# Patient Record
Sex: MALE | Race: WHITE | NOT HISPANIC OR LATINO | Employment: STUDENT | URBAN - METROPOLITAN AREA
[De-identification: names, ages, dates, MRNs, and addresses within clinical notes are randomized per-mention and may not be internally consistent; named-entity substitution may affect disease eponyms.]

---
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
## 2024-01-12 ENCOUNTER — HOSPITAL ENCOUNTER (EMERGENCY)
Facility: HOSPITAL | Age: 19
Discharge: HOME | End: 2024-01-12
Attending: EMERGENCY MEDICINE
Payer: COMMERCIAL

## 2024-01-12 VITALS
HEART RATE: 103 BPM | RESPIRATION RATE: 22 BRPM | SYSTOLIC BLOOD PRESSURE: 144 MMHG | TEMPERATURE: 98 F | DIASTOLIC BLOOD PRESSURE: 101 MMHG | BODY MASS INDEX: 52.5 KG/M2 | WEIGHT: 315 LBS | OXYGEN SATURATION: 96 %

## 2024-01-12 DIAGNOSIS — R11.2 NAUSEA VOMITING AND DIARRHEA: Primary | ICD-10-CM

## 2024-01-12 DIAGNOSIS — R10.13 EPIGASTRIC ABDOMINAL PAIN: ICD-10-CM

## 2024-01-12 DIAGNOSIS — R19.7 NAUSEA VOMITING AND DIARRHEA: Primary | ICD-10-CM

## 2024-01-12 PROCEDURE — 99284 EMERGENCY DEPT VISIT MOD MDM: CPT | Performed by: EMERGENCY MEDICINE

## 2024-01-12 PROCEDURE — 99284 EMERGENCY DEPT VISIT MOD MDM: CPT

## 2024-01-12 RX ORDER — ONDANSETRON 4 MG/1
4 TABLET, ORALLY DISINTEGRATING ORAL ONCE
Status: COMPLETED | OUTPATIENT
Start: 2024-01-12 | End: 2024-01-12

## 2024-01-12 RX ORDER — DICYCLOMINE HCL 20 MG
20 TABLET ORAL 2 TIMES DAILY
Qty: 20 TABLET | Refills: 0 | Status: SHIPPED | OUTPATIENT
Start: 2024-01-12

## 2024-01-12 RX ORDER — DICYCLOMINE HYDROCHLORIDE 10 MG/1
20 CAPSULE ORAL ONCE
Status: COMPLETED | OUTPATIENT
Start: 2024-01-12 | End: 2024-01-12

## 2024-01-12 RX ORDER — ONDANSETRON 4 MG/1
4 TABLET, FILM COATED ORAL EVERY 6 HOURS
Qty: 12 TABLET | Refills: 0 | Status: SHIPPED | OUTPATIENT
Start: 2024-01-12

## 2024-01-12 RX ADMIN — DICYCLOMINE HYDROCHLORIDE 20 MG: 10 CAPSULE ORAL at 14:29

## 2024-01-12 RX ADMIN — ONDANSETRON 4 MG: 4 TABLET, ORALLY DISINTEGRATING ORAL at 14:29

## 2024-01-12 NOTE — DISCHARGE INSTRUCTIONS
We have prescribed Bentyl for crampy abdominal pain, Zofran for nausea.  If you have any severe worsening of abdominal pain, persistent nausea and vomiting and are not able to tolerate fluids, have significant blood in your stool, return to the emergency department.  If you are still having a lot of diarrhea in the next 2 to 3 days, we have provided a prescription for stool studies which you can collect and take to the lab facility to check for common infectious causes.  Follow-up with your primary care provider in the next 1 to 2 weeks for reassessment.

## 2024-01-12 NOTE — ED PROVIDER NOTES
History  Chief Complaint   Patient presents with    Abdominal Pain     Mid abd pain for three days. Assoc with vomiting and diarrhea, gone now     HPI  Patient is a 18-year-old male presenting for evaluation of approximately 3 days of nausea, vomiting, diarrhea.  Patient stating that he has been having several episodes of nonbloody nonbilious emesis per day as well as numerous episodes of loose nonbloody nonmelanotic stool.  Patient states several episodes of stool incontinence.  Patient feels like he has a bowel movement every time he tries to eat something.  Patient denies headache, rhinorrhea, cough, sore throat, myalgias, fevers, chills, recent travel or sick contacts.  Patient states that his abdominal pain is totally resolved at time of evaluation in the emergency department.  Patient denies recent antibiotic use.  Prior to Admission Medications   Prescriptions Last Dose Informant Patient Reported? Taking?   Abilify Maintena 400 MG injection   Yes No   Sig: INJECT 2 ML INTRAMUSCUALRLY EVERY 4 WEEKS AS DIRECTED   Blood Glucose Monitoring Suppl (OneTouch Verio) w/Device KIT   No No   Sig: Use 2 (two) times a day Ok to substitute with insurance covered brand   Lancets (onetouch ultrasoft) lancets   No No   Sig: Use as instructed   buPROPion (WELLBUTRIN) 100 mg tablet  Family Member Yes No   Sig: Take 100 mg by mouth 2 (two) times a day   busPIRone (BUSPAR) 5 mg tablet   Yes No   Sig: Take 5 mg by mouth 2 (two) times a day   fenofibrate (TRICOR) 54 MG tablet   No No   Sig: Take 1 tablet (54 mg total) by mouth daily   glucose blood (OneTouch Verio) test strip   No No   Sig: TEST BLOOD SUGAR TWICE A DAY   metFORMIN (GLUCOPHAGE-XR) 500 mg 24 hr tablet   No No   Sig: Take 1 tablet (500 mg total) by mouth 2 (two) times a day with meals Start with 1 pill 500mg with breakfast x 1 week, then 1 pill 500mg with breakfast and 1 pill with dinner for 1 week , then 2 pills with breakfast (1000mg) and 1 pill with dinner (500mg)  for 1 week, then 1000mg twice a day going forward   omega-3-acid ethyl esters (LOVAZA) 1 g capsule   No No   Sig: Take 2 capsules (2 g total) by mouth 2 (two) times a day   Patient not taking: Reported on 10/17/2023   sitaGLIPtin (JANUVIA) 50 mg tablet   No No   Sig: Take 1 tablet (50 mg total) by mouth daily   topiramate (TOPAMAX) 50 MG tablet   Yes No   Sig: Take 50 mg by mouth 2 (two) times a day      Facility-Administered Medications: None       Past Medical History:   Diagnosis Date    ADHD (attention deficit hyperactivity disorder)     Diabetes mellitus (HCC)     5/2023    Lung collapse     Sleep apnea     Viral meningitis        Past Surgical History:   Procedure Laterality Date    TONSILECTOMY AND ADNOIDECTOMY      2020       Family History   Problem Relation Age of Onset    Hyperlipidemia Mother     Diabetes type II Mother     Obesity Mother     Obesity Father     Drug abuse Father     Diabetes type II Maternal Aunt     Lung disease Maternal Aunt     Rheum arthritis Maternal Aunt     Fibromyalgia Maternal Aunt     Atrial fibrillation Maternal Grandmother     Hyperlipidemia Maternal Grandfather     Diabetes type II Maternal Grandfather     Stroke Maternal Grandfather     Heart disease Maternal Grandfather     Stroke Paternal Grandfather      I have reviewed and agree with the history as documented.    E-Cigarette/Vaping    E-Cigarette Use Current Some Day User     Comments smoke cigg. when he has money      E-Cigarette/Vaping Substances    Nicotine No     THC No     CBD No     Flavoring Yes     Other No     Unknown No      Social History     Tobacco Use    Smoking status: Some Days     Types: Cigarettes     Passive exposure: Yes    Smokeless tobacco: Current   Vaping Use    Vaping status: Some Days    Substances: Flavoring   Substance Use Topics    Alcohol use: Not Currently     Alcohol/week: 2.0 standard drinks of alcohol     Types: 2 Shots of liquor per week    Drug use: Yes     Types: Marijuana        Review of Systems   Constitutional:  Negative for chills, fatigue and fever.   Respiratory:  Negative for cough and shortness of breath.    Gastrointestinal:  Positive for abdominal pain, diarrhea, nausea and vomiting.   Genitourinary:  Negative for decreased urine volume.   Musculoskeletal:  Negative for arthralgias and myalgias.   Neurological:  Negative for headaches.   Psychiatric/Behavioral:  Negative for confusion.    All other systems reviewed and are negative.      Physical Exam  Physical Exam  Vitals and nursing note reviewed.   Constitutional:       General: He is not in acute distress.     Appearance: Normal appearance. He is not ill-appearing, toxic-appearing or diaphoretic.      Comments: Well-appearing, nontoxic, nondistressed   HENT:      Head: Normocephalic and atraumatic.      Comments: Moist mucous membranes     Right Ear: External ear normal.      Left Ear: External ear normal.   Eyes:      General:         Right eye: No discharge.         Left eye: No discharge.   Cardiovascular:      Comments: Regular rate and rhythm, no murmurs rubs or gallops.  Extremities warm and well-perfused without mottling.  Pulmonary:      Effort: No respiratory distress.      Comments: No increased work of breathing.  Speaking in complete sentences.  Lungs clear to auscultation bilaterally without wheezes, rales, rhonchi.  Satting 98% on room air indicating adequate oxygenation.  Abdominal:      General: There is no distension.      Comments: Abdomen obese, protuberant, but nontender without rigidity, rebound, guarding   Musculoskeletal:         General: No deformity.      Cervical back: Normal range of motion.   Skin:     Findings: No lesion or rash.   Neurological:      Mental Status: He is alert and oriented to person, place, and time. Mental status is at baseline.      Comments: Awake, alert, pleasant, interactive   Psychiatric:         Mood and Affect: Mood and affect normal.         Vital Signs  ED Triage  Vitals [01/12/24 1320]   Temperature Pulse Respirations Blood Pressure SpO2   98 °F (36.7 °C) 103 22 (!) 144/101 96 %      Temp Source Heart Rate Source Patient Position - Orthostatic VS BP Location FiO2 (%)   Tympanic Monitor Sitting Right arm --      Pain Score       No Pain           Vitals:    01/12/24 1320   BP: (!) 144/101   Pulse: 103   Patient Position - Orthostatic VS: Sitting         Visual Acuity      ED Medications  Medications   dicyclomine (BENTYL) capsule 20 mg (has no administration in time range)   ondansetron (ZOFRAN-ODT) dispersible tablet 4 mg (has no administration in time range)       Diagnostic Studies  Results Reviewed       None                   No orders to display              Procedures  Procedures         ED Course                                             Medical Decision Making  I obtained history from the patient.  Patient well-appearing with normal vital signs, no external signs of dehydration, benign abdominal examination.  Patient's nausea, vomiting, diarrhea all consistent with a viral gastroenteritis.  Provided with prescription for Bentyl for crampy abdominal pain, Zofran for nausea.  Provided with prescriptions for stool studies if he continues to have diarrhea for more than the next few days.  Instructed to continue oral hydration, discharged with return precautions.    Amount and/or Complexity of Data Reviewed  Labs: ordered.    Risk  Prescription drug management.             Disposition  Final diagnoses:   Nausea vomiting and diarrhea   Epigastric abdominal pain     Time reflects when diagnosis was documented in both MDM as applicable and the Disposition within this note       Time User Action Codes Description Comment    1/12/2024  2:22 PM Dylan Peres Add [R11.2,  R19.7] Nausea vomiting and diarrhea     1/12/2024  2:22 PM Dylan Peres Add [R10.13] Epigastric abdominal pain           ED Disposition       ED Disposition   Discharge    Condition   Stable     Date/Time   Fri Jan 12, 2024  2:22 PM    Comment   Rik Marin discharge to home/self care.                   Follow-up Information       Follow up With Specialties Details Why Contact Info Additional Information    [facility] Emergency Department Emergency Medicine  If symptoms worsen [address] [facility] [address]  [phone] [facility] Emergency Department, [address] [place], [place], [address]            Patient's Medications   Discharge Prescriptions    DICYCLOMINE (BENTYL) 20 MG TABLET    Take 1 tablet (20 mg total) by mouth 2 (two) times a day       Start Date: 1/12/2024 End Date: --       Order Dose: 20 mg       Quantity: 20 tablet    Refills: 0    ONDANSETRON (ZOFRAN) 4 MG TABLET    Take 1 tablet (4 mg total) by mouth every 6 (six) hours       Start Date: 1/12/2024 End Date: --       Order Dose: 4 mg       Quantity: 12 tablet    Refills: 0       Outpatient Discharge Orders   Stool Enteric Bacterial Panel by PCR   Standing Status: Future Standing Exp. Date: 01/12/25     Ova and parasite examination   Standing Status: Future Standing Exp. Date: 01/12/25     Clostridium difficile toxin by PCR with EIA   Standing Status: Future Standing Exp. Date: 01/12/25       PDMP Review       None            ED Provider  Electronically Signed by             Dylan Peres MD  01/12/24 1497

## 2024-02-27 ENCOUNTER — HOSPITAL ENCOUNTER (EMERGENCY)
Facility: HOSPITAL | Age: 19
Discharge: HOME | End: 2024-02-27
Attending: EMERGENCY MEDICINE
Payer: COMMERCIAL

## 2024-02-27 VITALS
OXYGEN SATURATION: 94 % | RESPIRATION RATE: 18 BRPM | SYSTOLIC BLOOD PRESSURE: 159 MMHG | BODY MASS INDEX: 52.49 KG/M2 | DIASTOLIC BLOOD PRESSURE: 96 MMHG | TEMPERATURE: 97.1 F | HEART RATE: 108 BPM | WEIGHT: 315 LBS

## 2024-02-27 DIAGNOSIS — L02.91 ABSCESS: Primary | ICD-10-CM

## 2024-02-27 PROCEDURE — 99284 EMERGENCY DEPT VISIT MOD MDM: CPT | Performed by: EMERGENCY MEDICINE

## 2024-02-27 PROCEDURE — 99282 EMERGENCY DEPT VISIT SF MDM: CPT

## 2024-02-27 RX ORDER — SULFAMETHOXAZOLE AND TRIMETHOPRIM 800; 160 MG/1; MG/1
1 TABLET ORAL ONCE
Status: COMPLETED | OUTPATIENT
Start: 2024-02-27 | End: 2024-02-27

## 2024-02-27 RX ORDER — SULFAMETHOXAZOLE AND TRIMETHOPRIM 800; 160 MG/1; MG/1
1 TABLET ORAL 2 TIMES DAILY
Qty: 14 TABLET | Refills: 0 | Status: SHIPPED | OUTPATIENT
Start: 2024-02-27 | End: 2024-03-05

## 2024-02-27 RX ADMIN — SULFAMETHOXAZOLE AND TRIMETHOPRIM 1 TABLET: 800; 160 TABLET ORAL at 17:15

## 2024-02-28 NOTE — ED PROVIDER NOTES
History  Chief Complaint   Patient presents with    Groin Pain     States he's bleeding from groin     Patient presents via EMS from his day program for evaluation of bleeding from his groin.  Patient is unsure where the blood is coming from states he reached down and felt it when he felt something wet.      History provided by:  Patient and EMS personnel   used: No    Groin Pain  Associated symptoms: groin pain        Prior to Admission Medications   Prescriptions Last Dose Informant Patient Reported? Taking?   Abilify Maintena 400 MG injection   Yes No   Sig: INJECT 2 ML INTRAMUSCUALRLY EVERY 4 WEEKS AS DIRECTED   Blood Glucose Monitoring Suppl (OneTouch Verio) w/Device KIT   No No   Sig: Use 2 (two) times a day Ok to substitute with insurance covered brand   Lancets (onetouch ultrasoft) lancets   No No   Sig: Use as instructed   buPROPion (WELLBUTRIN) 100 mg tablet  Family Member Yes No   Sig: Take 100 mg by mouth 2 (two) times a day   busPIRone (BUSPAR) 5 mg tablet   Yes No   Sig: Take 5 mg by mouth 2 (two) times a day   dicyclomine (BENTYL) 20 mg tablet   No No   Sig: Take 1 tablet (20 mg total) by mouth 2 (two) times a day   fenofibrate (TRICOR) 54 MG tablet   No No   Sig: Take 1 tablet (54 mg total) by mouth daily   glucose blood (OneTouch Verio) test strip   No No   Sig: TEST BLOOD SUGAR TWICE A DAY   metFORMIN (GLUCOPHAGE-XR) 500 mg 24 hr tablet   No No   Sig: Take 1 tablet (500 mg total) by mouth 2 (two) times a day with meals Start with 1 pill 500mg with breakfast x 1 week, then 1 pill 500mg with breakfast and 1 pill with dinner for 1 week , then 2 pills with breakfast (1000mg) and 1 pill with dinner (500mg) for 1 week, then 1000mg twice a day going forward   omega-3-acid ethyl esters (LOVAZA) 1 g capsule   No No   Sig: Take 2 capsules (2 g total) by mouth 2 (two) times a day   Patient not taking: Reported on 10/17/2023   ondansetron (ZOFRAN) 4 mg tablet   No No   Sig: Take 1 tablet (4  mg total) by mouth every 6 (six) hours   sitaGLIPtin (JANUVIA) 50 mg tablet   No No   Sig: Take 1 tablet (50 mg total) by mouth daily   topiramate (TOPAMAX) 50 MG tablet   Yes No   Sig: Take 50 mg by mouth 2 (two) times a day      Facility-Administered Medications: None       Past Medical History:   Diagnosis Date    ADHD (attention deficit hyperactivity disorder)     Diabetes mellitus (HCC)     5/2023    Lung collapse     Sleep apnea     Viral meningitis        Past Surgical History:   Procedure Laterality Date    TONSILECTOMY AND ADNOIDECTOMY      2020       Family History   Problem Relation Age of Onset    Hyperlipidemia Mother     Diabetes type II Mother     Obesity Mother     Obesity Father     Drug abuse Father     Diabetes type II Maternal Aunt     Lung disease Maternal Aunt     Rheum arthritis Maternal Aunt     Fibromyalgia Maternal Aunt     Atrial fibrillation Maternal Grandmother     Hyperlipidemia Maternal Grandfather     Diabetes type II Maternal Grandfather     Stroke Maternal Grandfather     Heart disease Maternal Grandfather     Stroke Paternal Grandfather      I have reviewed and agree with the history as documented.    E-Cigarette/Vaping    E-Cigarette Use Current Some Day User     Comments smoke cigg. when he has money      E-Cigarette/Vaping Substances    Nicotine No     THC No     CBD No     Flavoring Yes     Other No     Unknown No      Social History     Tobacco Use    Smoking status: Some Days     Types: Cigarettes     Passive exposure: Yes    Smokeless tobacco: Current   Vaping Use    Vaping status: Some Days    Substances: Flavoring   Substance Use Topics    Alcohol use: Not Currently     Alcohol/week: 2.0 standard drinks of alcohol     Types: 2 Shots of liquor per week    Drug use: Yes     Types: Marijuana       Review of Systems   All other systems reviewed and are negative.      Physical Exam  Physical Exam  Vitals and nursing note reviewed.   Constitutional:       General: He is not in  "acute distress.     Appearance: He is obese.   Skin:         Neurological:      General: No focal deficit present.      Mental Status: He is alert and oriented to person, place, and time.         Vital Signs  ED Triage Vitals [02/27/24 1640]   Temperature Pulse Respirations Blood Pressure SpO2   (!) 97.1 °F (36.2 °C) (!) 108 18 159/96 94 %      Temp src Heart Rate Source Patient Position - Orthostatic VS BP Location FiO2 (%)   -- -- -- -- --      Pain Score       3           Vitals:    02/27/24 1640   BP: 159/96   Pulse: (!) 108         Visual Acuity      ED Medications  Medications   sulfamethoxazole-trimethoprim (BACTRIM DS) 800-160 mg per tablet 1 tablet (1 tablet Oral Given 2/27/24 1715)       Diagnostic Studies  Results Reviewed       None                   No orders to display              Procedures  Procedures         ED Course         CRAFFT      Flowsheet Row Most Recent Value   CRAFFT Initial Screen: During the past 12 months, did you:    1. Drink any alcohol (more than a few sips)?  No Filed at: 02/27/2024 [facility]   2. Smoke any marijuana or hashish No Filed at: 02/27/2024 [facility]   3. Use anything else to get high? (\"anything else\" includes illegal drugs, over the counter and prescription drugs, and things that you sniff or 'patel')? No Filed at: 02/27/2024 1640                                            Medical Decision Making  Pulse ox 94% on room air indicating adequate oxygenation.      Spontaneously ruptured abscess will start on antibiotics keep area clean warm soaks as needed return if symptoms worsen.    Risk  Prescription drug management.             Disposition  Final diagnoses:   Abscess     Time reflects when diagnosis was documented in both MDM as applicable and the Disposition within this note       Time User Action Codes Description Comment    2/27/2024  5:14 PM Amado Mercado Add [L02.91] Abscess           ED Disposition       ED Disposition   Discharge    Condition   Stable    Date/Time   Tue " Feb 27, 2024 1714    Comment   Rik Tomas discharge to home/self care.                   Follow-up Information       Follow up With Specialties Details Why Contact Info Additional Information    [facility] Emergency Department Emergency Medicine  If symptoms worsen [address] [facility] [id number] [phone] [facility] Emergency Department, [address] [place], [place], [address]    Gustavo Fajardo MD  In 4 days For wound re-check [address] [address] [phone]               Discharge Medication List as of 2/27/2024  5:15 PM        START taking these medications    Details   sulfamethoxazole-trimethoprim (BACTRIM DS) 800-160 mg per tablet Take 1 tablet by mouth 2 (two) times a day for 7 days smx-tmp DS (BACTRIM) 800-160 mg tabs (1tab q12 D10), Starting Tue 2/27/2024, Until Tue 3/5/2024, Normal           CONTINUE these medications which have NOT CHANGED    Details   Abilify Maintena 400 MG injection INJECT 2 ML INTRAMUSCUALRLY EVERY 4 WEEKS AS DIRECTED, Historical Med      Blood Glucose Monitoring Suppl (OneTouch Verio) w/Device KIT Use 2 (two) times a day Ok to substitute with insurance covered brand, Starting Fri 10/27/2023, Normal      buPROPion (WELLBUTRIN) 100 mg tablet Take 100 mg by mouth 2 (two) times a day, Historical Med      busPIRone (BUSPAR) 5 mg tablet Take 5 mg by mouth 2 (two) times a day, Starting Fri 6/30/2023, Historical Med      dicyclomine (BENTYL) 20 mg tablet Take 1 tablet (20 mg total) by mouth 2 (two) times a day, Starting Fri 1/12/2024, Normal      fenofibrate (TRICOR) 54 MG tablet Take 1 tablet (54 mg total) by mouth daily, Starting Thu 9/28/2023, Normal      glucose blood (OneTouch Verio) test strip TEST BLOOD SUGAR TWICE A DAY, Normal      Lancets (onetouch ultrasoft) lancets Use as instructed, Normal      metFORMIN (GLUCOPHAGE-XR) 500 mg 24 hr tablet Take 1 tablet (500 mg total) by mouth 2 (two)  times a day with meals Start with 1 pill 500mg with breakfast x 1 week, then 1 pill 500mg with breakfast and 1 pill with dinner for 1 week , then 2 pills with breakfast (1000mg) and 1 pill with dinner (500mg)  for 1 week, then 1000mg twice a day going forward, Starting Tue 10/17/2023, Normal      omega-3-acid ethyl esters (LOVAZA) 1 g capsule Take 2 capsules (2 g total) by mouth 2 (two) times a day, Starting Thu 9/28/2023, Normal      ondansetron (ZOFRAN) 4 mg tablet Take 1 tablet (4 mg total) by mouth every 6 (six) hours, Starting Fri 1/12/2024, Normal      sitaGLIPtin (JANUVIA) 50 mg tablet Take 1 tablet (50 mg total) by mouth daily, Starting Tue 10/17/2023, Normal      topiramate (TOPAMAX) 50 MG tablet Take 50 mg by mouth 2 (two) times a day, Starting Thu 3/10/2022, Historical Med             No discharge procedures on file.    PDMP Review       None            ED Provider  Electronically Signed by             Amado Mercado DO  02/28/24 2162

## 2024-03-04 ENCOUNTER — HOSPITAL ENCOUNTER (EMERGENCY)
Facility: HOSPITAL | Age: 19
Discharge: HOME | End: 2024-03-04
Attending: EMERGENCY MEDICINE
Payer: COMMERCIAL

## 2024-03-04 VITALS
HEART RATE: 106 BPM | DIASTOLIC BLOOD PRESSURE: 90 MMHG | TEMPERATURE: 96.9 F | WEIGHT: 315 LBS | RESPIRATION RATE: 18 BRPM | SYSTOLIC BLOOD PRESSURE: 148 MMHG | BODY MASS INDEX: 52.49 KG/M2 | OXYGEN SATURATION: 95 %

## 2024-03-04 DIAGNOSIS — Z00.8 ENCOUNTER FOR PSYCHOLOGICAL EVALUATION: ICD-10-CM

## 2024-03-04 DIAGNOSIS — F84.0 AUTISM: Primary | ICD-10-CM

## 2024-03-04 PROCEDURE — 99283 EMERGENCY DEPT VISIT LOW MDM: CPT

## 2024-03-04 PROCEDURE — 99284 EMERGENCY DEPT VISIT MOD MDM: CPT | Performed by: EMERGENCY MEDICINE

## 2024-03-04 NOTE — ED PROVIDER NOTES
"History  Chief Complaint   Patient presents with    Psychiatric Evaluation     Pt states he just needs to talk to crisis      Pt is a 19yo M who presents for psych eval.  Patient is well-known to this emergency department.  Patient was recently hospitalized and since that time has been going to a partial program Monday through Friday.  Patient missed his ride to his partial program today and therefore became upset.  Patient called crisis and they recommended he come in for further evaluation.  Patient denies any SI, HI, AH, VH.  Patient stating that he gets frustrated when he \"cannot control everything in his life\".        Prior to Admission Medications   Prescriptions Last Dose Informant Patient Reported? Taking?   Abilify Maintena 400 MG injection   Yes No   Sig: INJECT 2 ML INTRAMUSCUALRLY EVERY 4 WEEKS AS DIRECTED   Blood Glucose Monitoring Suppl (OneTouch Verio) w/Device KIT   No No   Sig: Use 2 (two) times a day Ok to substitute with insurance covered brand   Lancets (onetouch ultrasoft) lancets   No No   Sig: Use as instructed   buPROPion (WELLBUTRIN) 100 mg tablet  Family Member Yes No   Sig: Take 100 mg by mouth 2 (two) times a day   busPIRone (BUSPAR) 5 mg tablet   Yes No   Sig: Take 5 mg by mouth 2 (two) times a day   dicyclomine (BENTYL) 20 mg tablet   No No   Sig: Take 1 tablet (20 mg total) by mouth 2 (two) times a day   fenofibrate (TRICOR) 54 MG tablet   No No   Sig: Take 1 tablet (54 mg total) by mouth daily   glucose blood (OneTouch Verio) test strip   No No   Sig: TEST BLOOD SUGAR TWICE A DAY   metFORMIN (GLUCOPHAGE-XR) 500 mg 24 hr tablet   No No   Sig: Take 1 tablet (500 mg total) by mouth 2 (two) times a day with meals Start with 1 pill 500mg with breakfast x 1 week, then 1 pill 500mg with breakfast and 1 pill with dinner for 1 week , then 2 pills with breakfast (1000mg) and 1 pill with dinner (500mg) for 1 week, then 1000mg twice a day going forward   omega-3-acid ethyl esters (LOVAZA) 1 g " capsule   No No   Sig: Take 2 capsules (2 g total) by mouth 2 (two) times a day   Patient not taking: Reported on 10/17/2023   ondansetron (ZOFRAN) 4 mg tablet   No No   Sig: Take 1 tablet (4 mg total) by mouth every 6 (six) hours   sitaGLIPtin (JANUVIA) 50 mg tablet   No No   Sig: Take 1 tablet (50 mg total) by mouth daily   sulfamethoxazole-trimethoprim (BACTRIM DS) 800-160 mg per tablet   No No   Sig: Take 1 tablet by mouth 2 (two) times a day for 7 days smx-tmp DS (BACTRIM) 800-160 mg tabs (1tab q12 D10)   topiramate (TOPAMAX) 50 MG tablet   Yes No   Sig: Take 50 mg by mouth 2 (two) times a day      Facility-Administered Medications: None       Past Medical History:   Diagnosis Date    ADHD (attention deficit hyperactivity disorder)     Diabetes mellitus (HCC)     5/2023    Lung collapse     Sleep apnea     Viral meningitis        Past Surgical History:   Procedure Laterality Date    TONSILECTOMY AND ADNOIDECTOMY      2020       Family History   Problem Relation Age of Onset    Hyperlipidemia Mother     Diabetes type II Mother     Obesity Mother     Obesity Father     Drug abuse Father     Diabetes type II Maternal Aunt     Lung disease Maternal Aunt     Rheum arthritis Maternal Aunt     Fibromyalgia Maternal Aunt     Atrial fibrillation Maternal Grandmother     Hyperlipidemia Maternal Grandfather     Diabetes type II Maternal Grandfather     Stroke Maternal Grandfather     Heart disease Maternal Grandfather     Stroke Paternal Grandfather      I have reviewed and agree with the history as documented.    E-Cigarette/Vaping    E-Cigarette Use Current Some Day User     Comments smoke cigg. when he has money      E-Cigarette/Vaping Substances    Nicotine No     THC No     CBD No     Flavoring Yes     Other No     Unknown No      Social History     Tobacco Use    Smoking status: Some Days     Types: Cigarettes     Passive exposure: Yes    Smokeless tobacco: Current   Vaping Use    Vaping status: Some Days     Substances: Flavoring   Substance Use Topics    Alcohol use: Not Currently     Alcohol/week: 2.0 standard drinks of alcohol     Types: 2 Shots of liquor per week    Drug use: Yes     Types: Marijuana       Review of Systems   Psychiatric/Behavioral:  Positive for agitation.    All other systems reviewed and are negative.      Physical Exam  Physical Exam  Vitals reviewed.   Constitutional:       General: He is not in acute distress.     Appearance: He is well-developed. He is obese. He is not toxic-appearing.   HENT:      Head: Atraumatic.      Right Ear: External ear normal.      Left Ear: External ear normal.      Nose: Nose normal.   Cardiovascular:      Rate and Rhythm: Normal rate and regular rhythm.      Heart sounds: Normal heart sounds.   Pulmonary:      Effort: Pulmonary effort is normal. No respiratory distress.   Abdominal:      General: There is no distension.      Palpations: Abdomen is soft.   Musculoskeletal:         General: Normal range of motion.      Cervical back: Neck supple.   Skin:     General: Skin is warm and dry.      Capillary Refill: Capillary refill takes less than 2 seconds.   Neurological:      Mental Status: He is alert and oriented to person, place, and time.   Psychiatric:         Attention and Perception: He does not perceive auditory or visual hallucinations.         Speech: Speech normal.         Behavior: Behavior is cooperative.         Thought Content: Thought content does not include homicidal or suicidal ideation.         Vital Signs  ED Triage Vitals   Temperature Pulse Respirations Blood Pressure SpO2   03/04/24 1438 03/04/24 1439 03/04/24 1438 03/04/24 1439 03/04/24 1439   (!) 96.9 °F (36.1 °C) (!) 106 18 148/90 95 %      Temp src Heart Rate Source Patient Position - Orthostatic VS BP Location FiO2 (%)   -- -- -- -- --             Pain Score       03/04/24 1437       No Pain           Vitals:    03/04/24 1439   BP: 148/90   Pulse: (!) 106         Visual Acuity      ED  "Medications  Medications - No data to display    Diagnostic Studies  Results Reviewed       None                   No orders to display              Procedures  Procedures         ED Course  ED Course as of 03/04/24 1845   Mon Mar 04, 2024   1445 Crisis at bedside.    1503 Pt not meeting criteria for inpt. FG sending peer support to talk with pt. Will await their eval.          CRAFFT      Flowsheet Row Most Recent Value   CRAFFT Initial Screen: During the past 12 months, did you:    1. Drink any alcohol (more than a few sips)?  No Filed at: 03/04/2024 7308   2. Smoke any marijuana or hashish No Filed at: 03/04/2024 1437   3. Use anything else to get high? (\"anything else\" includes illegal drugs, over the counter and prescription drugs, and things that you sniff or 'patel')? No Filed at: 03/04/2024 3427                                            Medical Decision Making  Pt is a 17yo M who presents for psych eval.    Will plan for crisis evaluation.  See ED course for results and details.    Plan to discharge pt with f/u to PCP and partial program. Discussed returning the ED with new or worsening of symptoms. Discussed continuing home medications as prescribed. Pt expressed understanding of discharge instructions, return precautions, and medication instructions and is stable for discharge at this time. All questions were answered and pt was discharged without incident.                Disposition  Final diagnoses:   Autism   Encounter for psychological evaluation     Time reflects when diagnosis was documented in both MDM as applicable and the Disposition within this note       Time User Action Codes Description Comment    3/4/2024  3:53 PM Echo Snyder Add [F84.0] Autism     3/4/2024  3:53 PM Echo Snyder Add [Z00.8] Encounter for psychological evaluation           ED Disposition       ED Disposition   Discharge    Condition   Stable    Date/Time   Mon Mar 4, 2024 2583    Comment   Rik Marin discharge " to home/self care.                   Follow-up Information       Follow up With Specialties Details Why Contact Info    Gustavo Fajardo MD  Call  As needed [address] [id number] [phone]              Discharge Medication List as of 3/4/2024  3:54 PM        CONTINUE these medications which have NOT CHANGED    Details   Abilify Maintena 400 MG injection INJECT 2 ML INTRAMUSCUALRLY EVERY 4 WEEKS AS DIRECTED, Historical Med      Blood Glucose Monitoring Suppl (OneTouch Verio) w/Device KIT Use 2 (two) times a day Ok to substitute with insurance covered brand, Starting Fri 10/27/2023, Normal      buPROPion (WELLBUTRIN) 100 mg tablet Take 100 mg by mouth 2 (two) times a day, Historical Med      busPIRone (BUSPAR) 5 mg tablet Take 5 mg by mouth 2 (two) times a day, Starting Fri 6/30/2023, Historical Med      dicyclomine (BENTYL) 20 mg tablet Take 1 tablet (20 mg total) by mouth 2 (two) times a day, Starting Fri 1/12/2024, Normal      fenofibrate (TRICOR) 54 MG tablet Take 1 tablet (54 mg total) by mouth daily, Starting Thu 9/28/2023, Normal      glucose blood (OneTouch Verio) test strip TEST BLOOD SUGAR TWICE A DAY, Normal      Lancets (onetouch ultrasoft) lancets Use as instructed, Normal      metFORMIN (GLUCOPHAGE-XR) 500 mg 24 hr tablet Take 1 tablet (500 mg total) by mouth 2 (two) times a day with meals Start with 1 pill 500mg with breakfast x 1 week, then 1 pill 500mg with breakfast and 1 pill with dinner for 1 week , then 2 pills with breakfast (1000mg) and 1 pill with dinner (500mg)  for 1 week, then 1000mg twice a day going forward, Starting Tue 10/17/2023, Normal      omega-3-acid ethyl esters (LOVAZA) 1 g capsule Take 2 capsules (2 g total) by mouth 2 (two) times a day, Starting Thu 9/28/2023, Normal      ondansetron (ZOFRAN) 4 mg tablet Take 1 tablet (4 mg total) by mouth every 6 (six) hours, Starting Fri 1/12/2024, Normal      sitaGLIPtin (JANUVIA) 50 mg tablet Take 1 tablet (50 mg  total) by mouth daily, Starting Tue 10/17/2023, Normal      sulfamethoxazole-trimethoprim (BACTRIM DS) 800-160 mg per tablet Take 1 tablet by mouth 2 (two) times a day for 7 days smx-tmp DS (BACTRIM) 800-160 mg tabs (1tab q12 D10), Starting Tue 2/27/2024, Until Tue 3/5/2024, Normal      topiramate (TOPAMAX) 50 MG tablet Take 50 mg by mouth 2 (two) times a day, Starting u 3/10/2022, Historical Med             No discharge procedures on file.    PDMP Review       None            ED Provider  Electronically Signed by             Echo Snyder MD  03/04/24 3969

## 2024-03-04 NOTE — DISCHARGE INSTRUCTIONS
Keep any upcoming appointments.  Continue attending your partial program.   Return to the ED with new or worsening symptoms.

## 2024-03-04 NOTE — ED NOTES
SOTO/Robert was sending a [facility] peer support person (Delfino) to the ER to meet with the patient.    15:45 - SOTO/Delfino in to speak with patient - OK with d/c - ER Attending advised - patient to go visit his grandfather in ICU post d/c.
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
## 2024-03-12 ENCOUNTER — HOSPITAL ENCOUNTER (EMERGENCY)
Facility: HOSPITAL | Age: 19
Discharge: HOME | End: 2024-03-12
Attending: EMERGENCY MEDICINE
Payer: COMMERCIAL

## 2024-03-12 VITALS
WEIGHT: 315 LBS | SYSTOLIC BLOOD PRESSURE: 165 MMHG | RESPIRATION RATE: 20 BRPM | OXYGEN SATURATION: 98 % | HEIGHT: 74 IN | HEART RATE: 106 BPM | TEMPERATURE: 97.7 F | DIASTOLIC BLOOD PRESSURE: 82 MMHG | BODY MASS INDEX: 40.43 KG/M2

## 2024-03-12 DIAGNOSIS — F12.188 CANNABIS HYPEREMESIS SYNDROME CONCURRENT WITH AND DUE TO CANNABIS ABUSE (HCC): Primary | ICD-10-CM

## 2024-03-12 PROCEDURE — 96361 HYDRATE IV INFUSION ADD-ON: CPT

## 2024-03-12 PROCEDURE — 99284 EMERGENCY DEPT VISIT MOD MDM: CPT | Performed by: EMERGENCY MEDICINE

## 2024-03-12 PROCEDURE — 96374 THER/PROPH/DIAG INJ IV PUSH: CPT

## 2024-03-12 PROCEDURE — 99283 EMERGENCY DEPT VISIT LOW MDM: CPT

## 2024-03-12 RX ORDER — ONDANSETRON 4 MG/1
4 TABLET, ORALLY DISINTEGRATING ORAL EVERY 6 HOURS PRN
Qty: 12 TABLET | Refills: 0 | Status: SHIPPED | OUTPATIENT
Start: 2024-03-12

## 2024-03-12 RX ORDER — ONDANSETRON 2 MG/ML
4 INJECTION INTRAMUSCULAR; INTRAVENOUS ONCE
Status: COMPLETED | OUTPATIENT
Start: 2024-03-12 | End: 2024-03-12

## 2024-03-12 RX ADMIN — ONDANSETRON 4 MG: 2 INJECTION INTRAMUSCULAR; INTRAVENOUS at 15:28

## 2024-03-12 RX ADMIN — SODIUM CHLORIDE 1000 ML: 0.9 INJECTION, SOLUTION INTRAVENOUS at 15:29

## 2024-03-12 NOTE — ED PROVIDER NOTES
"History  Chief Complaint   Patient presents with    Vomiting     Patient inhaled marijuana and subsequently vomited. Nausea since subsided, but the patient states \"I need help, I'm stoned and I feel like crap\"     18-year-old male with past history of morbid obesity, ADHD, viral meningitis, presents to the ED for nausea and vomiting.  Earlier today patient smokes cigarette and then smoked a joint of marijuana and became nauseous.  Patient states that he did not eat breakfast this morning.  Subsequently ambulance was called and patient brought to the ED for further evaluation.  Patient has no other complaints.      History provided by:  Patient  Vomiting  Associated symptoms: no abdominal pain, no arthralgias, no chills, no cough, no fever and no sore throat        Prior to Admission Medications   Prescriptions Last Dose Informant Patient Reported? Taking?   Abilify Maintena 400 MG injection   Yes No   Sig: INJECT 2 ML INTRAMUSCUALRLY EVERY 4 WEEKS AS DIRECTED   Blood Glucose Monitoring Suppl (OneTouch Verio) w/Device KIT   No No   Sig: Use 2 (two) times a day Ok to substitute with insurance covered brand   Lancets (onetouch ultrasoft) lancets   No No   Sig: Use as instructed   buPROPion (WELLBUTRIN) 100 mg tablet  Family Member Yes No   Sig: Take 100 mg by mouth 2 (two) times a day   busPIRone (BUSPAR) 5 mg tablet   Yes No   Sig: Take 5 mg by mouth 2 (two) times a day   dicyclomine (BENTYL) 20 mg tablet   No No   Sig: Take 1 tablet (20 mg total) by mouth 2 (two) times a day   fenofibrate (TRICOR) 54 MG tablet   No No   Sig: Take 1 tablet (54 mg total) by mouth daily   glucose blood (OneTouch Verio) test strip   No No   Sig: TEST BLOOD SUGAR TWICE A DAY   metFORMIN (GLUCOPHAGE-XR) 500 mg 24 hr tablet   No No   Sig: Take 1 tablet (500 mg total) by mouth 2 (two) times a day with meals Start with 1 pill 500mg with breakfast x 1 week, then 1 pill 500mg with breakfast and 1 pill with dinner for 1 week , then 2 pills with " breakfast (1000mg) and 1 pill with dinner (500mg) for 1 week, then 1000mg twice a day going forward   omega-3-acid ethyl esters (LOVAZA) 1 g capsule   No No   Sig: Take 2 capsules (2 g total) by mouth 2 (two) times a day   Patient not taking: Reported on 10/17/2023   ondansetron (ZOFRAN) 4 mg tablet   No No   Sig: Take 1 tablet (4 mg total) by mouth every 6 (six) hours   sitaGLIPtin (JANUVIA) 50 mg tablet   No No   Sig: Take 1 tablet (50 mg total) by mouth daily   topiramate (TOPAMAX) 50 MG tablet   Yes No   Sig: Take 50 mg by mouth 2 (two) times a day      Facility-Administered Medications: None       Past Medical History:   Diagnosis Date    ADHD (attention deficit hyperactivity disorder)     Diabetes mellitus (HCC)     5/2023    Lung collapse     Sleep apnea     Viral meningitis        Past Surgical History:   Procedure Laterality Date    TONSILECTOMY AND ADNOIDECTOMY      2020       Family History   Problem Relation Age of Onset    Hyperlipidemia Mother     Diabetes type II Mother     Obesity Mother     Obesity Father     Drug abuse Father     Diabetes type II Maternal Aunt     Lung disease Maternal Aunt     Rheum arthritis Maternal Aunt     Fibromyalgia Maternal Aunt     Atrial fibrillation Maternal Grandmother     Hyperlipidemia Maternal Grandfather     Diabetes type II Maternal Grandfather     Stroke Maternal Grandfather     Heart disease Maternal Grandfather     Stroke Paternal Grandfather      I have reviewed and agree with the history as documented.    E-Cigarette/Vaping    E-Cigarette Use Current Some Day User     Comments smoke cigg. when he has money      E-Cigarette/Vaping Substances    Nicotine No     THC No     CBD No     Flavoring Yes     Other No     Unknown No      Social History     Tobacco Use    Smoking status: Some Days     Types: Cigarettes     Passive exposure: Yes    Smokeless tobacco: Current   Vaping Use    Vaping status: Some Days    Substances: Flavoring   Substance Use Topics     Alcohol use: Not Currently     Alcohol/week: 2.0 standard drinks of alcohol     Types: 2 Shots of liquor per week    Drug use: Yes     Types: Marijuana       Review of Systems   Constitutional:  Negative for chills and fever.   HENT:  Negative for ear pain and sore throat.    Eyes:  Negative for pain and visual disturbance.   Respiratory:  Negative for cough and shortness of breath.    Cardiovascular:  Negative for chest pain and palpitations.   Gastrointestinal:  Positive for nausea and vomiting. Negative for abdominal pain.   Genitourinary:  Negative for dysuria and hematuria.   Musculoskeletal:  Negative for arthralgias and back pain.   Skin:  Negative for color change and rash.   Neurological:  Negative for seizures and syncope.   All other systems reviewed and are negative.      Physical Exam  Physical Exam  Vitals and nursing note reviewed.   Constitutional:       General: He is not in acute distress.     Appearance: He is well-developed.   HENT:      Head: Normocephalic and atraumatic.   Eyes:      Conjunctiva/sclera: Conjunctivae normal.   Cardiovascular:      Rate and Rhythm: Regular rhythm. Tachycardia present.      Heart sounds: No murmur heard.  Pulmonary:      Effort: Pulmonary effort is normal. No respiratory distress.      Breath sounds: Normal breath sounds.   Abdominal:      General: Abdomen is flat. Bowel sounds are normal. There is no distension.      Palpations: Abdomen is soft.      Tenderness: There is no abdominal tenderness.      Comments: Abdomen is soft, nondistended, with bowel sound present all 4 quadrants.  No tenderness noted to palpation of abdomen.   Musculoskeletal:         General: No swelling.      Cervical back: Neck supple.   Skin:     General: Skin is warm and dry.      Capillary Refill: Capillary refill takes less than 2 seconds.   Neurological:      Mental Status: He is alert.   Psychiatric:         Mood and Affect: Mood normal.         Vital Signs  ED Triage Vitals [03/12/24  "1514]   Temperature Pulse Respirations Blood Pressure SpO2   97.7 °F (36.5 °C) (!) 108 20 165/82 96 %      Temp Source Heart Rate Source Patient Position - Orthostatic VS BP Location FiO2 (%)   Oral Monitor Sitting Left arm --      Pain Score       --           Vitals:    03/12/24 1514   BP: 165/82   Pulse: (!) 108   Patient Position - Orthostatic VS: Sitting         Visual Acuity      ED Medications  Medications   sodium chloride 0.9 % bolus 1,000 mL (1,000 mL Intravenous New Bag 3/12/24 1529)   ondansetron (ZOFRAN) injection 4 mg (4 mg Intravenous Given 3/12/24 1528)   ondansetron (ZOFRAN) injection 4 mg (4 mg Intravenous Given 3/12/24 1528)       Diagnostic Studies  Results Reviewed       None                   No orders to display              Procedures  Procedures         ED Course  ED Course as of 03/12/24 1608   Tue Mar 12, 2024   1535 Patient states that he is now hungry and he would like to eat something.   1548 Patient reexamined at bedside.  Patient is eating a turkey sandwich with no signs of emesis.  Patient states that he feels better.  I discussed cessation from illicit drug use with patient.  At this time patient will be discharged with follow-up to PCP.         DIAZ      Flowsheet Row Most Recent Value   DIAZ Initial Screen: During the past 12 months, did you:    1. Drink any alcohol (more than a few sips)?  No Filed at: 03/12/2024 1518   2. Smoke any marijuana or hashish Yes Filed at: 03/12/2024 1518   3. Use anything else to get high? (\"anything else\" includes illegal drugs, over the counter and prescription drugs, and things that you sniff or 'patel')? No Filed at: 03/12/2024 1518   DIAZ Full Screen: During the past 12 months:    1. Have you ever ridden in a car driven by someone (including yourself) who was \"high\" or had been using alcohol or drugs? 1 Filed at: 03/12/2024 1518   2. Do you ever use alcohol or drugs to relax, feel better about yourself, or fit in? 1 Filed at: 03/12/2024 1518 "   3. Do you ever use alcohol/drugs while you are by yourself, alone? 1 Filed at: 03/12/2024 1518   4. Do you ever forget things you did while using alcohol or drugs? 1 Filed at: 03/12/2024 8738   5. Do your family or friends ever tell you that you should cut down on your drinking or drug use? 0 Filed at: 03/12/2024 7099   6. Have you gotten into trouble while you were using alcohol or drugs? 1 Filed at: 03/12/2024 1518   CRAFFT Score 5 Filed at: 03/12/2024 1516                                            Medical Decision Making  Give IV fluids, antiemetics and continue to monitor patient for any worsening symptoms.    Patient felt better with IV fluids and IV Zofran.  Patient then tolerated p.o. without any further emesis.  At this time I had a long discussion about refraining from illicit drug use as well as marijuana causing cannabis hyperemesis syndrome.  Patient discharged home with follow-up to PCP.  Close return instructions given to return to the ER for any worsening symptoms.  Patient agrees with discharge plan.  Patient well appearing at time of discharge.    Please Note: Fluency Direct voice recognition software may have been used in the creation of this document. Wrong words or sound a like substitutions may have occurred due to the inherent limitations of the voice software.         Risk  Prescription drug management.             Disposition  Final diagnoses:   Cannabis hyperemesis syndrome concurrent with and due to cannabis abuse (HCC)     Time reflects when diagnosis was documented in both MDM as applicable and the Disposition within this note       Time User Action Codes Description Comment    3/12/2024  4:07 PM Sharon Law Add [F12.188] Cannabis hyperemesis syndrome concurrent with and due to cannabis abuse (HCC)           ED Disposition       ED Disposition   Discharge    Condition   Stable    Date/Time   Tue Mar 12, 2024 1607    Comment   Rik Marin discharge to home/self care.                    Follow-up Information       Follow up With Specialties Details Why Contact Info    Gustavo Fajardo MD  Schedule an appointment as soon as possible for a visit   [address] [address]  [phone]              Patient's Medications   Discharge Prescriptions    ONDANSETRON (ZOFRAN-ODT) 4 MG DISINTEGRATING TABLET    Take 1 tablet (4 mg total) by mouth every 6 (six) hours as needed for nausea or vomiting       Start Date: 3/12/2024 End Date: --       Order Dose: 4 mg       Quantity: 12 tablet    Refills: 0       No discharge procedures on file.    PDMP Review       None            ED Provider  Electronically Signed by             Sharon Law DO  03/12/24 0624

## 2024-03-17 ENCOUNTER — HOSPITAL ENCOUNTER (EMERGENCY)
Facility: HOSPITAL | Age: 19
Discharge: HOME | End: 2024-03-17
Attending: EMERGENCY MEDICINE
Payer: COMMERCIAL

## 2024-03-17 VITALS
HEART RATE: 106 BPM | OXYGEN SATURATION: 95 % | DIASTOLIC BLOOD PRESSURE: 83 MMHG | SYSTOLIC BLOOD PRESSURE: 138 MMHG | TEMPERATURE: 98.3 F | RESPIRATION RATE: 20 BRPM

## 2024-03-17 DIAGNOSIS — L02.91 ABSCESS: Primary | ICD-10-CM

## 2024-03-17 PROCEDURE — 99282 EMERGENCY DEPT VISIT SF MDM: CPT

## 2024-03-17 PROCEDURE — 99284 EMERGENCY DEPT VISIT MOD MDM: CPT | Performed by: EMERGENCY MEDICINE

## 2024-03-17 NOTE — ED PROVIDER NOTES
History  Chief Complaint   Patient presents with    Abscess     Two abscesses , one under each arm for about a week, here 5 days ago for unrelated prob     Patient presents for evaluation abscesses under both arms states has been there for a week.  History of hidradenitis suppurativa.       History provided by:  Patient   used: No    Abscess      Prior to Admission Medications   Prescriptions Last Dose Informant Patient Reported? Taking?   Abilify Maintena 400 MG injection   Yes No   Sig: INJECT 2 ML INTRAMUSCUALRLY EVERY 4 WEEKS AS DIRECTED   Blood Glucose Monitoring Suppl (OneTouch Verio) w/Device KIT   No No   Sig: Use 2 (two) times a day Ok to substitute with insurance covered brand   Lancets (onetouch ultrasoft) lancets   No No   Sig: Use as instructed   buPROPion (WELLBUTRIN) 100 mg tablet  Family Member Yes No   Sig: Take 100 mg by mouth 2 (two) times a day   busPIRone (BUSPAR) 5 mg tablet   Yes No   Sig: Take 5 mg by mouth 2 (two) times a day   dicyclomine (BENTYL) 20 mg tablet   No No   Sig: Take 1 tablet (20 mg total) by mouth 2 (two) times a day   fenofibrate (TRICOR) 54 MG tablet   No No   Sig: Take 1 tablet (54 mg total) by mouth daily   glucose blood (OneTouch Verio) test strip   No No   Sig: TEST BLOOD SUGAR TWICE A DAY   metFORMIN (GLUCOPHAGE-XR) 500 mg 24 hr tablet   No No   Sig: Take 1 tablet (500 mg total) by mouth 2 (two) times a day with meals Start with 1 pill 500mg with breakfast x 1 week, then 1 pill 500mg with breakfast and 1 pill with dinner for 1 week , then 2 pills with breakfast (1000mg) and 1 pill with dinner (500mg) for 1 week, then 1000mg twice a day going forward   omega-3-acid ethyl esters (LOVAZA) 1 g capsule   No No   Sig: Take 2 capsules (2 g total) by mouth 2 (two) times a day   Patient not taking: Reported on 10/17/2023   ondansetron (ZOFRAN) 4 mg tablet   No No   Sig: Take 1 tablet (4 mg total) by mouth every 6 (six) hours   ondansetron (ZOFRAN-ODT) 4 mg  disintegrating tablet   No No   Sig: Take 1 tablet (4 mg total) by mouth every 6 (six) hours as needed for nausea or vomiting   sitaGLIPtin (JANUVIA) 50 mg tablet   No No   Sig: Take 1 tablet (50 mg total) by mouth daily   topiramate (TOPAMAX) 50 MG tablet   Yes No   Sig: Take 50 mg by mouth 2 (two) times a day      Facility-Administered Medications: None       Past Medical History:   Diagnosis Date    ADHD (attention deficit hyperactivity disorder)     Diabetes mellitus (HCC)     5/2023    Lung collapse     Sleep apnea     Viral meningitis        Past Surgical History:   Procedure Laterality Date    TONSILECTOMY AND ADNOIDECTOMY      2020       Family History   Problem Relation Age of Onset    Hyperlipidemia Mother     Diabetes type II Mother     Obesity Mother     Obesity Father     Drug abuse Father     Diabetes type II Maternal Aunt     Lung disease Maternal Aunt     Rheum arthritis Maternal Aunt     Fibromyalgia Maternal Aunt     Atrial fibrillation Maternal Grandmother     Hyperlipidemia Maternal Grandfather     Diabetes type II Maternal Grandfather     Stroke Maternal Grandfather     Heart disease Maternal Grandfather     Stroke Paternal Grandfather      I have reviewed and agree with the history as documented.    E-Cigarette/Vaping    E-Cigarette Use Current Some Day User     Comments smoke cigg. when he has money      E-Cigarette/Vaping Substances    Nicotine No     THC No     CBD No     Flavoring Yes     Other No     Unknown No      Social History     Tobacco Use    Smoking status: Some Days     Types: Cigarettes     Passive exposure: Yes    Smokeless tobacco: Current   Vaping Use    Vaping status: Some Days    Substances: Flavoring   Substance Use Topics    Alcohol use: Not Currently     Alcohol/week: 2.0 standard drinks of alcohol     Types: 2 Shots of liquor per week    Drug use: Yes     Types: Marijuana       Review of Systems   All other systems reviewed and are negative.      Physical Exam  Physical  Exam  Vitals and nursing note reviewed.   Constitutional:       General: He is not in acute distress.     Appearance: He is obese.   Skin:         Neurological:      General: No focal deficit present.      Mental Status: He is alert and oriented to person, place, and time.         Vital Signs  ED Triage Vitals [03/17/24 1451]   Temperature Pulse Respirations Blood Pressure SpO2   98.3 °F (36.8 °C) (!) 106 20 138/83 95 %      Temp Source Heart Rate Source Patient Position - Orthostatic VS BP Location FiO2 (%)   Tympanic -- Sitting Right arm --      Pain Score       No Pain           Vitals:    03/17/24 1451   BP: 138/83   Pulse: (!) 106   Patient Position - Orthostatic VS: Sitting         Visual Acuity      ED Medications  Medications - No data to display    Diagnostic Studies  Results Reviewed       None                   No orders to display              Procedures  Procedures         ED Course                                             Medical Decision Making  Pulse ox 95% on room air indicating adequate oxygenation.    Patient use antibiotic ointment for the open area on the right side continue warm compresses on the left.    Risk  Prescription drug management.             Disposition  Final diagnoses:   Abscess     Time reflects when diagnosis was documented in both MDM as applicable and the Disposition within this note       Time User Action Codes Description Comment    3/17/2024  3:19 PM Amado Mercado Add [L02.91] Abscess           ED Disposition       ED Disposition   Discharge    Condition   Stable    Date/Time   Sun Mar 17, 2024  3:19 PM    Comment   Rik Marin discharge to home/self care.                   Follow-up Information       Follow up With Specialties Details Why Contact Info    Gustavo Fajardo MD  In 3 days For wound re-check [address] [address]  [phone]              Patient's Medications   Discharge Prescriptions    MUPIROCIN (BACTROBAN) 2 % OINTMENT     Apply topically 3 (three) times a day       Start Date: 3/17/2024 End Date: --       Order Dose: --       Quantity: 15 g    Refills: 0       No discharge procedures on file.    PDMP Review       None            ED Provider  Electronically Signed by             Amado Mercado DO  03/17/24 152

## 2024-04-03 ENCOUNTER — APPOINTMENT (EMERGENCY)
Dept: RADIOLOGY | Facility: HOSPITAL | Age: 19
End: 2024-04-03
Payer: COMMERCIAL

## 2024-04-03 ENCOUNTER — HOSPITAL ENCOUNTER (EMERGENCY)
Facility: HOSPITAL | Age: 19
Discharge: HOME | End: 2024-04-03
Attending: EMERGENCY MEDICINE
Payer: COMMERCIAL

## 2024-04-03 ENCOUNTER — HOSPITAL ENCOUNTER (EMERGENCY)
Facility: HOSPITAL | Age: 19
Discharge: HOME | End: 2024-04-04
Attending: EMERGENCY MEDICINE
Payer: COMMERCIAL

## 2024-04-03 VITALS
RESPIRATION RATE: 22 BRPM | WEIGHT: 315 LBS | DIASTOLIC BLOOD PRESSURE: 78 MMHG | SYSTOLIC BLOOD PRESSURE: 171 MMHG | OXYGEN SATURATION: 95 % | BODY MASS INDEX: 50.16 KG/M2 | TEMPERATURE: 98.3 F | HEART RATE: 105 BPM

## 2024-04-03 VITALS
OXYGEN SATURATION: 96 % | HEART RATE: 103 BPM | DIASTOLIC BLOOD PRESSURE: 62 MMHG | TEMPERATURE: 97.9 F | RESPIRATION RATE: 20 BRPM | SYSTOLIC BLOOD PRESSURE: 143 MMHG

## 2024-04-03 DIAGNOSIS — S09.90XA CLOSED HEAD INJURY, INITIAL ENCOUNTER: Primary | ICD-10-CM

## 2024-04-03 DIAGNOSIS — S09.90XA CLOSED HEAD INJURY, INITIAL ENCOUNTER: ICD-10-CM

## 2024-04-03 DIAGNOSIS — R51.9 HEADACHE: Primary | ICD-10-CM

## 2024-04-03 PROCEDURE — 99284 EMERGENCY DEPT VISIT MOD MDM: CPT

## 2024-04-03 PROCEDURE — 70450 CT HEAD/BRAIN W/O DYE: CPT

## 2024-04-03 PROCEDURE — 99284 EMERGENCY DEPT VISIT MOD MDM: CPT | Performed by: EMERGENCY MEDICINE

## 2024-04-03 RX ORDER — KETOROLAC TROMETHAMINE 30 MG/ML
15 INJECTION, SOLUTION INTRAMUSCULAR; INTRAVENOUS ONCE
Status: COMPLETED | OUTPATIENT
Start: 2024-04-03 | End: 2024-04-03

## 2024-04-03 RX ORDER — ACETAMINOPHEN 325 MG/1
975 TABLET ORAL ONCE
Status: COMPLETED | OUTPATIENT
Start: 2024-04-03 | End: 2024-04-03

## 2024-04-03 RX ADMIN — KETOROLAC TROMETHAMINE 15 MG: 30 INJECTION, SOLUTION INTRAMUSCULAR; INTRAVENOUS at 22:02

## 2024-04-03 RX ADMIN — ACETAMINOPHEN 975 MG: 325 TABLET ORAL at 21:47

## 2024-04-04 PROCEDURE — 99284 EMERGENCY DEPT VISIT MOD MDM: CPT | Performed by: EMERGENCY MEDICINE

## 2024-04-04 NOTE — ED PROVIDER NOTES
History  Chief Complaint   Patient presents with    Head Injury     States he was lifting a refridgerator over his head and it came down on his head. No loc no neck prob. C/o severe headache     HPI  Patient is a 18-year-old male history of diabetes, ADHD, autism presenting for evaluation of closed head injury.  Patient states that he was moving a refrigerator when it fell and struck him on the head.  Patient denies loss of consciousness patient denies anticoagulation or antiplatelet use.  Patient still complains of a moderate to severe headache, states some minor sensitivity to light, denies dizziness, nausea, vomiting, focal weakness or numbness.  Patient denies neck pain, denies trauma to any other part of his body.  Prior to Admission Medications   Prescriptions Last Dose Informant Patient Reported? Taking?   Abilify Maintena 400 MG injection   Yes No   Sig: INJECT 2 ML INTRAMUSCUALRLY EVERY 4 WEEKS AS DIRECTED   Blood Glucose Monitoring Suppl (OneTouch Verio) w/Device KIT   No No   Sig: Use 2 (two) times a day Ok to substitute with insurance covered brand   Lancets (onetouch ultrasoft) lancets   No No   Sig: Use as instructed   buPROPion (WELLBUTRIN) 100 mg tablet  Family Member Yes No   Sig: Take 100 mg by mouth 2 (two) times a day   busPIRone (BUSPAR) 5 mg tablet   Yes No   Sig: Take 5 mg by mouth 2 (two) times a day   dicyclomine (BENTYL) 20 mg tablet   No No   Sig: Take 1 tablet (20 mg total) by mouth 2 (two) times a day   fenofibrate (TRICOR) 54 MG tablet   No No   Sig: Take 1 tablet (54 mg total) by mouth daily   glucose blood (OneTouch Verio) test strip   No No   Sig: TEST BLOOD SUGAR TWICE A DAY   metFORMIN (GLUCOPHAGE-XR) 500 mg 24 hr tablet   No No   Sig: Take 1 tablet (500 mg total) by mouth 2 (two) times a day with meals Start with 1 pill 500mg with breakfast x 1 week, then 1 pill 500mg with breakfast and 1 pill with dinner for 1 week , then 2 pills with breakfast (1000mg) and 1 pill with dinner  (500mg) for 1 week, then 1000mg twice a day going forward   mupirocin (BACTROBAN) 2 % ointment   No No   Sig: Apply topically 3 (three) times a day   omega-3-acid ethyl esters (LOVAZA) 1 g capsule   No No   Sig: Take 2 capsules (2 g total) by mouth 2 (two) times a day   Patient not taking: Reported on 10/17/2023   ondansetron (ZOFRAN) 4 mg tablet   No No   Sig: Take 1 tablet (4 mg total) by mouth every 6 (six) hours   ondansetron (ZOFRAN-ODT) 4 mg disintegrating tablet   No No   Sig: Take 1 tablet (4 mg total) by mouth every 6 (six) hours as needed for nausea or vomiting   sitaGLIPtin (JANUVIA) 50 mg tablet   No No   Sig: Take 1 tablet (50 mg total) by mouth daily   topiramate (TOPAMAX) 50 MG tablet   Yes No   Sig: Take 50 mg by mouth 2 (two) times a day      Facility-Administered Medications: None       Past Medical History:   Diagnosis Date    ADHD (attention deficit hyperactivity disorder)     Diabetes mellitus (HCC)     5/2023    Lung collapse     Sleep apnea     Viral meningitis        Past Surgical History:   Procedure Laterality Date    TONSILECTOMY AND ADNOIDECTOMY      2020       Family History   Problem Relation Age of Onset    Hyperlipidemia Mother     Diabetes type II Mother     Obesity Mother     Obesity Father     Drug abuse Father     Diabetes type II Maternal Aunt     Lung disease Maternal Aunt     Rheum arthritis Maternal Aunt     Fibromyalgia Maternal Aunt     Atrial fibrillation Maternal Grandmother     Hyperlipidemia Maternal Grandfather     Diabetes type II Maternal Grandfather     Stroke Maternal Grandfather     Heart disease Maternal Grandfather     Stroke Paternal Grandfather      I have reviewed and agree with the history as documented.    E-Cigarette/Vaping    E-Cigarette Use Current Some Day User     Comments smoke cigg. when he has money      E-Cigarette/Vaping Substances    Nicotine No     THC No     CBD No     Flavoring Yes     Other No     Unknown No      Social History     Tobacco Use     Smoking status: Some Days     Types: Cigarettes     Passive exposure: Yes    Smokeless tobacco: Current   Vaping Use    Vaping status: Some Days    Substances: Flavoring   Substance Use Topics    Alcohol use: Not Currently     Alcohol/week: 2.0 standard drinks of alcohol     Types: 2 Shots of liquor per week    Drug use: Yes     Types: Marijuana       Review of Systems   Constitutional:  Negative for chills, fatigue and fever.   Gastrointestinal:  Negative for nausea and vomiting.   Musculoskeletal:  Negative for arthralgias, back pain, myalgias, neck pain and neck stiffness.   Neurological:  Negative for syncope, weakness and numbness.   All other systems reviewed and are negative.      Physical Exam  Physical Exam  Vitals and nursing note reviewed.   Constitutional:       General: He is not in acute distress.     Appearance: Normal appearance. He is not ill-appearing, toxic-appearing or diaphoretic.      Comments: Well-appearing, nontoxic, nondistressed   HENT:      Head: Normocephalic and atraumatic.      Comments: Moist mucous membranes.  No external signs of trauma.  Pupils 3 mm bilaterally, reactive to light     Right Ear: External ear normal.      Left Ear: External ear normal.   Eyes:      General:         Right eye: No discharge.         Left eye: No discharge.   Pulmonary:      Effort: No respiratory distress.   Abdominal:      General: There is no distension.   Musculoskeletal:         General: No deformity.      Cervical back: Normal range of motion.      Comments: No C/T/L-spine tenderness, step-off, deformity   Skin:     Findings: No lesion or rash.   Neurological:      Mental Status: He is alert and oriented to person, place, and time. Mental status is at baseline.      Comments: Awake, alert, pleasant, interactive.  Cranial nerves II through XII intact.  Full strength and sensation bilaterally in upper and lower extremities.  Able to walk across the room without ataxia   Psychiatric:         Mood  "and Affect: Mood and affect normal.         Vital Signs  ED Triage Vitals [04/03/24 2132]   Temperature Pulse Respirations Blood Pressure SpO2   98.3 °F (36.8 °C) 105 22 (!) 171/78 95 %      Temp Source Heart Rate Source Patient Position - Orthostatic VS BP Location FiO2 (%)   Tympanic Monitor Sitting Right arm --      Pain Score       10 - Worst Possible Pain           Vitals:    04/03/24 2132   BP: (!) 171/78   Pulse: 105   Patient Position - Orthostatic VS: Sitting         Visual Acuity  Visual Acuity      Flowsheet Row Most Recent Value   L Pupil Size (mm) 3   R Pupil Size (mm) 3            ED Medications  Medications   acetaminophen (TYLENOL) tablet 975 mg (975 mg Oral Given 4/3/24 2147)   ketorolac (TORADOL) injection 15 mg (15 mg Intramuscular Given 4/3/24 2202)       Diagnostic Studies  Results Reviewed       None                   No orders to display              Procedures  Procedures         ED Course         CRAFFT      Flowsheet Row Most Recent Value   CRAFFT Initial Screen: During the past 12 months, did you:    1. Drink any alcohol (more than a few sips)?  No Filed at: 04/03/2024 2150   2. Smoke any marijuana or hashish No Filed at: 04/03/2024 2150   3. Use anything else to get high? (\"anything else\" includes illegal drugs, over the counter and prescription drugs, and things that you sniff or 'patel')? No Filed at: 04/03/2024 2150                                            Medical Decision Making  I obtained history from the patient.  Patient with closed head injury from a refrigerator.  Patient well-appearing, smiling, eating [employer].  Patient with a nonfocal neurologic exam.  After discussion with patient and patient's mother, decision to defer head imaging at this time.  Cervical spine cleared clinically Via Nexus criteria.  Treated symptomatically with Tylenol, Toradol, provided with reassurance, discharged with return precautions.    Amount and/or Complexity of Data Reviewed  Radiology: " ordered.    Risk  OTC drugs.  Prescription drug management.             Disposition  Final diagnoses:   Closed head injury, initial encounter     Time reflects when diagnosis was documented in both MDM as applicable and the Disposition within this note       Time User Action Codes Description Comment    4/3/2024  9:54 PM Dylan Peres Add [S09.90XA] Closed head injury, initial encounter           ED Disposition       ED Disposition   Discharge    Condition   Stable    Date/Time   Wed Apr 3, 2024 2106    Comment   Rik Marin discharge to home/self care.                   Follow-up Information       Follow up With Specialties Details Why Contact Info Additional Information    [facility] Emergency Medicine  If symptoms worsen [address] [facility] [address]  [phone] [facility], [address] [place], [place], [address]            Discharge Medication List as of 4/3/2024  9:56 PM        CONTINUE these medications which have NOT CHANGED    Details   Abilify Maintena 400 MG injection INJECT 2 ML INTRAMUSCUALRLY EVERY 4 WEEKS AS DIRECTED, Historical Med      Blood Glucose Monitoring Suppl (OneTouch Verio) w/Device KIT Use 2 (two) times a day Ok to substitute with insurance covered brand, Starting Fri 10/27/2023, Normal      buPROPion (WELLBUTRIN) 100 mg tablet Take 100 mg by mouth 2 (two) times a day, Historical Med      busPIRone (BUSPAR) 5 mg tablet Take 5 mg by mouth 2 (two) times a day, Starting Fri 6/30/2023, Historical Med      dicyclomine (BENTYL) 20 mg tablet Take 1 tablet (20 mg total) by mouth 2 (two) times a day, Starting Fri 1/12/2024, Normal      fenofibrate (TRICOR) 54 MG tablet Take 1 tablet (54 mg total) by mouth daily, Starting Thu 9/28/2023, Normal      glucose blood (OneTouch Verio) test strip TEST BLOOD SUGAR TWICE A DAY, Normal      Lancets (onetouch ultrasoft) lancets Use as instructed,  Normal      metFORMIN (GLUCOPHAGE-XR) 500 mg 24 hr tablet Take 1 tablet (500 mg total) by mouth 2 (two) times a day with meals Start with 1 pill 500mg with breakfast x 1 week, then 1 pill 500mg with breakfast and 1 pill with dinner for 1 week , then 2 pills with breakfast (1000mg) and 1 pill with dinner (500mg)  for 1 week, then 1000mg twice a day going forward, Starting Tue 10/17/2023, Normal      mupirocin (BACTROBAN) 2 % ointment Apply topically 3 (three) times a day, Starting Sun 3/17/2024, Normal      omega-3-acid ethyl esters (LOVAZA) 1 g capsule Take 2 capsules (2 g total) by mouth 2 (two) times a day, Starting Thu 9/28/2023, Normal      ondansetron (ZOFRAN) 4 mg tablet Take 1 tablet (4 mg total) by mouth every 6 (six) hours, Starting Fri 1/12/2024, Normal      ondansetron (ZOFRAN-ODT) 4 mg disintegrating tablet Take 1 tablet (4 mg total) by mouth every 6 (six) hours as needed for nausea or vomiting, Starting Tue 3/12/2024, Normal      sitaGLIPtin (JANUVIA) 50 mg tablet Take 1 tablet (50 mg total) by mouth daily, Starting Tue 10/17/2023, Normal      topiramate (TOPAMAX) 50 MG tablet Take 50 mg by mouth 2 (two) times a day, Starting Thu 3/10/2022, Historical Med             No discharge procedures on file.    PDMP Review       None            ED Provider  Electronically Signed by             Dylan Peres MD  04/03/24 8892

## 2024-04-04 NOTE — ED NOTES
Arrives laughing and very happy c/o of severe headache with large drink from [employer] and large bag of food from [employer]. Advised not to touch any of that until the dr approves it.     Marlen Rivers RN  04/03/24 2668

## 2024-04-04 NOTE — DISCHARGE INSTRUCTIONS
Use Tylenol and Motrin for headache.  If you have any severe worsening of headache, confusion, persistent nausea and vomiting, new weakness or numbness, return to the emergency department.  You likely sustained a minor concussion.  Avoid any additional head trauma.  Avoid any activities that worsen your headache or worsen dizziness or nausea.

## 2024-04-04 NOTE — ED PROVIDER NOTES
History  Chief Complaint   Patient presents with    Headache     Patient c/o worsening headache after hit in head with refrigerator      HPI  Patient is a 18-year-old male history of diabetes, ADHD presenting for evaluation of headache.  Patient was evaluated approximately 30 minutes ago for same complaint, had been discharged without head CT following shared decision making with patient and patient's mother but continues to have headache and return to the emergency department.  Prior to Admission Medications   Prescriptions Last Dose Informant Patient Reported? Taking?   Abilify Maintena 400 MG injection   Yes No   Sig: INJECT 2 ML INTRAMUSCUALRLY EVERY 4 WEEKS AS DIRECTED   Blood Glucose Monitoring Suppl (OneTouch Verio) w/Device KIT   No No   Sig: Use 2 (two) times a day Ok to substitute with insurance covered brand   Lancets (onetouch ultrasoft) lancets   No No   Sig: Use as instructed   buPROPion (WELLBUTRIN) 100 mg tablet  Family Member Yes No   Sig: Take 100 mg by mouth 2 (two) times a day   busPIRone (BUSPAR) 5 mg tablet   Yes No   Sig: Take 5 mg by mouth 2 (two) times a day   dicyclomine (BENTYL) 20 mg tablet   No No   Sig: Take 1 tablet (20 mg total) by mouth 2 (two) times a day   fenofibrate (TRICOR) 54 MG tablet   No No   Sig: Take 1 tablet (54 mg total) by mouth daily   glucose blood (OneTouch Verio) test strip   No No   Sig: TEST BLOOD SUGAR TWICE A DAY   metFORMIN (GLUCOPHAGE-XR) 500 mg 24 hr tablet   No No   Sig: Take 1 tablet (500 mg total) by mouth 2 (two) times a day with meals Start with 1 pill 500mg with breakfast x 1 week, then 1 pill 500mg with breakfast and 1 pill with dinner for 1 week , then 2 pills with breakfast (1000mg) and 1 pill with dinner (500mg) for 1 week, then 1000mg twice a day going forward   mupirocin (BACTROBAN) 2 % ointment   No No   Sig: Apply topically 3 (three) times a day   omega-3-acid ethyl esters (LOVAZA) 1 g capsule   No No   Sig: Take 2 capsules (2 g total) by mouth  2 (two) times a day   Patient not taking: Reported on 10/17/2023   ondansetron (ZOFRAN) 4 mg tablet   No No   Sig: Take 1 tablet (4 mg total) by mouth every 6 (six) hours   ondansetron (ZOFRAN-ODT) 4 mg disintegrating tablet   No No   Sig: Take 1 tablet (4 mg total) by mouth every 6 (six) hours as needed for nausea or vomiting   sitaGLIPtin (JANUVIA) 50 mg tablet   No No   Sig: Take 1 tablet (50 mg total) by mouth daily   topiramate (TOPAMAX) 50 MG tablet   Yes No   Sig: Take 50 mg by mouth 2 (two) times a day      Facility-Administered Medications: None       Past Medical History:   Diagnosis Date    ADHD (attention deficit hyperactivity disorder)     Diabetes mellitus (HCC)     5/2023    Lung collapse     Sleep apnea     Viral meningitis        Past Surgical History:   Procedure Laterality Date    TONSILECTOMY AND ADNOIDECTOMY      2020       Family History   Problem Relation Age of Onset    Hyperlipidemia Mother     Diabetes type II Mother     Obesity Mother     Obesity Father     Drug abuse Father     Diabetes type II Maternal Aunt     Lung disease Maternal Aunt     Rheum arthritis Maternal Aunt     Fibromyalgia Maternal Aunt     Atrial fibrillation Maternal Grandmother     Hyperlipidemia Maternal Grandfather     Diabetes type II Maternal Grandfather     Stroke Maternal Grandfather     Heart disease Maternal Grandfather     Stroke Paternal Grandfather      I have reviewed and agree with the history as documented.    E-Cigarette/Vaping    E-Cigarette Use Current Some Day User     Comments smoke cigg. when he has money      E-Cigarette/Vaping Substances    Nicotine No     THC No     CBD No     Flavoring Yes     Other No     Unknown No      Social History     Tobacco Use    Smoking status: Some Days     Types: Cigarettes     Passive exposure: Yes    Smokeless tobacco: Current   Vaping Use    Vaping status: Some Days    Substances: Flavoring   Substance Use Topics    Alcohol use: Not Currently     Alcohol/week: 2.0  standard drinks of alcohol     Types: 2 Shots of liquor per week    Drug use: Yes     Types: Marijuana       Review of Systems   Musculoskeletal:  Negative for arthralgias, back pain, myalgias, neck pain and neck stiffness.   Neurological:  Positive for headaches. Negative for weakness and numbness.   All other systems reviewed and are negative.      Physical Exam  Physical Exam  Vitals and nursing note reviewed.   Constitutional:       General: He is not in acute distress.     Appearance: Normal appearance. He is not ill-appearing, toxic-appearing or diaphoretic.   HENT:      Head: Normocephalic and atraumatic.      Right Ear: External ear normal.      Left Ear: External ear normal.   Eyes:      General:         Right eye: No discharge.         Left eye: No discharge.   Pulmonary:      Effort: No respiratory distress.   Abdominal:      General: There is no distension.   Musculoskeletal:         General: No deformity.      Cervical back: Normal range of motion.   Skin:     Findings: No lesion or rash.   Neurological:      Mental Status: He is alert and oriented to person, place, and time. Mental status is at baseline.      Comments: Cranial nerves II through XII intact.  Full strength and sensation bilaterally in the upper and lower extremities   Psychiatric:         Mood and Affect: Mood and affect normal.         Vital Signs  ED Triage Vitals   Temperature Pulse Respirations Blood Pressure SpO2   04/03/24 2310 04/03/24 2310 04/03/24 2310 04/03/24 2310 04/03/24 2310   97.9 °F (36.6 °C) 103 20 143/62 96 %      Temp Source Heart Rate Source Patient Position - Orthostatic VS BP Location FiO2 (%)   04/03/24 2310 04/03/24 2310 -- -- --   Oral Monitor         Pain Score       04/03/24 2314       10 - Worst Possible Pain           Vitals:    04/03/24 2310   BP: 143/62   Pulse: 103         Visual Acuity  Visual Acuity      Flowsheet Row Most Recent Value   L Pupil Size (mm) 3   R Pupil Size (mm) 3            ED  "Medications  Medications - No data to display    Diagnostic Studies  Results Reviewed       None                   CT head without contrast   Final Result by Russell Ugarte MD (04/04 0040)      No acute intracranial abnormality.                  Workstation performed: [id number]                    Procedures  Procedures         ED Course         CRAFFT      Flowsheet Row Most Recent Value   THIAGOFFBAY Initial Screen: During the past 12 months, did you:    1. Drink any alcohol (more than a few sips)?  No Filed at: 04/03/2024 2312   2. Smoke any marijuana or hashish No Filed at: 04/03/2024 2312   3. Use anything else to get high? (\"anything else\" includes illegal drugs, over the counter and prescription drugs, and things that you sniff or 'patel')? No Filed at: 04/03/2024 2312                                            Medical Decision Making  I obtained history from the patient.  I ordered and reviewed a CT head to evaluate for intracranial trauma.  CT head atraumatic.  Provided with reassurance, discharged with return precautions.    Amount and/or Complexity of Data Reviewed  Radiology: ordered.             Disposition  Final diagnoses:   Headache   Closed head injury, initial encounter     Time reflects when diagnosis was documented in both MDM as applicable and the Disposition within this note       Time User Action Codes Description Comment    4/4/2024 12:42 AM Dylan Peres Add [R51.9] Headache     4/4/2024 12:43 AM Dylan Peres Add [S09.90XA] Closed head injury, initial encounter           ED Disposition       ED Disposition   Discharge    Condition   Stable    Date/Time   Thu Apr 4, 2024 0042    Comment   Rik Marin discharge to home/self care.                   Follow-up Information       Follow up With Specialties Details Why Contact Info Additional Information    [facility] Emergency Department Emergency Medicine  If symptoms worsen [address] [place] [address]" [place] [address]  [phone] [facility] Emergency Department, [address], [place], [address]            Patient's Medications   Discharge Prescriptions    No medications on file       No discharge procedures on file.    PDMP Review       None            ED Provider  Electronically Signed by             Dylan Peres MD  04/04/24 0047

## 2024-04-04 NOTE — DISCHARGE INSTRUCTIONS
If you have any severe worsening of headache, persistent nausea and vomiting and are not able to drink fluids, have confusion, new weakness or numbness anywhere in your body, return to the emergency department.  Your CAT scan was normal.  Continue with Tylenol and Motrin as needed for headache.

## 2024-04-11 ENCOUNTER — HOSPITAL ENCOUNTER (EMERGENCY)
Facility: HOSPITAL | Age: 19
Discharge: HOME | End: 2024-04-11
Attending: STUDENT IN AN ORGANIZED HEALTH CARE EDUCATION/TRAINING PROGRAM | Admitting: STUDENT IN AN ORGANIZED HEALTH CARE EDUCATION/TRAINING PROGRAM
Payer: COMMERCIAL

## 2024-04-11 VITALS
TEMPERATURE: 98.8 F | OXYGEN SATURATION: 96 % | BODY MASS INDEX: 40.43 KG/M2 | DIASTOLIC BLOOD PRESSURE: 77 MMHG | WEIGHT: 315 LBS | HEART RATE: 123 BPM | RESPIRATION RATE: 20 BRPM | SYSTOLIC BLOOD PRESSURE: 138 MMHG | HEIGHT: 74 IN

## 2024-04-11 DIAGNOSIS — F12.90 MARIJUANA USE: ICD-10-CM

## 2024-04-11 DIAGNOSIS — F41.9 ANXIETY: Primary | ICD-10-CM

## 2024-04-11 LAB
ATRIAL RATE: 108 BPM
P AXIS: 48 DEGREES
PR INTERVAL: 140 MS
QRS AXIS: 67 DEGREES
QRSD INTERVAL: 90 MS
QT INTERVAL: 342 MS
QTC INTERVAL: 458 MS
T WAVE AXIS: 48 DEGREES
VENTRICULAR RATE: 108 BPM

## 2024-04-11 PROCEDURE — 93005 ELECTROCARDIOGRAM TRACING: CPT

## 2024-04-11 NOTE — ED PROVIDER NOTES
History  Chief Complaint   Patient presents with    Medication Problem     Patient took his abilify after smoking marajauna, now has a headache, nausea, cannot walk straight feels bad     Patient is a 18-year-old male, past medical history including diabetes, and ADHD, who presents to the emergency room for anxiety.  Patient states that he takes Abilify and recently took his injection.  He also smokes marijuana.  He states earlier today he smoked marijuana.  He then started to google and became very anxious.  He now presents for further evaluation.  Currently patient continues to endorse some mild anxiety.  States he feels hungry.  No other complaints or concerns.        Prior to Admission Medications   Prescriptions Last Dose Informant Patient Reported? Taking?   Abilify Maintena 400 MG injection   Yes No   Sig: INJECT 2 ML INTRAMUSCUALRLY EVERY 4 WEEKS AS DIRECTED   Blood Glucose Monitoring Suppl (OneTouch Verio) w/Device KIT   No No   Sig: Use 2 (two) times a day Ok to substitute with insurance covered brand   Lancets (onetouch ultrasoft) lancets   No No   Sig: Use as instructed   buPROPion (WELLBUTRIN) 100 mg tablet  Family Member Yes No   Sig: Take 100 mg by mouth 2 (two) times a day   busPIRone (BUSPAR) 5 mg tablet   Yes No   Sig: Take 5 mg by mouth 2 (two) times a day   dicyclomine (BENTYL) 20 mg tablet   No No   Sig: Take 1 tablet (20 mg total) by mouth 2 (two) times a day   fenofibrate (TRICOR) 54 MG tablet   No No   Sig: Take 1 tablet (54 mg total) by mouth daily   glucose blood (OneTouch Verio) test strip   No No   Sig: TEST BLOOD SUGAR TWICE A DAY   metFORMIN (GLUCOPHAGE-XR) 500 mg 24 hr tablet   No No   Sig: Take 1 tablet (500 mg total) by mouth 2 (two) times a day with meals Start with 1 pill 500mg with breakfast x 1 week, then 1 pill 500mg with breakfast and 1 pill with dinner for 1 week , then 2 pills with breakfast (1000mg) and 1 pill with dinner (500mg) for 1 week, then 1000mg twice a day going  forward   mupirocin (BACTROBAN) 2 % ointment   No No   Sig: Apply topically 3 (three) times a day   omega-3-acid ethyl esters (LOVAZA) 1 g capsule   No No   Sig: Take 2 capsules (2 g total) by mouth 2 (two) times a day   Patient not taking: Reported on 10/17/2023   ondansetron (ZOFRAN) 4 mg tablet   No No   Sig: Take 1 tablet (4 mg total) by mouth every 6 (six) hours   ondansetron (ZOFRAN-ODT) 4 mg disintegrating tablet   No No   Sig: Take 1 tablet (4 mg total) by mouth every 6 (six) hours as needed for nausea or vomiting   sitaGLIPtin (JANUVIA) 50 mg tablet   No No   Sig: Take 1 tablet (50 mg total) by mouth daily   topiramate (TOPAMAX) 50 MG tablet   Yes No   Sig: Take 50 mg by mouth 2 (two) times a day      Facility-Administered Medications: None       Past Medical History:   Diagnosis Date    ADHD (attention deficit hyperactivity disorder)     Diabetes mellitus (HCC)     5/2023    Lung collapse     Sleep apnea     Viral meningitis        Past Surgical History:   Procedure Laterality Date    TONSILECTOMY AND ADNOIDECTOMY      2020       Family History   Problem Relation Age of Onset    Hyperlipidemia Mother     Diabetes type II Mother     Obesity Mother     Obesity Father     Drug abuse Father     Diabetes type II Maternal Aunt     Lung disease Maternal Aunt     Rheum arthritis Maternal Aunt     Fibromyalgia Maternal Aunt     Atrial fibrillation Maternal Grandmother     Hyperlipidemia Maternal Grandfather     Diabetes type II Maternal Grandfather     Stroke Maternal Grandfather     Heart disease Maternal Grandfather     Stroke Paternal Grandfather      I have reviewed and agree with the history as documented.    E-Cigarette/Vaping    E-Cigarette Use Current Some Day User     Comments smoke cigg. when he has money      E-Cigarette/Vaping Substances    Nicotine No     THC No     CBD No     Flavoring Yes     Other No     Unknown No      Social History     Tobacco Use    Smoking status: Some Days     Types:  Cigarettes     Passive exposure: Yes    Smokeless tobacco: Current   Vaping Use    Vaping status: Some Days    Substances: Flavoring   Substance Use Topics    Alcohol use: Not Currently     Alcohol/week: 2.0 standard drinks of alcohol     Types: 2 Shots of liquor per week    Drug use: Yes     Types: Marijuana       Review of Systems   Psychiatric/Behavioral:  The patient is nervous/anxious.    All other systems reviewed and are negative.      Physical Exam  Physical Exam  Vitals and nursing note reviewed.   Constitutional:       General: He is not in acute distress.     Appearance: He is well-developed. He is not ill-appearing, toxic-appearing or diaphoretic.   HENT:      Head: Normocephalic and atraumatic.      Right Ear: External ear normal.      Left Ear: External ear normal.      Nose: Nose normal.   Eyes:      General: Lids are normal. No scleral icterus.  Cardiovascular:      Rate and Rhythm: Normal rate and regular rhythm.   Pulmonary:      Effort: Pulmonary effort is normal. No respiratory distress.   Musculoskeletal:         General: No deformity. Normal range of motion.      Cervical back: Normal range of motion and neck supple.   Skin:     General: Skin is warm and dry.   Neurological:      General: No focal deficit present.      Mental Status: He is alert.   Psychiatric:         Mood and Affect: Mood normal.         Behavior: Behavior normal.         Vital Signs  ED Triage Vitals [04/11/24 1426]   Temperature Pulse Respirations Blood Pressure SpO2   98.8 °F (37.1 °C) (!) 123 20 138/77 96 %      Temp Source Heart Rate Source Patient Position - Orthostatic VS BP Location FiO2 (%)   Tympanic Monitor Standing Right arm --      Pain Score       --           Vitals:    04/11/24 1426   BP: 138/77   Pulse: (!) 123   Patient Position - Orthostatic VS: Standing         Visual Acuity      ED Medications  Medications - No data to display    Diagnostic Studies  Results Reviewed       None                   No orders  "to display              Procedures  ECG 12 Lead Documentation Only    Date/Time: 4/11/2024 3:52 PM    Performed by: Kee Mendez DO  Authorized by: Kee Mendez DO    ECG reviewed by me, the ED Provider: yes    Patient location:  ED  Interpretation:     Interpretation: normal    Rate:     ECG rate:  108    ECG rate assessment: tachycardic    Rhythm:     Rhythm: sinus tachycardia    Ectopy:     Ectopy: none    QRS:     QRS axis:  Normal  Conduction:     Conduction: normal    ST segments:     ST segments:  Normal  T waves:     T waves: normal             ED Course  ED Course as of 04/11/24 1618   Thu Apr 11, 2024   1545 Pt re-evaluated. Feels better. Not anxious anymore. Does not want further workup or observation. Would like to go. Will d/c.          DIAZ      Flowsheet Row Most Recent Value   DIAZ Initial Screen: During the past 12 months, did you:    1. Drink any alcohol (more than a few sips)?  No Filed at: 04/11/2024 1427   2. Smoke any marijuana or hashish Yes Filed at: 04/11/2024 1427   3. Use anything else to get high? (\"anything else\" includes illegal drugs, over the counter and prescription drugs, and things that you sniff or 'patel')? No Filed at: 04/11/2024 1427   DIAZ Full Screen: During the past 12 months:    1. Have you ever ridden in a car driven by someone (including yourself) who was \"high\" or had been using alcohol or drugs? 0 Filed at: 04/11/2024 1427   2. Do you ever use alcohol or drugs to relax, feel better about yourself, or fit in? 0 Filed at: 04/11/2024 1427   3. Do you ever use alcohol/drugs while you are by yourself, alone? 0 Filed at: 04/11/2024 1427   4. Do you ever forget things you did while using alcohol or drugs? 1 Filed at: 04/11/2024 1427   5. Do your family or friends ever tell you that you should cut down on your drinking or drug use? 0 Filed at: 04/11/2024 1427   6. Have you gotten into trouble while you were using alcohol or drugs? 0 Filed at: 04/11/2024 1427 " "  CRAFFT Score 1 Filed at: 04/11/2024 1427                                            Medical Decision Making  Patient is a 18 y.o. male who presents to the ED for anxiety in the setting of marijuana use. Pt is non-toxic, well appearing. Tachy but otherwise vitals are stable.  Physical exam is unremarkable.    Differential includes but is not limited to: Marijuana use.  Presentation not consistent with other toxidromes.    Plan: EKG, observation                 Portions of the record may have been created with voice recognition software. Occasional wrong word or \"sound a like\" substitutions may have occurred due to the inherent limitations of voice recognition software. Read the chart carefully and recognize, using context, where substitutions have occurred.    Problems Addressed:  Anxiety: acute illness or injury  Marijuana use: acute illness or injury             Disposition  Final diagnoses:   Anxiety   Marijuana use     Time reflects when diagnosis was documented in both MDM as applicable and the Disposition within this note       Time User Action Codes Description Comment    4/11/2024  3:44 PM Kee Mendez Add [F41.9] Anxiety     4/11/2024  3:44 PM Kee Mendez Add [F12.90] Marijuana use           ED Disposition       ED Disposition   Discharge    Condition   Stable    Date/Time   Thu Apr 11, 2024 0384    Comment   Rik Marin discharge to home/self care.                   Follow-up Information       Follow up With Specialties Details Why Contact Info Additional Information    [employer]  Call in 1 day  [phone]       [facility] Emergency Department Emergency Medicine   [address] [place] [id number] [phone] [facility] Emergency Department, [address] [place] [address], [address]    Gustavo Fajardo MD    [address] [id number] [phone]               Discharge Medication List as of 4/11/2024  3:45 PM    "     CONTINUE these medications which have NOT CHANGED    Details   Abilify Maintena 400 MG injection INJECT 2 ML INTRAMUSCUALRLY EVERY 4 WEEKS AS DIRECTED, Historical Med      Blood Glucose Monitoring Suppl (OneTouch Verio) w/Device KIT Use 2 (two) times a day Ok to substitute with insurance covered brand, Starting Fri 10/27/2023, Normal      buPROPion (WELLBUTRIN) 100 mg tablet Take 100 mg by mouth 2 (two) times a day, Historical Med      busPIRone (BUSPAR) 5 mg tablet Take 5 mg by mouth 2 (two) times a day, Starting Fri 6/30/2023, Historical Med      dicyclomine (BENTYL) 20 mg tablet Take 1 tablet (20 mg total) by mouth 2 (two) times a day, Starting Fri 1/12/2024, Normal      fenofibrate (TRICOR) 54 MG tablet Take 1 tablet (54 mg total) by mouth daily, Starting Thu 9/28/2023, Normal      glucose blood (OneTouch Verio) test strip TEST BLOOD SUGAR TWICE A DAY, Normal      Lancets (onetouch ultrasoft) lancets Use as instructed, Normal      metFORMIN (GLUCOPHAGE-XR) 500 mg 24 hr tablet Take 1 tablet (500 mg total) by mouth 2 (two) times a day with meals Start with 1 pill 500mg with breakfast x 1 week, then 1 pill 500mg with breakfast and 1 pill with dinner for 1 week , then 2 pills with breakfast (1000mg) and 1 pill with dinner (500mg)  for 1 week, then 1000mg twice a day going forward, Starting Tue 10/17/2023, Normal      mupirocin (BACTROBAN) 2 % ointment Apply topically 3 (three) times a day, Starting Sun 3/17/2024, Normal      omega-3-acid ethyl esters (LOVAZA) 1 g capsule Take 2 capsules (2 g total) by mouth 2 (two) times a day, Starting Thu 9/28/2023, Normal      ondansetron (ZOFRAN) 4 mg tablet Take 1 tablet (4 mg total) by mouth every 6 (six) hours, Starting Fri 1/12/2024, Normal      ondansetron (ZOFRAN-ODT) 4 mg disintegrating tablet Take 1 tablet (4 mg total) by mouth every 6 (six) hours as needed for nausea or vomiting, Starting Tue 3/12/2024, Normal      sitaGLIPtin (JANUVIA) 50 mg tablet Take 1 tablet  (50 mg total) by mouth daily, Starting Tue 10/17/2023, Normal      topiramate (TOPAMAX) 50 MG tablet Take 50 mg by mouth 2 (two) times a day, Starting Thu 3/10/2022, Historical Med             No discharge procedures on file.    PDMP Review       None            ED Provider  Electronically Signed by             Kee Mendez DO  04/11/24 1706

## 2024-04-13 ENCOUNTER — HOSPITAL ENCOUNTER (EMERGENCY)
Facility: HOSPITAL | Age: 19
Discharge: HOME | End: 2024-04-13
Admitting: STUDENT IN AN ORGANIZED HEALTH CARE EDUCATION/TRAINING PROGRAM
Payer: COMMERCIAL

## 2024-04-13 VITALS
RESPIRATION RATE: 22 BRPM | BODY MASS INDEX: 50.07 KG/M2 | TEMPERATURE: 97.8 F | HEART RATE: 100 BPM | SYSTOLIC BLOOD PRESSURE: 145 MMHG | HEIGHT: 74 IN | OXYGEN SATURATION: 96 % | DIASTOLIC BLOOD PRESSURE: 71 MMHG

## 2024-04-13 DIAGNOSIS — B37.9 CANDIDA INFECTION: Primary | ICD-10-CM

## 2024-04-13 PROCEDURE — 99284 EMERGENCY DEPT VISIT MOD MDM: CPT | Performed by: PHYSICIAN ASSISTANT

## 2024-04-13 PROCEDURE — 99283 EMERGENCY DEPT VISIT LOW MDM: CPT

## 2024-04-13 RX ORDER — NYSTATIN 100000 U/G
CREAM TOPICAL 2 TIMES DAILY
Qty: 30 G | Refills: 0 | Status: SHIPPED | OUTPATIENT
Start: 2024-04-13 | End: 2024-05-13

## 2024-04-13 NOTE — ED NOTES
Patient refusing to find . Lyft ride to be set up, and the patient was informed that this would be a last courtesy as the patient frequently requires .            Lila Trinh RN  04/13/24 0858

## 2024-04-13 NOTE — ED PROVIDER NOTES
History  Chief Complaint   Patient presents with    Arm Pain     Patient reports showering and feeling bump and pain to right underarm     Patient is a 19-year-old male with past medical history significant for NIDDM 2, ADHD, depression, anxiety who presents for evaluation of lesion under his right arm.  He states the lesion has been ongoing for several months.  He states it is mildly painful with palpation.  He has a similar lesion starting in the left axilla. He denies fever, chills nausea, vomiting.      Arm Pain  Associated symptoms: rash    Associated symptoms: no abdominal pain, no chest pain, no cough, no ear pain, no fever, no shortness of breath, no sore throat and no vomiting        Prior to Admission Medications   Prescriptions Last Dose Informant Patient Reported? Taking?   Abilify Maintena 400 MG injection   Yes No   Sig: INJECT 2 ML INTRAMUSCUALRLY EVERY 4 WEEKS AS DIRECTED   Blood Glucose Monitoring Suppl (OneTouch Verio) w/Device KIT   No No   Sig: Use 2 (two) times a day Ok to substitute with insurance covered brand   Lancets (onetouch ultrasoft) lancets   No No   Sig: Use as instructed   buPROPion (WELLBUTRIN) 100 mg tablet  Family Member Yes No   Sig: Take 100 mg by mouth 2 (two) times a day   busPIRone (BUSPAR) 5 mg tablet   Yes No   Sig: Take 5 mg by mouth 2 (two) times a day   dicyclomine (BENTYL) 20 mg tablet   No No   Sig: Take 1 tablet (20 mg total) by mouth 2 (two) times a day   fenofibrate (TRICOR) 54 MG tablet   No No   Sig: Take 1 tablet (54 mg total) by mouth daily   glucose blood (OneTouch Verio) test strip   No No   Sig: TEST BLOOD SUGAR TWICE A DAY   metFORMIN (GLUCOPHAGE-XR) 500 mg 24 hr tablet   No No   Sig: Take 1 tablet (500 mg total) by mouth 2 (two) times a day with meals Start with 1 pill 500mg with breakfast x 1 week, then 1 pill 500mg with breakfast and 1 pill with dinner for 1 week , then 2 pills with breakfast (1000mg) and 1 pill with dinner (500mg) for 1 week, then 1000mg  twice a day going forward   mupirocin (BACTROBAN) 2 % ointment   No No   Sig: Apply topically 3 (three) times a day   omega-3-acid ethyl esters (LOVAZA) 1 g capsule   No No   Sig: Take 2 capsules (2 g total) by mouth 2 (two) times a day   Patient not taking: Reported on 10/17/2023   ondansetron (ZOFRAN) 4 mg tablet   No No   Sig: Take 1 tablet (4 mg total) by mouth every 6 (six) hours   ondansetron (ZOFRAN-ODT) 4 mg disintegrating tablet   No No   Sig: Take 1 tablet (4 mg total) by mouth every 6 (six) hours as needed for nausea or vomiting   sitaGLIPtin (JANUVIA) 50 mg tablet   No No   Sig: Take 1 tablet (50 mg total) by mouth daily   topiramate (TOPAMAX) 50 MG tablet   Yes No   Sig: Take 50 mg by mouth 2 (two) times a day      Facility-Administered Medications: None       Past Medical History:   Diagnosis Date    ADHD (attention deficit hyperactivity disorder)     Diabetes mellitus (HCC)     5/2023    Lung collapse     Sleep apnea     Viral meningitis        Past Surgical History:   Procedure Laterality Date    TONSILECTOMY AND ADNOIDECTOMY      2020       Family History   Problem Relation Age of Onset    Hyperlipidemia Mother     Diabetes type II Mother     Obesity Mother     Obesity Father     Drug abuse Father     Diabetes type II Maternal Aunt     Lung disease Maternal Aunt     Rheum arthritis Maternal Aunt     Fibromyalgia Maternal Aunt     Atrial fibrillation Maternal Grandmother     Hyperlipidemia Maternal Grandfather     Diabetes type II Maternal Grandfather     Stroke Maternal Grandfather     Heart disease Maternal Grandfather     Stroke Paternal Grandfather      I have reviewed and agree with the history as documented.    E-Cigarette/Vaping    E-Cigarette Use Current Some Day User     Comments smoke cigg. when he has money      E-Cigarette/Vaping Substances    Nicotine No     THC No     CBD No     Flavoring Yes     Other No     Unknown No      Social History     Tobacco Use    Smoking status: Some Days      Types: Cigarettes     Passive exposure: Yes    Smokeless tobacco: Current   Vaping Use    Vaping status: Some Days    Substances: Flavoring   Substance Use Topics    Alcohol use: Yes     Alcohol/week: 2.0 standard drinks of alcohol     Types: 2 Shots of liquor per week    Drug use: Yes     Types: Marijuana       Review of Systems   Constitutional: Negative.  Negative for chills and fever.   HENT:  Negative for ear pain and sore throat.    Eyes: Negative.  Negative for pain and visual disturbance.   Respiratory:  Negative for cough and shortness of breath.    Cardiovascular:  Negative for chest pain and palpitations.   Gastrointestinal:  Negative for abdominal pain and vomiting.   Endocrine: Negative.    Genitourinary: Negative.  Negative for dysuria and hematuria.   Musculoskeletal:  Negative for arthralgias and back pain.   Skin:  Positive for color change and rash.   Allergic/Immunologic: Negative.    Neurological: Negative.  Negative for seizures and syncope.   All other systems reviewed and are negative.      Physical Exam  Physical Exam  Vitals and nursing note reviewed.   Constitutional:       General: He is not in acute distress.     Appearance: Normal appearance. He is well-developed. He is obese. He is not ill-appearing, toxic-appearing or diaphoretic.   HENT:      Head: Normocephalic and atraumatic.      Right Ear: External ear normal.      Left Ear: External ear normal.      Nose: Nose normal.      Mouth/Throat:      Mouth: Mucous membranes are moist.   Eyes:      General: No scleral icterus.     Conjunctiva/sclera: Conjunctivae normal.   Cardiovascular:      Rate and Rhythm: Normal rate and regular rhythm.      Pulses: Normal pulses.      Heart sounds: Normal heart sounds. No murmur heard.  Pulmonary:      Effort: Pulmonary effort is normal. No respiratory distress.      Breath sounds: Normal breath sounds.   Musculoskeletal:         General: No swelling, tenderness or signs of injury.      Cervical  "back: Normal range of motion and neck supple.      Right lower leg: No edema.      Left lower leg: No edema.   Skin:     General: Skin is warm and dry.      Capillary Refill: Capillary refill takes less than 2 seconds.      Findings: Erythema, lesion and rash present.   Neurological:      Mental Status: He is alert.   Psychiatric:         Mood and Affect: Mood normal.        Media Information      Document Information    Clinical Image - Mobile Device      04/13/2024 08:40   Attached To:   Hospital Encounter on 4/13/24   Source Information    [facility]       Vital Signs  ED Triage Vitals [04/13/24 0803]   Temperature Pulse Respirations Blood Pressure SpO2   97.8 °F (36.6 °C) 100 22 145/71 96 %      Temp Source Heart Rate Source Patient Position - Orthostatic VS BP Location FiO2 (%)   Oral Monitor Sitting Left arm --      Pain Score       --           Vitals:    04/13/24 0803   BP: 145/71   Pulse: 100   Patient Position - Orthostatic VS: Sitting         Visual Acuity      ED Medications  Medications - No data to display    Diagnostic Studies  Results Reviewed       None                   No orders to display              Procedures  Procedures         ED Course         CRAFFT      Flowsheet Row Most Recent Value   CRAFFT Initial Screen: During the past 12 months, did you:    1. Drink any alcohol (more than a few sips)?  No Filed at: 04/13/2024 0800   2. Smoke any marijuana or hashish No Filed at: 04/13/2024 0800   3. Use anything else to get high? (\"anything else\" includes illegal drugs, over the counter and prescription drugs, and things that you sniff or 'patel')? No Filed at: 04/13/2024 0800                                            Medical Decision Making  Problems Addressed:  Candida infection: acute illness or injury    Risk  Prescription drug management.             Disposition  Final diagnoses:   Candida infection     Time reflects when diagnosis was documented in both MDM as " applicable and the Disposition within this note       Time User Action Codes Description Comment    4/13/2024  8:37 AM Shaila Hernandez Add [B37.9] Candida infection           ED Disposition       ED Disposition   Discharge    Condition   Stable    Date/Time   Sat Apr 13, 2024 0838    Comment   Rik Coreasonnell discharge to home/self care.                   Follow-up Information       Follow up With Specialties Details Why Contact Info Additional Information    Gustavo Fajardo MD  Go to  As needed [address] [facility] [address]  [facility] [address]  [phone]       [facility] Emergency Medicine Go to  If symptoms worsen [address] [facility] [address]  [phone] [facility], [address] [place], [place], [address]            Patient's Medications   Discharge Prescriptions    NYSTATIN (MYCOSTATIN) CREAM    Apply topically 2 (two) times a day       Start Date: 4/13/2024 End Date: 5/13/2024       Order Dose: --       Quantity: 30 g    Refills: 0       No discharge procedures on file.    PDMP Review       None            ED Provider  Electronically Signed by             Shaila Hernandez PA-C  04/13/24 0852

## 2024-04-20 ENCOUNTER — HOSPITAL ENCOUNTER (EMERGENCY)
Facility: HOSPITAL | Age: 19
Discharge: HOME | End: 2024-04-20
Attending: EMERGENCY MEDICINE
Payer: COMMERCIAL

## 2024-04-20 ENCOUNTER — APPOINTMENT (EMERGENCY)
Dept: RADIOLOGY | Facility: HOSPITAL | Age: 19
End: 2024-04-20
Payer: COMMERCIAL

## 2024-04-20 VITALS
HEART RATE: 85 BPM | OXYGEN SATURATION: 99 % | DIASTOLIC BLOOD PRESSURE: 74 MMHG | SYSTOLIC BLOOD PRESSURE: 128 MMHG | TEMPERATURE: 98 F | RESPIRATION RATE: 20 BRPM
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
## 2024-04-20 DIAGNOSIS — S89.91XA INJURY OF RIGHT KNEE, INITIAL ENCOUNTER: ICD-10-CM

## 2024-04-20 PROCEDURE — 99284 EMERGENCY DEPT VISIT MOD MDM: CPT | Performed by: EMERGENCY MEDICINE

## 2024-04-20 PROCEDURE — 73564 X-RAY EXAM KNEE 4 OR MORE: CPT

## 2024-04-20 PROCEDURE — 99284 EMERGENCY DEPT VISIT MOD MDM: CPT

## 2024-04-20 RX ORDER — IBUPROFEN 600 MG/1
600 TABLET ORAL ONCE
Status: COMPLETED | OUTPATIENT
Start: 2024-04-20 | End: 2024-04-20

## 2024-04-20 RX ADMIN — IBUPROFEN 600 MG: 600 TABLET, FILM COATED ORAL at 22:35

## 2024-04-21 NOTE — ED PROVIDER NOTES
History  Chief Complaint   Patient presents with    Fall     Mechanical fall 9tripped) at home. No head strike. No syncope. No LOC. Pt A+ox4 at triage. C/o right knee pain     Patient presents for evaluation after a fall at home.  Patient states he had a mechanical fall after he tripped and fell over a toy going to his brother's room.  Denies any head injury or loss of consciousness.  States he landed on his right knee and is having pain in his anterior right knee.       History provided by:  Patient   used: No    Fall      Prior to Admission Medications   Prescriptions Last Dose Informant Patient Reported? Taking?   Abilify Maintena 400 MG injection   Yes No   Sig: INJECT 2 ML INTRAMUSCUALRLY EVERY 4 WEEKS AS DIRECTED   Blood Glucose Monitoring Suppl (OneTouch Verio) w/Device KIT   No No   Sig: Use 2 (two) times a day Ok to substitute with insurance covered brand   Lancets (onetouch ultrasoft) lancets   No No   Sig: Use as instructed   buPROPion (WELLBUTRIN) 100 mg tablet  Family Member Yes No   Sig: Take 100 mg by mouth 2 (two) times a day   busPIRone (BUSPAR) 5 mg tablet   Yes No   Sig: Take 5 mg by mouth 2 (two) times a day   dicyclomine (BENTYL) 20 mg tablet   No No   Sig: Take 1 tablet (20 mg total) by mouth 2 (two) times a day   fenofibrate (TRICOR) 54 MG tablet   No No   Sig: Take 1 tablet (54 mg total) by mouth daily   glucose blood (OneTouch Verio) test strip   No No   Sig: TEST BLOOD SUGAR TWICE A DAY   metFORMIN (GLUCOPHAGE-XR) 500 mg 24 hr tablet   No No   Sig: Take 1 tablet (500 mg total) by mouth 2 (two) times a day with meals Start with 1 pill 500mg with breakfast x 1 week, then 1 pill 500mg with breakfast and 1 pill with dinner for 1 week , then 2 pills with breakfast (1000mg) and 1 pill with dinner (500mg) for 1 week, then 1000mg twice a day going forward   mupirocin (BACTROBAN) 2 % ointment   No No   Sig: Apply topically 3 (three) times a day   nystatin (MYCOSTATIN) cream   No  No   Sig: Apply topically 2 (two) times a day   omega-3-acid ethyl esters (LOVAZA) 1 g capsule   No No   Sig: Take 2 capsules (2 g total) by mouth 2 (two) times a day   Patient not taking: Reported on 10/17/2023   ondansetron (ZOFRAN) 4 mg tablet   No No   Sig: Take 1 tablet (4 mg total) by mouth every 6 (six) hours   ondansetron (ZOFRAN-ODT) 4 mg disintegrating tablet   No No   Sig: Take 1 tablet (4 mg total) by mouth every 6 (six) hours as needed for nausea or vomiting   sitaGLIPtin (JANUVIA) 50 mg tablet   No No   Sig: Take 1 tablet (50 mg total) by mouth daily   topiramate (TOPAMAX) 50 MG tablet   Yes No   Sig: Take 50 mg by mouth 2 (two) times a day      Facility-Administered Medications: None       Past Medical History:   Diagnosis Date    ADHD (attention deficit hyperactivity disorder)     Diabetes mellitus (HCC)     5/2023    Lung collapse     Sleep apnea     Viral meningitis        Past Surgical History:   Procedure Laterality Date    TONSILECTOMY AND ADNOIDECTOMY      2020       Family History   Problem Relation Age of Onset    Hyperlipidemia Mother     Diabetes type II Mother     Obesity Mother     Obesity Father     Drug abuse Father     Diabetes type II Maternal Aunt     Lung disease Maternal Aunt     Rheum arthritis Maternal Aunt     Fibromyalgia Maternal Aunt     Atrial fibrillation Maternal Grandmother     Hyperlipidemia Maternal Grandfather     Diabetes type II Maternal Grandfather     Stroke Maternal Grandfather     Heart disease Maternal Grandfather     Stroke Paternal Grandfather      I have reviewed and agree with the history as documented.    E-Cigarette/Vaping    E-Cigarette Use Current Some Day User     Comments smoke cigg. when he has money      E-Cigarette/Vaping Substances    Nicotine No     THC No     CBD No     Flavoring Yes     Other No     Unknown No      Social History     Tobacco Use    Smoking status: Some Days     Types: Cigarettes     Passive exposure: Yes    Smokeless tobacco:  Current   Vaping Use    Vaping status: Some Days    Substances: Flavoring   Substance Use Topics    Alcohol use: Yes     Alcohol/week: 2.0 standard drinks of alcohol     Types: 2 Shots of liquor per week    Drug use: Yes     Types: Marijuana       Review of Systems   All other systems reviewed and are negative.      Physical Exam  Physical Exam  Vitals and nursing note reviewed.   Constitutional:       General: He is not in acute distress.  HENT:      Head: Atraumatic.   Musculoskeletal:         General: Tenderness present. No deformity. Normal range of motion.        Legs:    Skin:     Capillary Refill: Capillary refill takes less than 2 seconds.      Findings: No rash.   Neurological:      General: No focal deficit present.      Mental Status: He is alert and oriented to person, place, and time.         Vital Signs  ED Triage Vitals [04/20/24 2143]   Temperature Pulse Respirations Blood Pressure SpO2   98 °F (36.7 °C) 85 20 128/74 99 %      Temp Source Heart Rate Source Patient Position - Orthostatic VS BP Location FiO2 (%)   Tympanic Monitor Sitting Right arm --      Pain Score       1           Vitals:    04/20/24 2143   BP: 128/74   Pulse: 85   Patient Position - Orthostatic VS: Sitting         Visual Acuity  Visual Acuity      Flowsheet Row Most Recent Value   L Pupil Size (mm) 3   R Pupil Size (mm) 3            ED Medications  Medications   ibuprofen (MOTRIN) tablet 600 mg (600 mg Oral Given 4/20/24 2235)       Diagnostic Studies  Results Reviewed       None                   XR knee 4+ views Right injury   Final Result by Joshua Roman MD (04/21 4911)      No acute osseous abnormality.            Workstation performed: [id number]                    Procedures  Procedures         ED Course                                             Medical Decision Making  Pulse ox 99% on room air indicating adequate oxygenation.  Xray R knee: No fracture or dislocation as read by me    Amount and/or Complexity of Data  Reviewed  Radiology: ordered and independent interpretation performed.    Risk  Prescription drug management.             Disposition  Final diagnoses:   Fall, initial encounter   Injury of right knee, initial encounter     Time reflects when diagnosis was documented in both MDM as applicable and the Disposition within this note       Time User Action Codes Description Comment    4/20/2024 10:37 PM Amado Mercado [W19.XXXA] Fall, initial encounter     4/20/2024 10:37 PM Amado Mercado Add [S89.91XA] Injury of right knee, initial encounter           ED Disposition       ED Disposition   Discharge    Condition   Stable    Date/Time   Sat Apr 20, 2024 10:37 PM    Comment   Rik Coreasonnell discharge to home/self care.                   Follow-up Information       Follow up With Specialties Details Why Contact Info    Surinder Londono, DO Orthopedic Surgery In 1 week As needed [address] [facility] [address], [address]  [facility] [address] [phone]              Discharge Medication List as of 4/20/2024 10:42 PM        CONTINUE these medications which have NOT CHANGED    Details   Abilify Maintena 400 MG injection INJECT 2 ML INTRAMUSCUALRLY EVERY 4 WEEKS AS DIRECTED, Historical Med      Blood Glucose Monitoring Suppl (OneTouch Verio) w/Device KIT Use 2 (two) times a day Ok to substitute with insurance covered brand, Starting Fri 10/27/2023, Normal      buPROPion (WELLBUTRIN) 100 mg tablet Take 100 mg by mouth 2 (two) times a day, Historical Med      busPIRone (BUSPAR) 5 mg tablet Take 5 mg by mouth 2 (two) times a day, Starting Fri 6/30/2023, Historical Med      dicyclomine (BENTYL) 20 mg tablet Take 1 tablet (20 mg total) by mouth 2 (two) times a day, Starting Fri 1/12/2024, Normal      fenofibrate (TRICOR) 54 MG tablet Take 1 tablet (54 mg total) by mouth daily, Starting u 9/28/2023, Normal      glucose blood (OneTouch Verio) test strip TEST BLOOD SUGAR TWICE A DAY, Normal      Lancets (onetouch  ultrasoft) lancets Use as instructed, Normal      metFORMIN (GLUCOPHAGE-XR) 500 mg 24 hr tablet Take 1 tablet (500 mg total) by mouth 2 (two) times a day with meals Start with 1 pill 500mg with breakfast x 1 week, then 1 pill 500mg with breakfast and 1 pill with dinner for 1 week , then 2 pills with breakfast (1000mg) and 1 pill with dinner (500mg)  for 1 week, then 1000mg twice a day going forward, Starting Tue 10/17/2023, Normal      mupirocin (BACTROBAN) 2 % ointment Apply topically 3 (three) times a day, Starting Sun 3/17/2024, Normal      nystatin (MYCOSTATIN) cream Apply topically 2 (two) times a day, Starting Sat 4/13/2024, Until Mon 5/13/2024, Normal      omega-3-acid ethyl esters (LOVAZA) 1 g capsule Take 2 capsules (2 g total) by mouth 2 (two) times a day, Starting Thu 9/28/2023, Normal      ondansetron (ZOFRAN) 4 mg tablet Take 1 tablet (4 mg total) by mouth every 6 (six) hours, Starting Fri 1/12/2024, Normal      ondansetron (ZOFRAN-ODT) 4 mg disintegrating tablet Take 1 tablet (4 mg total) by mouth every 6 (six) hours as needed for nausea or vomiting, Starting Tue 3/12/2024, Normal      sitaGLIPtin (JANUVIA) 50 mg tablet Take 1 tablet (50 mg total) by mouth daily, Starting Tue 10/17/2023, Normal      topiramate (TOPAMAX) 50 MG tablet Take 50 mg by mouth 2 (two) times a day, Starting Thu 3/10/2022, Historical Med             No discharge procedures on file.    PDMP Review       None            ED Provider  Electronically Signed by             Amado Mercado DO  04/21/24 1942

## 2024-05-01 ENCOUNTER — HOSPITAL ENCOUNTER (EMERGENCY)
Facility: HOSPITAL | Age: 19
Discharge: HOME | End: 2024-05-01
Attending: EMERGENCY MEDICINE
Payer: COMMERCIAL

## 2024-05-01 VITALS
DIASTOLIC BLOOD PRESSURE: 61 MMHG | OXYGEN SATURATION: 93 % | SYSTOLIC BLOOD PRESSURE: 147 MMHG | TEMPERATURE: 100.5 F | HEART RATE: 116 BPM | RESPIRATION RATE: 29 BRPM

## 2024-05-01 DIAGNOSIS — B34.9 VIRAL SYNDROME: Primary | ICD-10-CM

## 2024-05-01 PROCEDURE — 0241U HB NFCT DS VIR RESP RNA 4 TRGT: CPT | Performed by: EMERGENCY MEDICINE

## 2024-05-01 PROCEDURE — 99284 EMERGENCY DEPT VISIT MOD MDM: CPT | Performed by: EMERGENCY MEDICINE

## 2024-05-01 PROCEDURE — 99284 EMERGENCY DEPT VISIT MOD MDM: CPT

## 2024-05-01 RX ORDER — ACETAMINOPHEN 325 MG/1
650 TABLET ORAL ONCE
Status: COMPLETED | OUTPATIENT
Start: 2024-05-01 | End: 2024-05-01

## 2024-05-01 RX ADMIN — ACETAMINOPHEN 650 MG: 325 TABLET ORAL at 18:28

## 2024-05-02 NOTE — ED PROVIDER NOTES
History  Chief Complaint   Patient presents with    Flu Symptoms     Pt whole family sick with flu, has felt sick since taking a nap today, slight fever, feels body aches and some sob.      Patient presents for evaluation of general illness.  Patient reports fever and generalized bodyaches with some shortness of breath and congestion.  Patient's family is sick with the flu as well.      History provided by:  Patient   used: No    Flu Symptoms  Presenting symptoms: fatigue, fever and shortness of breath    Associated symptoms: nasal congestion        Prior to Admission Medications   Prescriptions Last Dose Informant Patient Reported? Taking?   Abilify Maintena 400 MG injection   Yes No   Sig: INJECT 2 ML INTRAMUSCUALRLY EVERY 4 WEEKS AS DIRECTED   Blood Glucose Monitoring Suppl (OneTouch Verio) w/Device KIT   No No   Sig: Use 2 (two) times a day Ok to substitute with insurance covered brand   Lancets (onetouch ultrasoft) lancets   No No   Sig: Use as instructed   buPROPion (WELLBUTRIN) 100 mg tablet  Family Member Yes No   Sig: Take 100 mg by mouth 2 (two) times a day   busPIRone (BUSPAR) 5 mg tablet   Yes No   Sig: Take 5 mg by mouth 2 (two) times a day   dicyclomine (BENTYL) 20 mg tablet   No No   Sig: Take 1 tablet (20 mg total) by mouth 2 (two) times a day   fenofibrate (TRICOR) 54 MG tablet   No No   Sig: Take 1 tablet (54 mg total) by mouth daily   glucose blood (OneTouch Verio) test strip   No No   Sig: TEST BLOOD SUGAR TWICE A DAY   metFORMIN (GLUCOPHAGE-XR) 500 mg 24 hr tablet   No No   Sig: Take 1 tablet (500 mg total) by mouth 2 (two) times a day with meals Start with 1 pill 500mg with breakfast x 1 week, then 1 pill 500mg with breakfast and 1 pill with dinner for 1 week , then 2 pills with breakfast (1000mg) and 1 pill with dinner (500mg) for 1 week, then 1000mg twice a day going forward   mupirocin (BACTROBAN) 2 % ointment   No No   Sig: Apply topically 3 (three) times a day    nystatin (MYCOSTATIN) cream   No No   Sig: Apply topically 2 (two) times a day   omega-3-acid ethyl esters (LOVAZA) 1 g capsule   No No   Sig: Take 2 capsules (2 g total) by mouth 2 (two) times a day   Patient not taking: Reported on 10/17/2023   ondansetron (ZOFRAN) 4 mg tablet   No No   Sig: Take 1 tablet (4 mg total) by mouth every 6 (six) hours   ondansetron (ZOFRAN-ODT) 4 mg disintegrating tablet   No No   Sig: Take 1 tablet (4 mg total) by mouth every 6 (six) hours as needed for nausea or vomiting   sitaGLIPtin (JANUVIA) 50 mg tablet   No No   Sig: Take 1 tablet (50 mg total) by mouth daily   topiramate (TOPAMAX) 50 MG tablet   Yes No   Sig: Take 50 mg by mouth 2 (two) times a day      Facility-Administered Medications: None       Past Medical History:   Diagnosis Date    ADHD (attention deficit hyperactivity disorder)     Diabetes mellitus (HCC)     5/2023    Lung collapse     Sleep apnea     Viral meningitis        Past Surgical History:   Procedure Laterality Date    TONSILECTOMY AND ADNOIDECTOMY      2020       Family History   Problem Relation Age of Onset    Hyperlipidemia Mother     Diabetes type II Mother     Obesity Mother     Obesity Father     Drug abuse Father     Diabetes type II Maternal Aunt     Lung disease Maternal Aunt     Rheum arthritis Maternal Aunt     Fibromyalgia Maternal Aunt     Atrial fibrillation Maternal Grandmother     Hyperlipidemia Maternal Grandfather     Diabetes type II Maternal Grandfather     Stroke Maternal Grandfather     Heart disease Maternal Grandfather     Stroke Paternal Grandfather      I have reviewed and agree with the history as documented.    E-Cigarette/Vaping    E-Cigarette Use Current Some Day User     Comments smoke cigg. when he has money      E-Cigarette/Vaping Substances    Nicotine No     THC No     CBD No     Flavoring Yes     Other No     Unknown No      Social History     Tobacco Use    Smoking status: Former     Types: Cigarettes     Passive  exposure: Yes    Smokeless tobacco: Current   Vaping Use    Vaping status: Some Days    Substances: Flavoring   Substance Use Topics    Alcohol use: Yes     Alcohol/week: 2.0 standard drinks of alcohol     Types: 2 Shots of liquor per week    Drug use: Yes     Types: Marijuana       Review of Systems   Constitutional:  Positive for fatigue and fever.   HENT:  Positive for congestion.    Respiratory:  Positive for shortness of breath.    All other systems reviewed and are negative.      Physical Exam  Physical Exam  Vitals and nursing note reviewed.   Constitutional:       General: He is not in acute distress.     Appearance: He is obese.   HENT:      Nose: Congestion present.   Cardiovascular:      Rate and Rhythm: Regular rhythm. Tachycardia present.   Pulmonary:      Effort: Pulmonary effort is normal. No respiratory distress.      Breath sounds: Normal breath sounds.   Neurological:      General: No focal deficit present.      Mental Status: He is alert and oriented to person, place, and time.         Vital Signs  ED Triage Vitals   Temperature Pulse Respirations Blood Pressure SpO2   05/01/24 1815 05/01/24 1815 05/01/24 1815 05/01/24 1815 05/01/24 1815   100.5 °F (38.1 °C) (!) 121 (!) 25 147/61 95 %      Temp Source Heart Rate Source Patient Position - Orthostatic VS BP Location FiO2 (%)   05/01/24 1815 05/01/24 1815 05/01/24 1815 05/01/24 1815 --   Oral Monitor Sitting Right arm       Pain Score       05/01/24 1828       Med Not Given for Pain - for MAR use only           Vitals:    05/01/24 1815 05/01/24 1830 05/01/24 1845 05/01/24 1915   BP: 147/61      Pulse: (!) 121 (!) 130 (!) 120 (!) 116   Patient Position - Orthostatic VS: Sitting            Visual Acuity      ED Medications  Medications   acetaminophen (TYLENOL) tablet 650 mg (650 mg Oral Given 5/1/24 1828)       Diagnostic Studies  Results Reviewed       Procedure Component Value Units Date/Time    FLU/RSV/COVID - if FLU/RSV clinically relevant  [[id number]]  (Normal) Collected: 05/01/24 1828    Lab Status: Final result Specimen: Nares from Nose Updated: 05/01/24 1911     SARS-CoV-2 Negative     INFLUENZA A PCR Negative     INFLUENZA B PCR Negative     RSV PCR Negative    Narrative:      FOR PEDIATRIC PATIENTS - copy/paste COVID Guidelines URL to browser: [email or web address]    SARS-CoV-2 assay is a Nucleic Acid Amplification assay intended for the  qualitative detection of nucleic acid from SARS-CoV-2 in nasopharyngeal  swabs. Results are for the presumptive identification of SARS-CoV-2 RNA.    Positive results are indicative of infection with SARS-CoV-2, the virus  causing COVID-19, but do not rule out bacterial infection or co-infection  with other viruses. Laboratories within the [place] and its  territories are required to report all positive results to the appropriate  public health authorities. Negative results do not preclude SARS-CoV-2  infection and should not be used as the sole basis for treatment or other  patient management decisions. Negative results must be combined with  clinical observations, patient history, and epidemiological information.  This test has not been FDA cleared or approved.    This test has been authorized by FDA under an Emergency Use Authorization  (EUA). This test is only authorized for the duration of time the  declaration that circumstances exist justifying the authorization of the  emergency use of an in vitro diagnostic tests for detection of SARS-CoV-2  virus and/or diagnosis of COVID-19 infection under section 564(b)(1) of  the Act, 21 U.S.C. 360bbb-3(b)(1), unless the authorization is terminated  or revoked sooner. The test has been validated but independent review by FDA  and CLIA is pending.    Test performed using [employer] GeneXpert: This RT-PCR assay targets N2,  a region unique to SARS-CoV-2. A conserved region in the E-gene was chosen  for  "pan-Sarbecovirus detection which includes SARS-CoV-2.    According to CMS-2020-01-R, this platform meets the definition of high-throughput technology.                   No orders to display              Procedures  Procedures         ED Course         CRAFFT      Flowsheet Row Most Recent Value   CRAFFT Initial Screen: During the past 12 months, did you:    1. Drink any alcohol (more than a few sips)?  No Filed at: 05/01/2024 1819   2. Smoke any marijuana or hashish No Filed at: 05/01/2024 1819   3. Use anything else to get high? (\"anything else\" includes illegal drugs, over the counter and prescription drugs, and things that you sniff or 'patel')? No Filed at: 05/01/2024 1819                                            Medical Decision Making  Pulse ox 93% on room air indicating adequate oxygenation.    Risk  OTC drugs.             Disposition  Final diagnoses:   Viral syndrome     Time reflects when diagnosis was documented in both MDM as applicable and the Disposition within this note       Time User Action Codes Description Comment    5/1/2024  7:24 PM Amado Mercado [B34.9] Viral syndrome           ED Disposition       ED Disposition   Discharge    Condition   Stable    Date/Time   Wed May 1, 2024 1859    Comment   Rik Marin discharge to home/self care.                   Follow-up Information       Follow up With Specialties Details Why Contact Info Additional Information    Gustavo Fajardo MD  In 3 days  [address] [facility] [address]  [facility] [address] [phone]       [facility] Emergency Medicine  If symptoms worsen [address] [facility] [id number] [phone] [facility], [address], [place], [address]            Discharge Medication List as of 5/1/2024  7:24 PM        CONTINUE these medications which have NOT CHANGED    Details   Abilify Maintena 400 MG injection INJECT 2 ML INTRAMUSCUALRLY " EVERY 4 WEEKS AS DIRECTED, Historical Med      Blood Glucose Monitoring Suppl (OneTouch Verio) w/Device KIT Use 2 (two) times a day Ok to substitute with insurance covered brand, Starting Fri 10/27/2023, Normal      buPROPion (WELLBUTRIN) 100 mg tablet Take 100 mg by mouth 2 (two) times a day, Historical Med      busPIRone (BUSPAR) 5 mg tablet Take 5 mg by mouth 2 (two) times a day, Starting Fri 6/30/2023, Historical Med      dicyclomine (BENTYL) 20 mg tablet Take 1 tablet (20 mg total) by mouth 2 (two) times a day, Starting Fri 1/12/2024, Normal      fenofibrate (TRICOR) 54 MG tablet Take 1 tablet (54 mg total) by mouth daily, Starting Thu 9/28/2023, Normal      glucose blood (OneTouch Verio) test strip TEST BLOOD SUGAR TWICE A DAY, Normal      Lancets (onetouch ultrasoft) lancets Use as instructed, Normal      metFORMIN (GLUCOPHAGE-XR) 500 mg 24 hr tablet Take 1 tablet (500 mg total) by mouth 2 (two) times a day with meals Start with 1 pill 500mg with breakfast x 1 week, then 1 pill 500mg with breakfast and 1 pill with dinner for 1 week , then 2 pills with breakfast (1000mg) and 1 pill with dinner (500mg)  for 1 week, then 1000mg twice a day going forward, Starting Tue 10/17/2023, Normal      mupirocin (BACTROBAN) 2 % ointment Apply topically 3 (three) times a day, Starting Sun 3/17/2024, Normal      nystatin (MYCOSTATIN) cream Apply topically 2 (two) times a day, Starting Sat 4/13/2024, Until Mon 5/13/2024, Normal      omega-3-acid ethyl esters (LOVAZA) 1 g capsule Take 2 capsules (2 g total) by mouth 2 (two) times a day, Starting Thu 9/28/2023, Normal      ondansetron (ZOFRAN) 4 mg tablet Take 1 tablet (4 mg total) by mouth every 6 (six) hours, Starting Fri 1/12/2024, Normal      ondansetron (ZOFRAN-ODT) 4 mg disintegrating tablet Take 1 tablet (4 mg total) by mouth every 6 (six) hours as needed for nausea or vomiting, Starting Tue 3/12/2024, Normal      sitaGLIPtin (JANUVIA) 50 mg tablet Take 1 tablet (50 mg  total) by mouth daily, Starting Tue 10/17/2023, Normal      topiramate (TOPAMAX) 50 MG tablet Take 50 mg by mouth 2 (two) times a day, Starting Thu 3/10/2022, Historical Med             No discharge procedures on file.    PDMP Review       None            ED Provider  Electronically Signed by             Amado Mercado DO  05/02/24 0886

## 2024-05-05 ENCOUNTER — APPOINTMENT (EMERGENCY)
Dept: RADIOLOGY | Facility: HOSPITAL | Age: 19
DRG: 720 | End: 2024-05-05
Payer: COMMERCIAL

## 2024-05-05 ENCOUNTER — HOSPITAL ENCOUNTER (EMERGENCY)
Facility: HOSPITAL | Age: 19
Discharge: HOME | DRG: 720 | End: 2024-05-05
Attending: EMERGENCY MEDICINE | Admitting: EMERGENCY MEDICINE
Payer: COMMERCIAL

## 2024-05-05 ENCOUNTER — HOSPITAL ENCOUNTER (INPATIENT)
Facility: HOSPITAL | Age: 19
LOS: 2 days | Discharge: HOME | DRG: 720 | End: 2024-05-07
Attending: EMERGENCY MEDICINE | Admitting: INTERNAL MEDICINE
Payer: COMMERCIAL

## 2024-05-05 VITALS
TEMPERATURE: 98.3 F | HEART RATE: 111 BPM | RESPIRATION RATE: 26 BRPM | OXYGEN SATURATION: 93 % | SYSTOLIC BLOOD PRESSURE: 145 MMHG | DIASTOLIC BLOOD PRESSURE: 64 MMHG

## 2024-05-05 DIAGNOSIS — R09.02 HYPOXIA: ICD-10-CM

## 2024-05-05 DIAGNOSIS — J18.9 PNEUMONIA: ICD-10-CM

## 2024-05-05 DIAGNOSIS — J18.9 PNEUMONIA: Primary | ICD-10-CM

## 2024-05-05 DIAGNOSIS — J18.9 MULTIFOCAL PNEUMONIA: Primary | ICD-10-CM

## 2024-05-05 DIAGNOSIS — F32.9 MDD (MAJOR DEPRESSIVE DISORDER): ICD-10-CM

## 2024-05-05 DIAGNOSIS — R45.851 SUICIDE IDEATION: ICD-10-CM

## 2024-05-05 DIAGNOSIS — R06.02 SHORTNESS OF BREATH: ICD-10-CM

## 2024-05-05 DIAGNOSIS — E11.65 UNCONTROLLED TYPE 2 DIABETES MELLITUS WITH HYPERGLYCEMIA (HCC): ICD-10-CM

## 2024-05-05 LAB
ANION GAP SERPL CALCULATED.3IONS-SCNC: 13 MMOL/L (ref 4–13)
ANION GAP SERPL CALCULATED.3IONS-SCNC: 8 MMOL/L (ref 4–13)
BACTERIA UR QL AUTO: ABNORMAL /HPF
BASOPHILS # BLD AUTO: 0.01 THOUSANDS/ÂΜL (ref 0–0.1)
BASOPHILS # BLD AUTO: 0.02 THOUSANDS/ÂΜL (ref 0–0.1)
BASOPHILS NFR BLD AUTO: 0 % (ref 0–1)
BASOPHILS NFR BLD AUTO: 0 % (ref 0–1)
BILIRUB UR QL STRIP: NEGATIVE
BUN SERPL-MCNC: 10 MG/DL (ref 5–25)
BUN SERPL-MCNC: 7 MG/DL (ref 5–25)
CALCIUM SERPL-MCNC: 8.5 MG/DL (ref 8.4–10.2)
CALCIUM SERPL-MCNC: 9.1 MG/DL (ref 8.4–10.2)
CHLORIDE SERPL-SCNC: 98 MMOL/L (ref 96–108)
CHLORIDE SERPL-SCNC: 98 MMOL/L (ref 96–108)
CLARITY UR: CLEAR
CO2 SERPL-SCNC: 21 MMOL/L (ref 21–32)
CO2 SERPL-SCNC: 28 MMOL/L (ref 21–32)
COLOR UR: YELLOW
CREAT SERPL-MCNC: 0.61 MG/DL (ref 0.6–1.3)
CREAT SERPL-MCNC: 0.63 MG/DL (ref 0.6–1.3)
EOSINOPHIL # BLD AUTO: 0.11 THOUSAND/ÂΜL (ref 0–0.61)
EOSINOPHIL # BLD AUTO: 0.13 THOUSAND/ÂΜL (ref 0–0.61)
EOSINOPHIL NFR BLD AUTO: 2 % (ref 0–6)
EOSINOPHIL NFR BLD AUTO: 2 % (ref 0–6)
ERYTHROCYTE [DISTWIDTH] IN BLOOD BY AUTOMATED COUNT: 13.6 % (ref 11.6–15.1)
ERYTHROCYTE [DISTWIDTH] IN BLOOD BY AUTOMATED COUNT: 13.6 % (ref 11.6–15.1)
GFR SERPL CREATININE-BSD FRML MDRD: 141 ML/MIN/1.73SQ M
GFR SERPL CREATININE-BSD FRML MDRD: 143 ML/MIN/1.73SQ M
GLUCOSE SERPL-MCNC: 173 MG/DL (ref 65–140)
GLUCOSE SERPL-MCNC: 298 MG/DL (ref 65–140)
GLUCOSE SERPL-MCNC: 368 MG/DL (ref 65–140)
GLUCOSE UR STRIP-MCNC: ABNORMAL MG/DL
HCT VFR BLD AUTO: 40.8 % (ref 36.5–49.3)
HCT VFR BLD AUTO: 41.7 % (ref 36.5–49.3)
HGB BLD-MCNC: 13 G/DL (ref 12–17)
HGB BLD-MCNC: 13.3 G/DL (ref 12–17)
HGB UR QL STRIP.AUTO: NEGATIVE
IMM GRANULOCYTES # BLD AUTO: 0.02 THOUSAND/UL (ref 0–0.2)
IMM GRANULOCYTES # BLD AUTO: 0.04 THOUSAND/UL (ref 0–0.2)
IMM GRANULOCYTES NFR BLD AUTO: 0 % (ref 0–2)
IMM GRANULOCYTES NFR BLD AUTO: 1 % (ref 0–2)
KETONES UR STRIP-MCNC: NEGATIVE MG/DL
LEUKOCYTE ESTERASE UR QL STRIP: NEGATIVE
LYMPHOCYTES # BLD AUTO: 1.76 THOUSANDS/ÂΜL (ref 0.6–4.47)
LYMPHOCYTES # BLD AUTO: 1.88 THOUSANDS/ÂΜL (ref 0.6–4.47)
LYMPHOCYTES NFR BLD AUTO: 27 % (ref 14–44)
LYMPHOCYTES NFR BLD AUTO: 29 % (ref 14–44)
MCH RBC QN AUTO: 27.3 PG (ref 26.8–34.3)
MCH RBC QN AUTO: 27.3 PG (ref 26.8–34.3)
MCHC RBC AUTO-ENTMCNC: 31.9 G/DL (ref 31.4–37.4)
MCHC RBC AUTO-ENTMCNC: 31.9 G/DL (ref 31.4–37.4)
MCV RBC AUTO: 86 FL (ref 82–98)
MCV RBC AUTO: 86 FL (ref 82–98)
MONOCYTES # BLD AUTO: 0.49 THOUSAND/ÂΜL (ref 0.17–1.22)
MONOCYTES # BLD AUTO: 0.63 THOUSAND/ÂΜL (ref 0.17–1.22)
MONOCYTES NFR BLD AUTO: 10 % (ref 4–12)
MONOCYTES NFR BLD AUTO: 7 % (ref 4–12)
MUCOUS THREADS UR QL AUTO: ABNORMAL
NEUTROPHILS # BLD AUTO: 4.05 THOUSANDS/ÂΜL (ref 1.85–7.62)
NEUTROPHILS # BLD AUTO: 4.06 THOUSANDS/ÂΜL (ref 1.85–7.62)
NEUTS SEG NFR BLD AUTO: 61 % (ref 43–75)
NEUTS SEG NFR BLD AUTO: 61 % (ref 43–75)
NITRITE UR QL STRIP: NEGATIVE
NON-SQ EPI CELLS URNS QL MICRO: ABNORMAL /HPF
NRBC BLD AUTO-RTO: 0 /100 WBCS
NRBC BLD AUTO-RTO: 0 /100 WBCS
PH UR STRIP.AUTO: 6.5 [PH]
PLATELET # BLD AUTO: 241 THOUSANDS/UL (ref 149–390)
PLATELET # BLD AUTO: 246 THOUSANDS/UL (ref 149–390)
PMV BLD AUTO: 10.5 FL (ref 8.9–12.7)
PMV BLD AUTO: 10.5 FL (ref 8.9–12.7)
POTASSIUM SERPL-SCNC: 4 MMOL/L (ref 3.5–5.3)
POTASSIUM SERPL-SCNC: 4.1 MMOL/L (ref 3.5–5.3)
PROCALCITONIN SERPL-MCNC: 0.09 NG/ML
PROT UR STRIP-MCNC: ABNORMAL MG/DL
RBC # BLD AUTO: 4.76 MILLION/UL (ref 3.88–5.62)
RBC # BLD AUTO: 4.87 MILLION/UL (ref 3.88–5.62)
RBC #/AREA URNS AUTO: ABNORMAL /HPF
SODIUM SERPL-SCNC: 132 MMOL/L (ref 135–147)
SODIUM SERPL-SCNC: 134 MMOL/L (ref 135–147)
SP GR UR STRIP.AUTO: 1.02 (ref 1–1.03)
UROBILINOGEN UR STRIP-ACNC: 2 MG/DL
WBC # BLD AUTO: 6.6 THOUSAND/UL (ref 4.31–10.16)
WBC # BLD AUTO: 6.6 THOUSAND/UL (ref 4.31–10.16)
WBC #/AREA URNS AUTO: ABNORMAL /HPF

## 2024-05-05 PROCEDURE — 85025 COMPLETE CBC W/AUTO DIFF WBC: CPT | Performed by: EMERGENCY MEDICINE

## 2024-05-05 PROCEDURE — 36415 COLL VENOUS BLD VENIPUNCTURE: CPT | Performed by: EMERGENCY MEDICINE

## 2024-05-05 PROCEDURE — 82948 REAGENT STRIP/BLOOD GLUCOSE: CPT

## 2024-05-05 PROCEDURE — 96360 HYDRATION IV INFUSION INIT: CPT

## 2024-05-05 PROCEDURE — 87449 NOS EACH ORGANISM AG IA: CPT | Performed by: INTERNAL MEDICINE

## 2024-05-05 PROCEDURE — 84145 PROCALCITONIN (PCT): CPT | Performed by: EMERGENCY MEDICINE

## 2024-05-05 PROCEDURE — 99285 EMERGENCY DEPT VISIT HI MDM: CPT | Performed by: EMERGENCY MEDICINE

## 2024-05-05 PROCEDURE — 71046 X-RAY EXAM CHEST 2 VIEWS: CPT

## 2024-05-05 PROCEDURE — 99285 EMERGENCY DEPT VISIT HI MDM: CPT

## 2024-05-05 PROCEDURE — 99284 EMERGENCY DEPT VISIT MOD MDM: CPT | Performed by: EMERGENCY MEDICINE

## 2024-05-05 PROCEDURE — 81001 URINALYSIS AUTO W/SCOPE: CPT | Performed by: EMERGENCY MEDICINE

## 2024-05-05 PROCEDURE — 71045 X-RAY EXAM CHEST 1 VIEW: CPT

## 2024-05-05 PROCEDURE — 80048 BASIC METABOLIC PNL TOTAL CA: CPT | Performed by: EMERGENCY MEDICINE

## 2024-05-05 PROCEDURE — 99284 EMERGENCY DEPT VISIT MOD MDM: CPT

## 2024-05-05 PROCEDURE — 99222 1ST HOSP IP/OBS MODERATE 55: CPT | Performed by: INTERNAL MEDICINE

## 2024-05-05 RX ORDER — CEFTRIAXONE 1 G/50ML
1000 INJECTION, SOLUTION INTRAVENOUS ONCE
Qty: 50 ML | Refills: 0 | Status: COMPLETED | OUTPATIENT
Start: 2024-05-06 | End: 2024-05-06

## 2024-05-05 RX ORDER — ENOXAPARIN SODIUM 100 MG/ML
40 INJECTION SUBCUTANEOUS DAILY
Status: DISCONTINUED | OUTPATIENT
Start: 2024-05-06 | End: 2024-05-05 | Stop reason: DRUGHIGH

## 2024-05-05 RX ORDER — ACETAMINOPHEN 325 MG/1
650 TABLET ORAL EVERY 6 HOURS PRN
Status: DISCONTINUED | OUTPATIENT
Start: 2024-05-05 | End: 2024-05-07 | Stop reason: HOSPADM

## 2024-05-05 RX ORDER — AMOXICILLIN AND CLAVULANATE POTASSIUM 875; 125 MG/1; MG/1
1 TABLET, FILM COATED ORAL ONCE
Status: COMPLETED | OUTPATIENT
Start: 2024-05-05 | End: 2024-05-05

## 2024-05-05 RX ORDER — IPRATROPIUM BROMIDE AND ALBUTEROL SULFATE 2.5; .5 MG/3ML; MG/3ML
3 SOLUTION RESPIRATORY (INHALATION)
Status: DISCONTINUED | OUTPATIENT
Start: 2024-05-05 | End: 2024-05-07 | Stop reason: HOSPADM

## 2024-05-05 RX ORDER — ALBUTEROL SULFATE 90 UG/1
2 AEROSOL, METERED RESPIRATORY (INHALATION) EVERY 4 HOURS PRN
Status: DISCONTINUED | OUTPATIENT
Start: 2024-05-05 | End: 2024-05-07 | Stop reason: HOSPADM

## 2024-05-05 RX ORDER — GUAIFENESIN/DEXTROMETHORPHAN 100-10MG/5
10 SYRUP ORAL ONCE
Status: COMPLETED | OUTPATIENT
Start: 2024-05-05 | End: 2024-05-05

## 2024-05-05 RX ORDER — ENOXAPARIN SODIUM 100 MG/ML
40 INJECTION SUBCUTANEOUS EVERY 12 HOURS SCHEDULED
Status: DISCONTINUED | OUTPATIENT
Start: 2024-05-06 | End: 2024-05-07 | Stop reason: HOSPADM

## 2024-05-05 RX ORDER — AMOXICILLIN AND CLAVULANATE POTASSIUM 875; 125 MG/1; MG/1
1 TABLET, FILM COATED ORAL EVERY 12 HOURS SCHEDULED
Qty: 14 TABLET | Refills: 0 | Status: SHIPPED | OUTPATIENT
Start: 2024-05-05 | End: 2024-05-07

## 2024-05-05 RX ORDER — AZITHROMYCIN 250 MG/1
500 TABLET, FILM COATED ORAL ONCE
Status: COMPLETED | OUTPATIENT
Start: 2024-05-05 | End: 2024-05-05

## 2024-05-05 RX ORDER — BENZONATATE 100 MG/1
100 CAPSULE ORAL EVERY 8 HOURS SCHEDULED
Status: DISCONTINUED | OUTPATIENT
Start: 2024-05-05 | End: 2024-05-07 | Stop reason: HOSPADM

## 2024-05-05 RX ORDER — METHYLPREDNISOLONE SODIUM SUCCINATE 125 MG/2ML
60 INJECTION, POWDER, LYOPHILIZED, FOR SOLUTION INTRAMUSCULAR; INTRAVENOUS ONCE
Status: COMPLETED | OUTPATIENT
Start: 2024-05-05 | End: 2024-05-05

## 2024-05-05 RX ORDER — DEXTROMETHORPHAN POLISTIREX 30 MG/5ML
10 SUSPENSION ORAL 2 TIMES DAILY
Qty: 148 ML | Refills: 0 | Status: SHIPPED | OUTPATIENT
Start: 2024-05-05

## 2024-05-05 RX ORDER — INSULIN LISPRO 100 [IU]/ML
2-12 INJECTION, SOLUTION INTRAVENOUS; SUBCUTANEOUS
Status: DISCONTINUED | OUTPATIENT
Start: 2024-05-05 | End: 2024-05-07 | Stop reason: HOSPADM

## 2024-05-05 RX ORDER — MAGNESIUM SULFATE HEPTAHYDRATE 40 MG/ML
2 INJECTION, SOLUTION INTRAVENOUS ONCE
Status: COMPLETED | OUTPATIENT
Start: 2024-05-05 | End: 2024-05-05

## 2024-05-05 RX ORDER — BUPROPION HYDROCHLORIDE 100 MG/1
100 TABLET ORAL 2 TIMES DAILY
Status: DISCONTINUED | OUTPATIENT
Start: 2024-05-06 | End: 2024-05-06

## 2024-05-05 RX ORDER — INSULIN LISPRO 100 [IU]/ML
2-12 INJECTION, SOLUTION INTRAVENOUS; SUBCUTANEOUS
Status: DISCONTINUED | OUTPATIENT
Start: 2024-05-06 | End: 2024-05-07 | Stop reason: HOSPADM

## 2024-05-05 RX ORDER — BENZONATATE 100 MG/1
100 CAPSULE ORAL ONCE
Status: COMPLETED | OUTPATIENT
Start: 2024-05-05 | End: 2024-05-05

## 2024-05-05 RX ORDER — BUSPIRONE HYDROCHLORIDE 5 MG/1
5 TABLET ORAL 2 TIMES DAILY
Status: DISCONTINUED | OUTPATIENT
Start: 2024-05-06 | End: 2024-05-06

## 2024-05-05 RX ORDER — FENOFIBRATE 48 MG/1
48 TABLET, COATED ORAL DAILY
Status: DISCONTINUED | OUTPATIENT
Start: 2024-05-06 | End: 2024-05-07 | Stop reason: HOSPADM

## 2024-05-05 RX ORDER — AZITHROMYCIN 250 MG/1
250 TABLET, FILM COATED ORAL EVERY 24 HOURS
Qty: 4 TABLET | Refills: 0 | Status: ON HOLD | OUTPATIENT
Start: 2024-05-05 | End: 2024-05-07

## 2024-05-05 RX ORDER — NICOTINE 21 MG/24HR
1 PATCH, TRANSDERMAL 24 HOURS TRANSDERMAL DAILY
Status: CANCELLED | OUTPATIENT
Start: 2024-05-06

## 2024-05-05 RX ORDER — BENZONATATE 100 MG/1
100 CAPSULE ORAL EVERY 8 HOURS
Qty: 21 CAPSULE | Refills: 0 | Status: SHIPPED | OUTPATIENT
Start: 2024-05-05 | End: 2024-05-18

## 2024-05-05 RX ORDER — CEFTRIAXONE 1 G/50ML
1000 INJECTION, SOLUTION INTRAVENOUS ONCE
Status: COMPLETED | OUTPATIENT
Start: 2024-05-05 | End: 2024-05-05

## 2024-05-05 RX ORDER — TOPIRAMATE 25 MG/1
50 TABLET ORAL 2 TIMES DAILY
Status: DISCONTINUED | OUTPATIENT
Start: 2024-05-06 | End: 2024-05-07 | Stop reason: HOSPADM

## 2024-05-05 RX ADMIN — ALBUTEROL SULFATE 5 MG: 2.5 SOLUTION RESPIRATORY (INHALATION) at 19:31

## 2024-05-05 RX ADMIN — AMOXICILLIN AND CLAVULANATE POTASSIUM 1 TABLET: 875; 125 TABLET, FILM COATED ORAL at 04:19

## 2024-05-05 RX ADMIN — METHYLPREDNISOLONE SODIUM SUCCINATE 60 MG: 125 INJECTION, POWDER, FOR SOLUTION INTRAMUSCULAR; INTRAVENOUS at 19:41

## 2024-05-05 RX ADMIN — GUAIFENESIN AND DEXTROMETHORPHAN 10 ML: 100; 10 SYRUP ORAL at 19:50

## 2024-05-05 RX ADMIN — BENZONATATE 100 MG: 100 CAPSULE ORAL at 03:05

## 2024-05-05 RX ADMIN — SODIUM CHLORIDE 1000 ML: 0.9 INJECTION, SOLUTION INTRAVENOUS at 03:07

## 2024-05-05 RX ADMIN — CEFTRIAXONE 1000 MG: 1 INJECTION, SOLUTION INTRAVENOUS at 19:52

## 2024-05-05 RX ADMIN — INSULIN LISPRO 10 UNITS: 100 INJECTION, SOLUTION INTRAVENOUS; SUBCUTANEOUS at 23:12

## 2024-05-05 RX ADMIN — BENZONATATE 100 MG: 100 CAPSULE ORAL at 23:30

## 2024-05-05 RX ADMIN — GUAIFENESIN AND DEXTROMETHORPHAN 10 ML: 100; 10 SYRUP ORAL at 03:05

## 2024-05-05 RX ADMIN — IPRATROPIUM BROMIDE 0.5 MG: 0.5 SOLUTION RESPIRATORY (INHALATION) at 19:31

## 2024-05-05 RX ADMIN — AZITHROMYCIN MONOHYDRATE 500 MG: 500 INJECTION, POWDER, LYOPHILIZED, FOR SOLUTION INTRAVENOUS at 23:10

## 2024-05-05 RX ADMIN — AZITHROMYCIN DIHYDRATE 500 MG: 250 TABLET ORAL at 04:19

## 2024-05-05 RX ADMIN — MAGNESIUM SULFATE HEPTAHYDRATE 2 G: 40 INJECTION, SOLUTION INTRAVENOUS at 20:10

## 2024-05-05 NOTE — ED PROVIDER NOTES
History  Chief Complaint   Patient presents with    Psychiatric Evaluation     States he is feeling down. States maybe he could use a adjustment of his meds. States he lives with his family. He gets angry fast, they fight. Arrives with several tote bags of belongings. He does not think he is suicidal . Patient noted to have medical chart from this am.     Shortness of Breath     Patient is tachypneic , sat is 92 %     18-year-old male with past history of morbid obesity, ADHD, viral meningitis, presents to the ED for suicidal ideation as well as cough and shortness of breath.  Patient was seen at our emergency department last night and was diagnosed with multifocal pneumonia.  Patient was discharged home on antibiotics and cough medicine.  Patient states that he never picked this up.  Patient got into an argument earlier with his family and then became suicidal.  Patient called and left and arrived to the ED for further evaluation.  In the emergency department patient is tachycardic, mildly febrile as well as tachypneic.  His oxygen saturation is 92%.  Patient states that he did not know that he needed to  his prescriptions at home.  Patient is having difficulty taking deep breath.      History provided by:  Patient  Psychiatric Evaluation  Associated symptoms: no abdominal pain and no chest pain    Shortness of Breath  Associated symptoms: cough    Associated symptoms: no abdominal pain, no chest pain, no ear pain, no fever, no rash, no sore throat and no vomiting        Prior to Admission Medications   Prescriptions Last Dose Informant Patient Reported? Taking?   Abilify Maintena 400 MG injection   Yes No   Sig: INJECT 2 ML INTRAMUSCUALRLY EVERY 4 WEEKS AS DIRECTED   Blood Glucose Monitoring Suppl (OneTouch Verio) w/Device KIT   No No   Sig: Use 2 (two) times a day Ok to substitute with insurance covered brand   Dextromethorphan Polistirex ER 30 MG/5ML SUER   No No   Sig: Take 10 mL (60 mg total) by mouth 2  (two) times a day   Lancets (onetouch ultrasoft) lancets   No No   Sig: Use as instructed   amoxicillin-clavulanate (AUGMENTIN) 875-125 mg per tablet   No No   Sig: Take 1 tablet by mouth every 12 (twelve) hours for 7 days   azithromycin (ZITHROMAX) 250 mg tablet   No No   Sig: Take 1 tablet (250 mg total) by mouth every 24 hours for 4 days Take 2 tablets today then 1 tablet daily x 4 days   benzonatate (TESSALON PERLES) 100 mg capsule   No No   Sig: Take 1 capsule (100 mg total) by mouth every 8 (eight) hours   buPROPion (WELLBUTRIN) 100 mg tablet  Family Member Yes No   Sig: Take 100 mg by mouth 2 (two) times a day   busPIRone (BUSPAR) 5 mg tablet   Yes No   Sig: Take 5 mg by mouth 2 (two) times a day   dicyclomine (BENTYL) 20 mg tablet   No No   Sig: Take 1 tablet (20 mg total) by mouth 2 (two) times a day   fenofibrate (TRICOR) 54 MG tablet   No No   Sig: Take 1 tablet (54 mg total) by mouth daily   glucose blood (OneTouch Verio) test strip   No No   Sig: TEST BLOOD SUGAR TWICE A DAY   metFORMIN (GLUCOPHAGE-XR) 500 mg 24 hr tablet   No No   Sig: Take 1 tablet (500 mg total) by mouth 2 (two) times a day with meals Start with 1 pill 500mg with breakfast x 1 week, then 1 pill 500mg with breakfast and 1 pill with dinner for 1 week , then 2 pills with breakfast (1000mg) and 1 pill with dinner (500mg) for 1 week, then 1000mg twice a day going forward   mupirocin (BACTROBAN) 2 % ointment   No No   Sig: Apply topically 3 (three) times a day   nystatin (MYCOSTATIN) cream   No No   Sig: Apply topically 2 (two) times a day   omega-3-acid ethyl esters (LOVAZA) 1 g capsule   No No   Sig: Take 2 capsules (2 g total) by mouth 2 (two) times a day   Patient not taking: Reported on 10/17/2023   ondansetron (ZOFRAN) 4 mg tablet   No No   Sig: Take 1 tablet (4 mg total) by mouth every 6 (six) hours   ondansetron (ZOFRAN-ODT) 4 mg disintegrating tablet   No No   Sig: Take 1 tablet (4 mg total) by mouth every 6 (six) hours as needed  for nausea or vomiting   sitaGLIPtin (JANUVIA) 50 mg tablet   No No   Sig: Take 1 tablet (50 mg total) by mouth daily   topiramate (TOPAMAX) 50 MG tablet   Yes No   Sig: Take 50 mg by mouth 2 (two) times a day      Facility-Administered Medications: None       Past Medical History:   Diagnosis Date    ADHD (attention deficit hyperactivity disorder)     Diabetes mellitus (HCC)     5/2023    Lung collapse     Sleep apnea     Viral meningitis        Past Surgical History:   Procedure Laterality Date    TONSILECTOMY AND ADNOIDECTOMY      2020       Family History   Problem Relation Age of Onset    Hyperlipidemia Mother     Diabetes type II Mother     Obesity Mother     Obesity Father     Drug abuse Father     Diabetes type II Maternal Aunt     Lung disease Maternal Aunt     Rheum arthritis Maternal Aunt     Fibromyalgia Maternal Aunt     Atrial fibrillation Maternal Grandmother     Hyperlipidemia Maternal Grandfather     Diabetes type II Maternal Grandfather     Stroke Maternal Grandfather     Heart disease Maternal Grandfather     Stroke Paternal Grandfather      I have reviewed and agree with the history as documented.    E-Cigarette/Vaping    E-Cigarette Use Current Some Day User     Comments smoke cigg. when he has money      E-Cigarette/Vaping Substances    Nicotine No     THC No     CBD No     Flavoring Yes     Other No     Unknown No      Social History     Tobacco Use    Smoking status: Former     Types: Cigarettes     Passive exposure: Yes    Smokeless tobacco: Current   Vaping Use    Vaping status: Some Days    Substances: Flavoring   Substance Use Topics    Alcohol use: Yes     Alcohol/week: 2.0 standard drinks of alcohol     Types: 2 Shots of liquor per week    Drug use: Yes     Types: Marijuana       Review of Systems   Constitutional:  Negative for chills and fever.   HENT:  Negative for ear pain and sore throat.    Eyes:  Negative for pain and visual disturbance.   Respiratory:  Positive for cough and  shortness of breath.    Cardiovascular:  Negative for chest pain and palpitations.   Gastrointestinal:  Negative for abdominal pain and vomiting.   Genitourinary:  Negative for dysuria and hematuria.   Musculoskeletal:  Negative for arthralgias and back pain.   Skin:  Negative for color change and rash.   Neurological:  Negative for seizures and syncope.   All other systems reviewed and are negative.      Physical Exam  Physical Exam  Vitals and nursing note reviewed.   Constitutional:       General: He is not in acute distress.     Appearance: He is well-developed.   HENT:      Head: Normocephalic and atraumatic.   Eyes:      Conjunctiva/sclera: Conjunctivae normal.   Cardiovascular:      Rate and Rhythm: Normal rate and regular rhythm.      Heart sounds: No murmur heard.  Pulmonary:      Effort: Pulmonary effort is normal. No respiratory distress.      Breath sounds: Wheezing present.      Comments: Diffuse inspiratory and expiratory rhonchi/wheezing noted to bilateral lungs.  Patient is tachypneic and tachycardic.  Oxygen saturation is 92% on room air.  Abdominal:      Palpations: Abdomen is soft.      Tenderness: There is no abdominal tenderness.   Musculoskeletal:         General: No swelling.      Cervical back: Neck supple.   Skin:     General: Skin is warm and dry.      Capillary Refill: Capillary refill takes less than 2 seconds.   Neurological:      Mental Status: He is alert.   Psychiatric:         Mood and Affect: Mood normal.         Vital Signs  ED Triage Vitals [05/05/24 1749]   Temperature Pulse Respirations Blood Pressure SpO2   100.3 °F (37.9 °C) 105 (!) 36 134/74 92 %      Temp Source Heart Rate Source Patient Position - Orthostatic VS BP Location FiO2 (%)   Tympanic Monitor Sitting Left arm --      Pain Score       --           Vitals:    05/05/24 1749   BP: 134/74   Pulse: 105   Patient Position - Orthostatic VS: Sitting         Visual Acuity      ED Medications  Medications   ipratropium  (ATROVENT) 0.02 % inhalation solution 0.5 mg (has no administration in time range)   albuterol inhalation solution 5 mg (has no administration in time range)   methylPREDNISolone sodium succinate (Solu-MEDROL) injection 60 mg (has no administration in time range)   magnesium sulfate 2 g/50 mL IVPB (premix) 2 g (has no administration in time range)   cefTRIAXone (ROCEPHIN) IVPB (premix in dextrose) 1,000 mg 50 mL (has no administration in time range)   azithromycin (ZITHROMAX) 500 mg in sodium chloride 0.9% 250mL IVPB 500 mg (has no administration in time range)   dextromethorphan-guaiFENesin (ROBITUSSIN DM) oral syrup 10 mL (has no administration in time range)       Diagnostic Studies  Results Reviewed       Procedure Component Value Units Date/Time    CBC and differential [[id number]]     Lab Status: No result Specimen: Blood     Basic metabolic panel [[id number]]     Lab Status: No result Specimen: Blood     UA w Reflex to Microscopic w Reflex to Culture [[id number]]     Lab Status: No result Specimen: Urine                    XR chest 2 views    (Results Pending)              Procedures  Procedures         ED Course         CRAFFT      Flowsheet Row Most Recent Value   CRAFFT Initial Screen: During the past 12 months, did you:    1. Drink any alcohol (more than a few sips)?  Yes Filed at: 05/05/2024 1752   2. Smoke any marijuana or hashish Yes Filed at: 05/05/2024 1752                                            Medical Decision Making  I reviewed x-ray from last night that shows multifocal pneumonia.  Patient is currently having suicidal ideation.  At this time patient is not able to take care of himself at home.  Patient did not  any of his medications that was prescribed to him yesterday.  At this time steroids, neb treatment, cough medication, antibiotics ordered.  Patient will be admitted for further management of his multifocal pneumonia.  After patient is medically cleared, patient will undergo crisis  evaluation for his suicide ideation.  Patient agrees with admission plans.    Amount and/or Complexity of Data Reviewed  Labs: ordered. Decision-making details documented in ED Course.  Radiology: ordered.    Risk  Prescription drug management.             Disposition  Final diagnoses:   Multifocal pneumonia   Shortness of breath   Suicide ideation   Hypoxia     Time reflects when diagnosis was documented in both MDM as applicable and the Disposition within this note       Time User Action Codes Description Comment    5/5/2024  7:19 PM Sharon Law [J18.9] Multifocal pneumonia     5/5/2024  7:19 PM Sharon Law [R06.02] Shortness of breath     5/5/2024  7:19 PM Sharon Law [R45.851] Suicide ideation     5/5/2024  7:24 PM Sharon Law [R09.02] Hypoxia           ED Disposition       ED Disposition   Admit    Condition   Stable    Date/Time   Sun May 5, 2024 1919    Comment   Case was discussed with Dr. Colvin and the patient's admission status was agreed to be Admission Status: inpatient status to the service of Dr. Colvin.               Follow-up Information    None         Patient's Medications   Discharge Prescriptions    No medications on file       No discharge procedures on file.    PDMP Review       None            ED Provider  Electronically Signed by             Sharon Law DO  05/05/24 8713

## 2024-05-05 NOTE — ED NOTES
Patient noted to have harsh, barky cough.No sob,O2 saturation is 91-93% on room air, has pneumonia,was here this am, was prescribed antibiotics and did not pick them up yet and family did not pick them up for him either.     Shima Pruitt RN  05/05/24 3949

## 2024-05-06 ENCOUNTER — APPOINTMENT (INPATIENT)
Dept: RADIOLOGY | Facility: HOSPITAL | Age: 19
DRG: 720 | End: 2024-05-06
Payer: COMMERCIAL

## 2024-05-06 PROBLEM — A41.9 SEPSIS (HCC): Status: ACTIVE | Noted: 2024-05-06

## 2024-05-06 PROBLEM — F91.9 CONDUCT DISORDER: Status: ACTIVE | Noted: 2021-11-23

## 2024-05-06 LAB
ALBUMIN SERPL BCP-MCNC: 4 G/DL (ref 3.5–5)
ALP SERPL-CCNC: 77 U/L (ref 34–104)
ALT SERPL W P-5'-P-CCNC: 35 U/L (ref 7–52)
ANION GAP SERPL CALCULATED.3IONS-SCNC: 10 MMOL/L (ref 4–13)
AST SERPL W P-5'-P-CCNC: 36 U/L (ref 13–39)
BASOPHILS # BLD AUTO: 0.01 THOUSANDS/ÂΜL (ref 0–0.1)
BASOPHILS NFR BLD AUTO: 0 % (ref 0–1)
BILIRUB SERPL-MCNC: 0.42 MG/DL (ref 0.2–1)
BUN SERPL-MCNC: 8 MG/DL (ref 5–25)
CALCIUM SERPL-MCNC: 9.1 MG/DL (ref 8.4–10.2)
CHLORIDE SERPL-SCNC: 99 MMOL/L (ref 96–108)
CO2 SERPL-SCNC: 25 MMOL/L (ref 21–32)
CREAT SERPL-MCNC: 0.56 MG/DL (ref 0.6–1.3)
EOSINOPHIL # BLD AUTO: 0 THOUSAND/ÂΜL (ref 0–0.61)
EOSINOPHIL NFR BLD AUTO: 0 % (ref 0–6)
ERYTHROCYTE [DISTWIDTH] IN BLOOD BY AUTOMATED COUNT: 13.7 % (ref 11.6–15.1)
GFR SERPL CREATININE-BSD FRML MDRD: 148 ML/MIN/1.73SQ M
GLUCOSE SERPL-MCNC: 240 MG/DL (ref 65–140)
GLUCOSE SERPL-MCNC: 270 MG/DL (ref 65–140)
GLUCOSE SERPL-MCNC: 298 MG/DL (ref 65–140)
GLUCOSE SERPL-MCNC: 311 MG/DL (ref 65–140)
GLUCOSE SERPL-MCNC: 312 MG/DL (ref 65–140)
GLUCOSE SERPL-MCNC: 395 MG/DL (ref 65–140)
HCT VFR BLD AUTO: 41.5 % (ref 36.5–49.3)
HGB BLD-MCNC: 13.5 G/DL (ref 12–17)
IMM GRANULOCYTES # BLD AUTO: 0.04 THOUSAND/UL (ref 0–0.2)
IMM GRANULOCYTES NFR BLD AUTO: 1 % (ref 0–2)
L PNEUMO1 AG UR QL IA.RAPID: NEGATIVE
LYMPHOCYTES # BLD AUTO: 1.16 THOUSANDS/ÂΜL (ref 0.6–4.47)
LYMPHOCYTES NFR BLD AUTO: 22 % (ref 14–44)
MAGNESIUM SERPL-MCNC: 2.3 MG/DL (ref 1.9–2.7)
MCH RBC QN AUTO: 28.1 PG (ref 26.8–34.3)
MCHC RBC AUTO-ENTMCNC: 32.5 G/DL (ref 31.4–37.4)
MCV RBC AUTO: 87 FL (ref 82–98)
MONOCYTES # BLD AUTO: 0.29 THOUSAND/ÂΜL (ref 0.17–1.22)
MONOCYTES NFR BLD AUTO: 6 % (ref 4–12)
NEUTROPHILS # BLD AUTO: 3.68 THOUSANDS/ÂΜL (ref 1.85–7.62)
NEUTS SEG NFR BLD AUTO: 71 % (ref 43–75)
NRBC BLD AUTO-RTO: 0 /100 WBCS
PHOSPHATE SERPL-MCNC: 4.5 MG/DL (ref 2.7–4.5)
PLATELET # BLD AUTO: 259 THOUSANDS/UL (ref 149–390)
PMV BLD AUTO: 11.1 FL (ref 8.9–12.7)
POTASSIUM SERPL-SCNC: 4.3 MMOL/L (ref 3.5–5.3)
PROT SERPL-MCNC: 8.1 G/DL (ref 6.4–8.4)
RBC # BLD AUTO: 4.8 MILLION/UL (ref 3.88–5.62)
S PNEUM AG UR QL: NEGATIVE
SODIUM SERPL-SCNC: 134 MMOL/L (ref 135–147)
WBC # BLD AUTO: 5.18 THOUSAND/UL (ref 4.31–10.16)

## 2024-05-06 PROCEDURE — 93005 ELECTROCARDIOGRAM TRACING: CPT

## 2024-05-06 PROCEDURE — 82948 REAGENT STRIP/BLOOD GLUCOSE: CPT

## 2024-05-06 PROCEDURE — 94760 N-INVAS EAR/PLS OXIMETRY 1: CPT

## 2024-05-06 PROCEDURE — 83735 ASSAY OF MAGNESIUM: CPT | Performed by: INTERNAL MEDICINE

## 2024-05-06 PROCEDURE — 80053 COMPREHEN METABOLIC PANEL: CPT | Performed by: INTERNAL MEDICINE

## 2024-05-06 PROCEDURE — 85025 COMPLETE CBC W/AUTO DIFF WBC: CPT | Performed by: INTERNAL MEDICINE

## 2024-05-06 PROCEDURE — 84100 ASSAY OF PHOSPHORUS: CPT | Performed by: INTERNAL MEDICINE

## 2024-05-06 PROCEDURE — 94640 AIRWAY INHALATION TREATMENT: CPT

## 2024-05-06 PROCEDURE — 71275 CT ANGIOGRAPHY CHEST: CPT

## 2024-05-06 PROCEDURE — 94660 CPAP INITIATION&MGMT: CPT

## 2024-05-06 PROCEDURE — 99232 SBSQ HOSP IP/OBS MODERATE 35: CPT | Performed by: INTERNAL MEDICINE

## 2024-05-06 RX ORDER — FLUOXETINE 10 MG/1
10 CAPSULE ORAL DAILY
Status: DISCONTINUED | OUTPATIENT
Start: 2024-05-06 | End: 2024-05-07 | Stop reason: HOSPADM

## 2024-05-06 RX ORDER — GUAIFENESIN/DEXTROMETHORPHAN 100-10MG/5
10 SYRUP ORAL EVERY 6 HOURS PRN
Status: DISCONTINUED | OUTPATIENT
Start: 2024-05-06 | End: 2024-05-07 | Stop reason: HOSPADM

## 2024-05-06 RX ORDER — ONDANSETRON 2 MG/ML
4 INJECTION INTRAMUSCULAR; INTRAVENOUS EVERY 6 HOURS PRN
Status: DISCONTINUED | OUTPATIENT
Start: 2024-05-06 | End: 2024-05-07 | Stop reason: HOSPADM

## 2024-05-06 RX ORDER — INSULIN GLARGINE 100 [IU]/ML
40 INJECTION, SOLUTION SUBCUTANEOUS
Status: DISCONTINUED | OUTPATIENT
Start: 2024-05-06 | End: 2024-05-07 | Stop reason: HOSPADM

## 2024-05-06 RX ORDER — INSULIN LISPRO 100 [IU]/ML
10 INJECTION, SOLUTION INTRAVENOUS; SUBCUTANEOUS
Status: DISCONTINUED | OUTPATIENT
Start: 2024-05-07 | End: 2024-05-07 | Stop reason: HOSPADM

## 2024-05-06 RX ADMIN — FENOFIBRATE 48 MG: 48 TABLET, FILM COATED ORAL at 08:19

## 2024-05-06 RX ADMIN — INSULIN LISPRO 4 UNITS: 100 INJECTION, SOLUTION INTRAVENOUS; SUBCUTANEOUS at 17:11

## 2024-05-06 RX ADMIN — BENZONATATE 100 MG: 100 CAPSULE ORAL at 21:05

## 2024-05-06 RX ADMIN — IPRATROPIUM BROMIDE AND ALBUTEROL SULFATE 3 ML: 2.5; .5 SOLUTION RESPIRATORY (INHALATION) at 20:06

## 2024-05-06 RX ADMIN — INSULIN LISPRO 8 UNITS: 100 INJECTION, SOLUTION INTRAVENOUS; SUBCUTANEOUS at 21:06

## 2024-05-06 RX ADMIN — ONDANSETRON 4 MG: 2 INJECTION INTRAMUSCULAR; INTRAVENOUS at 22:40

## 2024-05-06 RX ADMIN — IPRATROPIUM BROMIDE AND ALBUTEROL SULFATE 3 ML: 2.5; .5 SOLUTION RESPIRATORY (INHALATION) at 07:06

## 2024-05-06 RX ADMIN — BENZONATATE 100 MG: 100 CAPSULE ORAL at 05:17

## 2024-05-06 RX ADMIN — SITAGLIPTIN 100 MG: 50 TABLET, FILM COATED ORAL at 11:04

## 2024-05-06 RX ADMIN — CEFTRIAXONE 1000 MG: 1 INJECTION, SOLUTION INTRAVENOUS at 00:38

## 2024-05-06 RX ADMIN — INSULIN LISPRO 6 UNITS: 100 INJECTION, SOLUTION INTRAVENOUS; SUBCUTANEOUS at 08:18

## 2024-05-06 RX ADMIN — INSULIN LISPRO 6 UNITS: 100 INJECTION, SOLUTION INTRAVENOUS; SUBCUTANEOUS at 12:48

## 2024-05-06 RX ADMIN — ENOXAPARIN SODIUM 40 MG: 40 INJECTION SUBCUTANEOUS at 08:17

## 2024-05-06 RX ADMIN — TOPIRAMATE 50 MG: 25 TABLET, FILM COATED ORAL at 08:19

## 2024-05-06 RX ADMIN — AZITHROMYCIN MONOHYDRATE 500 MG: 500 INJECTION, POWDER, LYOPHILIZED, FOR SOLUTION INTRAVENOUS at 22:04

## 2024-05-06 RX ADMIN — IPRATROPIUM BROMIDE AND ALBUTEROL SULFATE 3 ML: 2.5; .5 SOLUTION RESPIRATORY (INHALATION) at 01:39

## 2024-05-06 RX ADMIN — BUSPIRONE HYDROCHLORIDE 5 MG: 5 TABLET ORAL at 08:19

## 2024-05-06 RX ADMIN — TOPIRAMATE 50 MG: 25 TABLET, FILM COATED ORAL at 17:10

## 2024-05-06 RX ADMIN — INSULIN GLARGINE 40 UNITS: 100 INJECTION, SOLUTION SUBCUTANEOUS at 22:03

## 2024-05-06 RX ADMIN — CEFTRIAXONE 2000 MG: 2 INJECTION, SOLUTION INTRAVENOUS at 21:05

## 2024-05-06 RX ADMIN — IPRATROPIUM BROMIDE AND ALBUTEROL SULFATE 3 ML: 2.5; .5 SOLUTION RESPIRATORY (INHALATION) at 13:15

## 2024-05-06 RX ADMIN — BUPROPION HYDROCHLORIDE TABLETS 100 MG: 100 TABLET, FILM COATED ORAL at 08:19

## 2024-05-06 RX ADMIN — IOHEXOL 100 ML: 350 INJECTION, SOLUTION INTRAVENOUS at 00:35

## 2024-05-06 RX ADMIN — BENZONATATE 100 MG: 100 CAPSULE ORAL at 13:12

## 2024-05-06 NOTE — ASSESSMENT & PLAN NOTE
-Patient reportedly with suicidal ideation on admission to ED  -Patient reports no intent to harm self or others on my personal evaluation  -Continue home meds: bupropion, buspirone  -1 to 1   -Psych consulted

## 2024-05-06 NOTE — ASSESSMENT & PLAN NOTE
As evidenced by tachypnea, tachycardia, fever  Afebrile at present, hemodynamically stable  Secondary to above  Continue antibiotics as noted  Follow-up cultures

## 2024-05-06 NOTE — H&P
Pending sale to [facility]  H&P  Name: Rik Marin 19 y.o. male I MRN: [id number]  Unit/Bed#: [address] [place] [address] Date of Admission: 5/5/2024   Date of Service: 5/5/2024 I Hospital Day: 0      Assessment/Plan   * Pneumonia  Assessment & Plan  -Afebrile, tachypneic, tachycardic, stable BP  -No leukocytosis  -CXR: multifocal bilateral pneumonia, greater on the left  -Continue ceftriaxone with azithromycin  -Albuterol PRN, Duoneb breathing treatment  -Pneumonia panel ordered  -Monitor O2 sat closely    MDD (major depressive disorder)  Assessment & Plan  -Patient reportedly with suicidal ideation on admission to ED  -Patient reports no intent to harm self or others on my personal evaluation  -Continue home meds: bupropion, buspirone  -1 to 1   -Psych consulted    Uncontrolled type 2 diabetes mellitus with hyperglycemia (HCC)  Assessment & Plan  -A1c 10.9  -Continue ISS with glucose checks      Morbid obesity with BMI of 50.0-59.9, adult (HCC)  Assessment & Plan  -Lifestyle modifications     Hypertriglyceridemia  Assessment & Plan  -Continue fenofibrate    VTE Pharmacologic Prophylaxis: VTE Score: 3 Moderate Risk (Score 3-4) - Pharmacological DVT Prophylaxis Ordered: enoxaparin (Lovenox).  Code Status: Full Code  Discussion with family: Patient declined call to .     Anticipated Length of Stay: Patient will be admitted on an inpatient basis with an anticipated length of stay of greater than 2 midnights secondary to CAP.    Total Time Spent on Date of Encounter in care of patient: 30 mins. This time was spent on one or more of the following: performing physical exam; counseling and coordination of care; obtaining or reviewing history; documenting in the medical record; reviewing/ordering tests, medications or procedures; communicating with other healthcare professionals and discussing with patient's family/caregivers.    Chief Complaint: Dyspnea    History of Present Illness:  Rik Marin is  a 19 y.o. male with a PMH of MDD, T2DM, Hypertriglyceridemia, Morbid obesity who presented to the ED due to cough and shortness of breath. Patient was recently evaluated in ED previous night and ultimately discharged home on cough suppressants and oral antibiotics. Patient failed to improve on above therapy and therefore came into the ED for evaluation. Patient reported to have suicideal ideation during ED provider's evaluation. Denies chest pain, N/V/D, weakness, numbness.    Review of Systems:  Review of Systems   Constitutional:  Positive for fever.   HENT: Negative.     Eyes: Negative.    Respiratory:  Positive for shortness of breath.    Cardiovascular: Negative.    Gastrointestinal: Negative.    Endocrine: Negative.    Genitourinary: Negative.    Musculoskeletal: Negative.    Skin: Negative.    Allergic/Immunologic: Negative.    Neurological: Negative.    Hematological: Negative.    Psychiatric/Behavioral: Negative.         Past Medical and Surgical History:   Past Medical History:   Diagnosis Date    ADHD (attention deficit hyperactivity disorder)     Diabetes mellitus (HCC)     5/2023    Lung collapse     Sleep apnea     Viral meningitis        Past Surgical History:   Procedure Laterality Date    TONSILECTOMY AND ADNOIDECTOMY      2020       Meds/Allergies:  Prior to Admission medications    Medication Sig Start Date End Date Taking? Authorizing Provider   Abilify Maintena 400 MG injection INJECT 2 ML INTRAMUSCUALRLY EVERY 4 WEEKS AS DIRECTED 9/23/23   Historical Provider, MD   amoxicillin-clavulanate (AUGMENTIN) 875-125 mg per tablet Take 1 tablet by mouth every 12 (twelve) hours for 7 days 5/5/24 5/12/24  Kanu Carter MD   azithromycin (ZITHROMAX) 250 mg tablet Take 1 tablet (250 mg total) by mouth every 24 hours for 4 days Take 2 tablets today then 1 tablet daily x 4 days 5/5/24 5/9/24  Kanu Carter MD   benzonatate (TESSALON PERLES) 100 mg capsule Take 1 capsule (100 mg total) by mouth every  8 (eight) hours 5/5/24   Kanu Carter MD   Blood Glucose Monitoring Suppl (OneTouch Verio) w/Device KIT Use 2 (two) times a day Ok to substitute with insurance covered brand 10/27/23   Kailyn Velázquez MD   buPROPion (WELLBUTRIN) 100 mg tablet Take 100 mg by mouth 2 (two) times a day  Patient not taking: Reported on 5/5/2024    Historical Provider, MD   busPIRone (BUSPAR) 5 mg tablet Take 5 mg by mouth 2 (two) times a day  Patient not taking: Reported on 5/5/2024 6/30/23   Historical Provider, MD   Dextromethorphan Polistirex ER 30 MG/5ML SUER Take 10 mL (60 mg total) by mouth 2 (two) times a day 5/5/24   Kanu Carter MD   dicyclomine (BENTYL) 20 mg tablet Take 1 tablet (20 mg total) by mouth 2 (two) times a day 1/12/24   Dylan Peres MD   fenofibrate (TRICOR) 54 MG tablet Take 1 tablet (54 mg total) by mouth daily  Patient not taking: Reported on 5/5/2024 9/28/23   Narinder Whatley MD   glucose blood (OneTouch Verio) test strip TEST BLOOD SUGAR TWICE A DAY 10/30/23   Kailyn Velázquez MD   Lancets (onetouch ultrasoft) lancets Use as instructed 10/27/23   Kailyn Velázquez MD   metFORMIN (GLUCOPHAGE-XR) 500 mg 24 hr tablet Take 1 tablet (500 mg total) by mouth 2 (two) times a day with meals Start with 1 pill 500mg with breakfast x 1 week, then 1 pill 500mg with breakfast and 1 pill with dinner for 1 week , then 2 pills with breakfast (1000mg) and 1 pill with dinner (500mg) for 1 week, then 1000mg twice a day going forward  Patient not taking: Reported on 5/5/2024 10/17/23   Rebecca Centeno, DO   mupirocin (BACTROBAN) 2 % ointment Apply topically 3 (three) times a day 3/17/24   Amado Mercado, DO   nystatin (MYCOSTATIN) cream Apply topically 2 (two) times a day 4/13/24 5/13/24  Shaila Hernandez PA-C   omega-3-acid ethyl esters (LOVAZA) 1 g capsule Take 2 capsules (2 g total) by mouth 2 (two) times a day  Patient not taking: Reported on 10/17/2023 9/28/23   Narinder Whatley MD   ondansetron (ZOFRAN) 4 mg  tablet Take 1 tablet (4 mg total) by mouth every 6 (six) hours 1/12/24   Dylan Peres MD   ondansetron (ZOFRAN-ODT) 4 mg disintegrating tablet Take 1 tablet (4 mg total) by mouth every 6 (six) hours as needed for nausea or vomiting 3/12/24   Sharon Law DO   sitaGLIPtin (JANUVIA) 50 mg tablet Take 1 tablet (50 mg total) by mouth daily  Patient not taking: Reported on 5/5/2024 10/17/23   Rebecca Centneo DO   topiramate (TOPAMAX) 50 MG tablet Take 50 mg by mouth 2 (two) times a day  Patient not taking: Reported on 5/5/2024 3/10/22   Historical Provider, MD     I have reviewed home medications using recent Epic encounter.    Allergies: No Known Allergies    Social History:  Marital Status: Single   Patient Pre-hospital Living Situation: Home  Patient Pre-hospital Level of Mobility: walks  Substance Use History:   Social History     Substance and Sexual Activity   Alcohol Use Yes    Alcohol/week: 2.0 standard drinks of alcohol    Types: 2 Shots of liquor per week     Social History     Tobacco Use   Smoking Status Former    Types: Cigarettes    Passive exposure: Yes   Smokeless Tobacco Current     Social History     Substance and Sexual Activity   Drug Use Yes    Types: Marijuana       Family History:  Family History   Problem Relation Age of Onset    Hyperlipidemia Mother     Diabetes type II Mother     Obesity Mother     Obesity Father     Drug abuse Father     Diabetes type II Maternal Aunt     Lung disease Maternal Aunt     Rheum arthritis Maternal Aunt     Fibromyalgia Maternal Aunt     Atrial fibrillation Maternal Grandmother     Hyperlipidemia Maternal Grandfather     Diabetes type II Maternal Grandfather     Stroke Maternal Grandfather     Heart disease Maternal Grandfather     Stroke Paternal Grandfather        Physical Exam:     Vitals:   Blood Pressure: 135/69 (05/05/24 2045)  Pulse: (!) 112 (05/05/24 2045)  Temperature: 98.7 °F (37.1 °C) (05/05/24 2051)  Temp Source: Oral (05/05/24  2051)  Respirations: (!) 28 (05/05/24 2045)  Weight - Scale: (!) 173 kg (382 lb 3.2 oz) (05/05/24 1749)  SpO2: 94 % (05/05/24 2045)    Physical Exam  Constitutional:       Appearance: He is obese.   HENT:      Right Ear: Tympanic membrane normal.      Left Ear: Tympanic membrane normal.      Nose: Nose normal.      Mouth/Throat:      Mouth: Mucous membranes are moist.   Eyes:      Extraocular Movements: Extraocular movements intact.      Pupils: Pupils are equal, round, and reactive to light.   Cardiovascular:      Rate and Rhythm: Regular rhythm. Tachycardia present.      Pulses: Normal pulses.      Heart sounds: Normal heart sounds.   Pulmonary:      Effort: Pulmonary effort is normal. No respiratory distress.      Breath sounds: Rhonchi present.   Abdominal:      General: Abdomen is flat. Bowel sounds are normal.      Palpations: Abdomen is soft.   Musculoskeletal:         General: Normal range of motion.      Cervical back: Normal range of motion.   Skin:     General: Skin is warm and dry.      Capillary Refill: Capillary refill takes less than 2 seconds.   Neurological:      General: No focal deficit present.      Mental Status: He is alert and oriented to person, place, and time. Mental status is at baseline.          Additional Data:     Lab Results:  Results from last 7 days   Lab Units 05/05/24  1939   WBC Thousand/uL 6.60   HEMOGLOBIN g/dL 13.3   HEMATOCRIT % 41.7   PLATELETS Thousands/uL 246   SEGS PCT % 61   LYMPHO PCT % 29   MONO PCT % 7   EOS PCT % 2     Results from last 7 days   Lab Units 05/05/24  1939   SODIUM mmol/L 134*   POTASSIUM mmol/L 4.0   CHLORIDE mmol/L 98   CO2 mmol/L 28   BUN mg/dL 7   CREATININE mg/dL 0.61   ANION GAP mmol/L 8   CALCIUM mg/dL 9.1   GLUCOSE RANDOM mg/dL 173*                 Results from last 7 days   Lab Units 05/05/24  0304   PROCALCITONIN ng/ml 0.09       Lines/Drains:  Invasive Devices       Peripheral Intravenous Line  Duration             Peripheral IV 05/05/24  Left;Ventral (anterior);Right Forearm <1 day                        Imaging: Reviewed radiology reports from this admission including: chest xray  XR chest 2 views   Final Result by Yaz Weathers MD (05/05 2145)      Redemonstration of multifocal bilateral pneumonia, greater on the left.            Workstation performed: [id number]             EKG and Other Studies Reviewed on Admission:   EKG: Sinus Tachycardia. .    ** Please Note: This note has been constructed using a voice recognition system. **

## 2024-05-06 NOTE — PLAN OF CARE
Problem: NEUROSENSORY - ADULT  Goal: Achieves stable or improved neurological status  Description: INTERVENTIONS  - Monitor and report changes in neurological status  - Monitor vital signs such as temperature, blood pressure, glucose, and any other labs ordered   - Initiate measures to prevent increased intracranial pressure  - Monitor for seizure activity and implement precautions if appropriate      Outcome: Progressing  Goal: Remains free of injury related to seizures activity  Description: INTERVENTIONS  - Maintain airway, patient safety  and administer oxygen as ordered  - Monitor patient for seizure activity, document and report duration and description of seizure to physician/advanced practitioner  - If seizure occurs,  ensure patient safety during seizure  - Reorient patient post seizure  - Seizure pads on all 4 side rails  - Instruct patient/family to notify RN of any seizure activity including if an aura is experienced  - Instruct patient/family to call for assistance with activity based on nursing assessment  - Administer anti-seizure medications if ordered    Outcome: Progressing  Goal: Achieves maximal functionality and self care  Description: INTERVENTIONS  - Monitor swallowing and airway patency with patient fatigue and changes in neurological status  - Encourage and assist patient to increase activity and self care.   - Encourage visually impaired, hearing impaired and aphasic patients to use assistive/communication devices  Outcome: Progressing     Problem: CARDIOVASCULAR - ADULT  Goal: Maintains optimal cardiac output and hemodynamic stability  Description: INTERVENTIONS:  - Monitor I/O, vital signs and rhythm  - Monitor for S/S and trends of decreased cardiac output  - Administer and titrate ordered vasoactive medications to optimize hemodynamic stability  - Assess quality of pulses, skin color and temperature  - Assess for signs of decreased coronary artery perfusion  - Instruct patient to  report change in severity of symptoms  Outcome: Progressing  Goal: Absence of cardiac dysrhythmias or at baseline rhythm  Description: INTERVENTIONS:  - Continuous cardiac monitoring, vital signs, obtain 12 lead EKG if ordered  - Administer antiarrhythmic and heart rate control medications as ordered  - Monitor electrolytes and administer replacement therapy as ordered  Outcome: Progressing     Problem: RESPIRATORY - ADULT  Goal: Achieves optimal ventilation and oxygenation  Description: INTERVENTIONS:  - Assess for changes in respiratory status  - Assess for changes in mentation and behavior  - Position to facilitate oxygenation and minimize respiratory effort  - Oxygen administered by appropriate delivery if ordered  - Initiate smoking cessation education as indicated  - Encourage broncho-pulmonary hygiene including cough, deep breathe, Incentive Spirometry  - Assess the need for suctioning and aspirate as needed  - Assess and instruct to report SOB or any respiratory difficulty  - Respiratory Therapy support as indicated  Outcome: Progressing

## 2024-05-06 NOTE — UTILIZATION REVIEW
Initial Clinical Review    Admission: Date/Time/Statement:   Admission Orders (From admission, onward)       Ordered        05/05/24 1926  INPATIENT ADMISSION  Once                          Orders Placed This Encounter   Procedures    INPATIENT ADMISSION     Standing Status:   Standing     Number of Occurrences:   1     Order Specific Question:   Level of Care     Answer:   Med Surg [16]     Order Specific Question:   Estimated length of stay     Answer:   More than 2 Midnights     Order Specific Question:   Certification     Answer:   I certify that inpatient services are medically necessary for this patient for a duration of greater than two midnights. See H&P and MD Progress Notes for additional information about the patient's course of treatment.     ED Arrival Information       Expected   -    Arrival   5/5/2024 17:33    Acuity   Emergent              Means of arrival   Walk-In    Escorted by   Self    Service   Hospitalist    Admission type   Emergency              Arrival complaint   Crisis             Chief Complaint   Patient presents with    Psychiatric Evaluation     States he is feeling down. States maybe he could use a adjustment of his meds. States he lives with his family. He gets angry fast, they fight. Arrives with several tote bags of belongings. He does not think he is suicidal . Patient noted to have medical chart from this am.     Shortness of Breath     Patient is tachypneic , sat is 92 %       Initial Presentation:  19 yom to ER from home  with suicidal ideations, cough, SOB. Patient was seen at our emergency department last night and was diagnosed with multifocal pneumonia. Patient was discharged home on antibiotics and cough medicine. Patient states that he never picked this up. Patient got into an argument earlier with his family and then became suicidal. Patient called and left and arrived to the ED for further evaluation. In the emergency department patient is tachycardic, mildly febrile  as well as tachypneic. His oxygen saturation is 92%. Patient states that he did not know that he needed to  his prescriptions at home. Patient is having difficulty taking deep breath. Hx DM, morbid obesity, ADHD, viral meningitis, major depression. Presents febrile, tachycardic, tachypneic with diffuse inspiratory and expiratory rhonchi/wheezing noted to bilateral lungs. Admission CTA showing multifocal bilateral pneumonia. Labs: Na 132.  Admitted to inpatient status for pneumonia. Started on IVABT, cultures pending. Placed on frequent safety checks for suidial ideations.     Anticipated Length of Stay/Certification Statement:   Patient will be admitted on an inpatient basis with an anticipated length of stay of greater than 2 midnights secondary to CAP.     Date: 5/6/24   Day 2:   Double IVABT in progress for pneumonia with cultures pending. Pt currently tachypneic with coarse breath sounds, expiratory wheezing noted.  requiring O2 @ 4ltr nc.     Per psyche: depression  Plan:   Continue medical management  1:1 observation not necessary from psychiatric perspective at this time  Patient is not interested in inpatient psychiatric hospitalization  Patient does not meet criteria for involuntary inpatient psychiatric treatment at this time  Will adjust patient's psychiatric medication regimen to his home regimen based on records  Discontinue bupropion  Discontinue BuSpar   Continue Topamax 50 mg BID  Restart home fluoxetine 10 mg daily  home guanfacine ER 2 mg not on formulary therefore cannot order at this time, will hold off on ordering substitute at this time    ED Triage Vitals   Temperature Pulse Respirations Blood Pressure SpO2   05/05/24 1749 05/05/24 1749 05/05/24 1749 05/05/24 1749 05/05/24 1749   100.3 °F (37.9 °C) 105 (!) 36 134/74 92 %      Temp Source Heart Rate Source Patient Position - Orthostatic VS BP Location FiO2 (%)   05/05/24 1749 05/05/24 1749 05/05/24 1749 05/05/24 1749 --   Tympanic  Monitor Sitting Left arm       Pain Score       05/06/24 0049       No Pain          Wt Readings from Last 1 Encounters:   05/05/24 (!) 172 kg (379 lb 15.9 oz)     Additional Vital Signs:   Date/Time Temp Pulse Resp BP MAP (mmHg) SpO2 Calculated FIO2 (%) - Nasal Cannula Nasal Cannula O2 Flow Rate (L/min) O2 Device O2 Interface Device Patient Position - Orthostatic VS   05/06/24 1143 -- -- -- -- -- 94 % 28 2 L/min Nasal cannula -- --   05/06/24 0804 98.4 °F (36.9 °C) 92 24 Abnormal  141/86 -- 94 % 36 4 L/min Nasal cannula -- Lying   05/06/24 0706 -- -- -- -- -- 92 % 36 4 L/min Nasal cannula -- --   05/06/24 0300 98.2 °F (36.8 °C) 110 Abnormal  30 Abnormal  143/72 98 94 % -- -- CPAP -- Lying   05/06/24 0200 -- -- -- -- -- 96 % -- -- -- Full face mask --   05/06/24 0141 -- -- -- -- -- 96 % 36 4 L/min Nasal cannula -- --   05/05/24 2302 -- -- -- -- -- 92 % -- -- Nasal cannula -- --   05/05/24 2250 -- -- -- -- -- 95 % 36 4 L/min Nasal cannula -- --   05/05/24 2154 97.8 °F (36.6 °C) 105 32 Abnormal  129/63 -- 86 % Abnormal  -- -- None (Room air) -- --   05/05/24 2051 98.7 °F (37.1 °C) -- -- -- -- -- -- -- -- -- --   05/05/24 2045 -- 112 Abnormal  28 Abnormal  135/69 93 94 % -- -- None (Room air) -- Lying   05/05/24 2015 -- 110 Abnormal  30 Abnormal  147/69 99 94 % -- -- None (Room air) -- Lying   05/05/24 1950 -- -- -- -- -- -- -- -- None (Room air) -- --   05/05/24 1945 -- 112 Abnormal  32 Abnormal  146/69 99 98 % -- -- None (Room air) -- Lying   05/05/24 1749 100.3 °F (37.9 °C) 105 36 Abnormal  134/74 -- 92 % -- -- None (Room air) -- Sitting     Pertinent Labs/Diagnostic Test Results:   CTA chest pe study   Final Result  (05/06 0155)      No evidence of acute intrathoracic pathology      Multifocal pneumonia      XR chest 2 views   Final Result  (05/05 2145)      Redemonstration of multifocal bilateral pneumonia, greater on the left.       Results from last 7 days   Lab Units 05/01/24  1828   SARS-COV-2  Negative  "    Results from last 7 days   Lab Units 05/06/24  0513 05/05/24 1939 05/05/24  0304   WBC Thousand/uL 5.18 6.60 6.60   HEMOGLOBIN g/dL 13.5 13.3 13.0   HEMATOCRIT % 41.5 41.7 40.8   PLATELETS Thousands/uL 259 246 241   TOTAL NEUT ABS Thousands/µL 3.68 4.05 4.06         Results from last 7 days   Lab Units 05/06/24  0513 05/05/24 1939 05/05/24  0304   SODIUM mmol/L 134* 134* 132*   POTASSIUM mmol/L 4.3 4.0 4.1   CHLORIDE mmol/L 99 98 98   CO2 mmol/L 25 28 21   ANION GAP mmol/L 10 8 13   BUN mg/dL 8 7 10   CREATININE mg/dL 0.56* 0.61 0.63   EGFR ml/min/1.73sq m 148 143 141   CALCIUM mg/dL 9.1 9.1 8.5   MAGNESIUM mg/dL 2.3  --   --    PHOSPHORUS mg/dL 4.5  --   --      Results from last 7 days   Lab Units 05/06/24  0513   AST U/L 36   ALT U/L 35   ALK PHOS U/L 77   TOTAL PROTEIN g/dL 8.1   ALBUMIN g/dL 4.0   TOTAL BILIRUBIN mg/dL 0.42     Results from last 7 days   Lab Units 05/06/24  0808 05/06/24  0051 05/05/24  2256   POC GLUCOSE mg/dl 298* 395* 368*     Results from last 7 days   Lab Units 05/06/24 0513 05/05/24 1939 05/05/24  0304   GLUCOSE RANDOM mg/dL 312* 173* 298*             No results found for: \"BETA-HYDROXYBUTYRATE\"                                   Results from last 7 days   Lab Units 05/05/24  0304   PROCALCITONIN ng/ml 0.09                                                     Results from last 7 days   Lab Units 05/05/24  2042   CLARITY UA  Clear   COLOR UA  Yellow   SPEC GRAV UA  1.025   PH UA  6.5   GLUCOSE UA mg/dl 300 (3/10%)*   KETONES UA mg/dl Negative   BLOOD UA  Negative   PROTEIN UA mg/dl Trace*   NITRITE UA  Negative   BILIRUBIN UA  Negative   UROBILINOGEN UA (BE) mg/dl 2.0*   LEUKOCYTES UA  Negative   WBC UA /hpf 0-1   RBC UA /hpf 0-1   BACTERIA UA /hpf Occasional   EPITHELIAL CELLS WET PREP /hpf Occasional   MUCUS THREADS  Moderate*     Results from last 7 days   Lab Units 05/05/24  2333 05/01/24  1828   STREP PNEUMONIAE ANTIGEN, URINE  Negative  --    LEGIONELLA URINARY ANTIGEN  " Negative  --    INFLUENZA A PCR   --  Negative   INFLUENZA B PCR   --  Negative   RSV PCR   --  Negative                                               ED Treatment:   Medication Administration from 05/05/2024 1733 to 05/05/2024 2104         Date/Time Order Dose Route Action     05/05/2024 1931 EDT ipratropium (ATROVENT) 0.02 % inhalation solution 0.5 mg 0.5 mg Nebulization Given     05/05/2024 1931 EDT albuterol inhalation solution 5 mg 5 mg Nebulization Given     05/05/2024 1941 EDT methylPREDNISolone sodium succinate (Solu-MEDROL) injection 60 mg 60 mg Intravenous Given     05/05/2024 2010 EDT magnesium sulfate 2 g/50 mL IVPB (premix) 2 g 2 g Intravenous New Bag     05/05/2024 1952 EDT cefTRIAXone (ROCEPHIN) IVPB (premix in dextrose) 1,000 mg 50 mL 1,000 mg Intravenous New Bag     05/05/2024 1950 EDT dextromethorphan-guaiFENesin (ROBITUSSIN DM) oral syrup 10 mL 10 mL Oral Given          Past Medical History:   Diagnosis Date    ADHD (attention deficit hyperactivity disorder)     Diabetes mellitus ([facility])     5/2023    Lung collapse     Sleep apnea     Viral meningitis      Present on Admission:   Uncontrolled type 2 diabetes mellitus with hyperglycemia ([facility])   MDD (major depressive disorder)   Pneumonia   Hypertriglyceridemia   Sepsis ([facility])      Admitting Diagnosis: Shortness of breath [R06.02]  SOB (shortness of breath) [R06.02]  Hypoxia [R09.02]  Suicide ideation [R45.851]  Encounter for psychological evaluation [Z00.8]  Multifocal pneumonia [J18.9]  Age/Sex: 19 y.o. male  Admission Orders:  Telemetry  Q15min safety checks  Accuchecks  Consult psyche  Cont pulse ox  Scd/foot pumps    Scheduled Medications:  azithromycin, 500 mg, Intravenous, Q24H  benzonatate, 100 mg, Oral, Q8H TANESHA  ceftriaxone, 2,000 mg, Intravenous, Q24H  enoxaparin, 40 mg, Subcutaneous, Q12H TANESHA  fenofibrate, 48 mg, Oral, Daily  FLUoxetine, 10 mg, Oral, Daily  insulin lispro, 2-12 Units, Subcutaneous, TID AC  insulin lispro, 2-12 Units,  Subcutaneous, HS  ipratropium-albuterol, 3 mL, Nebulization, Q6H  sitaGLIPtin, 100 mg, Oral, Daily  topiramate, 50 mg, Oral, BID    PRN Meds:  acetaminophen, 650 mg, Oral, Q6H PRN  albuterol, 2 puff, Inhalation, Q4H PRN  dextromethorphan-guaiFENesin, 10 mL, Oral, Q6H PRN    Network Utilization Review Department  ATTENTION: Please call with any questions or concerns to [phone] and carefully listen to the prompts so that you are directed to the right person. All voicemails are confidential.   For Discharge needs, contact Care Management DC Support Team at [phone] opt. [phone]  Send all requests for admission clinical reviews, approved or denied determinations and any other requests to dedicated fax number below belonging to the [facility] where the patient is receiving treatment. List of dedicated fax numbers for the Facilities:  FACILITY NAME UR FAX NUMBER   ADMISSION DENIALS (Administrative/Medical Necessity) [phone]   DISCHARGE SUPPORT TEAM (NETWORK) [phone]   PARENT CHILD HEALTH (Maternity/NICU/Pediatrics) [phone]   [facility] [phone]   [facility] [phone]   [facility] [phone]   [facility] [phone]   [facility] [phone]   General acute hospital [phone]   [facility] [phone]   [facility] [phone]   [facility] [phone]   [facility] [phone]   [facility] [phone]   [facility] [phone]

## 2024-05-06 NOTE — ASSESSMENT & PLAN NOTE
Presented with fever 100.3 °F with associated tachypnea, tachycardia, blood pressure stable  White count normal, initially procalcitonin negative.  SARS-CoV-2 influenza RSV PCR negative  -CXR: multifocal bilateral pneumonia, greater on the left.  CTA chest multifocal pneumonia, no evidence of pulmonary embolism  -Continue ceftriaxone with azithromycin  -Albuterol PRN, Duoneb   -Pneumonia panel ordered  -Monitor O2 sat closely  Check EKG for QTc

## 2024-05-06 NOTE — PROGRESS NOTES
[facility]  Progress Note  Name: Rik Marin I  MRN: [id number]  Unit/Bed#: [address] [address] I Date of Admission: 5/5/2024   Date of Service: 5/6/2024 I Hospital Day: 1    Assessment/Plan   Sepsis (HCC)  Assessment & Plan  As evidenced by tachypnea, tachycardia, fever  Afebrile at present, hemodynamically stable  Secondary to above  Continue antibiotics as noted  Follow-up cultures    * Pneumonia  Assessment & Plan  Presented with fever 100.3 °F with associated tachypnea, tachycardia, blood pressure stable  White count normal, initially procalcitonin negative.  SARS-CoV-2 influenza RSV PCR negative  -CXR: multifocal bilateral pneumonia, greater on the left.  CTA chest multifocal pneumonia, no evidence of pulmonary embolism  -Continue ceftriaxone with azithromycin  -Albuterol PRN, Duoneb   -Pneumonia panel ordered  -Monitor O2 sat closely  Check EKG for QTc    Uncontrolled type 2 diabetes mellitus with hyperglycemia ([facility])  Assessment & Plan  -A1c 10.9 in 2023, recheck hemoglobin A1c  Hyperglycemic, patient did receive steroids in ED  Resume Januvia, holding metformin at present as patient had contrast exposure  -Continue sliding scale  Discussed with patient regarding starting basal bolus regimen, will start Lantus and Premeal insulin  Diabetic insulin education  Diabetic diet      Hypertriglyceridemia  Assessment & Plan  -Continue fenofibrate    Morbid obesity with BMI of 50.0-59.9, adult ([facility])  Assessment & Plan  -Lifestyle modifications     MDD (major depressive disorder)  Assessment & Plan  -Patient reportedly with suicidal ideation on admission to ED  -Patient reported no intent to harm self or others at the time of admission  -Continue home meds: bupropion, buspirone  -Psych consulted-input appreciated.  No one-to-one observation or inpatient psychiatric evaluation and treatment was recommended.  Continue medical management as per psychiatry               VTE Pharmacologic  Prophylaxis: VTE Score: 3 Moderate Risk (Score 3-4) - Pharmacological DVT Prophylaxis Ordered: enoxaparin (Lovenox).    Mobility:   Basic Mobility Inpatient Raw Score: 18  JH-HLM Goal: 6: Walk 10 steps or more  JH-HLM Achieved: 5: Stand (1 or more minutes)  JH-HLM Goal achieved. Continue to encourage appropriate mobility.    Patient Centered Rounds: I performed bedside rounds with nursing staff today.   Discussions with Specialists or Other Care Team Provider: yes, psychiatry    Education and Discussions with Family / Patient:  yes.     Total Time Spent on Date of Encounter in care of patient: 35 mins. This time was spent on one or more of the following: performing physical exam; counseling and coordination of care; obtaining or reviewing history; documenting in the medical record; reviewing/ordering tests, medications or procedures; communicating with other healthcare professionals and discussing with patient's family/caregivers.    Current Length of Stay: 1 day(s)  Current Patient Status: Inpatient   Certification Statement: The patient will continue to require additional inpatient hospital stay due to sepsis, pneumonia  Discharge Plan: Anticipate discharge in 24-48 hrs to pending clinical progress and psychiatry input    Code Status: Level 1 - Full Code    Subjective:   Reports improvement in cough and shortness of breath still with coughing fits at times  Denies any nasal congestion or sore throat  Denies any GI symptoms    Discussed with psychiatry, Dr. Franco patient does not require suicide precautions    Reports that he was using metformin at home for diabetes but was not checking sugars    Remains on supplemental oxygen        Objective:     Vitals:   Temp (24hrs), Av.7 °F (37.1 °C), Min:97.8 °F (36.6 °C), Max:100.3 °F (37.9 °C)    Temp:  [97.8 °F (36.6 °C)-100.3 °F (37.9 °C)] 98.4 °F (36.9 °C)  HR:  [] 92  Resp:  [24-36] 24  BP: (129-147)/(63-86) 141/86  SpO2:  [86 %-98 %] 94 % on 4 L  supplemental oxygen  Body mass index is 48.14 kg/m².     Input and Output Summary (last 24 hours):   No intake or output data in the 24 hours ending 05/06/24 0856    Physical Exam:   Physical Exam  Vitals and nursing note reviewed.   Constitutional:       General: He is not in acute distress.     Appearance: He is well-developed. He is obese.   HENT:      Head: Normocephalic and atraumatic.   Eyes:      Conjunctiva/sclera: Conjunctivae normal.   Cardiovascular:      Rate and Rhythm: Normal rate and regular rhythm.      Heart sounds: No murmur heard.  Pulmonary:      Effort: Pulmonary effort is normal. No respiratory distress.      Breath sounds: Rhonchi present.   Abdominal:      Palpations: Abdomen is soft.      Tenderness: There is no abdominal tenderness.   Musculoskeletal:         General: No swelling.      Right lower leg: No edema.      Left lower leg: No edema.   Skin:     General: Skin is warm and dry.      Capillary Refill: Capillary refill takes less than 2 seconds.   Neurological:      Mental Status: He is alert.   Psychiatric:         Mood and Affect: Mood normal.         Additional Data:     Labs:  Results from last 7 days   Lab Units 05/06/24  0513   WBC Thousand/uL 5.18   HEMOGLOBIN g/dL 13.5   HEMATOCRIT % 41.5   PLATELETS Thousands/uL 259   SEGS PCT % 71   LYMPHO PCT % 22   MONO PCT % 6   EOS PCT % 0     Results from last 7 days   Lab Units 05/06/24  0513   SODIUM mmol/L 134*   POTASSIUM mmol/L 4.3   CHLORIDE mmol/L 99   CO2 mmol/L 25   BUN mg/dL 8   CREATININE mg/dL 0.56*   ANION GAP mmol/L 10   CALCIUM mg/dL 9.1   ALBUMIN g/dL 4.0   TOTAL BILIRUBIN mg/dL 0.42   ALK PHOS U/L 77   ALT U/L 35   AST U/L 36   GLUCOSE RANDOM mg/dL 312*         Results from last 7 days   Lab Units 05/06/24  0808 05/06/24  0051 05/05/24  2256   POC GLUCOSE mg/dl 298* 395* 368*         Results from last 7 days   Lab Units 05/05/24  0304   PROCALCITONIN ng/ml 0.09       Lines/Drains:  Invasive Devices       Peripheral  Intravenous Line  Duration             Peripheral IV 05/05/24 Left;Ventral (anterior);Right Forearm <1 day                      Telemetry:  Telemetry Orders (From admission, onward)               24 Hour Telemetry Monitoring  Continuous x 24 Hours (Telem)        Question:  Reason for 24 Hour Telemetry  Answer:  Arrhythmias requiring acute medical intervention / PPM or ICD malfunction                     Telemetry Reviewed: Normal Sinus Rhythm  Indication for Continued Telemetry Use: No indication for continued use. Will discontinue.     EKG normal sinus rhythm at 87 bpm, QTc 450 unchanged from prior         Imaging: Reviewed radiology reports from this admission including: chest CT scan    Recent Cultures (last 7 days):         Last 24 Hours Medication List:   Current Facility-Administered Medications   Medication Dose Route Frequency Provider Last Rate    acetaminophen  650 mg Oral Q6H PRN Doc Colvin MD      albuterol  2 puff Inhalation Q4H PRN Doc Colvin MD      azithromycin           azithromycin  500 mg Intravenous Q24H Doc Colvin MD      benzonatate  100 mg Oral Q8H TANESHA Doc Colvin MD      buPROPion  100 mg Oral BID Doc Colvin MD      busPIRone  5 mg Oral BID Doc Colvin MD      ceftriaxone  2,000 mg Intravenous Q24H Doc Colvin MD      dextromethorphan-guaiFENesin  10 mL Oral Q6H PRN Kelis Cody MD      enoxaparin  40 mg Subcutaneous Q12H TANESHA Doc Colvin MD      fenofibrate  48 mg Oral Daily Doc Colvin MD      insulin lispro  2-12 Units Subcutaneous TID AC Doc Colvin MD      insulin lispro  2-12 Units Subcutaneous HS Doc Colvin MD      ipratropium-albuterol  3 mL Nebulization Q6H Doc Colvin MD      sitaGLIPtin  100 mg Oral Daily Kelsi Cody MD      topiramate  50 mg Oral BID Doc Colvin MD          Today, Patient Was Seen By: Kelsi Cody MD    **Please Note: This note may have been constructed using a voice recognition  system.**
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
## 2024-05-06 NOTE — ASSESSMENT & PLAN NOTE
-A1c 10.9 in 2023, recheck hemoglobin A1c  Hyperglycemic, patient did receive steroids in ED  Resume Januvia, holding metformin at present as patient had contrast exposure  -Continue sliding scale  Discussed with patient regarding starting basal bolus regimen, will start Lantus and Premeal insulin  Diabetic insulin education  Diabetic diet

## 2024-05-06 NOTE — ED PROVIDER NOTES
Final Diagnosis:  1. Pneumonia        Chief Complaint   Patient presents with    Cough     Pt arrives with cough x a few days and generally feeling unwell. States when he has coughing fits he vomits. Also had nose bleed which triggered an episode of throwing up blood tinged from his nose.        HPI  Patient presents w/ cough for a few days. Was seen w/ neg swabs. Not taking OTC. Coughing to post tussive emesis. Had 1 x episode of epistaxis. Not active. Continued rhinorrhea. Has some tachypnea exacerbated by body habitus, but WOB ok and no resp distress. Sleeping soundly between labs and evals.  Has a hx of DM     EMS additionally reports:     - Previous charting underwent limited review with attention to last ED visits, labs, ekgs, and prior imaging.  Chart review reveals :     Admission on 05/01/2024, Discharged on 05/01/2024   Component Date Value Ref Range Status    SARS-CoV-2 05/01/2024 Negative  Negative Final    INFLUENZA A PCR 05/01/2024 Negative  Negative Final    INFLUENZA B PCR 05/01/2024 Negative  Negative Final    RSV PCR 05/01/2024 Negative  Negative Final       - No language barrier.   - History obtained from patient    - Discuss patient's care, with patient permission or by chart review, with      PMH:   has a past medical history of ADHD (attention deficit hyperactivity disorder), Diabetes mellitus (HCC), Lung collapse, Sleep apnea, and Viral meningitis.    PSH:   has a past surgical history that includes TONSILECTOMY AND ADNOIDECTOMY.     Social History:  Tobacco Use: High Risk (5/5/2024)    Patient History     Smoking Tobacco Use: Former     Smokeless Tobacco Use: Current     Passive Exposure: Yes     Alcohol Use: Unknown (1/18/2023)    Received from [facility]    AUDIT-C     Frequency of Alcohol Consumption: Patient declined     Average Number of Drinks: Patient declined     Frequency of Binge Drinking: Patient declined     No illicit use       ROS:  Pertinent positives/negatives: .      Some ROS may be present in the HPI and would take precedent over these standard questions asked below.   Review of Systems   Constitutional:  Positive for chills and fatigue. Negative for diaphoresis and fever.   Respiratory:  Positive for cough and shortness of breath. Negative for wheezing and stridor.    Cardiovascular:  Negative for chest pain and palpitations.   Gastrointestinal:  Positive for vomiting. Negative for abdominal pain, constipation and diarrhea.        CONSTITUTIONAL:  No lethargy. No unexpected weight loss. No change in behavior.  EYES:  No pain, redness, or discharge. No loss of vision. No orbital trauma or pain.   ENT:  No tinnitus or decreased hearing. No epistaxis/purulent rhinorrhea. No voice change, airway closing, trismus.   CARDIOVASCULAR:  No chest pain. No skin mottling or pallor. No change in exertional capacity  RESPIRATORY:  No hemoptysis. No paroxysmal nocturnal dyspnea. No stridor. No apnea or bluing.   GASTROINTESTINAL:  No vomiting, diarrhea. No distension. No melena. No hematochezia.   GENITOURINARY:  No nocturia. No hematuria or foul smelling or cloudy urine. No discharge. No sores/adenopathy.   MUSCULOSKELETAL:  No contracture.  No new deformity.   INTEGUMENTARY:  No swelling. No unexpected contusions. No abrasions. No lymphangitis.  NEUROLOGIC:  No meningismus. No new numbness of the extremities. No new focal weakness. No postural instability  PSYCHIATRIC:  No SI HI AVH  HEMATOLOGICAL:  No bleeding. No petechiae. No bruising.  ALLERGIES:  No urticaria. No sudden abd cramping. No stridor.    PE:     Physical exam highlights:   Physical Exam       Vitals:    05/05/24 0242 05/05/24 0300 05/05/24 0330 05/05/24 0415   BP:  142/66 136/79 145/64   Pulse: (!) 111 (!) 111 (!) 109 (!) 111   Resp: (!) 26  (!) 28 (!) 26   Temp:  98.3 °F (36.8 °C)     TempSrc:  Oral     SpO2: 91% 93% 97% 93%     Vitals reviewed by me.   Nursing note reviewed  Chaperone present for all sensitive  exam.  Const: No acute distress. Alert. Nontoxic. Not diaphoretic.  Able to stand and walk around dept   HEENT: External ears normal. No protrusion drainage swelling. Nose normal. No drainage/traumatic deformity. MM. Mouth with baseline/symmetric movement. No trismus.   Eyes: No squinting. No icterus. No tearing/swelling/drainage. Tracks through the room with normal EOM.   Neck: ROM normal. No rigidity. No meningismus.  Cards: Rate as per vitals Compared to monitor sinus unless documented. Regular Well perfused.  Pulm: tachypnea. No inc in WOB. Satting on RA around 93 94. Full sentences.   Abd: No distension beyond baseline. No fluctuant wave. Patient without peritoneal pain with shifting/bumping the bed.   MSK: ROM normal baseline. No deformity. No contractures from baseline.   Skin: No new rashes visible. Well perfused. No wounds visualized on exposed skin  Neuro: Nonfocal. Baseline. CN grossly intact. Moving all four with coordination. Gait w/o issue.   Psych: Normal behavior and affect.        A:  - Nursing note reviewed.    Ddx and MDM  Considered diagnoses    Re swab viral illness    Given 2x cough supp for bronchitis    R/o pnuemonia  On prelim w/ multifocal  Augmentin and azithro since he has DM and prior lung issues          Dispo decision able to ambulate w/o change in o2. Walking around comf.   Cough controlled.   DM so given expanded home regimen for pneumonia      My conversation with consultant reveals:        Decision rules:                           My read of the XR/CT scan reveals:   XR chest 1 view portable   ED Interpretation   Patchy consolidations like multifocal pneumonia      Final Result      Multifocal consolidation, greater on the left, compatible with pneumonia.            Workstation performed: [id number]             Orders Placed This Encounter   Procedures    XR chest 1 view portable    CBC and differential    Basic metabolic panel    Procalcitonin     Labs Reviewed   BASIC METABOLIC  PANEL - Abnormal       Result Value Ref Range Status    Sodium 132 (*) 135 - 147 mmol/L Final    Potassium 4.1  3.5 - 5.3 mmol/L Final    Comment: Slightly Hemolyzed:Results may be affected.    Chloride 98  96 - 108 mmol/L Final    CO2 21  21 - 32 mmol/L Final    ANION GAP 13  4 - 13 mmol/L Final    BUN 10  5 - 25 mg/dL Final    Creatinine 0.63  0.60 - 1.30 mg/dL Final    Comment: Standardized to IDMS reference method    Glucose 298 (*) 65 - 140 mg/dL Final    Comment: If the patient is fasting, the ADA then defines impaired fasting glucose as > 100 mg/dL and diabetes as > or equal to 123 mg/dL.    Calcium 8.5  8.4 - 10.2 mg/dL Final    eGFR 141  ml/min/1.73sq m Final    Narrative:     National Kidney Disease Foundation guidelines for Chronic Kidney Disease (CKD):     Stage 1 with normal or high GFR (GFR > 90 mL/min/1.73 square meters)    Stage 2 Mild CKD (GFR = 60-89 mL/min/1.73 square meters)    Stage 3A Moderate CKD (GFR = 45-59 mL/min/1.73 square meters)    Stage 3B Moderate CKD (GFR = 30-44 mL/min/1.73 square meters)    Stage 4 Severe CKD (GFR = 15-29 mL/min/1.73 square meters)    Stage 5 End Stage CKD (GFR <15 mL/min/1.73 square meters)  Note: GFR calculation is accurate only with a steady state creatinine   PROCALCITONIN TEST - Normal    Procalcitonin 0.09  <=0.25 ng/ml Final    Comment: Suspected Lower Respiratory Tract Infection (LRTI):  - LESS than or EQUAL to 0.25 ng/mL:   low likelihood for bacterial LRTI; antibiotics DISCOURAGED.  - GREATER than 0.25 ng/mL:   increased likelihood for bacterial LRTI; antibiotics ENCOURAGED.    Suspected Sepsis:  - Strongly consider initiating antibiotics in ALL UNSTABLE patients.  - LESS than or EQUAL to 0.5 ng/mL:   low likelihood for bacterial sepsis; antibiotics DISCOURAGED.  - GREATER than 0.5 ng/mL:   increased likelihood for bacterial sepsis; antibiotics ENCOURAGED.  - GREATER than 2 ng/mL:   high risk for severe sepsis / septic shock; antibiotics strongly  ENCOURAGED.    Decisions on antibiotic use should not be based solely on Procalcitonin (PCT) levels. If PCT is low but uncertainty exists with stopping antibiotics, repeat PCT in 6-24 hours to confirm the low level. If antibiotics are administered (regardless if initial PCT was high or low), repeat PCT every 1-2 days to consider early antibiotic cessation (when GREATER than 80% decrease from the peak OR when PCT drops below designated cutoffs, whichever comes first), so long as the infection is NOT one that typically requires prolonged treatment durations (e.g., bone/joint infections, endocarditis, Staph. aureus bacteremia).    Situations of FALSE-POSITIVE Procalcitonin values:  1) Newborns < 72 hours old  2) Massive stress from severe trauma / burns, major surgery, acute pancreatitis, cardiogenic / hemorrhagic shock, sickle cell crisis, or other organ perfusion abnormalities  3) Malaria and some Candidal infections  4) Treatment with agents that stimulate cytokines (e.g., OKT3, anti-lymphocyte globulins, alemtuzumab, IL-2, granulocyte transfusion [NOT GCSFs])  5) Chronic renal disease causes elevated baseline levels (consider GREATER than 0.75 ng/mL as an abnormal cut-off); initiating HD/CRRT may cause transient decreases  6) Paraneoplastic syndromes from medullary thyroid or SCLC, some forms of vasculitis, and acute [id number] disease    Situations of FALSE-NEGATIVE Procalcitonin values:  1) Too early in clinical course for PCT to have reached its peak (may repeat in 6-24 hours to confirm low level)  2) Localized infection WITHOUT systemic (SIRS / sepsis) response (e.g., an abscess, osteomyelitis, cystitis)  3) Mycobacteria (e.g., Tuberculosis, MAC)  4) Cystic fibrosis exacerbations     CBC AND DIFFERENTIAL    WBC 6.60  4.31 - 10.16 Thousand/uL Final    RBC 4.76  3.88 - 5.62 Million/uL Final    Hemoglobin 13.0  12.0 - 17.0 g/dL Final    Hematocrit 40.8  36.5 - 49.3 % Final    MCV 86  82 - 98 fL Final    MCH  27.3  26.8 - 34.3 pg Final    MCHC 31.9  31.4 - 37.4 g/dL Final    RDW 13.6  11.6 - 15.1 % Final    MPV 10.5  8.9 - 12.7 fL Final    Platelets 241  149 - 390 Thousands/uL Final    nRBC 0  /100 WBCs Final    Segmented % 61  43 - 75 % Final    Immature Grans % 0  0 - 2 % Final    Lymphocytes % 27  14 - 44 % Final    Monocytes % 10  4 - 12 % Final    Eosinophils Relative 2  0 - 6 % Final    Basophils Relative 0  0 - 1 % Final    Absolute Neutrophils 4.06  1.85 - 7.62 Thousands/µL Final    Absolute Immature Grans 0.02  0.00 - 0.20 Thousand/uL Final    Absolute Lymphocytes 1.76  0.60 - 4.47 Thousands/µL Final    Absolute Monocytes 0.63  0.17 - 1.22 Thousand/µL Final    Eosinophils Absolute 0.11  0.00 - 0.61 Thousand/µL Final    Basophils Absolute 0.02  0.00 - 0.10 Thousands/µL Final       *Each of these labs was reviewed. Particular standout labs will be noted in the ED Course above     Final Diagnosis:  1. Pneumonia          P:  - hospital tx includes   Medications   sodium chloride 0.9 % bolus 1,000 mL (0 mL Intravenous Stopped 5/5/24 0416)   dextromethorphan-guaiFENesin (ROBITUSSIN DM) oral syrup 10 mL (10 mL Oral Given 5/5/24 0305)   benzonatate (TESSALON PERLES) capsule 100 mg (100 mg Oral Given 5/5/24 0305)   amoxicillin-clavulanate (AUGMENTIN) 875-125 mg per tablet 1 tablet (1 tablet Oral Given 5/5/24 0419)   azithromycin (ZITHROMAX) tablet 500 mg (500 mg Oral Given 5/5/24 0419)         - disposition  Time reflects when diagnosis was documented in both MDM as applicable and the Disposition within this note       Time User Action Codes Description Comment    5/5/2024  3:38 AM Kanu Carter [J18.9] Pneumonia           ED Disposition       ED Disposition   Discharge    Condition   Stable    Date/Time   Sun May 5, 2024  3:38 AM    Comment   Rik Marin discharge to home/self care.                   Follow-up Information       Follow up With Specialties Details Why Contact Info    Gustavo Fajardo MD    [address] [facility] [address]  [facility] [address]  [phone]              - patient will call their PCP to let them know they were in the emergency department. We discuss return precautions and patient is agreeable with plan and aformentioned disposition.       - additional treatment intended, if consistent with primary provider:  - patient to follow with :      Discharge Medication List as of 5/5/2024  4:23 AM        START taking these medications    Details   amoxicillin-clavulanate (AUGMENTIN) 875-125 mg per tablet Take 1 tablet by mouth every 12 (twelve) hours for 7 days, Starting Sun 5/5/2024, Until Sun 5/12/2024, Normal      azithromycin (ZITHROMAX) 250 mg tablet Take 1 tablet (250 mg total) by mouth every 24 hours for 4 days Take 2 tablets today then 1 tablet daily x 4 days, Starting Sun 5/5/2024, Until Thu 5/9/2024, Normal      benzonatate (TESSALON PERLES) 100 mg capsule Take 1 capsule (100 mg total) by mouth every 8 (eight) hours, Starting Sun 5/5/2024, Normal      Dextromethorphan Polistirex ER 30 MG/5ML SUER Take 10 mL (60 mg total) by mouth 2 (two) times a day, Starting Sun 5/5/2024, Normal           CONTINUE these medications which have NOT CHANGED    Details   Abilify Maintena 400 MG injection INJECT 2 ML INTRAMUSCUALRLY EVERY 4 WEEKS AS DIRECTED, Historical Med      Blood Glucose Monitoring Suppl (OneTouch Verio) w/Device KIT Use 2 (two) times a day Ok to substitute with insurance covered brand, Starting Fri 10/27/2023, Normal      buPROPion (WELLBUTRIN) 100 mg tablet Take 100 mg by mouth 2 (two) times a day, Historical Med      busPIRone (BUSPAR) 5 mg tablet Take 5 mg by mouth 2 (two) times a day, Starting Fri 6/30/2023, Historical Med      dicyclomine (BENTYL) 20 mg tablet Take 1 tablet (20 mg total) by mouth 2 (two) times a day, Starting Fri 1/12/2024, Normal      fenofibrate (TRICOR) 54 MG tablet Take 1 tablet (54 mg total) by mouth daily, Starting Thu 9/28/2023, Normal      glucose blood (OneTouch  Verio) test strip TEST BLOOD SUGAR TWICE A DAY, Normal      Lancets (onetouch ultrasoft) lancets Use as instructed, Normal      metFORMIN (GLUCOPHAGE-XR) 500 mg 24 hr tablet Take 1 tablet (500 mg total) by mouth 2 (two) times a day with meals Start with 1 pill 500mg with breakfast x 1 week, then 1 pill 500mg with breakfast and 1 pill with dinner for 1 week , then 2 pills with breakfast (1000mg) and 1 pill with dinner (500mg)  for 1 week, then 1000mg twice a day going forward, Starting Tue 10/17/2023, Normal      mupirocin (BACTROBAN) 2 % ointment Apply topically 3 (three) times a day, Starting Sun 3/17/2024, Normal      nystatin (MYCOSTATIN) cream Apply topically 2 (two) times a day, Starting Sat 4/13/2024, Until Mon 5/13/2024, Normal      omega-3-acid ethyl esters (LOVAZA) 1 g capsule Take 2 capsules (2 g total) by mouth 2 (two) times a day, Starting Thu 9/28/2023, Normal      ondansetron (ZOFRAN) 4 mg tablet Take 1 tablet (4 mg total) by mouth every 6 (six) hours, Starting Fri 1/12/2024, Normal      ondansetron (ZOFRAN-ODT) 4 mg disintegrating tablet Take 1 tablet (4 mg total) by mouth every 6 (six) hours as needed for nausea or vomiting, Starting Tue 3/12/2024, Normal      sitaGLIPtin (JANUVIA) 50 mg tablet Take 1 tablet (50 mg total) by mouth daily, Starting Tue 10/17/2023, Normal      topiramate (TOPAMAX) 50 MG tablet Take 50 mg by mouth 2 (two) times a day, Starting Thu 3/10/2022, Historical Med           No discharge procedures on file.  Prior to Admission Medications   Prescriptions Last Dose Informant Patient Reported? Taking?   Abilify Maintena 400 MG injection   Yes No   Sig: INJECT 2 ML INTRAMUSCUALRLY EVERY 4 WEEKS AS DIRECTED   Blood Glucose Monitoring Suppl (OneTouch Verio) w/Device KIT   No No   Sig: Use 2 (two) times a day Ok to substitute with insurance covered brand   Lancets (onetouch ultrasoft) lancets   No No   Sig: Use as instructed   buPROPion (WELLBUTRIN) 100 mg tablet  Family Member Yes No    Sig: Take 100 mg by mouth 2 (two) times a day   Patient not taking: Reported on 5/5/2024   busPIRone (BUSPAR) 5 mg tablet   Yes No   Sig: Take 5 mg by mouth 2 (two) times a day   Patient not taking: Reported on 5/5/2024   dicyclomine (BENTYL) 20 mg tablet   No No   Sig: Take 1 tablet (20 mg total) by mouth 2 (two) times a day   fenofibrate (TRICOR) 54 MG tablet   No No   Sig: Take 1 tablet (54 mg total) by mouth daily   Patient not taking: Reported on 5/5/2024   glucose blood (OneTouch Verio) test strip   No No   Sig: TEST BLOOD SUGAR TWICE A DAY   metFORMIN (GLUCOPHAGE-XR) 500 mg 24 hr tablet   No No   Sig: Take 1 tablet (500 mg total) by mouth 2 (two) times a day with meals Start with 1 pill 500mg with breakfast x 1 week, then 1 pill 500mg with breakfast and 1 pill with dinner for 1 week , then 2 pills with breakfast (1000mg) and 1 pill with dinner (500mg) for 1 week, then 1000mg twice a day going forward   Patient not taking: Reported on 5/5/2024   mupirocin (BACTROBAN) 2 % ointment   No No   Sig: Apply topically 3 (three) times a day   nystatin (MYCOSTATIN) cream   No No   Sig: Apply topically 2 (two) times a day   omega-3-acid ethyl esters (LOVAZA) 1 g capsule   No No   Sig: Take 2 capsules (2 g total) by mouth 2 (two) times a day   Patient not taking: Reported on 10/17/2023   ondansetron (ZOFRAN) 4 mg tablet   No No   Sig: Take 1 tablet (4 mg total) by mouth every 6 (six) hours   ondansetron (ZOFRAN-ODT) 4 mg disintegrating tablet   No No   Sig: Take 1 tablet (4 mg total) by mouth every 6 (six) hours as needed for nausea or vomiting   sitaGLIPtin (JANUVIA) 50 mg tablet   No No   Sig: Take 1 tablet (50 mg total) by mouth daily   Patient not taking: Reported on 5/5/2024   topiramate (TOPAMAX) 50 MG tablet   Yes No   Sig: Take 50 mg by mouth 2 (two) times a day   Patient not taking: Reported on 5/5/2024      Facility-Administered Medications: None       Portions of the record may have been created with voice  "recognition software. Occasional wrong word or \"sound a like\" substitutions may have occurred due to the inherent limitations of voice recognition software. Read the chart carefully and recognize, using context, where substitutions have occurred.    Electronically signed by:  MD Kanu Villafuerte MD  05/06/24 0320    "

## 2024-05-06 NOTE — PLAN OF CARE
Problem: NEUROSENSORY - ADULT  Goal: Achieves stable or improved neurological status  Description: INTERVENTIONS  - Monitor and report changes in neurological status  - Monitor vital signs such as temperature, blood pressure, glucose, and any other labs ordered   - Initiate measures to prevent increased intracranial pressure  - Monitor for seizure activity and implement precautions if appropriate      Outcome: Progressing  Goal: Remains free of injury related to seizures activity  Description: INTERVENTIONS  - Maintain airway, patient safety  and administer oxygen as ordered  - Monitor patient for seizure activity, document and report duration and description of seizure to physician/advanced practitioner  - If seizure occurs,  ensure patient safety during seizure  - Reorient patient post seizure  - Seizure pads on all 4 side rails  - Instruct patient/family to notify RN of any seizure activity including if an aura is experienced  - Instruct patient/family to call for assistance with activity based on nursing assessment  - Administer anti-seizure medications if ordered    Outcome: Progressing  Goal: Achieves maximal functionality and self care  Description: INTERVENTIONS  - Monitor swallowing and airway patency with patient fatigue and changes in neurological status  - Encourage and assist patient to increase activity and self care.   - Encourage visually impaired, hearing impaired and aphasic patients to use assistive/communication devices  Outcome: Progressing     Problem: CARDIOVASCULAR - ADULT  Goal: Maintains optimal cardiac output and hemodynamic stability  Description: INTERVENTIONS:  - Monitor I/O, vital signs and rhythm  - Monitor for S/S and trends of decreased cardiac output  - Administer and titrate ordered vasoactive medications to optimize hemodynamic stability  - Assess quality of pulses, skin color and temperature  - Assess for signs of decreased coronary artery perfusion  - Instruct patient to  report change in severity of symptoms  Outcome: Progressing  Goal: Absence of cardiac dysrhythmias or at baseline rhythm  Description: INTERVENTIONS:  - Continuous cardiac monitoring, vital signs, obtain 12 lead EKG if ordered  - Administer antiarrhythmic and heart rate control medications as ordered  - Monitor electrolytes and administer replacement therapy as ordered  Outcome: Progressing     Problem: RESPIRATORY - ADULT  Goal: Achieves optimal ventilation and oxygenation  Description: INTERVENTIONS:  - Assess for changes in respiratory status  - Assess for changes in mentation and behavior  - Position to facilitate oxygenation and minimize respiratory effort  - Oxygen administered by appropriate delivery if ordered  - Initiate smoking cessation education as indicated  - Encourage broncho-pulmonary hygiene including cough, deep breathe, Incentive Spirometry  - Assess the need for suctioning and aspirate as needed  - Assess and instruct to report SOB or any respiratory difficulty  - Respiratory Therapy support as indicated  Outcome: Progressing     Problem: GENITOURINARY - ADULT  Goal: Maintains or returns to baseline urinary function  Description: INTERVENTIONS:  - Assess urinary function  - Encourage oral fluids to ensure adequate hydration if ordered  - Administer IV fluids as ordered to ensure adequate hydration  - Administer ordered medications as needed  - Offer frequent toileting  - Follow urinary retention protocol if ordered  Outcome: Progressing  Goal: Absence of urinary retention  Description: INTERVENTIONS:  - Assess patient’s ability to void and empty bladder  - Monitor I/O  - Bladder scan as needed  - Discuss with physician/AP medications to alleviate retention as needed  - Discuss catheterization for long term situations as appropriate  Outcome: Progressing  Goal: Urinary catheter remains patent  Description: INTERVENTIONS:  - Assess patency of urinary catheter  - If patient has a chronic terrell,  consider changing catheter if non-functioning  - Follow guidelines for intermittent irrigation of non-functioning urinary catheter  Outcome: Progressing     Problem: METABOLIC, FLUID AND ELECTROLYTES - ADULT  Goal: Electrolytes maintained within normal limits  Description: INTERVENTIONS:  - Monitor labs and assess patient for signs and symptoms of electrolyte imbalances  - Administer electrolyte replacement as ordered  - Monitor response to electrolyte replacements, including repeat lab results as appropriate  - Instruct patient on fluid and nutrition as appropriate  Outcome: Progressing  Goal: Fluid balance maintained  Description: INTERVENTIONS:  - Monitor labs   - Monitor I/O and WT  - Instruct patient on fluid and nutrition as appropriate  - Assess for signs & symptoms of volume excess or deficit  Outcome: Progressing  Goal: Glucose maintained within target range  Description: INTERVENTIONS:  - Monitor Blood Glucose as ordered  - Assess for signs and symptoms of hyperglycemia and hypoglycemia  - Administer ordered medications to maintain glucose within target range  - Assess nutritional intake and initiate nutrition service referral as needed  Outcome: Progressing     Problem: HEMATOLOGIC - ADULT  Goal: Maintains hematologic stability  Description: INTERVENTIONS  - Assess for signs and symptoms of bleeding or hemorrhage  - Monitor labs  - Administer supportive blood products/factors as ordered and appropriate  Outcome: Progressing

## 2024-05-06 NOTE — ASSESSMENT & PLAN NOTE
-Patient reportedly with suicidal ideation on admission to ED  -Patient reported no intent to harm self or others at the time of admission  -Continue home meds: bupropion, buspirone  -Psych consulted-input appreciated.  No one-to-one observation or inpatient psychiatric evaluation and treatment was recommended.  Continue medical management as per psychiatry

## 2024-05-06 NOTE — ASSESSMENT & PLAN NOTE
-Afebrile, tachypneic, tachycardic, stable BP  -No leukocytosis  -CXR: multifocal bilateral pneumonia, greater on the left  -Continue ceftriaxone with azithromycin  -Albuterol PRN, Duoneb breathing treatment  -Pneumonia panel ordered  -Monitor O2 sat closely

## 2024-05-07 VITALS
DIASTOLIC BLOOD PRESSURE: 71 MMHG | HEIGHT: 75 IN | WEIGHT: 315 LBS | RESPIRATION RATE: 20 BRPM | BODY MASS INDEX: 39.17 KG/M2 | SYSTOLIC BLOOD PRESSURE: 125 MMHG | HEART RATE: 96 BPM | TEMPERATURE: 97.5 F | OXYGEN SATURATION: 92 %

## 2024-05-07 LAB
ANION GAP SERPL CALCULATED.3IONS-SCNC: 9 MMOL/L (ref 4–13)
ATRIAL RATE: 87 BPM
BUN SERPL-MCNC: 15 MG/DL (ref 5–25)
CALCIUM SERPL-MCNC: 9 MG/DL (ref 8.4–10.2)
CHLORIDE SERPL-SCNC: 101 MMOL/L (ref 96–108)
CO2 SERPL-SCNC: 26 MMOL/L (ref 21–32)
CREAT SERPL-MCNC: 0.68 MG/DL (ref 0.6–1.3)
ERYTHROCYTE [DISTWIDTH] IN BLOOD BY AUTOMATED COUNT: 13.8 % (ref 11.6–15.1)
EST. AVERAGE GLUCOSE BLD GHB EST-MCNC: 286 MG/DL
GFR SERPL CREATININE-BSD FRML MDRD: 137 ML/MIN/1.73SQ M
GLUCOSE SERPL-MCNC: 170 MG/DL (ref 65–140)
GLUCOSE SERPL-MCNC: 197 MG/DL (ref 65–140)
GLUCOSE SERPL-MCNC: 261 MG/DL (ref 65–140)
GLUCOSE SERPL-MCNC: 283 MG/DL (ref 65–140)
HBA1C MFR BLD: 11.6 %
HCT VFR BLD AUTO: 42.3 % (ref 36.5–49.3)
HGB BLD-MCNC: 13.4 G/DL (ref 12–17)
MCH RBC QN AUTO: 27.4 PG (ref 26.8–34.3)
MCHC RBC AUTO-ENTMCNC: 31.7 G/DL (ref 31.4–37.4)
MCV RBC AUTO: 87 FL (ref 82–98)
P AXIS: 56 DEGREES
PLATELET # BLD AUTO: 256 THOUSANDS/UL (ref 149–390)
PMV BLD AUTO: 10.4 FL (ref 8.9–12.7)
POTASSIUM SERPL-SCNC: 4 MMOL/L (ref 3.5–5.3)
PR INTERVAL: 144 MS
PROCALCITONIN SERPL-MCNC: 0.07 NG/ML
QRS AXIS: 71 DEGREES
QRSD INTERVAL: 94 MS
QT INTERVAL: 374 MS
QTC INTERVAL: 450 MS
RBC # BLD AUTO: 4.89 MILLION/UL (ref 3.88–5.62)
SODIUM SERPL-SCNC: 136 MMOL/L (ref 135–147)
T WAVE AXIS: 43 DEGREES
VENTRICULAR RATE: 87 BPM
WBC # BLD AUTO: 8.04 THOUSAND/UL (ref 4.31–10.16)

## 2024-05-07 PROCEDURE — 94760 N-INVAS EAR/PLS OXIMETRY 1: CPT

## 2024-05-07 PROCEDURE — 83036 HEMOGLOBIN GLYCOSYLATED A1C: CPT | Performed by: INTERNAL MEDICINE

## 2024-05-07 PROCEDURE — 80048 BASIC METABOLIC PNL TOTAL CA: CPT | Performed by: INTERNAL MEDICINE

## 2024-05-07 PROCEDURE — 99239 HOSP IP/OBS DSCHRG MGMT >30: CPT | Performed by: STUDENT IN AN ORGANIZED HEALTH CARE EDUCATION/TRAINING PROGRAM

## 2024-05-07 PROCEDURE — 94640 AIRWAY INHALATION TREATMENT: CPT

## 2024-05-07 PROCEDURE — 84145 PROCALCITONIN (PCT): CPT | Performed by: INTERNAL MEDICINE

## 2024-05-07 PROCEDURE — 94660 CPAP INITIATION&MGMT: CPT

## 2024-05-07 PROCEDURE — 82948 REAGENT STRIP/BLOOD GLUCOSE: CPT

## 2024-05-07 PROCEDURE — 93010 ELECTROCARDIOGRAM REPORT: CPT | Performed by: INTERNAL MEDICINE

## 2024-05-07 PROCEDURE — 85027 COMPLETE CBC AUTOMATED: CPT | Performed by: INTERNAL MEDICINE

## 2024-05-07 RX ORDER — METFORMIN HYDROCHLORIDE 500 MG/1
1000 TABLET, EXTENDED RELEASE ORAL 2 TIMES DAILY WITH MEALS
Qty: 60 TABLET | Refills: 0 | Status: SHIPPED | OUTPATIENT
Start: 2024-05-07

## 2024-05-07 RX ORDER — FLUOXETINE 10 MG/1
10 CAPSULE ORAL DAILY
Qty: 30 CAPSULE | Refills: 0 | Status: SHIPPED | OUTPATIENT
Start: 2024-05-08

## 2024-05-07 RX ORDER — BLOOD SUGAR DIAGNOSTIC
STRIP MISCELLANEOUS
Qty: 100 EACH | Refills: 1 | Status: SHIPPED | OUTPATIENT
Start: 2024-05-07

## 2024-05-07 RX ORDER — INSULIN LISPRO 100 [IU]/ML
9 INJECTION, SOLUTION INTRAVENOUS; SUBCUTANEOUS
Qty: 10 ML | Refills: 0 | Status: SHIPPED | OUTPATIENT
Start: 2024-05-07 | End: 2024-05-08

## 2024-05-07 RX ORDER — PEN NEEDLE, DIABETIC 32GX 5/32"
NEEDLE, DISPOSABLE MISCELLANEOUS
Qty: 100 EACH | Refills: 0 | Status: SHIPPED | OUTPATIENT
Start: 2024-05-07

## 2024-05-07 RX ORDER — AZITHROMYCIN 250 MG/1
250 TABLET, FILM COATED ORAL EVERY 24 HOURS
Qty: 3 TABLET | Refills: 0 | Status: SHIPPED | OUTPATIENT
Start: 2024-05-08 | End: 2024-05-18

## 2024-05-07 RX ORDER — CEFUROXIME AXETIL 500 MG/1
500 TABLET ORAL EVERY 12 HOURS SCHEDULED
Qty: 8 TABLET | Refills: 0 | Status: SHIPPED | OUTPATIENT
Start: 2024-05-07 | End: 2024-05-18

## 2024-05-07 RX ORDER — BLOOD-GLUCOSE METER
KIT MISCELLANEOUS
Qty: 1 KIT | Refills: 1 | Status: SHIPPED | OUTPATIENT
Start: 2024-05-07

## 2024-05-07 RX ORDER — INSULIN GLARGINE 100 [IU]/ML
30 INJECTION, SOLUTION SUBCUTANEOUS
Qty: 10 ML | Refills: 0 | Status: SHIPPED | OUTPATIENT
Start: 2024-05-07

## 2024-05-07 RX ORDER — GLUCOSAMINE HCL/CHONDROITIN SU 500-400 MG
CAPSULE ORAL
Qty: 100 EACH | Refills: 0 | Status: SHIPPED | OUTPATIENT
Start: 2024-05-07

## 2024-05-07 RX ORDER — LANCETS 33 GAUGE
EACH MISCELLANEOUS
Qty: 100 EACH | Refills: 0 | Status: SHIPPED | OUTPATIENT
Start: 2024-05-07

## 2024-05-07 RX ADMIN — TOPIRAMATE 50 MG: 25 TABLET, FILM COATED ORAL at 08:36

## 2024-05-07 RX ADMIN — ENOXAPARIN SODIUM 40 MG: 40 INJECTION SUBCUTANEOUS at 08:37

## 2024-05-07 RX ADMIN — SITAGLIPTIN 100 MG: 50 TABLET, FILM COATED ORAL at 08:36

## 2024-05-07 RX ADMIN — INSULIN LISPRO 6 UNITS: 100 INJECTION, SOLUTION INTRAVENOUS; SUBCUTANEOUS at 12:10

## 2024-05-07 RX ADMIN — BENZONATATE 100 MG: 100 CAPSULE ORAL at 14:17

## 2024-05-07 RX ADMIN — IPRATROPIUM BROMIDE AND ALBUTEROL SULFATE 3 ML: 2.5; .5 SOLUTION RESPIRATORY (INHALATION) at 01:53

## 2024-05-07 RX ADMIN — TOPIRAMATE 50 MG: 25 TABLET, FILM COATED ORAL at 17:05

## 2024-05-07 RX ADMIN — FLUOXETINE 10 MG: 10 CAPSULE ORAL at 08:47

## 2024-05-07 RX ADMIN — INSULIN LISPRO 10 UNITS: 100 INJECTION, SOLUTION INTRAVENOUS; SUBCUTANEOUS at 17:05

## 2024-05-07 RX ADMIN — IPRATROPIUM BROMIDE AND ALBUTEROL SULFATE 3 ML: 2.5; .5 SOLUTION RESPIRATORY (INHALATION) at 07:06

## 2024-05-07 RX ADMIN — BENZONATATE 100 MG: 100 CAPSULE ORAL at 05:24

## 2024-05-07 RX ADMIN — INSULIN LISPRO 2 UNITS: 100 INJECTION, SOLUTION INTRAVENOUS; SUBCUTANEOUS at 17:05

## 2024-05-07 RX ADMIN — GUAIFENESIN AND DEXTROMETHORPHAN 10 ML: 100; 10 SYRUP ORAL at 09:45

## 2024-05-07 RX ADMIN — IPRATROPIUM BROMIDE AND ALBUTEROL SULFATE 3 ML: 2.5; .5 SOLUTION RESPIRATORY (INHALATION) at 13:06

## 2024-05-07 RX ADMIN — INSULIN LISPRO 2 UNITS: 100 INJECTION, SOLUTION INTRAVENOUS; SUBCUTANEOUS at 08:39

## 2024-05-07 RX ADMIN — INSULIN LISPRO 10 UNITS: 100 INJECTION, SOLUTION INTRAVENOUS; SUBCUTANEOUS at 08:39

## 2024-05-07 RX ADMIN — INSULIN LISPRO 10 UNITS: 100 INJECTION, SOLUTION INTRAVENOUS; SUBCUTANEOUS at 12:10

## 2024-05-07 RX ADMIN — ALBUTEROL SULFATE 2 PUFF: 90 AEROSOL, METERED RESPIRATORY (INHALATION) at 14:34

## 2024-05-07 RX ADMIN — FENOFIBRATE 48 MG: 48 TABLET, FILM COATED ORAL at 08:36

## 2024-05-07 NOTE — ASSESSMENT & PLAN NOTE
-A1c 10.9 in 2023, recheck hemoglobin A1c  Hyperglycemic, patient did receive steroids in ED  Resume Januvia, holding metformin at present as patient had contrast exposure    Discussed with patient regarding starting basal bolus regimen, will start Lantus and Premeal insulin on DC with PCP  Diabetic insulin education  Diabetic diet

## 2024-05-07 NOTE — ED NOTES
16:50 - PES asked to speak with patient who would not disclose the reason to 3N staff - patient already cleared for d/c by psychiatrist - patient reports he feels fine for d/c but his mother wants him inpt - PES explained we can't exclude the psychiatrist's recommendation for d/c - PES will notify patient's mother.    17:00 - mother's voice mail full and no message could be left - dialed the other phone # and it was the patient's - there is no other phone # to reach his mother.  Patient will try to have her call PES back.    22:15 - call was never returned by mother.

## 2024-05-07 NOTE — ASSESSMENT & PLAN NOTE
As evidenced by tachypnea, tachycardia, fever  Afebrile at present, hemodynamically stable  Secondary to above  Continue antibiotics as noted  Follow-up cultures  Urine Legionella/strep pneumo-negative

## 2024-05-07 NOTE — ASSESSMENT & PLAN NOTE
Presented with fever 100.3 °F with associated tachypnea, tachycardia, blood pressure stable  White count normal, initially procalcitonin negative.  SARS-CoV-2 influenza RSV PCR negative  -CXR: multifocal bilateral pneumonia, greater on the left.  CTA chest multifocal pneumonia, no evidence of pulmonary embolism  Continue ceftriaxone with azithromycin- dc with Ceftin/Azithro  Albuterol PRN, Duoneb   Pneumonia panel-negative

## 2024-05-07 NOTE — DISCHARGE SUMMARY
[facility]  Discharge- Rik Marin 2005, 19 y.o. male MRN: [id number]  Unit/Bed#: [address] [place] [address] Encounter: [id number]  Primary Care Provider: Gustavo Fajardo MD   Date and time admitted to hospital: 5/5/2024  5:55 PM    * Pneumonia  Assessment & Plan  Presented with fever 100.3 °F with associated tachypnea, tachycardia, blood pressure stable  White count normal, initially procalcitonin negative.  SARS-CoV-2 influenza RSV PCR negative  -CXR: multifocal bilateral pneumonia, greater on the left.  CTA chest multifocal pneumonia, no evidence of pulmonary embolism  Continue ceftriaxone with azithromycin- dc with Ceftin/Azithro  Albuterol PRN, Duoneb   Pneumonia panel-negative      Uncontrolled type 2 diabetes mellitus with hyperglycemia (HCC)  Assessment & Plan  -A1c 10.9 in 2023, recheck hemoglobin A1c  Hyperglycemic, patient did receive steroids in ED  Resume Januvia, holding metformin at present as patient had contrast exposure    Discussed with patient regarding starting basal bolus regimen, will start Lantus and Premeal insulin on DC with PCP  Diabetic insulin education  Diabetic diet      MDD (major depressive disorder)  Assessment & Plan  -Patient reportedly with suicidal ideation on admission to ED  -Patient reported no intent to harm self or others at the time of admission  -Continue home meds: bupropion, buspirone  -Psych consulted-   not meet criteria for involuntary inpatient psychiatric treatment at this time  Discontinue bupropion  Discontinue BuSpar   Continue Topamax 50 mg BID  Restart home fluoxetine 10 mg daily  home guanfacine ER 2 mg not on formulary therefore cannot order at this time, will hold off on ordering substitute at this time    Sepsis (HCC)  Assessment & Plan  As evidenced by tachypnea, tachycardia, fever  Afebrile at present, hemodynamically stable  Secondary to above  Continue antibiotics as noted  Follow-up cultures  Urine Legionella/strep  pneumo-negative    Hypertriglyceridemia  Assessment & Plan  -Continue fenofibrate    Morbid obesity with BMI of 50.0-59.9, adult ([facility])  Assessment & Plan  -Lifestyle modifications       Medical Problems       Resolved Problems  Date Reviewed: 5/7/2024   None       Discharging Physician / Practitioner: Betzaida Hernandez MD  PCP: Gustavo Fajardo MD  Admission Date:   Admission Orders (From admission, onward)       Ordered        05/05/24 1926  INPATIENT ADMISSION  Once                          Discharge Date: 05/07/24      Consultations During Hospital Stay:  N/A    Procedures Performed:   N/A    Significant Findings / Test Results:   N/A    Incidental Findings:   N/A    Test Results Pending at Discharge (will require follow up):   N/A     Outpatient Tests Requested:  N/A    Complications:  N/A        Hospital Course:   Rik Marin is a 19 y.o. male patient who originally presented to the hospital on 5/5/2024 due to coughing versus shortness of breath.  Upon arrival patient noted to have pneumonia multifocal initiated on dual antibiotics will be discharged with oral equivalent.  Patient noted to have significant history of MDD, seen by psych with recommendations for discharge DC bupropion/BuSpar, continue Topamax, restart fluoxetine, continue home guanfacine.  Patient met sepsis criteria upon arrival, cultures remain negative, continue current course of care as above follow-up with PCP.  Patient morbidly obese, counseled on diet and exercise follow-up with PCP.  Patient has history of elevated triglycerides, continue fibrate and follow-up with PCP for monitoring.      The patient, initially admitted to the hospital as inpatient, was discharged earlier than expected given the following: improved.    Please see above list of diagnoses and related plan for additional information.     Condition at Discharge: fair    Discharge Day Visit / Exam:   Subjective: Patient seen and examined at bedside while walking in the  "[facility].  Patient denied any fever, chills chest pain, shortness of breath, chest pain or palpitations.  Patient reported that parameter does not work, demonstrated by MD and patient obtaining 1500 I-S.    Vitals: Blood Pressure: 125/65 (05/07/24 0706)  Pulse: 93 (05/07/24 0706)  Temperature: 97.7 °F (36.5 °C) (05/07/24 0706)  Temp Source: Oral (05/07/24 0706)  Respirations: 22 (05/07/24 0706)  Height: 6' 2.5\" (189.2 cm) (05/05/24 2300)  Weight - Scale: (!) 172 kg (379 lb 15.9 oz) (05/05/24 2300)  SpO2: 95 % (05/07/24 1306)    Exam:   Physical Exam  Vitals and nursing note reviewed.   Constitutional:       General: He is not in acute distress.     Appearance: He is well-developed. He is obese.   HENT:      Head: Normocephalic and atraumatic.   Eyes:      Conjunctiva/sclera: Conjunctivae normal.   Cardiovascular:      Rate and Rhythm: Normal rate and regular rhythm.      Heart sounds: No murmur heard.  Pulmonary:      Effort: Pulmonary effort is normal. No respiratory distress.      Breath sounds: Normal breath sounds. No stridor. No wheezing or rhonchi.   Abdominal:      Palpations: Abdomen is soft.      Tenderness: There is no abdominal tenderness.      Comments: Enlarged panniculus   Musculoskeletal:         General: No swelling.      Cervical back: Neck supple.      Right lower leg: No edema.      Left lower leg: No edema.   Skin:     General: Skin is warm and dry.      Capillary Refill: Capillary refill takes less than 2 seconds.   Neurological:      Mental Status: He is alert and oriented to person, place, and time.   Psychiatric:         Mood and Affect: Mood normal.          Discussion with Family: Patient declined call to .     Discharge instructions/Information to patient and family:   See after visit summary for information provided to patient and family.      Provisions for Follow-Up Care:  See after visit summary for information related to follow-up care and any pertinent home " health orders.      Mobility at time of Discharge:   Basic Mobility Inpatient Raw Score: 24  JH-HLM Goal: 8: Walk 250 feet or more  JH-HLM Achieved: 8: Walk 250 feet ot more  HLM Goal achieved. Continue to encourage appropriate mobility.     Disposition:   Home    Planned Readmission: No     Discharge Statement:  I spent 45 minutes discharging the patient. This time was spent on the day of discharge. I had direct contact with the patient on the day of discharge. Greater than 50% of the total time was spent examining patient, answering all patient questions, arranging and discussing plan of care with patient as well as directly providing post-discharge instructions.  Additional time then spent on discharge activities.    Discharge Medications:  See after visit summary for reconciled discharge medications provided to patient and/or family.      **Please Note: This note may have been constructed using a voice recognition system**

## 2024-05-07 NOTE — ASSESSMENT & PLAN NOTE
-Patient reportedly with suicidal ideation on admission to ED  -Patient reported no intent to harm self or others at the time of admission  -Continue home meds: bupropion, buspirone  -Psych consulted-   not meet criteria for involuntary inpatient psychiatric treatment at this time  Discontinue bupropion  Discontinue BuSpar   Continue Topamax 50 mg BID  Restart home fluoxetine 10 mg daily  home guanfacine ER 2 mg not on formulary therefore cannot order at this time, will hold off on ordering substitute at this time

## 2024-05-07 NOTE — PROGRESS NOTES
Patient called RN into room laughing about ordering over $70 worth of [employer] food, pt stated he got hot dogs and chicken wings and multiple bags of snacks and a milkshake. RN educated patient about diabetes and a controlled diet. Patient laughed and said he wanted it all and did not care about it. Patient blood glucose 311 at the moment.

## 2024-05-07 NOTE — NURSING NOTE
Patient discharged home. Written and verbal discharge instruction provided to patient. All questions answered. IV removed per hospital protocol, occlusive dressing applied. Marianela called for patient. Patient left unit with all personal belongings, accompanied by LPN.

## 2024-05-08 ENCOUNTER — APPOINTMENT (EMERGENCY)
Dept: RADIOLOGY | Facility: HOSPITAL | Age: 19
End: 2024-05-08
Payer: COMMERCIAL

## 2024-05-08 ENCOUNTER — HOSPITAL ENCOUNTER (EMERGENCY)
Facility: HOSPITAL | Age: 19
Discharge: HOME | End: 2024-05-08
Attending: EMERGENCY MEDICINE
Payer: COMMERCIAL

## 2024-05-08 VITALS
OXYGEN SATURATION: 95 % | SYSTOLIC BLOOD PRESSURE: 141 MMHG | BODY MASS INDEX: 40.43 KG/M2 | DIASTOLIC BLOOD PRESSURE: 86 MMHG | TEMPERATURE: 97.7 F | RESPIRATION RATE: 20 BRPM | WEIGHT: 315 LBS | HEIGHT: 74 IN | HEART RATE: 88 BPM

## 2024-05-08 DIAGNOSIS — E11.9 TYPE 2 DIABETES MELLITUS WITHOUT COMPLICATION, WITHOUT LONG-TERM CURRENT USE OF INSULIN (HCC): Primary | ICD-10-CM

## 2024-05-08 DIAGNOSIS — J18.9 PNEUMONIA: Primary | ICD-10-CM

## 2024-05-08 PROCEDURE — 87070 CULTURE OTHR SPECIMN AEROBIC: CPT | Performed by: PHYSICIAN ASSISTANT

## 2024-05-08 PROCEDURE — 99284 EMERGENCY DEPT VISIT MOD MDM: CPT

## 2024-05-08 PROCEDURE — 71046 X-RAY EXAM CHEST 2 VIEWS: CPT

## 2024-05-08 PROCEDURE — 99284 EMERGENCY DEPT VISIT MOD MDM: CPT | Performed by: PHYSICIAN ASSISTANT

## 2024-05-08 PROCEDURE — 87205 SMEAR GRAM STAIN: CPT | Performed by: PHYSICIAN ASSISTANT

## 2024-05-08 RX ORDER — ALBUTEROL SULFATE 2.5 MG/3ML
2.5 SOLUTION RESPIRATORY (INHALATION) EVERY 6 HOURS PRN
Qty: 75 ML | Refills: 0 | Status: SHIPPED | OUTPATIENT
Start: 2024-05-08

## 2024-05-08 RX ORDER — INSULIN ASPART 100 [IU]/ML
9 INJECTION, SOLUTION INTRAVENOUS; SUBCUTANEOUS
Qty: 15 ML | Refills: 0 | Status: SHIPPED | OUTPATIENT
Start: 2024-05-08

## 2024-05-08 RX ORDER — BENZONATATE 100 MG/1
100 CAPSULE ORAL 3 TIMES DAILY PRN
Qty: 20 CAPSULE | Refills: 0 | Status: SHIPPED | OUTPATIENT
Start: 2024-05-08 | End: 2024-05-24

## 2024-05-08 NOTE — UTILIZATION REVIEW
NOTIFICATION OF ADMISSION DISCHARGE   This is a Notification of Discharge from [facility]. Please be advised that this patient has been discharge from our facility. Below you will find the admission and discharge date and time including the patient’s disposition.   UTILIZATION REVIEW CONTACT:  Opal Sow  Utilization   Network Utilization Review Department  Phone: [phone] x carefully listen to the prompts. All voicemails are confidential.  Email: [email or web address]     ADMISSION INFORMATION  PRESENTATION DATE: 5/5/2024  5:55 PM  OBERVATION ADMISSION DATE:   INPATIENT ADMISSION DATE: 5/5/24  7:26 PM   DISCHARGE DATE: 5/7/2024  7:54 PM   DISPOSITION:Home/Self Care    Network Utilization Review Department  ATTENTION: Please call with any questions or concerns to [phone] and carefully listen to the prompts so that you are directed to the right person. All voicemails are confidential.   For Discharge needs, contact Care Management DC Support Team at [phone] opt. 2  Send all requests for admission clinical reviews, approved or denied determinations and any other requests to dedicated fax number below belonging to the campus where the patient is receiving treatment. List of dedicated fax numbers for the Facilities:  FACILITY NAME UR FAX NUMBER   ADMISSION DENIALS (Administrative/Medical Necessity) [phone]   DISCHARGE SUPPORT TEAM ([facility]) [phone]   PARENT CHILD HEALTH (Maternity/NICU/Pediatrics) [phone]   [facility] [phone]   [facility] [phone]   [facility] [phone]   General acute hospital [phone]   [facility] [phone]   [facility] [phone]   [facility] [phone]   [facility] [phone]   [facility]  [facility] [phone]   [facility] [phone]   [facility] [phone]   [facility] [phone]

## 2024-05-08 NOTE — ED NOTES
RN gave patient a nebulizer equipment and a digital thermometer for home as verbally instructed by Lara BRITO who is the attending for the patient.  Patient told regarding the instructions in the  box of the nebulizer.     Amada Barrow RN  05/08/24 3937

## 2024-05-08 NOTE — ED PROVIDER NOTES
History  Chief Complaint   Patient presents with    Cough    Shortness of Breath     3 days ago patient was here for same reason, came back because symptoms got worst.  Bloody tinge moodyglm     19-year-old male to the ED with continued cough that is productive, was recently admitted for pneumonia and recently discharged, he states that he is taking all of his medications.  States that he continues with cough shortness of breath but now cough is overly productive.  Small moderate blood in the cough.  There is a harsh cough noted on exam.  Patient does not have a nebulizer at home.  He was not discharged on steroids, does have a history of diabetes etc.  Denies fevers, chest or abdominal pain, flank pain etc.        Prior to Admission Medications   Prescriptions Last Dose Informant Patient Reported? Taking?   Abilify Maintena 400 MG injection   Yes No   Sig: INJECT 2 ML INTRAMUSCUALRLY EVERY 4 WEEKS AS DIRECTED   Alcohol Swabs 70 % PADS   No No   Sig: May substitute brand based on insurance coverage. Check glucose TID.   Blood Glucose Monitoring Suppl (OneTouch Verio Reflect) w/Device KIT   No No   Sig: May substitute brand based on insurance coverage. Check glucose TID.   Blood Glucose Monitoring Suppl (OneTouch Verio) w/Device KIT   No No   Sig: Use 2 (two) times a day Ok to substitute with insurance covered brand   Dextromethorphan Polistirex ER 30 MG/5ML SUER   No No   Sig: Take 10 mL (60 mg total) by mouth 2 (two) times a day   FLUoxetine (PROzac) 10 mg capsule   No No   Sig: Take 1 capsule (10 mg total) by mouth daily   Insulin Glargine Solostar (Lantus SoloStar) 100 UNIT/ML SOPN   No No   Sig: Inject 0.3 mL (30 Units total) under the skin daily at bedtime   Insulin Pen Needle (BD Pen Needle Shani 2nd Gen) 32G X 4 MM MISC   No No   Sig: For use with insulin pen. Pharmacy may dispense brand covered by insurance.   Lancets (onetouch ultrasoft) lancets   No No   Sig: Use as instructed   OneTouch Delica Lancets 33G  MISC   No No   Sig: May substitute brand based on insurance coverage. Check glucose TID.   azithromycin (ZITHROMAX) 250 mg tablet   No No   Sig: Take 1 tablet (250 mg total) by mouth every 24 hours for 3 days Take 2 tablets today then 1 tablet daily x 4 days Do not start before May 8, 2024.   benzonatate (TESSALON PERLES) 100 mg capsule   No No   Sig: Take 1 capsule (100 mg total) by mouth every 8 (eight) hours   cefuroxime (CEFTIN) 500 mg tablet   No No   Sig: Take 1 tablet (500 mg total) by mouth every 12 (twelve) hours for 4 days   dicyclomine (BENTYL) 20 mg tablet   No No   Sig: Take 1 tablet (20 mg total) by mouth 2 (two) times a day   fenofibrate (TRICOR) 54 MG tablet   No No   Sig: Take 1 tablet (54 mg total) by mouth daily   glucose blood (OneTouch Verio) test strip   No No   Sig: TEST BLOOD SUGAR TWICE A DAY   glucose blood (OneTouch Verio) test strip   No No   Sig: May substitute brand based on insurance coverage. Check glucose TID.   insulin lispro (HumaLOG KwikPen) 100 units/mL injection pen   No No   Sig: Inject 9 Units under the skin 3 (three) times a day with meals   metFORMIN (GLUCOPHAGE-XR) 500 mg 24 hr tablet   No No   Sig: Take 2 tablets (1,000 mg total) by mouth 2 (two) times a day with meals Start with 1 pill 500mg with breakfast x 1 week, then 1 pill 500mg with breakfast and 1 pill with dinner for 1 week , then 2 pills with breakfast (1000mg) and 1 pill with dinner (500mg) for 1 week, then 1000mg twice a day going forward   nystatin (MYCOSTATIN) cream   No No   Sig: Apply topically 2 (two) times a day   ondansetron (ZOFRAN-ODT) 4 mg disintegrating tablet   No No   Sig: Take 1 tablet (4 mg total) by mouth every 6 (six) hours as needed for nausea or vomiting   sitaGLIPtin (JANUVIA) 50 mg tablet   No No   Sig: Take 1 tablet (50 mg total) by mouth daily   topiramate (TOPAMAX) 50 MG tablet   Yes No   Sig: Take 50 mg by mouth 2 (two) times a day      Facility-Administered Medications: None       Past  Medical History:   Diagnosis Date    ADHD (attention deficit hyperactivity disorder)     Diabetes mellitus (HCC)     5/2023    Lung collapse     Sleep apnea     Viral meningitis        Past Surgical History:   Procedure Laterality Date    TONSILECTOMY AND ADNOIDECTOMY      2020       Family History   Problem Relation Age of Onset    Hyperlipidemia Mother     Diabetes type II Mother     Obesity Mother     Obesity Father     Drug abuse Father     Diabetes type II Maternal Aunt     Lung disease Maternal Aunt     Rheum arthritis Maternal Aunt     Fibromyalgia Maternal Aunt     Atrial fibrillation Maternal Grandmother     Hyperlipidemia Maternal Grandfather     Diabetes type II Maternal Grandfather     Stroke Maternal Grandfather     Heart disease Maternal Grandfather     Stroke Paternal Grandfather      I have reviewed and agree with the history as documented.    E-Cigarette/Vaping    E-Cigarette Use Current Some Day User     Comments smoke cigg. when he has money      E-Cigarette/Vaping Substances    Nicotine No     THC No     CBD No     Flavoring Yes     Other No     Unknown No      Social History     Tobacco Use    Smoking status: Former     Types: Cigarettes     Passive exposure: Yes    Smokeless tobacco: Current   Vaping Use    Vaping status: Some Days    Substances: Flavoring   Substance Use Topics    Alcohol use: Yes     Alcohol/week: 2.0 standard drinks of alcohol     Types: 2 Shots of liquor per week    Drug use: Yes     Types: Marijuana       Review of Systems   Constitutional: Negative.  Negative for chills, fatigue and fever.   HENT: Negative.  Negative for congestion, postnasal drip, rhinorrhea and sore throat.    Eyes: Negative.    Respiratory:  Positive for cough and shortness of breath. Negative for apnea, choking, chest tightness, wheezing and stridor.    Cardiovascular: Negative.    Gastrointestinal: Negative.  Negative for abdominal pain, diarrhea, nausea and vomiting.   Endocrine: Negative.     Genitourinary: Negative.    Musculoskeletal: Negative.    Skin: Negative.    Neurological: Negative.    Hematological: Negative.    Psychiatric/Behavioral: Negative.     All other systems reviewed and are negative.      Physical Exam  Physical Exam  Vitals and nursing note reviewed.   Constitutional:       General: He is not in acute distress.     Appearance: Normal appearance. He is well-developed. He is obese. He is not ill-appearing, toxic-appearing or diaphoretic.   HENT:      Head: Normocephalic and atraumatic.      Right Ear: External ear normal.      Left Ear: External ear normal.      Nose: Nose normal.      Mouth/Throat:      Pharynx: No oropharyngeal exudate.   Eyes:      General: No scleral icterus.        Right eye: No discharge.         Left eye: No discharge.      Conjunctiva/sclera: Conjunctivae normal.      Pupils: Pupils are equal, round, and reactive to light.   Cardiovascular:      Rate and Rhythm: Normal rate and regular rhythm.      Pulses: Normal pulses.      Heart sounds: Normal heart sounds. No murmur heard.     No friction rub. No gallop.   Pulmonary:      Effort: Pulmonary effort is normal. No respiratory distress.      Breath sounds: Normal breath sounds. No stridor. No wheezing, rhonchi or rales.      Comments: Patient speaking full sentences without difficulty, clear breath sounds in all lung fields however there is a harsh productive cough on exam, sputum cultures was taken  Chest:      Chest wall: No tenderness.   Abdominal:      General: Bowel sounds are normal. There is no distension.      Palpations: Abdomen is soft. There is no mass.      Tenderness: There is no abdominal tenderness. There is no guarding or rebound.      Hernia: No hernia is present.   Musculoskeletal:      Cervical back: Normal range of motion and neck supple.   Lymphadenopathy:      Cervical: No cervical adenopathy.   Skin:     General: Skin is warm and dry.      Capillary Refill: Capillary refill takes less  "than 2 seconds.      Coloration: Skin is not pale.      Findings: No erythema or rash.   Neurological:      General: No focal deficit present.      Mental Status: He is alert and oriented to person, place, and time. Mental status is at baseline.   Psychiatric:         Thought Content: Thought content normal.         Judgment: Judgment normal.         Vital Signs  ED Triage Vitals [05/08/24 0930]   Temperature Pulse Respirations Blood Pressure SpO2   97.7 °F (36.5 °C) 88 20 141/86 95 %      Temp Source Heart Rate Source Patient Position - Orthostatic VS BP Location FiO2 (%)   Tympanic Monitor Sitting Right arm --      Pain Score       --           Vitals:    05/08/24 0930   BP: 141/86   Pulse: 88   Patient Position - Orthostatic VS: Sitting         Visual Acuity      ED Medications  Medications - No data to display    Diagnostic Studies  Results Reviewed       Procedure Component Value Units Date/Time    Sputum culture and Gram stain [[id number]] Collected: 05/08/24 1015    Lab Status: In process Specimen: Expectorated Sputum Updated: 05/08/24 1017                   XR chest 2 views    (Results Pending)              Procedures  Procedures         ED Course         DIAZ      Flowsheet Row Most Recent Value   DIAZ Initial Screen: During the past 12 months, did you:    1. Drink any alcohol (more than a few sips)?  No Filed at: 05/08/2024 0946   2. Smoke any marijuana or hashish Yes Filed at: 05/08/2024 0946   3. Use anything else to get high? (\"anything else\" includes illegal drugs, over the counter and prescription drugs, and things that you sniff or 'patel')? No Filed at: 05/08/2024 0946   DIAZ Full Screen: During the past 12 months:    1. Have you ever ridden in a car driven by someone (including yourself) who was \"high\" or had been using alcohol or drugs? 0 Filed at: 05/08/2024 0946   2. Do you ever use alcohol or drugs to relax, feel better about yourself, or fit in? 0 Filed at: 05/08/2024 0946   3. Do you ever " use alcohol/drugs while you are by yourself, alone? 0 Filed at: 05/08/2024 0946   4. Do you ever forget things you did while using alcohol or drugs? 0 Filed at: 05/08/2024 0946   5. Do your family or friends ever tell you that you should cut down on your drinking or drug use? 0 Filed at: 05/08/2024 0946   6. Have you gotten into trouble while you were using alcohol or drugs? 0 Filed at: 05/08/2024 0946   CRAFFT Score 0 Filed at: 05/08/2024 0946                                            Medical Decision Making  Patient ambulatory in the ED without any shortness of breath and good oxygenation and clear breath sounds on room air.  We were able to obtain a sputum culture.  Will avoid any steroids at this point in time given history of diabetes.  We gave patient a thermometer as he does not have 1 at home as well as a nebulizer which she was advised to utilize every 4-6 hours.  Will have him continue his current antibiotic regimen as chest x-ray shows improvement of pneumonia as read by myself.     Patient is informed to return to the emergency department for worsening of symptoms and was given proper education regarding their diagnosis and symptoms. Otherwise the patient is informed to follow up with their primary care doctor for re-evaluation. The patient verbalizes understanding and agrees with above assessment and plan. All questions were answered.        Amount and/or Complexity of Data Reviewed  Labs: ordered.  Radiology: ordered.    Risk  OTC drugs.  Prescription drug management.             Disposition  Final diagnoses:   Pneumonia     Time reflects when diagnosis was documented in both MDM as applicable and the Disposition within this note       Time User Action Codes Description Comment    5/8/2024 10:26 AM Melissa Murillo Add [J18.9] Pneumonia           ED Disposition       ED Disposition   Discharge    Condition   Stable    Date/Time   Wed May 8, 2024 1026    Comment   Rik Marin discharge to  home/self care.                   Follow-up Information       Follow up With Specialties Details Why Contact Info Additional Information    [facility] Emergency Department Emergency Medicine Go to  If symptoms worsen such as high persistent fevers, otherwise please follow up with your family doctor [address] [facility] [id number] [phone] [facility] Emergency Department, [address] [place], [place], [address]    Gustavo Fajardo MD  Schedule an appointment as soon as possible for a visit in 1 day  [address] [id number] [phone]               Discharge Medication List as of 5/8/2024 10:28 AM        START taking these medications    Details   albuterol (2.5 mg/3 mL) 0.083 % nebulizer solution Take 3 mL (2.5 mg total) by nebulization every 6 (six) hours as needed for wheezing or shortness of breath, Starting Wed 5/8/2024, Normal      !! benzonatate (TESSALON PERLES) 100 mg capsule Take 1 capsule (100 mg total) by mouth 3 (three) times a day as needed for cough, Starting Wed 5/8/2024, Normal       !! - Potential duplicate medications found. Please discuss with provider.        CONTINUE these medications which have NOT CHANGED    Details   Abilify Maintena 400 MG injection INJECT 2 ML INTRAMUSCUALRLY EVERY 4 WEEKS AS DIRECTED, Historical Med      Alcohol Swabs 70 % PADS May substitute brand based on insurance coverage. Check glucose TID., Normal      azithromycin (ZITHROMAX) 250 mg tablet Take 1 tablet (250 mg total) by mouth every 24 hours for 3 days Take 2 tablets today then 1 tablet daily x 4 days Do not start before May 8, 2024., Starting Wed 5/8/2024, Until Sat 5/11/2024, Normal      !! benzonatate (TESSALON PERLES) 100 mg capsule Take 1 capsule (100 mg total) by mouth every 8 (eight) hours, Starting Sun 5/5/2024, Normal      !! Blood Glucose Monitoring Suppl (OneTouch Verio Reflect) w/Device KIT May substitute brand based on  insurance coverage. Check glucose TID., Normal      !! Blood Glucose Monitoring Suppl (OneTouch Verio) w/Device KIT Use 2 (two) times a day Ok to substitute with insurance covered brand, Starting Fri 10/27/2023, Normal      cefuroxime (CEFTIN) 500 mg tablet Take 1 tablet (500 mg total) by mouth every 12 (twelve) hours for 4 days, Starting Tue 5/7/2024, Until Sat 5/11/2024, Normal      Dextromethorphan Polistirex ER 30 MG/5ML SUER Take 10 mL (60 mg total) by mouth 2 (two) times a day, Starting Sun 5/5/2024, Normal      dicyclomine (BENTYL) 20 mg tablet Take 1 tablet (20 mg total) by mouth 2 (two) times a day, Starting Fri 1/12/2024, Normal      fenofibrate (TRICOR) 54 MG tablet Take 1 tablet (54 mg total) by mouth daily, Starting Thu 9/28/2023, Normal      FLUoxetine (PROzac) 10 mg capsule Take 1 capsule (10 mg total) by mouth daily, Starting Wed 5/8/2024, Normal      !! glucose blood (OneTouch Verio) test strip TEST BLOOD SUGAR TWICE A DAY, Normal      !! glucose blood (OneTouch Verio) test strip May substitute brand based on insurance coverage. Check glucose TID., Normal      Insulin Glargine Solostar (Lantus SoloStar) 100 UNIT/ML SOPN Inject 0.3 mL (30 Units total) under the skin daily at bedtime, Starting Tue 5/7/2024, Normal      insulin lispro (HumaLOG KwikPen) 100 units/mL injection pen Inject 9 Units under the skin 3 (three) times a day with meals, Starting Tue 5/7/2024, Normal      Insulin Pen Needle (BD Pen Needle Shani 2nd Gen) 32G X 4 MM MISC For use with insulin pen. Pharmacy may dispense brand covered by insurance., Normal      !! Lancets (onetouch ultrasoft) lancets Use as instructed, Normal      metFORMIN (GLUCOPHAGE-XR) 500 mg 24 hr tablet Take 2 tablets (1,000 mg total) by mouth 2 (two) times a day with meals Start with 1 pill 500mg with breakfast x 1 week, then 1 pill 500mg with breakfast and 1 pill with dinner for 1 week , then 2 pills with breakfast (1000mg) and 1 pill with dinner (500 mg) for 1  week, then 1000mg twice a day going forward, Starting Tue 5/7/2024, Normal      nystatin (MYCOSTATIN) cream Apply topically 2 (two) times a day, Starting Sat 4/13/2024, Until Mon 5/13/2024, Normal      ondansetron (ZOFRAN-ODT) 4 mg disintegrating tablet Take 1 tablet (4 mg total) by mouth every 6 (six) hours as needed for nausea or vomiting, Starting Tue 3/12/2024, Normal      !! OneTouch Delica Lancets 33G MISC May substitute brand based on insurance coverage. Check glucose TID., Normal      sitaGLIPtin (JANUVIA) 50 mg tablet Take 1 tablet (50 mg total) by mouth daily, Starting Tue 5/7/2024, Normal      topiramate (TOPAMAX) 50 MG tablet Take 50 mg by mouth 2 (two) times a day, Starting Thu 3/10/2022, Historical Med       !! - Potential duplicate medications found. Please discuss with provider.          Outpatient Discharge Orders   Nebulizer       PDMP Review       None            ED Provider  Electronically Signed by             Melissa Murillo PA-C  05/08/24 8731

## 2024-05-08 NOTE — DISCHARGE INSTRUCTIONS
Your chest x-ray has showed improved signs of pneumonia, your lungs are clear and your oxygenation looks good.  Please use nebulizer every 4-6 hours over the next few days.  Please understand that your cough may persist over the next few weeks.

## 2024-05-10 LAB
BACTERIA SPT RESP CULT: ABNORMAL
GRAM STN SPEC: ABNORMAL

## 2024-05-11 ENCOUNTER — APPOINTMENT (EMERGENCY)
Dept: RADIOLOGY | Facility: HOSPITAL | Age: 19
DRG: 710 | End: 2024-05-11
Payer: COMMERCIAL

## 2024-05-11 ENCOUNTER — ANESTHESIA EVENT (INPATIENT)
Dept: PERIOP | Facility: HOSPITAL | Age: 19
End: 2024-05-11
Payer: COMMERCIAL

## 2024-05-11 ENCOUNTER — ANESTHESIA (INPATIENT)
Dept: PERIOP | Facility: HOSPITAL | Age: 19
End: 2024-05-11
Payer: COMMERCIAL

## 2024-05-11 ENCOUNTER — HOSPITAL ENCOUNTER (INPATIENT)
Facility: HOSPITAL | Age: 19
LOS: 7 days | Discharge: HOME | DRG: 710 | End: 2024-05-18
Attending: EMERGENCY MEDICINE | Admitting: STUDENT IN AN ORGANIZED HEALTH CARE EDUCATION/TRAINING PROGRAM
Payer: COMMERCIAL

## 2024-05-11 DIAGNOSIS — N49.3 FOURNIER'S GANGRENE IN MALE: ICD-10-CM

## 2024-05-11 DIAGNOSIS — E11.9 DIABETES MELLITUS (HCC): ICD-10-CM

## 2024-05-11 DIAGNOSIS — A41.9 SEPSIS (HCC): ICD-10-CM

## 2024-05-11 DIAGNOSIS — N49.3 FOURNIER'S GANGRENE: Primary | ICD-10-CM

## 2024-05-11 DIAGNOSIS — E66.01 MORBID OBESITY (HCC): ICD-10-CM

## 2024-05-11 DIAGNOSIS — R78.81 GRAM-NEGATIVE BACTEREMIA: ICD-10-CM

## 2024-05-11 PROBLEM — Z79.4 TYPE 2 DIABETES MELLITUS WITH HYPERGLYCEMIA, WITH LONG-TERM CURRENT USE OF INSULIN (HCC): Status: ACTIVE | Noted: 2023-10-11

## 2024-05-11 PROBLEM — F90.9 ADHD (ATTENTION DEFICIT HYPERACTIVITY DISORDER): Status: ACTIVE | Noted: 2024-05-11

## 2024-05-11 PROBLEM — E11.65 TYPE 2 DIABETES MELLITUS WITH HYPERGLYCEMIA, WITH LONG-TERM CURRENT USE OF INSULIN (HCC): Status: ACTIVE | Noted: 2023-10-11

## 2024-05-11 PROBLEM — N49.2 SCROTAL INFECTION: Status: ACTIVE | Noted: 2024-05-11

## 2024-05-11 PROBLEM — E87.1 HYPONATREMIA: Status: ACTIVE | Noted: 2024-05-11

## 2024-05-11 PROBLEM — G47.30 SLEEP APNEA: Status: ACTIVE | Noted: 2024-05-11

## 2024-05-11 PROBLEM — R82.71 BACTERIURIA: Status: ACTIVE | Noted: 2024-05-11

## 2024-05-11 LAB
ALBUMIN SERPL BCP-MCNC: 3.8 G/DL (ref 3.5–5)
ALP SERPL-CCNC: 70 U/L (ref 34–104)
ALT SERPL W P-5'-P-CCNC: 20 U/L (ref 7–52)
ANION GAP SERPL CALCULATED.3IONS-SCNC: 8 MMOL/L (ref 4–13)
APTT PPP: 33 SECONDS (ref 23–37)
AST SERPL W P-5'-P-CCNC: 21 U/L (ref 13–39)
ATRIAL RATE: 118 BPM
BACTERIA UR QL AUTO: ABNORMAL /HPF
BASOPHILS # BLD AUTO: 0.05 THOUSANDS/ÂΜL (ref 0–0.1)
BASOPHILS NFR BLD AUTO: 0 % (ref 0–1)
BILIRUB SERPL-MCNC: 0.92 MG/DL (ref 0.2–1)
BILIRUB UR QL STRIP: NEGATIVE
BUN SERPL-MCNC: 7 MG/DL (ref 5–25)
CALCIUM SERPL-MCNC: 8.8 MG/DL (ref 8.4–10.2)
CHLORIDE SERPL-SCNC: 99 MMOL/L (ref 96–108)
CLARITY UR: CLEAR
CO2 SERPL-SCNC: 21 MMOL/L (ref 21–32)
COLOR UR: ABNORMAL
CREAT SERPL-MCNC: 0.76 MG/DL (ref 0.6–1.3)
EOSINOPHIL # BLD AUTO: 0.06 THOUSAND/ÂΜL (ref 0–0.61)
EOSINOPHIL NFR BLD AUTO: 0 % (ref 0–6)
ERYTHROCYTE [DISTWIDTH] IN BLOOD BY AUTOMATED COUNT: 13.7 % (ref 11.6–15.1)
FLUAV RNA RESP QL NAA+PROBE: NEGATIVE
FLUBV RNA RESP QL NAA+PROBE: NEGATIVE
GFR SERPL CREATININE-BSD FRML MDRD: 131 ML/MIN/1.73SQ M
GLUCOSE SERPL-MCNC: 182 MG/DL (ref 65–140)
GLUCOSE SERPL-MCNC: 234 MG/DL (ref 65–140)
GLUCOSE UR STRIP-MCNC: ABNORMAL MG/DL
HCT VFR BLD AUTO: 39.2 % (ref 36.5–49.3)
HGB BLD-MCNC: 12.5 G/DL (ref 12–17)
HGB UR QL STRIP.AUTO: NEGATIVE
IMM GRANULOCYTES # BLD AUTO: 0.18 THOUSAND/UL (ref 0–0.2)
IMM GRANULOCYTES NFR BLD AUTO: 1 % (ref 0–2)
INR PPP: 1.14 (ref 0.84–1.19)
KETONES UR STRIP-MCNC: ABNORMAL MG/DL
LACTATE SERPL-SCNC: 1.4 MMOL/L (ref 0.5–2)
LEUKOCYTE ESTERASE UR QL STRIP: NEGATIVE
LYMPHOCYTES # BLD AUTO: 1.7 THOUSANDS/ÂΜL (ref 0.6–4.47)
LYMPHOCYTES NFR BLD AUTO: 10 % (ref 14–44)
MCH RBC QN AUTO: 27.4 PG (ref 26.8–34.3)
MCHC RBC AUTO-ENTMCNC: 31.9 G/DL (ref 31.4–37.4)
MCV RBC AUTO: 86 FL (ref 82–98)
MONOCYTES # BLD AUTO: 0.94 THOUSAND/ÂΜL (ref 0.17–1.22)
MONOCYTES NFR BLD AUTO: 5 % (ref 4–12)
MUCOUS THREADS UR QL AUTO: ABNORMAL
NEUTROPHILS # BLD AUTO: 14.34 THOUSANDS/ÂΜL (ref 1.85–7.62)
NEUTS SEG NFR BLD AUTO: 84 % (ref 43–75)
NITRITE UR QL STRIP: NEGATIVE
NON-SQ EPI CELLS URNS QL MICRO: ABNORMAL /HPF
NRBC BLD AUTO-RTO: 0 /100 WBCS
P AXIS: 45 DEGREES
PH UR STRIP.AUTO: 6 [PH]
PLATELET # BLD AUTO: 281 THOUSANDS/UL (ref 149–390)
PMV BLD AUTO: 10.7 FL (ref 8.9–12.7)
POTASSIUM SERPL-SCNC: 3.7 MMOL/L (ref 3.5–5.3)
PR INTERVAL: 164 MS
PROCALCITONIN SERPL-MCNC: 0.28 NG/ML
PROT SERPL-MCNC: 8 G/DL (ref 6.4–8.4)
PROT UR STRIP-MCNC: ABNORMAL MG/DL
PROTHROMBIN TIME: 14.8 SECONDS (ref 11.6–14.5)
QRS AXIS: 80 DEGREES
QRSD INTERVAL: 92 MS
QT INTERVAL: 328 MS
QTC INTERVAL: 459 MS
RBC # BLD AUTO: 4.57 MILLION/UL (ref 3.88–5.62)
RBC #/AREA URNS AUTO: ABNORMAL /HPF
RSV RNA RESP QL NAA+PROBE: NEGATIVE
SARS-COV-2 RNA RESP QL NAA+PROBE: NEGATIVE
SODIUM SERPL-SCNC: 128 MMOL/L (ref 135–147)
SP GR UR STRIP.AUTO: 1.02 (ref 1–1.03)
T WAVE AXIS: 53 DEGREES
UROBILINOGEN UR STRIP-ACNC: 2 MG/DL
VENTRICULAR RATE: 118 BPM
WBC # BLD AUTO: 17.27 THOUSAND/UL (ref 4.31–10.16)
WBC #/AREA URNS AUTO: ABNORMAL /HPF

## 2024-05-11 PROCEDURE — 74177 CT ABD & PELVIS W/CONTRAST: CPT

## 2024-05-11 PROCEDURE — 87040 BLOOD CULTURE FOR BACTERIA: CPT | Performed by: EMERGENCY MEDICINE

## 2024-05-11 PROCEDURE — 93005 ELECTROCARDIOGRAM TRACING: CPT

## 2024-05-11 PROCEDURE — 71045 X-RAY EXAM CHEST 1 VIEW: CPT

## 2024-05-11 PROCEDURE — 80053 COMPREHEN METABOLIC PANEL: CPT | Performed by: EMERGENCY MEDICINE

## 2024-05-11 PROCEDURE — 71260 CT THORAX DX C+: CPT

## 2024-05-11 PROCEDURE — 81001 URINALYSIS AUTO W/SCOPE: CPT | Performed by: EMERGENCY MEDICINE

## 2024-05-11 PROCEDURE — 87205 SMEAR GRAM STAIN: CPT | Performed by: SURGERY

## 2024-05-11 PROCEDURE — 87077 CULTURE AEROBIC IDENTIFY: CPT | Performed by: EMERGENCY MEDICINE

## 2024-05-11 PROCEDURE — 87154 CUL TYP ID BLD PTHGN 6+ TRGT: CPT | Performed by: EMERGENCY MEDICINE

## 2024-05-11 PROCEDURE — 85730 THROMBOPLASTIN TIME PARTIAL: CPT | Performed by: EMERGENCY MEDICINE

## 2024-05-11 PROCEDURE — 99223 1ST HOSP IP/OBS HIGH 75: CPT | Performed by: SURGERY

## 2024-05-11 PROCEDURE — 96368 THER/DIAG CONCURRENT INF: CPT

## 2024-05-11 PROCEDURE — 96365 THER/PROPH/DIAG IV INF INIT: CPT

## 2024-05-11 PROCEDURE — 99291 CRITICAL CARE FIRST HOUR: CPT | Performed by: EMERGENCY MEDICINE

## 2024-05-11 PROCEDURE — 0241U HB NFCT DS VIR RESP RNA 4 TRGT: CPT | Performed by: EMERGENCY MEDICINE

## 2024-05-11 PROCEDURE — 99418 PROLNG IP/OBS E/M EA 15 MIN: CPT | Performed by: SURGERY

## 2024-05-11 PROCEDURE — 83605 ASSAY OF LACTIC ACID: CPT | Performed by: EMERGENCY MEDICINE

## 2024-05-11 PROCEDURE — 99285 EMERGENCY DEPT VISIT HI MDM: CPT

## 2024-05-11 PROCEDURE — 85025 COMPLETE CBC W/AUTO DIFF WBC: CPT | Performed by: EMERGENCY MEDICINE

## 2024-05-11 PROCEDURE — 87075 CULTR BACTERIA EXCEPT BLOOD: CPT | Performed by: SURGERY

## 2024-05-11 PROCEDURE — 85610 PROTHROMBIN TIME: CPT | Performed by: EMERGENCY MEDICINE

## 2024-05-11 PROCEDURE — 87077 CULTURE AEROBIC IDENTIFY: CPT | Performed by: SURGERY

## 2024-05-11 PROCEDURE — 87086 URINE CULTURE/COLONY COUNT: CPT | Performed by: NURSE PRACTITIONER

## 2024-05-11 PROCEDURE — 84145 PROCALCITONIN (PCT): CPT | Performed by: EMERGENCY MEDICINE

## 2024-05-11 PROCEDURE — 99253 IP/OBS CNSLTJ NEW/EST LOW 45: CPT | Performed by: NURSE PRACTITIONER

## 2024-05-11 PROCEDURE — 87186 SC STD MICRODIL/AGAR DIL: CPT | Performed by: EMERGENCY MEDICINE

## 2024-05-11 PROCEDURE — 87070 CULTURE OTHR SPECIMN AEROBIC: CPT | Performed by: SURGERY

## 2024-05-11 PROCEDURE — 36415 COLL VENOUS BLD VENIPUNCTURE: CPT | Performed by: EMERGENCY MEDICINE

## 2024-05-11 PROCEDURE — 96367 TX/PROPH/DG ADDL SEQ IV INF: CPT

## 2024-05-11 PROCEDURE — 87185 SC STD ENZYME DETCJ PER NZM: CPT | Performed by: SURGERY

## 2024-05-11 PROCEDURE — 82948 REAGENT STRIP/BLOOD GLUCOSE: CPT

## 2024-05-11 PROCEDURE — 0HD9XZZ EXTRACTION OF PERINEUM SKIN, EXTERNAL APPROACH: ICD-10-PCS | Performed by: SURGERY

## 2024-05-11 PROCEDURE — 10061 I&D ABSCESS COMP/MULTIPLE: CPT | Performed by: SURGERY

## 2024-05-11 PROCEDURE — 96375 TX/PRO/DX INJ NEW DRUG ADDON: CPT

## 2024-05-11 RX ORDER — INSULIN LISPRO 100 [IU]/ML
INJECTION, SOLUTION INTRAVENOUS; SUBCUTANEOUS
Status: DISPENSED
Start: 2024-05-11 | End: 2024-05-12

## 2024-05-11 RX ORDER — SACCHAROMYCES BOULARDII 250 MG
250 CAPSULE ORAL 2 TIMES DAILY
Status: DISCONTINUED | OUTPATIENT
Start: 2024-05-11 | End: 2024-05-18 | Stop reason: HOSPADM

## 2024-05-11 RX ORDER — FLUOXETINE 10 MG/1
10 CAPSULE ORAL DAILY
Status: DISCONTINUED | OUTPATIENT
Start: 2024-05-12 | End: 2024-05-18 | Stop reason: HOSPADM

## 2024-05-11 RX ORDER — ACETAMINOPHEN 10 MG/ML
1000 INJECTION, SOLUTION INTRAVENOUS ONCE
Status: COMPLETED | OUTPATIENT
Start: 2024-05-11 | End: 2024-05-11

## 2024-05-11 RX ORDER — HYDROMORPHONE HCL/PF 1 MG/ML
0.5 SYRINGE (ML) INJECTION EVERY 4 HOURS PRN
Status: DISCONTINUED | OUTPATIENT
Start: 2024-05-11 | End: 2024-05-16

## 2024-05-11 RX ORDER — LIDOCAINE HYDROCHLORIDE 10 MG/ML
INJECTION, SOLUTION EPIDURAL; INFILTRATION; INTRACAUDAL; PERINEURAL AS NEEDED
Status: DISCONTINUED | OUTPATIENT
Start: 2024-05-11 | End: 2024-05-11

## 2024-05-11 RX ORDER — SUCCINYLCHOLINE/SOD CL,ISO/PF 100 MG/5ML
SYRINGE (ML) INTRAVENOUS AS NEEDED
Status: DISCONTINUED | OUTPATIENT
Start: 2024-05-11 | End: 2024-05-11

## 2024-05-11 RX ORDER — HEPARIN SODIUM 5000 [USP'U]/ML
INJECTION, SOLUTION INTRAVENOUS; SUBCUTANEOUS AS NEEDED
Status: DISCONTINUED | OUTPATIENT
Start: 2024-05-11 | End: 2024-05-11

## 2024-05-11 RX ORDER — TOPIRAMATE 25 MG/1
50 TABLET ORAL 2 TIMES DAILY
Status: DISCONTINUED | OUTPATIENT
Start: 2024-05-11 | End: 2024-05-18 | Stop reason: HOSPADM

## 2024-05-11 RX ORDER — BUPIVACAINE HYDROCHLORIDE AND EPINEPHRINE 2.5; 5 MG/ML; UG/ML
INJECTION, SOLUTION EPIDURAL; INFILTRATION; INTRACAUDAL; PERINEURAL AS NEEDED
Status: DISCONTINUED | OUTPATIENT
Start: 2024-05-11 | End: 2024-05-11 | Stop reason: HOSPADM

## 2024-05-11 RX ORDER — INSULIN LISPRO 100 [IU]/ML
9 INJECTION, SOLUTION INTRAVENOUS; SUBCUTANEOUS
Status: DISCONTINUED | OUTPATIENT
Start: 2024-05-12 | End: 2024-05-14

## 2024-05-11 RX ORDER — ONDANSETRON 2 MG/ML
INJECTION INTRAMUSCULAR; INTRAVENOUS AS NEEDED
Status: DISCONTINUED | OUTPATIENT
Start: 2024-05-11 | End: 2024-05-11

## 2024-05-11 RX ORDER — CLINDAMYCIN PHOSPHATE 900 MG/50ML
900 INJECTION, SOLUTION INTRAVENOUS EVERY 8 HOURS
Status: DISCONTINUED | OUTPATIENT
Start: 2024-05-12 | End: 2024-05-14

## 2024-05-11 RX ORDER — CLINDAMYCIN PHOSPHATE 150 MG/ML
300 INJECTION, SOLUTION INTRAVENOUS EVERY 6 HOURS
Status: DISCONTINUED | OUTPATIENT
Start: 2024-05-11 | End: 2024-05-11

## 2024-05-11 RX ORDER — BENZONATATE 100 MG/1
100 CAPSULE ORAL 3 TIMES DAILY PRN
Status: DISCONTINUED | OUTPATIENT
Start: 2024-05-11 | End: 2024-05-18 | Stop reason: HOSPADM

## 2024-05-11 RX ORDER — CEFTRIAXONE 1 G/50ML
1000 INJECTION, SOLUTION INTRAVENOUS EVERY 24 HOURS
Status: DISCONTINUED | OUTPATIENT
Start: 2024-05-12 | End: 2024-05-12

## 2024-05-11 RX ORDER — INSULIN GLARGINE 100 [IU]/ML
30 INJECTION, SOLUTION SUBCUTANEOUS
Status: DISCONTINUED | OUTPATIENT
Start: 2024-05-11 | End: 2024-05-14

## 2024-05-11 RX ORDER — ALBUTEROL SULFATE 2.5 MG/3ML
2.5 SOLUTION RESPIRATORY (INHALATION) EVERY 6 HOURS PRN
Status: DISCONTINUED | OUTPATIENT
Start: 2024-05-11 | End: 2024-05-18 | Stop reason: HOSPADM

## 2024-05-11 RX ORDER — CEFTRIAXONE 1 G/50ML
1000 INJECTION, SOLUTION INTRAVENOUS ONCE
Status: COMPLETED | OUTPATIENT
Start: 2024-05-11 | End: 2024-05-11

## 2024-05-11 RX ORDER — INSULIN LISPRO 100 [IU]/ML
1-5 INJECTION, SOLUTION INTRAVENOUS; SUBCUTANEOUS
Status: DISCONTINUED | OUTPATIENT
Start: 2024-05-12 | End: 2024-05-14

## 2024-05-11 RX ORDER — KETOROLAC TROMETHAMINE 30 MG/ML
15 INJECTION, SOLUTION INTRAMUSCULAR; INTRAVENOUS ONCE
Status: COMPLETED | OUTPATIENT
Start: 2024-05-11 | End: 2024-05-11

## 2024-05-11 RX ORDER — OXYCODONE HYDROCHLORIDE 10 MG/1
10 TABLET ORAL EVERY 4 HOURS PRN
Status: DISCONTINUED | OUTPATIENT
Start: 2024-05-11 | End: 2024-05-14

## 2024-05-11 RX ORDER — SODIUM CHLORIDE, SODIUM LACTATE, POTASSIUM CHLORIDE, CALCIUM CHLORIDE 600; 310; 30; 20 MG/100ML; MG/100ML; MG/100ML; MG/100ML
INJECTION, SOLUTION INTRAVENOUS CONTINUOUS PRN
Status: DISCONTINUED | OUTPATIENT
Start: 2024-05-11 | End: 2024-05-11

## 2024-05-11 RX ORDER — SODIUM CHLORIDE 9 MG/ML
125 INJECTION, SOLUTION INTRAVENOUS CONTINUOUS
Status: DISCONTINUED | OUTPATIENT
Start: 2024-05-11 | End: 2024-05-16

## 2024-05-11 RX ORDER — NEOSTIGMINE METHYLSULFATE 1 MG/ML
INJECTION INTRAVENOUS AS NEEDED
Status: DISCONTINUED | OUTPATIENT
Start: 2024-05-11 | End: 2024-05-11

## 2024-05-11 RX ORDER — ROCURONIUM BROMIDE 10 MG/ML
INJECTION, SOLUTION INTRAVENOUS AS NEEDED
Status: DISCONTINUED | OUTPATIENT
Start: 2024-05-11 | End: 2024-05-11

## 2024-05-11 RX ORDER — ONDANSETRON 2 MG/ML
4 INJECTION INTRAMUSCULAR; INTRAVENOUS ONCE AS NEEDED
Status: DISCONTINUED | OUTPATIENT
Start: 2024-05-11 | End: 2024-05-11 | Stop reason: HOSPADM

## 2024-05-11 RX ORDER — MAGNESIUM HYDROXIDE 1200 MG/15ML
LIQUID ORAL AS NEEDED
Status: DISCONTINUED | OUTPATIENT
Start: 2024-05-11 | End: 2024-05-11 | Stop reason: HOSPADM

## 2024-05-11 RX ORDER — SODIUM CHLORIDE 9 MG/ML
INJECTION, SOLUTION INTRAVENOUS AS NEEDED
Status: DISCONTINUED | OUTPATIENT
Start: 2024-05-11 | End: 2024-05-11 | Stop reason: HOSPADM

## 2024-05-11 RX ORDER — MIDAZOLAM HYDROCHLORIDE 2 MG/2ML
INJECTION, SOLUTION INTRAMUSCULAR; INTRAVENOUS AS NEEDED
Status: DISCONTINUED | OUTPATIENT
Start: 2024-05-11 | End: 2024-05-11

## 2024-05-11 RX ORDER — ONDANSETRON 2 MG/ML
4 INJECTION INTRAMUSCULAR; INTRAVENOUS EVERY 6 HOURS PRN
Status: DISCONTINUED | OUTPATIENT
Start: 2024-05-11 | End: 2024-05-18 | Stop reason: HOSPADM

## 2024-05-11 RX ORDER — CLINDAMYCIN PHOSPHATE 600 MG/50ML
600 INJECTION, SOLUTION INTRAVENOUS ONCE
Status: COMPLETED | OUTPATIENT
Start: 2024-05-11 | End: 2024-05-11

## 2024-05-11 RX ORDER — INSULIN LISPRO 100 [IU]/ML
1-5 INJECTION, SOLUTION INTRAVENOUS; SUBCUTANEOUS
Status: DISCONTINUED | OUTPATIENT
Start: 2024-05-11 | End: 2024-05-14

## 2024-05-11 RX ORDER — GLYCOPYRROLATE 0.2 MG/ML
INJECTION INTRAMUSCULAR; INTRAVENOUS AS NEEDED
Status: DISCONTINUED | OUTPATIENT
Start: 2024-05-11 | End: 2024-05-11

## 2024-05-11 RX ORDER — ENOXAPARIN SODIUM 100 MG/ML
40 INJECTION SUBCUTANEOUS 2 TIMES DAILY
Status: DISCONTINUED | OUTPATIENT
Start: 2024-05-12 | End: 2024-05-18 | Stop reason: HOSPADM

## 2024-05-11 RX ORDER — PROPOFOL 10 MG/ML
INJECTION, EMULSION INTRAVENOUS AS NEEDED
Status: DISCONTINUED | OUTPATIENT
Start: 2024-05-11 | End: 2024-05-11

## 2024-05-11 RX ORDER — KETAMINE HYDROCHLORIDE 50 MG/ML
INJECTION, SOLUTION INTRAMUSCULAR; INTRAVENOUS AS NEEDED
Status: DISCONTINUED | OUTPATIENT
Start: 2024-05-11 | End: 2024-05-11

## 2024-05-11 RX ORDER — OXYCODONE HYDROCHLORIDE 5 MG/1
5 TABLET ORAL EVERY 4 HOURS PRN
Status: DISCONTINUED | OUTPATIENT
Start: 2024-05-11 | End: 2024-05-14

## 2024-05-11 RX ORDER — FENTANYL CITRATE 50 UG/ML
INJECTION, SOLUTION INTRAMUSCULAR; INTRAVENOUS AS NEEDED
Status: DISCONTINUED | OUTPATIENT
Start: 2024-05-11 | End: 2024-05-11

## 2024-05-11 RX ORDER — FENTANYL CITRATE/PF 50 MCG/ML
50 SYRINGE (ML) INJECTION
Status: DISCONTINUED | OUTPATIENT
Start: 2024-05-11 | End: 2024-05-11 | Stop reason: HOSPADM

## 2024-05-11 RX ORDER — ACETAMINOPHEN 325 MG/1
650 TABLET ORAL EVERY 6 HOURS PRN
Status: DISCONTINUED | OUTPATIENT
Start: 2024-05-12 | End: 2024-05-16

## 2024-05-11 RX ORDER — HYDROMORPHONE HCL/PF 1 MG/ML
0.5 SYRINGE (ML) INJECTION
Status: DISCONTINUED | OUTPATIENT
Start: 2024-05-11 | End: 2024-05-11 | Stop reason: HOSPADM

## 2024-05-11 RX ADMIN — HEPARIN SODIUM 5000 UNITS: 5000 INJECTION, SOLUTION INTRAVENOUS; SUBCUTANEOUS at 20:55

## 2024-05-11 RX ADMIN — Medication 250 MG: at 22:45

## 2024-05-11 RX ADMIN — Medication 100 MG: at 20:26

## 2024-05-11 RX ADMIN — TOPIRAMATE 50 MG: 25 TABLET, FILM COATED ORAL at 22:45

## 2024-05-11 RX ADMIN — KETOROLAC TROMETHAMINE 15 MG: 30 INJECTION, SOLUTION INTRAMUSCULAR; INTRAVENOUS at 18:12

## 2024-05-11 RX ADMIN — KETAMINE HYDROCHLORIDE 50 MG: 50 INJECTION INTRAMUSCULAR; INTRAVENOUS at 20:25

## 2024-05-11 RX ADMIN — GLYCOPYRROLATE 1 MG: 0.2 INJECTION, SOLUTION INTRAMUSCULAR; INTRAVENOUS at 21:12

## 2024-05-11 RX ADMIN — SODIUM CHLORIDE 125 ML/HR: 0.9 INJECTION, SOLUTION INTRAVENOUS at 22:22

## 2024-05-11 RX ADMIN — CEFTRIAXONE 1000 MG: 1 INJECTION, SOLUTION INTRAVENOUS at 18:13

## 2024-05-11 RX ADMIN — MIDAZOLAM 2 MG: 1 INJECTION INTRAMUSCULAR; INTRAVENOUS at 20:13

## 2024-05-11 RX ADMIN — SODIUM CHLORIDE, SODIUM LACTATE, POTASSIUM CHLORIDE, AND CALCIUM CHLORIDE: .6; .31; .03; .02 INJECTION, SOLUTION INTRAVENOUS at 20:14

## 2024-05-11 RX ADMIN — SODIUM CHLORIDE 1000 ML: 0.9 INJECTION, SOLUTION INTRAVENOUS at 18:10

## 2024-05-11 RX ADMIN — FENTANYL CITRATE 50 MCG: 50 INJECTION, SOLUTION INTRAMUSCULAR; INTRAVENOUS at 21:04

## 2024-05-11 RX ADMIN — CLINDAMYCIN PHOSPHATE 600 MG: 600 INJECTION, SOLUTION INTRAVENOUS at 18:29

## 2024-05-11 RX ADMIN — INSULIN GLARGINE 30 UNITS: 100 INJECTION, SOLUTION SUBCUTANEOUS at 22:45

## 2024-05-11 RX ADMIN — Medication 100 MG: at 20:28

## 2024-05-11 RX ADMIN — ROCURONIUM BROMIDE 50 MG: 10 INJECTION, SOLUTION INTRAVENOUS at 20:36

## 2024-05-11 RX ADMIN — FENTANYL CITRATE 50 MCG: 50 INJECTION, SOLUTION INTRAMUSCULAR; INTRAVENOUS at 20:25

## 2024-05-11 RX ADMIN — ACETAMINOPHEN 1000 MG: 10 INJECTION INTRAVENOUS at 18:11

## 2024-05-11 RX ADMIN — PROPOFOL 200 MG: 10 INJECTION, EMULSION INTRAVENOUS at 20:25

## 2024-05-11 RX ADMIN — VANCOMYCIN HYDROCHLORIDE 2000 MG: 10 INJECTION, POWDER, LYOPHILIZED, FOR SOLUTION INTRAVENOUS at 18:47

## 2024-05-11 RX ADMIN — ONDANSETRON 4 MG: 2 INJECTION INTRAMUSCULAR; INTRAVENOUS at 21:04

## 2024-05-11 RX ADMIN — IOHEXOL 120 ML: 350 INJECTION, SOLUTION INTRAVENOUS at 17:59

## 2024-05-11 RX ADMIN — PROPOFOL 200 MG: 10 INJECTION, EMULSION INTRAVENOUS at 20:28

## 2024-05-11 RX ADMIN — NEOSTIGMINE METHYLSULFATE 5 MG: 1 INJECTION INTRAVENOUS at 21:12

## 2024-05-11 RX ADMIN — LIDOCAINE HYDROCHLORIDE 50 MG: 10 INJECTION, SOLUTION EPIDURAL; INFILTRATION; INTRACAUDAL; PERINEURAL at 20:25

## 2024-05-11 RX ADMIN — INSULIN LISPRO 1 UNITS: 100 INJECTION, SOLUTION INTRAVENOUS; SUBCUTANEOUS at 22:54

## 2024-05-11 NOTE — ASSESSMENT & PLAN NOTE
Patient presents with left testicular and left pubic region pain for 2 to 3 days, with associated fever chills nausea vomiting.  Denies dysuria but does report urinating everywhere at home.  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    Sepsis, present on admission, as evidenced by leukocytosis, fever, tachycardia, with source of infection scrotal infection/Humberto's.  Lactic acid normal.  Initial procalcitonin 0.28.    Given Vanco Rocephin clindamycin in ED. Communicated with ID, recommend vancomycin Zosyn high-dose clindamycin.  Appreciate recommendation.  Patient going to the OR emergently with primary  IV hydration  Follow-up blood cultures  Follow-up OR cultures  Repeat CBC with differential, procalcitonin in the morning  Recommend ID consult.

## 2024-05-11 NOTE — ANESTHESIA PREPROCEDURE EVALUATION
Procedure:  INCISION AND DRAINAGE (I&D) PERINEAL (Scrotum)    Relevant Problems   CARDIO   (+) Hypertriglyceridemia      ENDO   (+) Type 2 diabetes mellitus with hyperglycemia, with long-term current use of insulin (HCC)      NEURO/PSYCH   (+) MDD (major depressive disorder)      PULMONARY   (+) Pneumonia   (+) Sleep apnea      Behavioral Health   (+) ADHD (attention deficit hyperactivity disorder)      Other   (+) Morbid obesity (HCC)        Physical Exam    Airway    Mallampati score: IV  TM Distance: <3 FB  Neck ROM: full     Dental       Cardiovascular  Rhythm: regular, Rate: abnormal    Pulmonary   Breath sounds clear to auscultation    Other Findings        Anesthesia Plan  ASA Score- 3 Emergent    Anesthesia Type- general with ASA Monitors.         Additional Monitors:     Airway Plan: ETT.    Comment: RSI with Cullen, suction stomach with OGT prior to emergence.       Plan Factors-    Chart reviewed.        Patient is not a current smoker.              Induction- intravenous and rapid sequence induction.    Postoperative Plan- Plan for postoperative opioid use.     Informed Consent- Anesthetic plan and risks discussed with patient.

## 2024-05-11 NOTE — CONSULTS
"[facility]  Consult  Name: Rik Marin 19 y.o. male I MRN: [id number]  Unit/Bed#: OR POOL I Date of Admission: 5/11/2024   Date of Service: 5/11/2024  Hospital Day: 0    Consult to internal medicine  Consult performed by: ANDRES Garcia  Consult ordered by: Echo Snyder MD          Assessment/Plan   * Sepsis (HCC)  Assessment & Plan  Patient presents with left testicular and left pubic region pain for 2 to 3 days, with associated fever chills nausea vomiting.  Denies dysuria but does report urinating everywhere at home.  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    Sepsis, present on admission, as evidenced by leukocytosis, fever, tachycardia, with source of infection scrotal infection/Humberto's.  Lactic acid normal.  Initial procalcitonin 0.28.    Given Vanco Rocephin clindamycin in ED. Communicated with ID, recommend vancomycin Zosyn high-dose clindamycin.  Appreciate recommendation.  Patient going to the OR emergently with primary  IV hydration  Follow-up blood cultures  Follow-up OR cultures  Repeat CBC with differential, procalcitonin in the morning  Recommend ID consult.    Humberto's gangrene of scrotum  Assessment & Plan  Management as above    Type 2 diabetes mellitus with hyperglycemia, with long-term current use of insulin (HCC)  Assessment & Plan  Lab Results   Component Value Date    HGBA1C 11.6 (H) 05/07/2024       No results for input(s): \"POCGLU\" in the last 72 hours.    Blood Sugar Average: Last 72 hrs:  A1c 11.6 4 days ago  Started on Lantus and mealtime insulin on recent discharge 4 days ago.   Was on Januvia, metformin at home.Seems not taking Januvia currently.  Will hold oral agents.  Will continue Lantus 30 units SQ at bedtime  Continue mealtime insulin 9 units 3 times daily  SSI  Diabetic diet  Tight glycemic control in view of sepsis        Bacteriuria  Assessment & Plan  Denies dysuria.  Will order urine " culture    ADHD (attention deficit hyperactivity disorder)  Assessment & Plan  Continue Prozac  Gets Abilify injection every 4 weeks as well  Follow-up psych as outpatient    Sleep apnea  Assessment & Plan  Unsure if using CPAP at home.               VTE Prophylaxis: Enoxaparin (Lovenox)  / sequential compression device     Recommendations for Discharge:  Follow-up primary, surgery    Counseling / Coordination of Care Time: 45 minutes.  Greater than 50% of total time spent on patient counseling and coordination of care.    Collaboration of Care: Were Recommendations Directly Discussed with Primary Treatment Team? - No     History of Present Illness:    Rik Marin is a 19 y.o. male with PMH of type 2 diabetes, ADHD, sleep apnea, morbid obesity who is originally admitted to the acute surgery service on 5/11/2024 due to scrotal infection /Humberto's gangrene.We are consulted for management of diabetes.  Patient reports left-sided scrotum and pubic pain for 2 to 3 days, with associated fever chills nausea vomiting.  Patient denies dysuria but reports urinating everywhere. Patient denies chest pain, headache, dizziness, SOB.  Denies history of bad infection.    Review of Systems:    Review of Systems   Constitutional:  Positive for appetite change, chills and fever.   Gastrointestinal:  Positive for nausea and vomiting.   Genitourinary:  Positive for testicular pain.        Left pubic pain   All other systems reviewed and are negative.      Past Medical and Surgical History:     Past Medical History:   Diagnosis Date    ADHD (attention deficit hyperactivity disorder) 5/11/2024    Diabetes mellitus (HCC)     5/2023    Lung collapse     Sleep apnea     Viral meningitis        Past Surgical History:   Procedure Laterality Date    TONSILECTOMY AND ADNOIDECTOMY      2020       Meds/Allergies:    all medications and allergies reviewed    Allergies: No Known Allergies    Social History:     Marital Status:  Single    Substance Use History:   Social History     Substance and Sexual Activity   Alcohol Use Yes    Alcohol/week: 2.0 standard drinks of alcohol    Types: 2 Shots of liquor per week     Social History     Tobacco Use   Smoking Status Former    Types: Cigarettes    Passive exposure: Yes   Smokeless Tobacco Current     Social History     Substance and Sexual Activity   Drug Use Yes    Types: Marijuana       Family History:    non-contributory    Physical Exam:     Vitals:   Blood Pressure: 119/56 (05/11/24 1930)  Pulse: 102 (05/11/24 1930)  Temperature: 100.1 °F (37.8 °C) (05/11/24 1845)  Temp Source: Oral (05/11/24 1845)  Respirations: 22 (05/11/24 1930)  SpO2: 94 % (05/11/24 1930)    Physical Exam  Vitals and nursing note reviewed.   Constitutional:       Appearance: He is well-developed.      Comments: Patient is morbidly obese.  Diaphoretic on exam.   HENT:      Head: Normocephalic and atraumatic.   Neck:      Thyroid: No thyromegaly.      Vascular: No JVD.      Trachea: No tracheal deviation.   Cardiovascular:      Rate and Rhythm: Normal rate and regular rhythm.      Heart sounds: Normal heart sounds.   Pulmonary:      Effort: No respiratory distress.      Breath sounds: No wheezing or rales.      Comments: Slight tachypneic.  O2 2 L, satting mid 90s.  Lung sounds  diminished bilateral.  Abdominal:      General: Bowel sounds are normal. There is no distension.      Palpations: Abdomen is soft.      Tenderness: There is no abdominal tenderness. There is no guarding.   Genitourinary:     Comments: Scrotum and pubic reddened, left sided scrotum and pubic indurated tender increased warmth.  Musculoskeletal:         General: Normal range of motion.      Cervical back: Neck supple.      Right lower leg: No edema.      Left lower leg: No edema.   Skin:     General: Skin is warm and dry.   Neurological:      General: No focal deficit present.      Mental Status: He is alert and oriented to person, place, and time.    Psychiatric:         Mood and Affect: Mood normal.         Judgment: Judgment normal.           Additional Data:     Lab Results: I Have Reviewed All Lab Data Below:    Results from last 7 days   Lab Units 05/11/24  1726   WBC Thousand/uL 17.27*   HEMOGLOBIN g/dL 12.5   HEMATOCRIT % 39.2   PLATELETS Thousands/uL 281   SEGS PCT % 84*   LYMPHO PCT % 10*   MONO PCT % 5   EOS PCT % 0     Results from last 7 days   Lab Units 05/11/24  1726   POTASSIUM mmol/L 3.7   CHLORIDE mmol/L 99   CO2 mmol/L 21   BUN mg/dL 7   CREATININE mg/dL 0.76   CALCIUM mg/dL 8.8   ALK PHOS U/L 70   ALT U/L 20   AST U/L 21     Results from last 7 days   Lab Units 05/11/24  1726   INR  1.14       * Additional Pertinent Lab Tests Reviewed: All Labs For Current Hospital Admission Reviewed    Imaging: I have personally reviewed pertinent reports.      CT chest abdomen pelvis w contrast    Result Date: 5/11/2024  Narrative: CT CHEST, ABDOMEN AND PELVIS WITH IV CONTRAST INDICATION: Testicle pain and swelling. Cough. Shortness of breath. COMPARISON: CT chest from 5/6/2024. TECHNIQUE: CT examination of the chest, abdomen and pelvis was performed. Multiplanar 2D reformatted images were created from the source data. This examination, like all CT scans performed in the [facility], was performed utilizing techniques to minimize radiation dose exposure, including the use of iterative reconstruction and automated exposure control. Radiation dose length product (DLP) for this visit: 1987 mGy-cm IV Contrast: 120 mL of iohexol (OMNIPAQUE) Enteric Contrast: Not administered. FINDINGS: CHEST LUNGS: Near complete resolution of previously seen multi lobar pneumonia. No endotracheal or endobronchial lesion. PLEURA: Unremarkable. HEART/GREAT VESSELS: Heart is unremarkable for patient's age. No thoracic aortic aneurysm. MEDIASTINUM AND CORNELIUS: Unremarkable. CHEST WALL AND LOWER NECK: Unremarkable. ABDOMEN LIVER/BILIARY TREE: Hepatomegaly with  steatosis. GALLBLADDER: No calcified gallstones. No pericholecystic inflammatory change. SPLEEN: Stable splenomegaly measuring 16.3 cm. PANCREAS: Unremarkable. ADRENAL GLANDS: Unremarkable. KIDNEYS/URETERS: Unremarkable. No hydronephrosis. STOMACH AND BOWEL: Unremarkable. APPENDIX: Normal. ABDOMINOPELVIC CAVITY: No ascites. No pneumoperitoneum. No lymphadenopathy. VESSELS: Unremarkable for patient's age. PELVIS REPRODUCTIVE ORGANS: Unremarkable for patient's age. URINARY BLADDER: Unremarkable. ABDOMINAL WALL/INGUINAL REGIONS: Extensive cellulitis, subcutaneous edema and subcutaneous air in the left anterior perineum extending inferior alongside the left testicle consistent with infection/Humberto's gangrene. BONES: No acute fracture or suspicious osseous lesion.     Impression: Extensive cellulitis, subcutaneous edema and subcutaneous air in the left anterior perineum extending inferior alongside the left testicle consistent with infection/Humberto's gangrene. Patient already scheduled for operating room in 15 minutes according  to Dr. Snyder. Near complete resolution of multilobar pneumonia. Hepatomegaly with steatosis and splenomegaly. I personally discussed this study with KIA SNYDER on 5/11/2024 7:34 PM. Workstation performed: [id number]     XR chest 2 views    Result Date: 5/9/2024  Narrative: XR CHEST PA & LATERAL INDICATION: PNA. COMPARISON: 3/5/2024 FINDINGS: Bilateral lower lobe and lingula infiltrates again noted suggesting multifocal pneumonia. Findings appear similar compared to the prior exam. No pneumothorax or pleural effusion. Normal cardiomediastinal silhouette. Bones are unremarkable for age. Normal upper abdomen.     Impression: Bilateral lower lobe and lingula infiltrates again noted suggesting multifocal pneumonia. Findings appear similar compared to the prior exam. Workstation performed: [id number]     CTA chest pe study    Result Date: 5/6/2024  Narrative: CTA - CHEST WITH IV CONTRAST  - PULMONARY ANGIOGRAM INDICATION: m. COMPARISON: None. TECHNIQUE: CTA examination of the chest was performed using angiographic technique according to a protocol specifically tailored to evaluate for pulmonary embolism. Multiplanar 2D reformatted images were created from the source data. In addition, coronal  3D MIP postprocessing was performed on the acquisition scanner. Radiation dose length product (DLP) for this visit: 880 mGy-cm . This examination, like all CT scans performed in the [facility], was performed utilizing techniques to minimize radiation dose exposure, including the use of iterative reconstruction and automated exposure control. IV Contrast: 100 mL of iohexol (OMNIPAQUE) FINDINGS: PULMONARY ARTERIAL TREE: No pulmonary embolus is seen. LUNGS: Patchy and nodular airspace consolidation throughout both lungs.. There is no tracheal or endobronchial lesion. PLEURA: Unremarkable. HEART/GREAT VESSELS: Heart is unremarkable for patient's age. No thoracic aortic aneurysm. MEDIASTINUM AND CORNELIUS: Unremarkable. CHEST WALL AND LOWER NECK: Unremarkable. VISUALIZED STRUCTURES IN THE UPPER ABDOMEN: Visualized hepatic parenchyma is diffusely decreased in density consistent with hepatic steatosis. Otherwise no clinical significant abnormality identified in the visualized upper abdomen. OSSEOUS STRUCTURES: No acute fracture or destructive osseous lesion.     Impression: No evidence of acute intrathoracic pathology Multifocal pneumonia Workstation performed: [id number]     XR chest 2 views    Result Date: 5/5/2024  Narrative: XR CHEST PA & LATERAL INDICATION: sob. COMPARISON: CXR 5/5/2024 and 12/16/2020. FINDINGS: Multifocal bilateral pneumonia unchanged, greater on the left. No pneumothorax or pleural effusion. Normal cardiomediastinal silhouette. Bones are unremarkable for age. Normal upper abdomen.     Impression: Redemonstration of multifocal bilateral pneumonia, greater on the left. Workstation  performed: [id number]     XR chest 1 view portable    Result Date: 5/5/2024  Narrative: XR CHEST PORTABLE INDICATION: r/o pneumonia. Cough, feeling unwell. COMPARISON: CXR 12/16/2022. FINDINGS: Multifocal consolidation, greater on the left. No pneumothorax or pleural effusion. Normal cardiomediastinal silhouette. Bones are unremarkable for age. Normal upper abdomen.     Impression: Multifocal consolidation, greater on the left, compatible with pneumonia. Workstation performed: [id number]     XR knee 4+ views Right injury    Result Date: 4/21/2024  Narrative: XR KNEE 4+ VW RIGHT INJURY INDICATION: fall. COMPARISON: 9/21/2023 FINDINGS: No acute fracture or dislocation. No joint effusion. No significant degenerative changes. No lytic or blastic osseous lesion. Unremarkable soft tissues.     Impression: No acute osseous abnormality. Workstation performed: [id number]         ** Please Note: Dragon 360 Dictation speech to text software may have been used in the creation of this document **

## 2024-05-11 NOTE — LETTER
[address] [address]  Dept: [phone]    May 17, 2024     Patient: Rik Marin   YOB: 2005   Date of Visit: 5/11/2024       To Whom it May Concern:    Rik Marin is under my professional care. He was seen in the hospital from 5/11/2024 to 05/17/24. He may return to work on 5/31/24 with the following limitations no strenuous activity until cleared upon surgical revist .    If you have any questions or concerns, please don't hesitate to call.         Sincerely,          Kee Mccray PA-C

## 2024-05-11 NOTE — H&P
H&P Exam - General Surgery   Rik Marin 19 y.o. male MRN: [id number]  Unit/Bed#: OR POOL Encounter: [id number]    Assessment/Plan     Assessment:  This is a 19-year-old male past medical history of diabetes mellitus, morbid obesity, sleep apnea, ADHD presenting with left testicular pain and swelling x 2 to 3 days.  CT scan showing extensive cellulitis, subcutaneous edema and subcutaneous air in the left anterior perineum extending inferiorly alongside the left testicle, consistent with foreigners gangrene.    WBC: 17.27.    Plan:  OR urgently for incision and drainage.   Consent obtained by Dr. Whitfield.  IV antibiotics: Vancomycin, clindamycin, and Rocephin.   Patient verbalized understanding agrees to treatment plan      History of Present Illness     HPI:  Rik Marin is a 19 y.o. male past medical history of diabetes mellitus, morbid obesity, sleep apnea, ADHD presenting with left testicular pain and swelling x 2 to 3 days.  CT scan showing extensive cellulitis, subcutaneous edema and subcutaneous air in the left anterior perineum extending inferiorly alongside the left testicle, consistent with foreigners gangrene.    Review of Systems   Constitutional:  Negative for chills, fatigue and fever.   HENT:  Negative for congestion, ear pain, hearing loss, postnasal drip, sinus pressure, sinus pain and sore throat.    Eyes:  Negative for pain and discharge.   Respiratory:  Negative for chest tightness and shortness of breath.    Cardiovascular:  Negative for chest pain.   Gastrointestinal:  Negative for abdominal pain, constipation, nausea and vomiting.   Genitourinary:  Positive for scrotal swelling and testicular pain. Negative for difficulty urinating.   Musculoskeletal:  Negative for arthralgias and myalgias.   Skin:  Negative for rash.   Neurological:  Negative for dizziness and headaches.   Psychiatric/Behavioral:  Negative for behavioral problems.        Historical Information   Past Medical History:    Diagnosis Date    ADHD (attention deficit hyperactivity disorder) 5/11/2024    Diabetes mellitus (HCC)     5/2023    Lung collapse     Sleep apnea     Viral meningitis      Past Surgical History:   Procedure Laterality Date    TONSILECTOMY AND ADNOIDECTOMY      2020     Social History   Social History     Substance and Sexual Activity   Alcohol Use Yes    Alcohol/week: 2.0 standard drinks of alcohol    Types: 2 Shots of liquor per week     Social History     Substance and Sexual Activity   Drug Use Yes    Types: Marijuana     Social History     Tobacco Use   Smoking Status Former    Types: Cigarettes    Passive exposure: Yes   Smokeless Tobacco Current     E-Cigarette/Vaping    E-Cigarette Use Current Some Day User     Comments smoke cigg. when he has money      E-Cigarette/Vaping Substances    Nicotine No     THC No     CBD No     Flavoring Yes     Other No     Unknown No      Family History: non-contributory    Meds/Allergies   all medications and allergies reviewed  No Known Allergies    Objective   First Vitals:   Blood Pressure: 156/97 (05/11/24 1558)  Pulse: (!) 114 (05/11/24 1558)  Temperature: (!) 101.8 °F (38.8 °C) (05/11/24 1558)  Temp Source: Tympanic (05/11/24 1558)  Respirations: 22 (05/11/24 1558)  SpO2: 100 % (05/11/24 1558)    Current Vitals:   Blood Pressure: 119/56 (05/11/24 1930)  Pulse: 102 (05/11/24 1930)  Temperature: 100.1 °F (37.8 °C) (05/11/24 1845)  Temp Source: Oral (05/11/24 1845)  Respirations: 22 (05/11/24 1930)  SpO2: 94 % (05/11/24 1930)      Intake/Output Summary (Last 24 hours) at 5/11/2024 1955  Last data filed at 5/11/2024 1837  Gross per 24 hour   Intake 150 ml   Output --   Net 150 ml       Invasive Devices       Peripheral Intravenous Line  Duration             Peripheral IV 05/11/24 Distal;Left;Upper;Ventral (anterior) Arm <1 day    Peripheral IV 05/11/24 Left;Upper;Ventral (anterior) Arm <1 day                    Physical Exam  Vitals and nursing note reviewed.    Constitutional:       Appearance: He is ill-appearing.   HENT:      Head: Normocephalic and atraumatic.      Mouth/Throat:      Mouth: Mucous membranes are moist.   Eyes:      Extraocular Movements: Extraocular movements intact.   Cardiovascular:      Rate and Rhythm: Tachycardia present.   Pulmonary:      Effort: Pulmonary effort is normal. No respiratory distress.   Abdominal:      General: There is no distension.      Palpations: Abdomen is soft.      Tenderness: There is no abdominal tenderness. There is no guarding.      Comments: Obese abdomen   Genitourinary:     Testes:         Right: Tenderness and swelling present.         Left: Tenderness and swelling present.   Skin:     Capillary Refill: Capillary refill takes less than 2 seconds.   Neurological:      General: No focal deficit present.      Mental Status: He is alert.         Lab Results: I have personally reviewed pertinent lab results.  , CBC:   Lab Results   Component Value Date    WBC 17.27 (H) 05/11/2024    HGB 12.5 05/11/2024    HCT 39.2 05/11/2024    MCV 86 05/11/2024     05/11/2024    RBC 4.57 05/11/2024    MCH 27.4 05/11/2024    MCHC 31.9 05/11/2024    RDW 13.7 05/11/2024    MPV 10.7 05/11/2024    NRBC 0 05/11/2024   , CMP:   Lab Results   Component Value Date    SODIUM 128 (L) 05/11/2024    K 3.7 05/11/2024    CL 99 05/11/2024    CO2 21 05/11/2024    BUN 7 05/11/2024    CREATININE 0.76 05/11/2024    CALCIUM 8.8 05/11/2024    AST 21 05/11/2024    ALT 20 05/11/2024    ALKPHOS 70 05/11/2024    EGFR 131 05/11/2024     Imaging: I have personally reviewed pertinent reports.   and I have personally reviewed pertinent films in PACS  CT chest abdomen pelvis w contrast   Final Result by Yoav Hines MD (05/11 1936)      Extensive cellulitis, subcutaneous edema and subcutaneous air in the left anterior perineum extending inferior alongside the left testicle consistent with infection/Humberto's gangrene. Patient already scheduled for operating  room in 15 minutes according    to Dr. Snyder.      Near complete resolution of multilobar pneumonia.      Hepatomegaly with steatosis and splenomegaly.      I personally discussed this study with KIA SNYDER on 5/11/2024 7:34 PM.            Workstation performed: [id number]         XR chest portable    (Results Pending)       EKG, Pathology, and Other Studies: I have personally reviewed pertinent reports.    iPad      Status of tablets of service  Counseling / Coordination of Care  Total floor / unit time spent today 30 minutes.  Greater than 50% of total time was spent with the patient and / or family counseling and / or coordination of care.  A description of the counseling / coordination of care: obtaining history, performing physical exam, reviewing pertinent labs imaging, discussing case with attending.     review.

## 2024-05-11 NOTE — ASSESSMENT & PLAN NOTE
"Lab Results   Component Value Date    HGBA1C 11.6 (H) 05/07/2024       No results for input(s): \"POCGLU\" in the last 72 hours.    Blood Sugar Average: Last 72 hrs:  A1c 11.6 4 days ago  Started on Lantus and mealtime insulin on recent discharge 4 days ago.   Was on Januvia, metformin at home.Seems not taking Januvia currently.  Will hold oral agent.  Will continue Lantus 30 units SQ at bedtime  Continue mealtime insulin 9 units 3 times daily  SSI  Diabetic diet  Tight glycemic control in view of sepsis      "

## 2024-05-11 NOTE — ED PROVIDER NOTES
"History  Chief Complaint   Patient presents with    Testicle Pain     Today pt reports of testicle pain \" I feel like its on fire and its swollen.\"     Pt is a 18yo M who presents for testicle pain.  Patient reports that has been going on for 2 to 3 days.  Patient states he first noticed it when he was in [place] in the shower.  Patient states that he has pain and swelling particular to the left testicle.  Patient states it is significantly painful and he has to sit in specific ways in order to improve his pain.  Patient states he has had difficulty urinating secondary to the swelling.  Patient denies any trauma to the area.  Patient states that his mother told him not to go to the emergency department in [place] and he therefore waited to be evaluated until he was back in this area.  Patient found to be febrile here, states he was on aware that he had fevers at home.  Patient was recently diagnosed with pneumonia and states he has been taking his p.o. antibiotics regularly.  Patient states that he feels as though his cough and his shortness of breath have been getting worse regardless.        Prior to Admission Medications   Prescriptions Last Dose Informant Patient Reported? Taking?   Abilify Maintena 400 MG injection Past Month  Yes Yes   Sig: INJECT 2 ML INTRAMUSCUALRLY EVERY 4 WEEKS AS DIRECTED   Alcohol Swabs 70 % PADS   No No   Sig: May substitute brand based on insurance coverage. Check glucose TID.   Blood Glucose Monitoring Suppl (OneTouch Verio Reflect) w/Device KIT   No No   Sig: May substitute brand based on insurance coverage. Check glucose TID.   Blood Glucose Monitoring Suppl (OneTouch Verio) w/Device KIT   No No   Sig: Use 2 (two) times a day Ok to substitute with insurance covered brand   Dextromethorphan Polistirex ER 30 MG/5ML SUER 5/10/2024  No Yes   Sig: Take 10 mL (60 mg total) by mouth 2 (two) times a day   FLUoxetine (PROzac) 10 mg capsule 5/11/2024 at 0700  No Yes   Sig: Take 1 " capsule (10 mg total) by mouth daily   Insulin Glargine Solostar (Lantus SoloStar) 100 UNIT/ML SOPN 5/10/2024 at 2200  No Yes   Sig: Inject 0.3 mL (30 Units total) under the skin daily at bedtime   Insulin Pen Needle (BD Pen Needle Shani 2nd Gen) 32G X 4 MM MISC   No No   Sig: For use with insulin pen. Pharmacy may dispense brand covered by insurance.   Lancets (onetouch ultrasoft) lancets   No No   Sig: Use as instructed   OneTouch Delica Lancets 33G MISC   No No   Sig: May substitute brand based on insurance coverage. Check glucose TID.   albuterol (2.5 mg/3 mL) 0.083 % nebulizer solution 5/11/2024  No Yes   Sig: Take 3 mL (2.5 mg total) by nebulization every 6 (six) hours as needed for wheezing or shortness of breath   azithromycin (ZITHROMAX) 250 mg tablet 5/10/2024  No Yes   Sig: Take 1 tablet (250 mg total) by mouth every 24 hours for 3 days Take 2 tablets today then 1 tablet daily x 4 days Do not start before May 8, 2024.   benzonatate (TESSALON PERLES) 100 mg capsule Past Week  No Yes   Sig: Take 1 capsule (100 mg total) by mouth every 8 (eight) hours   benzonatate (TESSALON PERLES) 100 mg capsule   No No   Sig: Take 1 capsule (100 mg total) by mouth 3 (three) times a day as needed for cough   cefuroxime (CEFTIN) 500 mg tablet 5/10/2024  No Yes   Sig: Take 1 tablet (500 mg total) by mouth every 12 (twelve) hours for 4 days   dicyclomine (BENTYL) 20 mg tablet Not Taking  No No   Sig: Take 1 tablet (20 mg total) by mouth 2 (two) times a day   Patient not taking: Reported on 5/11/2024   fenofibrate (TRICOR) 54 MG tablet Not Taking  No No   Sig: Take 1 tablet (54 mg total) by mouth daily   Patient not taking: Reported on 5/11/2024   glucose blood (OneTouch Verio) test strip   No No   Sig: TEST BLOOD SUGAR TWICE A DAY   glucose blood (OneTouch Verio) test strip   No No   Sig: May substitute brand based on insurance coverage. Check glucose TID.   insulin aspart (NovoLOG FlexPen) 100 UNIT/ML injection pen 5/10/2024  at 1900  No Yes   Sig: Inject 9 Units under the skin 3 (three) times a day with meals   metFORMIN (GLUCOPHAGE-XR) 500 mg 24 hr tablet 5/11/2024 at 0700  No Yes   Sig: Take 2 tablets (1,000 mg total) by mouth 2 (two) times a day with meals Start with 1 pill 500mg with breakfast x 1 week, then 1 pill 500mg with breakfast and 1 pill with dinner for 1 week , then 2 pills with breakfast (1000mg) and 1 pill with dinner (500mg) for 1 week, then 1000mg twice a day going forward   nystatin (MYCOSTATIN) cream Not Taking  No No   Sig: Apply topically 2 (two) times a day   Patient not taking: Reported on 5/11/2024   ondansetron (ZOFRAN-ODT) 4 mg disintegrating tablet Not Taking  No No   Sig: Take 1 tablet (4 mg total) by mouth every 6 (six) hours as needed for nausea or vomiting   Patient not taking: Reported on 5/11/2024   sitaGLIPtin (JANUVIA) 50 mg tablet Not Taking  No No   Sig: Take 1 tablet (50 mg total) by mouth daily   Patient not taking: Reported on 5/11/2024   topiramate (TOPAMAX) 50 MG tablet 5/11/2024 at 0700  Yes Yes   Sig: Take 50 mg by mouth 2 (two) times a day      Facility-Administered Medications: None       Past Medical History:   Diagnosis Date    ADHD (attention deficit hyperactivity disorder) 5/11/2024    Diabetes mellitus (HCC)     5/2023    Lung collapse     Sleep apnea     Viral meningitis        Past Surgical History:   Procedure Laterality Date    TONSILECTOMY AND ADNOIDECTOMY      2020       Family History   Problem Relation Age of Onset    Hyperlipidemia Mother     Diabetes type II Mother     Obesity Mother     Obesity Father     Drug abuse Father     Diabetes type II Maternal Aunt     Lung disease Maternal Aunt     Rheum arthritis Maternal Aunt     Fibromyalgia Maternal Aunt     Atrial fibrillation Maternal Grandmother     Hyperlipidemia Maternal Grandfather     Diabetes type II Maternal Grandfather     Stroke Maternal Grandfather     Heart disease Maternal Grandfather     Stroke Paternal  Grandfather      I have reviewed and agree with the history as documented.    E-Cigarette/Vaping    E-Cigarette Use Current Some Day User     Comments smoke cigg. when he has money      E-Cigarette/Vaping Substances    Nicotine No     THC No     CBD No     Flavoring Yes     Other No     Unknown No      Social History     Tobacco Use    Smoking status: Former     Types: Cigarettes     Passive exposure: Yes    Smokeless tobacco: Current   Vaping Use    Vaping status: Some Days    Substances: Flavoring   Substance Use Topics    Alcohol use: Yes     Alcohol/week: 2.0 standard drinks of alcohol     Types: 2 Shots of liquor per week    Drug use: Yes     Types: Marijuana       Review of Systems   Respiratory:  Positive for cough and shortness of breath.    Genitourinary:  Positive for difficulty urinating, scrotal swelling (L) and testicular pain.   All other systems reviewed and are negative.      Physical Exam  Physical Exam  Vitals reviewed. Exam conducted with a chaperone present.   Constitutional:       Appearance: He is well-developed. He is obese. He is ill-appearing and diaphoretic.   HENT:      Head: Normocephalic and atraumatic.      Right Ear: External ear normal.      Left Ear: External ear normal.      Nose: Nose normal.      Mouth/Throat:      Pharynx: Oropharynx is clear.   Eyes:      Extraocular Movements: Extraocular movements intact.      Pupils: Pupils are equal, round, and reactive to light.   Cardiovascular:      Rate and Rhythm: Regular rhythm. Tachycardia present.      Heart sounds: Normal heart sounds.   Pulmonary:      Effort: Pulmonary effort is normal. Tachypnea present. No respiratory distress.      Breath sounds: No stridor. Rhonchi present.   Abdominal:      General: Bowel sounds are normal. There is no distension.      Palpations: Abdomen is soft. There is no mass.      Tenderness: There is no abdominal tenderness. There is no guarding.   Genitourinary:     Comments: Significant swelling with  erythema and large area of induration particularly to the L; Tender to palpation; No crepitus noted  Musculoskeletal:         General: Normal range of motion.      Cervical back: Normal range of motion and neck supple.   Skin:     General: Skin is warm.      Capillary Refill: Capillary refill takes less than 2 seconds.   Neurological:      General: No focal deficit present.      Mental Status: He is alert and oriented to person, place, and time.   Psychiatric:         Speech: Speech normal.         Behavior: Behavior is cooperative.         Vital Signs  ED Triage Vitals   Temperature Pulse Respirations Blood Pressure SpO2   05/11/24 1558 05/11/24 1558 05/11/24 1558 05/11/24 1558 05/11/24 1558   (!) 101.8 °F (38.8 °C) (!) 114 22 156/97 100 %      Temp Source Heart Rate Source Patient Position - Orthostatic VS BP Location FiO2 (%)   05/11/24 1558 05/11/24 1558 05/11/24 1815 05/11/24 1815 --   Tympanic Monitor Lying Right arm       Pain Score       05/11/24 1812       10 - Worst Possible Pain           Vitals:    05/11/24 1845 05/11/24 1900 05/11/24 1915 05/11/24 1930   BP: 119/56 117/59 122/59 119/56   Pulse: (!) 114 (!) 120 104 102   Patient Position - Orthostatic VS: Lying            Visual Acuity      ED Medications  Medications   vancomycin (VANCOCIN) 2,000 mg in sodium chloride 0.9 % 500 mL IVPB (2,000 mg Intravenous New Bag 5/11/24 1847)   cefTRIAXone (ROCEPHIN) IVPB (premix in dextrose) 1,000 mg 50 mL (0 mg Intravenous Stopped 5/11/24 1837)   acetaminophen (Ofirmev) injection 1,000 mg (0 mg Intravenous Stopped 5/11/24 1819)   ketorolac (TORADOL) injection 15 mg (15 mg Intravenous Given 5/11/24 1812)   sodium chloride 0.9 % bolus 1,000 mL (1,000 mL Intravenous New Bag 5/11/24 1810)   iohexol (OMNIPAQUE) 350 MG/ML injection (MULTI-DOSE) 100 mL (120 mL Intravenous Given 5/11/24 1759)   clindamycin in dextrose 5% (Cleocin) IVPB (premix) 600 mg 50 mL (600 mg Intravenous New Bag 5/11/24 1178)       Diagnostic  Studies  Results Reviewed       Procedure Component Value Units Date/Time    Urine Microscopic [[id number]]  (Abnormal) Collected: 05/11/24 1740    Lab Status: Final result Specimen: Urine, Clean Catch Updated: 05/11/24 1834     RBC, UA None Seen /hpf      WBC, UA 1-2 /hpf      Epithelial Cells Occasional /hpf      Bacteria, UA Moderate /hpf      MUCUS THREADS Occasional    FLU/RSV/COVID - if FLU/RSV clinically relevant [[id number]]  (Normal) Collected: 05/11/24 1726    Lab Status: Final result Specimen: Nares from Nose Updated: 05/11/24 1828     SARS-CoV-2 Negative     INFLUENZA A PCR Negative     INFLUENZA B PCR Negative     RSV PCR Negative    Narrative:      FOR PEDIATRIC PATIENTS - copy/paste COVID Guidelines URL to browser: [email or web address]    SARS-CoV-2 assay is a Nucleic Acid Amplification assay intended for the  qualitative detection of nucleic acid from SARS-CoV-2 in nasopharyngeal  swabs. Results are for the presumptive identification of SARS-CoV-2 RNA.    Positive results are indicative of infection with SARS-CoV-2, the virus  causing COVID-19, but do not rule out bacterial infection or co-infection  with other viruses. Laboratories within the [place] and its  territories are required to report all positive results to the appropriate  public health authorities. Negative results do not preclude SARS-CoV-2  infection and should not be used as the sole basis for treatment or other  patient management decisions. Negative results must be combined with  clinical observations, patient history, and epidemiological information.  This test has not been FDA cleared or approved.    This test has been authorized by FDA under an Emergency Use Authorization  (EUA). This test is only authorized for the duration of time the  declaration that circumstances exist justifying the authorization of the  emergency use of an in vitro diagnostic tests for detection of  SARS-CoV-2  virus and/or diagnosis of COVID-19 infection under section 564(b)(1) of  the Act, 21 U.S.C. 360bbb-3(b)(1), unless the authorization is terminated  or revoked sooner. The test has been validated but independent review by FDA  and CLIA is pending.    Test performed using [employer] GeneXpert: This RT-PCR assay targets N2,  a region unique to SARS-CoV-2. A conserved region in the E-gene was chosen  for pan-Sarbecovirus detection which includes SARS-CoV-2.    According to CMS-2020-01-R, this platform meets the definition of high-throughput technology.    Procalcitonin [[id number]]  (Abnormal) Collected: 05/11/24 1726    Lab Status: Final result Specimen: Blood from Arm, Left Updated: 05/11/24 1808     Procalcitonin 0.28 ng/ml     Comprehensive metabolic panel [[id number]]  (Abnormal) Collected: 05/11/24 1726    Lab Status: Final result Specimen: Blood from Arm, Left Updated: 05/11/24 1758     Sodium 128 mmol/L      Potassium 3.7 mmol/L      Chloride 99 mmol/L      CO2 21 mmol/L      ANION GAP 8 mmol/L      BUN 7 mg/dL      Creatinine 0.76 mg/dL      Glucose 234 mg/dL      Calcium 8.8 mg/dL      AST 21 U/L      ALT 20 U/L      Alkaline Phosphatase 70 U/L      Total Protein 8.0 g/dL      Albumin 3.8 g/dL      Total Bilirubin 0.92 mg/dL      eGFR 131 ml/min/1.73sq m     Narrative:      National Kidney Disease Foundation guidelines for Chronic Kidney Disease (CKD):     Stage 1 with normal or high GFR (GFR > 90 mL/min/1.73 square meters)    Stage 2 Mild CKD (GFR = 60-89 mL/min/1.73 square meters)    Stage 3A Moderate CKD (GFR = 45-59 mL/min/1.73 square meters)    Stage 3B Moderate CKD (GFR = 30-44 mL/min/1.73 square meters)    Stage 4 Severe CKD (GFR = 15-29 mL/min/1.73 square meters)    Stage 5 End Stage CKD (GFR <15 mL/min/1.73 square meters)  Note: GFR calculation is accurate only with a steady state creatinine    Lactic acid [[id number]]  (Normal) Collected: 05/11/24 9063    Lab Status: Final result Specimen:  Blood from Arm, Left Updated: 05/11/24 1758     LACTIC ACID 1.4 mmol/L     Narrative:      Result may be elevated if tourniquet was used during collection.    APTT [[id number]]  (Normal) Collected: 05/11/24 1726    Lab Status: Final result Specimen: Blood from Arm, Left Updated: 05/11/24 1757     PTT 33 seconds     Protime-INR [[id number]]  (Abnormal) Collected: 05/11/24 1726    Lab Status: Final result Specimen: Blood from Arm, Left Updated: 05/11/24 1757     Protime 14.8 seconds      INR 1.14    UA w Reflex to Microscopic w Reflex to Culture [[id number]]  (Abnormal) Collected: 05/11/24 1740    Lab Status: Final result Specimen: Urine, Clean Catch Updated: 05/11/24 1746     Color, UA Light Orange     Clarity, UA Clear     Specific Gravity, UA 1.020     pH, UA 6.0     Leukocytes, UA Negative     Nitrite, UA Negative     Protein, UA 30 (1+) mg/dl      Glucose, UA 70 (7/100%) mg/dl      Ketones, UA Trace mg/dl      Urobilinogen, UA 2.0 mg/dl      Bilirubin, UA Negative     Occult Blood, UA Negative    CBC and differential [[id number]]  (Abnormal) Collected: 05/11/24 1726    Lab Status: Final result Specimen: Blood from Arm, Left Updated: 05/11/24 1740     WBC 17.27 Thousand/uL      RBC 4.57 Million/uL      Hemoglobin 12.5 g/dL      Hematocrit 39.2 %      MCV 86 fL      MCH 27.4 pg      MCHC 31.9 g/dL      RDW 13.7 %      MPV 10.7 fL      Platelets 281 Thousands/uL      nRBC 0 /100 WBCs      Segmented % 84 %      Immature Grans % 1 %      Lymphocytes % 10 %      Monocytes % 5 %      Eosinophils Relative 0 %      Basophils Relative 0 %      Absolute Neutrophils 14.34 Thousands/µL      Absolute Immature Grans 0.18 Thousand/uL      Absolute Lymphocytes 1.70 Thousands/µL      Absolute Monocytes 0.94 Thousand/µL      Eosinophils Absolute 0.06 Thousand/µL      Basophils Absolute 0.05 Thousands/µL     Blood culture #1 [[id number]] Collected: 05/11/24 1726    Lab Status: In process Specimen: Blood from Arm, Left Updated: 05/11/24  1737    Blood culture #2 [[id number]] Collected: 05/11/24 1726    Lab Status: In process Specimen: Blood from Arm, Left Updated: 05/11/24 1737                   CT chest abdomen pelvis w contrast   Final Result by Yoav Hines MD (05/11 1936)      Extensive cellulitis, subcutaneous edema and subcutaneous air in the left anterior perineum extending inferior alongside the left testicle consistent with infection/Humberto's gangrene. Patient already scheduled for operating room in 15 minutes according    to Dr. Snyder.      Near complete resolution of multilobar pneumonia.      Hepatomegaly with steatosis and splenomegaly.      I personally discussed this study with KIA SNYDER on 5/11/2024 7:34 PM.            Workstation performed: [id number]         XR chest portable    (Results Pending)              Procedures  CriticalCare Time    Date/Time: 5/11/2024 7:46 PM    Performed by: Kia Snyder MD  Authorized by: Kia Snyder MD    Critical care provider statement:     Critical care time (minutes):  37    Critical care time was exclusive of:  Separately billable procedures and treating other patients and teaching time    Critical care was necessary to treat or prevent imminent or life-threatening deterioration of the following conditions:  Sepsis and shock    Critical care was time spent personally by me on the following activities:  Obtaining history from patient or surrogate, development of treatment plan with patient or surrogate, discussions with consultants, evaluation of patient's response to treatment, examination of patient, ordering and performing treatments and interventions, ordering and review of laboratory studies, ordering and review of radiographic studies, re-evaluation of patient's condition and review of old charts           ED Course  ED Course as of 05/11/24 1947   Sat May 11, 2024   1648 ECG 12 lead  Procedure Note: EKG  Date/Time: 05/11/24 4:48 PM   Interpreted by: Kia  MD Claudio  Indications / Diagnosis: Tachycardia  ECG reviewed by me, the ED Physician: yes   The EKG demonstrates:  Rhythm: sinus tachycardia  Intervals: normal intervals  Axis: normal axis  QRS/Blocks: normal QRS  ST Changes: No acute ST Changes, no STD/WING.   1653 Labs and imaging delayed secondary to difficulty obtaining IV access.    1739 XR chest portable  Overall poor film secondary to positioning and body habitus, however, no obvious acute abnormality.    1741 WBC(!): 17.27  Elevated. Sepsis w/u and treatment in process.    1750 Glucose, UA(!): 70 (7/100%)   1750 Leukocytes, UA: Negative   1750 Nitrite, UA: Negative  No evidence of infection.   1750 Blood, UA: Negative   1757 POCT INR: 1.14  WNL   1758 PTT: 33  WNL   1759 Sodium(!): 128  Corrects to 130 2/2 hyperglycemia. Hyponatremia slightly worsened from most recent prior.    1759 Comprehensive metabolic panel(!)  Reviewed and without actionable derangement.    1759 LACTIC ACID: 1.4  WNL   1759 Air seen on my independent read of CT. Will expand abx coverage, request STAT read, and make surgery aware.    1809 Procalcitonin(!): 0.28  Elevated.    1815 Pt made aware of all results and plan for admission and possibly surgery tonight. Pt expressed understanding and all questions answered.    1828 FLU/RSV/COVID - if FLU/RSV clinically relevant  Negative.    1835 WBC, UA: 1-2   1836 Bacteria, UA(!): Moderate  On abx   1839 Pt to go to the OR with Dr. Ch at 2000.    1936 Rads called confirming Humberto's.    1944 CT chest abdomen pelvis w contrast  Extensive cellulitis, subcutaneous edema and subcutaneous air in the left anterior perineum extending inferior alongside the left testicle consistent with infection/Humberto's gangrene.   Near complete resolution of multilobar pneumonia.  Hepatomegaly with steatosis and splenomegaly.                                             Medical Decision Making  Pt is a 20yo M who presents with testicle pain.      Differential diagnosis to include but not limited to sepsis, Humberto's, cellulitis, cystitis, orchitis.  Will plan for labs and imaging.  Will treat fever.  Due to patient meeting SIRS criteria with obvious infection, will start empiric antibiotics.  Will expand coverage based on CT results if indicated.  See ED course for results and details.    Plan to send to OR.       Amount and/or Complexity of Data Reviewed  Labs: ordered. Decision-making details documented in ED Course.  Radiology: ordered. Decision-making details documented in ED Course.  ECG/medicine tests:  Decision-making details documented in ED Course.    Risk  Prescription drug management.  Decision regarding hospitalization.             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     5/11/2024  7:36 PM Echo Snyder Add [N49.3] Humberto's gangrene     5/11/2024  7:36 PM Echo Snyder Modify [A41.9] Sepsis (HCC)     5/11/2024  7:36 PM Echo Snyder Modify [N49.3] Humberto's gangrene           ED Disposition       ED Disposition   Send to OR    Condition   --    Date/Time   Sat May 11, 2024  6:50 PM    Comment                  Follow-up Information    None         Current Discharge Medication List        CONTINUE these medications which have NOT CHANGED    Details   Abilify Maintena 400 MG injection INJECT 2 ML INTRAMUSCUALRLY EVERY 4 WEEKS AS DIRECTED      albuterol  (2.5 mg/3 mL) 0.083 % nebulizer solution Take 3 mL (2.5 mg total) by nebulization every 6 (six) hours as needed for wheezing or shortness of breath  Qty: 75 mL, Refills: 0    Associated Diagnoses: Pneumonia      azithromycin (ZITHROMAX) 250 mg tablet Take 1 tablet (250 mg total) by mouth every 24 hours for 3 days Take 2 tablets today then 1 tablet daily x 4 days Do not start before May 8, 2024.  Qty: 3 tablet, Refills: 0    Associated Diagnoses: Pneumonia      !! benzonatate (TESSALON PERLES) 100 mg capsule Take 1 capsule (100 mg total) by mouth every 8 (eight) hours  Qty: 21 capsule, Refills: 0    Associated Diagnoses: Pneumonia      cefuroxime (CEFTIN) 500 mg tablet Take 1 tablet (500 mg total) by mouth every 12 (twelve) hours for 4 days  Qty: 8 tablet, Refills: 0    Associated Diagnoses: Multifocal pneumonia      Dextromethorphan Polistirex ER 30 MG/5ML SUER Take 10 mL (60 mg total) by mouth 2 (two) times a day  Qty: 148 mL, Refills: 0    Associated Diagnoses: Pneumonia      FLUoxetine (PROzac) 10 mg capsule Take 1 capsule (10 mg total) by mouth daily  Qty: 30 capsule, Refills: 0    Associated Diagnoses: MDD (major depressive disorder)      insulin aspart (NovoLOG FlexPen) 100 UNIT/ML injection pen Inject 9 Units under the skin 3 (three) times a day with meals  Qty: 15 mL, Refills: 0    Associated Diagnoses: Type 2 diabetes mellitus without complication, without long-term current use of insulin ([facility])      Insulin Glargine Solostar (Lantus SoloStar) 100 UNIT/ML SOPN Inject 0.3 mL (30 Units total) under the skin daily at bedtime  Qty: 10 mL, Refills: 0    Associated Diagnoses: Uncontrolled type 2 diabetes mellitus with hyperglycemia ([facility])      metFORMIN (GLUCOPHAGE-XR) 500 mg 24 hr tablet Take 2 tablets (1,000 mg total) by mouth 2 (two) times a day with meals Start with 1 pill 500mg with breakfast x 1 week, then 1 pill 500mg with breakfast and 1 pill with dinner for 1 week , then 2 pills with breakfast (1000mg) and  1 pill with dinner (500mg) for 1 week, then 1000mg twice a day going forward  Qty: 60 tablet, Refills: 0    Associated Diagnoses: Uncontrolled type 2 diabetes mellitus with hyperglycemia (HCC)      topiramate (TOPAMAX) 50 MG tablet Take 50 mg by mouth 2 (two) times a day      Alcohol Swabs 70 % PADS May substitute brand based on insurance coverage. Check glucose TID.  Qty: 100 each, Refills: 0    Associated Diagnoses: Uncontrolled type 2 diabetes mellitus with hyperglycemia (HCC)      !! benzonatate (TESSALON PERLES) 100 mg capsule Take 1 capsule (100 mg total) by mouth 3 (three) times a day as needed for cough  Qty: 20 capsule, Refills: 0    Associated Diagnoses: Pneumonia      !! Blood Glucose Monitoring Suppl (OneTouch Verio Reflect) w/Device KIT May substitute brand based on insurance coverage. Check glucose TID.  Qty: 1 kit, Refills: 1    Associated Diagnoses: Uncontrolled type 2 diabetes mellitus with hyperglycemia (HCC)      !! Blood Glucose Monitoring Suppl (OneTouch Verio) w/Device KIT Use 2 (two) times a day Ok to substitute with insurance covered brand  Qty: 1 kit, Refills: 0    Associated Diagnoses: Uncontrolled type 2 diabetes mellitus with hyperglycemia (HCC)      dicyclomine (BENTYL) 20 mg tablet Take 1 tablet (20 mg total) by mouth 2 (two) times a day  Qty: 20 tablet, Refills: 0    Associated Diagnoses: Nausea vomiting and diarrhea      fenofibrate (TRICOR) 54 MG tablet Take 1 tablet (54 mg total) by mouth daily  Qty: 90 tablet, Refills: 1    Associated Diagnoses: Hypercholesterolemia; Smoking hx; Abnormal EKG; Type 2 diabetes mellitus with hyperglycemia, without long-term current use of insulin (HCC)      !! glucose blood (OneTouch Verio) test strip TEST BLOOD SUGAR TWICE A DAY  Qty: 100 strip, Refills: 3    Associated Diagnoses: Uncontrolled type 2 diabetes mellitus with hyperglycemia (HCC)      !! glucose blood (OneTouch Verio) test strip May substitute brand based on insurance coverage. Check  glucose TID.  Qty: 100 each, Refills: 1    Associated Diagnoses: Uncontrolled type 2 diabetes mellitus with hyperglycemia (HCC)      Insulin Pen Needle (BD Pen Needle Shani 2nd Gen) 32G X 4 MM MISC For use with insulin pen. Pharmacy may dispense brand covered by insurance.  Qty: 100 each, Refills: 0    Associated Diagnoses: Uncontrolled type 2 diabetes mellitus with hyperglycemia (HCC)      !! Lancets (onetouch ultrasoft) lancets Use as instructed  Qty: 100 each, Refills: 3    Comments: Ok to substitute with insurance covered brand  Associated Diagnoses: Uncontrolled type 2 diabetes mellitus with hyperglycemia (HCC)      nystatin (MYCOSTATIN) cream Apply topically 2 (two) times a day  Qty: 30 g, Refills: 0    Associated Diagnoses: Candida infection      ondansetron (ZOFRAN-ODT) 4 mg disintegrating tablet Take 1 tablet (4 mg total) by mouth every 6 (six) hours as needed for nausea or vomiting  Qty: 12 tablet, Refills: 0    Associated Diagnoses: Cannabis hyperemesis syndrome concurrent with and due to cannabis abuse (HCC)      !! OneTouch Delica Lancets 33G MISC May substitute brand based on insurance coverage. Check glucose TID.  Qty: 100 each, Refills: 0    Associated Diagnoses: Uncontrolled type 2 diabetes mellitus with hyperglycemia (HCC)      sitaGLIPtin (JANUVIA) 50 mg tablet Take 1 tablet (50 mg total) by mouth daily  Qty: 60 tablet, Refills: 0    Associated Diagnoses: Uncontrolled type 2 diabetes mellitus with hyperglycemia (HCC)       !! - Potential duplicate medications found. Please discuss with provider.          No discharge procedures on file.    PDMP Review       None            ED Provider  Electronically Signed by             Echo Snyder MD  05/11/24 0249

## 2024-05-12 PROBLEM — D64.9 ACUTE ANEMIA: Status: ACTIVE | Noted: 2024-05-12

## 2024-05-12 PROBLEM — E87.6 ACUTE HYPOKALEMIA: Status: ACTIVE | Noted: 2024-05-12

## 2024-05-12 PROBLEM — E83.42 HYPOMAGNESEMIA: Status: ACTIVE | Noted: 2024-05-12

## 2024-05-12 LAB
ANION GAP SERPL CALCULATED.3IONS-SCNC: 7 MMOL/L (ref 4–13)
BASOPHILS # BLD MANUAL: 0.11 THOUSAND/UL (ref 0–0.1)
BASOPHILS NFR MAR MANUAL: 1 % (ref 0–1)
BUN SERPL-MCNC: 6 MG/DL (ref 5–25)
CALCIUM SERPL-MCNC: 6.4 MG/DL (ref 8.4–10.2)
CHLORIDE SERPL-SCNC: 112 MMOL/L (ref 96–108)
CO2 SERPL-SCNC: 17 MMOL/L (ref 21–32)
CREAT SERPL-MCNC: 0.56 MG/DL (ref 0.6–1.3)
EOSINOPHIL # BLD MANUAL: 0 THOUSAND/UL (ref 0–0.4)
EOSINOPHIL NFR BLD MANUAL: 0 % (ref 0–6)
ERYTHROCYTE [DISTWIDTH] IN BLOOD BY AUTOMATED COUNT: 13.9 % (ref 11.6–15.1)
GFR SERPL CREATININE-BSD FRML MDRD: 148 ML/MIN/1.73SQ M
GLUCOSE SERPL-MCNC: 182 MG/DL (ref 65–140)
GLUCOSE SERPL-MCNC: 190 MG/DL (ref 65–140)
GLUCOSE SERPL-MCNC: 194 MG/DL (ref 65–140)
GLUCOSE SERPL-MCNC: 212 MG/DL (ref 65–140)
GLUCOSE SERPL-MCNC: 213 MG/DL (ref 65–140)
GLUCOSE SERPL-MCNC: 216 MG/DL (ref 65–140)
HCT VFR BLD AUTO: 30 % (ref 36.5–49.3)
HGB BLD-MCNC: 9.5 G/DL (ref 12–17)
LYMPHOCYTES # BLD AUTO: 1.49 THOUSAND/UL (ref 0.6–4.47)
LYMPHOCYTES # BLD AUTO: 13 % (ref 14–44)
MAGNESIUM SERPL-MCNC: 1.4 MG/DL (ref 1.9–2.7)
MCH RBC QN AUTO: 27.3 PG (ref 26.8–34.3)
MCHC RBC AUTO-ENTMCNC: 31.3 G/DL (ref 31.4–37.4)
MCV RBC AUTO: 87 FL (ref 82–98)
MONOCYTES # BLD AUTO: 0.69 THOUSAND/UL (ref 0–1.22)
MONOCYTES NFR BLD: 6 % (ref 4–12)
NEUTROPHILS # BLD MANUAL: 9.15 THOUSAND/UL (ref 1.85–7.62)
NEUTS BAND NFR BLD MANUAL: 3 % (ref 0–8)
NEUTS SEG NFR BLD AUTO: 77 % (ref 43–75)
PLATELET # BLD AUTO: 188 THOUSANDS/UL (ref 149–390)
PLATELET BLD QL SMEAR: ADEQUATE
PMV BLD AUTO: 10.6 FL (ref 8.9–12.7)
POTASSIUM SERPL-SCNC: 3.1 MMOL/L (ref 3.5–5.3)
PROCALCITONIN SERPL-MCNC: 0.33 NG/ML
RBC # BLD AUTO: 3.44 MILLION/UL (ref 3.88–5.62)
RBC MORPH BLD: NORMAL
SODIUM SERPL-SCNC: 136 MMOL/L (ref 135–147)
WBC # BLD AUTO: 11.44 THOUSAND/UL (ref 4.31–10.16)

## 2024-05-12 PROCEDURE — 83735 ASSAY OF MAGNESIUM: CPT | Performed by: NURSE PRACTITIONER

## 2024-05-12 PROCEDURE — 94660 CPAP INITIATION&MGMT: CPT

## 2024-05-12 PROCEDURE — 99252 IP/OBS CONSLTJ NEW/EST SF 35: CPT | Performed by: STUDENT IN AN ORGANIZED HEALTH CARE EDUCATION/TRAINING PROGRAM

## 2024-05-12 PROCEDURE — 85027 COMPLETE CBC AUTOMATED: CPT | Performed by: NURSE PRACTITIONER

## 2024-05-12 PROCEDURE — 94760 N-INVAS EAR/PLS OXIMETRY 1: CPT

## 2024-05-12 PROCEDURE — 99024 POSTOP FOLLOW-UP VISIT: CPT | Performed by: SURGERY

## 2024-05-12 PROCEDURE — 85007 BL SMEAR W/DIFF WBC COUNT: CPT | Performed by: NURSE PRACTITIONER

## 2024-05-12 PROCEDURE — 84145 PROCALCITONIN (PCT): CPT | Performed by: NURSE PRACTITIONER

## 2024-05-12 PROCEDURE — 80048 BASIC METABOLIC PNL TOTAL CA: CPT | Performed by: NURSE PRACTITIONER

## 2024-05-12 PROCEDURE — 87040 BLOOD CULTURE FOR BACTERIA: CPT | Performed by: STUDENT IN AN ORGANIZED HEALTH CARE EDUCATION/TRAINING PROGRAM

## 2024-05-12 PROCEDURE — 82948 REAGENT STRIP/BLOOD GLUCOSE: CPT

## 2024-05-12 RX ORDER — POTASSIUM CHLORIDE 14.9 MG/ML
20 INJECTION INTRAVENOUS ONCE
Qty: 100 ML | Refills: 0 | Status: COMPLETED | OUTPATIENT
Start: 2024-05-12 | End: 2024-05-12

## 2024-05-12 RX ORDER — CLINDAMYCIN PHOSPHATE 600 MG/50ML
INJECTION, SOLUTION INTRAVENOUS
Status: DISPENSED
Start: 2024-05-12 | End: 2024-05-12

## 2024-05-12 RX ORDER — POTASSIUM CHLORIDE 29.8 MG/ML
40 INJECTION INTRAVENOUS ONCE
Status: DISCONTINUED | OUTPATIENT
Start: 2024-05-12 | End: 2024-05-12

## 2024-05-12 RX ORDER — ACETAMINOPHEN 10 MG/ML
1000 INJECTION, SOLUTION INTRAVENOUS ONCE
Status: COMPLETED | OUTPATIENT
Start: 2024-05-12 | End: 2024-05-12

## 2024-05-12 RX ORDER — MAGNESIUM SULFATE HEPTAHYDRATE 40 MG/ML
2 INJECTION, SOLUTION INTRAVENOUS ONCE
Status: COMPLETED | OUTPATIENT
Start: 2024-05-12 | End: 2024-05-12

## 2024-05-12 RX ADMIN — CLINDAMYCIN IN 5 PERCENT DEXTROSE 900 MG: 18 INJECTION, SOLUTION INTRAVENOUS at 01:45

## 2024-05-12 RX ADMIN — ACETAMINOPHEN 1000 MG: 10 INJECTION INTRAVENOUS at 23:23

## 2024-05-12 RX ADMIN — POTASSIUM CHLORIDE 20 MEQ: 14.9 INJECTION, SOLUTION INTRAVENOUS at 13:46

## 2024-05-12 RX ADMIN — PIPERACILLIN SODIUM,TAZOBACTAM SODIUM 4.5 G: 4; .5 INJECTION, POWDER, FOR SOLUTION INTRAVENOUS at 01:33

## 2024-05-12 RX ADMIN — POTASSIUM CHLORIDE 20 MEQ: 14.9 INJECTION, SOLUTION INTRAVENOUS at 18:39

## 2024-05-12 RX ADMIN — CLINDAMYCIN IN 5 PERCENT DEXTROSE 900 MG: 18 INJECTION, SOLUTION INTRAVENOUS at 10:25

## 2024-05-12 RX ADMIN — FLUOXETINE 10 MG: 10 CAPSULE ORAL at 08:35

## 2024-05-12 RX ADMIN — ENOXAPARIN SODIUM 40 MG: 40 INJECTION SUBCUTANEOUS at 17:33

## 2024-05-12 RX ADMIN — INSULIN LISPRO 1 UNITS: 100 INJECTION, SOLUTION INTRAVENOUS; SUBCUTANEOUS at 13:47

## 2024-05-12 RX ADMIN — TOPIRAMATE 50 MG: 25 TABLET, FILM COATED ORAL at 08:34

## 2024-05-12 RX ADMIN — TOPIRAMATE 50 MG: 25 TABLET, FILM COATED ORAL at 17:33

## 2024-05-12 RX ADMIN — Medication 1500 MG: at 03:49

## 2024-05-12 RX ADMIN — INSULIN LISPRO 9 UNITS: 100 INJECTION, SOLUTION INTRAVENOUS; SUBCUTANEOUS at 13:48

## 2024-05-12 RX ADMIN — OXYCODONE HYDROCHLORIDE 10 MG: 10 TABLET ORAL at 09:02

## 2024-05-12 RX ADMIN — VANCOMYCIN HYDROCHLORIDE 2000 MG: 10 INJECTION, POWDER, LYOPHILIZED, FOR SOLUTION INTRAVENOUS at 23:39

## 2024-05-12 RX ADMIN — INSULIN LISPRO 1 UNITS: 100 INJECTION, SOLUTION INTRAVENOUS; SUBCUTANEOUS at 01:44

## 2024-05-12 RX ADMIN — CLINDAMYCIN IN 5 PERCENT DEXTROSE 900 MG: 18 INJECTION, SOLUTION INTRAVENOUS at 18:39

## 2024-05-12 RX ADMIN — ENOXAPARIN SODIUM 40 MG: 40 INJECTION SUBCUTANEOUS at 08:32

## 2024-05-12 RX ADMIN — VANCOMYCIN HYDROCHLORIDE 2000 MG: 10 INJECTION, POWDER, LYOPHILIZED, FOR SOLUTION INTRAVENOUS at 14:15

## 2024-05-12 RX ADMIN — ACETAMINOPHEN 650 MG: 325 TABLET ORAL at 08:34

## 2024-05-12 RX ADMIN — INSULIN GLARGINE 30 UNITS: 100 INJECTION, SOLUTION SUBCUTANEOUS at 23:37

## 2024-05-12 RX ADMIN — OXYCODONE HYDROCHLORIDE 10 MG: 10 TABLET ORAL at 17:38

## 2024-05-12 RX ADMIN — MAGNESIUM SULFATE HEPTAHYDRATE 2 G: 40 INJECTION, SOLUTION INTRAVENOUS at 17:36

## 2024-05-12 RX ADMIN — INSULIN LISPRO 9 UNITS: 100 INJECTION, SOLUTION INTRAVENOUS; SUBCUTANEOUS at 08:44

## 2024-05-12 RX ADMIN — INSULIN LISPRO 9 UNITS: 100 INJECTION, SOLUTION INTRAVENOUS; SUBCUTANEOUS at 17:30

## 2024-05-12 RX ADMIN — Medication 250 MG: at 17:33

## 2024-05-12 RX ADMIN — SODIUM CHLORIDE 125 ML/HR: 0.9 INJECTION, SOLUTION INTRAVENOUS at 14:35

## 2024-05-12 RX ADMIN — OXYCODONE HYDROCHLORIDE 10 MG: 10 TABLET ORAL at 23:21

## 2024-05-12 RX ADMIN — INSULIN LISPRO 1 UNITS: 100 INJECTION, SOLUTION INTRAVENOUS; SUBCUTANEOUS at 23:37

## 2024-05-12 RX ADMIN — Medication 250 MG: at 08:39

## 2024-05-12 RX ADMIN — PIPERACILLIN SODIUM,TAZOBACTAM SODIUM 4.5 G: 4; .5 INJECTION, POWDER, FOR SOLUTION INTRAVENOUS at 03:55

## 2024-05-12 RX ADMIN — PIPERACILLIN SODIUM,TAZOBACTAM SODIUM 4.5 G: 4; .5 INJECTION, POWDER, FOR SOLUTION INTRAVENOUS at 14:15

## 2024-05-12 RX ADMIN — INSULIN LISPRO 2 UNITS: 100 INJECTION, SOLUTION INTRAVENOUS; SUBCUTANEOUS at 17:30

## 2024-05-12 RX ADMIN — INSULIN LISPRO 2 UNITS: 100 INJECTION, SOLUTION INTRAVENOUS; SUBCUTANEOUS at 08:43

## 2024-05-12 NOTE — ASSESSMENT & PLAN NOTE
"Lab Results   Component Value Date    HGBA1C 11.6 (H) 05/07/2024       No results for input(s): \"POCGLU\" in the last 72 hours.    Blood Sugar Average: Last 72 hrs:  Resume Lantus with SSI coverage     "

## 2024-05-12 NOTE — ASSESSMENT & PLAN NOTE
Patient was hospitalized 5/5-5/7 for sepsis multifocal pneumonia.  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

## 2024-05-12 NOTE — SEPSIS NOTE
Sepsis Note   Rik Marin 19 y.o. male MRN: [id number]  Unit/Bed#: OR POOL Encounter: [id number]       Initial Sepsis Screening       Row Name 05/11/24 1930                Is the patient's history suggestive of a new or worsening infection? Yes (Proceed)  -CY        Suspected source of infection soft tissue  -CY        Indicate SIRS criteria Hyperthemia > 38.3C (100.9F) OR Hypothermia <36C (96.8F);Tachycardia > 90 bpm;Leukocytosis (WBC > 44312 IJL) OR Leukopenia (WBC <4000 IJL) OR Bandemia (WBC >10% bands)  -CY        Are two or more of the above signs & symptoms of infection both present and new to the patient? Yes (Proceed)  -CY        Assess for evidence of organ dysfunction: Are any of the below criteria present within 6 hours of suspected infection and SIRS criteria that are NOT considered to be chronic conditions? --                  User Key  (r) = Recorded By, (t) = Taken By, (c) = Cosigned By      Initials Name Provider Type    CY ANDRES Garcia Nurse Practitioner                        There is no height or weight on file to calculate BMI.  Wt Readings from Last 1 Encounters:   05/08/24 (!) 173 kg (382 lb)        Ideal body weight: 82.2 kg (181 lb 3.5 oz)  Adjusted ideal body weight: 119 kg (261 lb 8.5 oz)

## 2024-05-12 NOTE — ASSESSMENT & PLAN NOTE
Patient was hospitalized 5/5-5/7 for sepsis multifocal pneumonia.  Was treated with Rocephin and Zithromax, discharged on Ceftin and Zithromax.Today is the last dose of antibiotics.  Patient denies SOB  O2 2 L applied in ED due to tachypnea in the setting of sepsis.  SpO2 mid 90s.  Wean as tolerated.  Incentive centimeter  Continue Tessalon Perles as needed and albuterol as needed

## 2024-05-12 NOTE — PROGRESS NOTES
Rik Marin is a 19 y.o. male who is currently ordered Vancomycin IV with management by the Pharmacy Consult service.  Relevant clinical data and objective / subjective history reviewed.  Vancomycin Assessment:  Indication and Goal AUC/Trough: Soft tissue (goal -600, trough >10)  Clinical Status: stable  Micro:     Renal Function:  SCr: 0.76 mg/dL  CrCl: 157.2 mL/min  Renal replacement: Not on dialysis  Days of Therapy: 1  Current Dose: vancomycin 2500 mg q12h  Vancomycin Plan:  New Dosing: vancomycin 1500 mg q8h  Estimated AUC: 480 mcg*hr/mL  Estimated Trough: 14.4 mcg/mL  Next Level: 5/12 1900  Renal Function Monitoring: Daily BMP and UOP  Pharmacy will continue to follow closely for s/sx of nephrotoxicity, infusion reactions and appropriateness of therapy.  BMP and CBC will be ordered per protocol. We will continue to follow the patient’s culture results and clinical progress daily.    Jeremy Junior, Pharmacist

## 2024-05-12 NOTE — ANESTHESIA POSTPROCEDURE EVALUATION
Post-Op Assessment Note    CV Status:  Stable  Pain Score: 3    Pain management: adequate       Mental Status:  Sleepy   Hydration Status:  Stable   PONV Controlled:  None   Airway Patency:  Patent     Post Op Vitals Reviewed: Yes    No anethesia notable event occurred.    Staff: Anesthesiologist               BP  155/62   Temp  101.8   Pulse  102   Resp      SpO2  95%

## 2024-05-12 NOTE — PROGRESS NOTES
Notified that pt's blood culture is positive for gram negative rods.  Currently on IV Zosyn (along with IV Vancomycin and IV Clindamycin).  Will check repeat blood cultures and consult ID service.

## 2024-05-12 NOTE — PROGRESS NOTES
Rik Marin is a 19 y.o. male who is currently ordered Vancomycin IV with management by the Pharmacy Consult service.  Relevant clinical data and objective / subjective history reviewed.  Vancomycin Assessment:  Indication and Goal AUC/Trough: Soft tissue (goal -600, trough >10)  Clinical Status:   Micro:     Renal Function:  SCr: 0.56 mg/dL  CrCl: 357 mL/min  Renal replacement: Not on dialysis  Days of Therapy: 2  Current Dose: 1,500 mg Q8H  Vancomycin Plan:  New Dosin,000 mg Q8H  Estimated AUC: 493 mcg*hr/mL  Estimated Trough: 13.4 mcg/mL  Next Level: 24  Renal Function Monitoring: Daily BMP and UOP  Pharmacy will continue to follow closely for s/sx of nephrotoxicity, infusion reactions and appropriateness of therapy.  BMP and CBC will be ordered per protocol. We will continue to follow the patient’s culture results and clinical progress daily.    Adalid Morales, Pharmacist

## 2024-05-12 NOTE — PROGRESS NOTES
"Progress Note - General Surgery   Rik Marin 19 y.o. male MRN: [id number]  Unit/Bed#: [address] [place] [address] Encounter: [id number]    Assessment:  Postop day 1 incision and drainage left groin necrotizing soft tissue infection    Plan:  1.  Continue IV fluids, IV antibiotics.  2.  Reexploration and washout tomorrow.  3.  N.p.o. after midnight    Subjective/Objective   Chief Complaint: \"I am feeling pretty good\"    Subjective: Resting comfortably in bedside chair    Objective:     Blood pressure 136/69, pulse 105, temperature (!) 102.1 °F (38.9 °C), temperature source Oral, resp. rate 22, height 6' 2\" (1.88 m), weight (!) 177 kg (391 lb), SpO2 95%.,Body mass index is 50.2 kg/m².      Intake/Output Summary (Last 24 hours) at 5/12/2024 0913  Last data filed at 5/11/2024 2148  Gross per 24 hour   Intake 800 ml   Output 275 ml   Net 525 ml       Invasive Devices       Peripheral Intravenous Line  Duration             Peripheral IV 05/11/24 Left;Upper;Ventral (anterior) Arm <1 day    Peripheral IV 05/11/24 Right Forearm <1 day              Drain  Duration             Urethral Catheter Latex 16 Fr. <1 day                    Physical Exam: Resting comfortably in bedside chair.  Over dressing not removed    Lab, Imaging and other studies:I have personally reviewed pertinent lab results.  , CBC:   Lab Results   Component Value Date    WBC 11.44 (H) 05/12/2024    HGB 9.5 (L) 05/12/2024    HCT 30.0 (L) 05/12/2024    MCV 87 05/12/2024     05/12/2024    RBC 3.44 (L) 05/12/2024    MCH 27.3 05/12/2024    MCHC 31.3 (L) 05/12/2024    RDW 13.9 05/12/2024    MPV 10.6 05/12/2024    NRBC 0 05/11/2024   , CMP:   Lab Results   Component Value Date    SODIUM 136 05/12/2024    K 3.1 (L) 05/12/2024     (H) 05/12/2024    CO2 17 (L) 05/12/2024    BUN 6 05/12/2024    CREATININE 0.56 (L) 05/12/2024    CALCIUM 6.4 (L) 05/12/2024    AST 21 05/11/2024    ALT 20 05/11/2024    ALKPHOS 70 05/11/2024    EGFR 148 05/12/2024     VTE Pharmacologic " Prophylaxis: Heparin  VTE Mechanical Prophylaxis: sequential compression device

## 2024-05-12 NOTE — CONSULTS
"[facility]  Consult  Name: Rik Marin 19 y.o. male I MRN: [id number]  Unit/Bed#: 2 [address] Date of Admission: 5/11/2024   Date of Service: 5/12/2024 I Hospital Day: 1    Consults    Assessment/Plan   Acute anemia  Assessment & Plan  Possibly blood loss related from I&D.  Follow CBC     Hypomagnesemia  Assessment & Plan  IV Mag ordered.  Follow electrolytes     Acute hypokalemia  Assessment & Plan  IV Potassium replacement ordered.  Will also replete Magnesium.  Follow electrolytes     Humberto's gangrene of scrotum  Assessment & Plan  Pt is s/p I&D by Surgery team and is on IV Vancomycin, IV Zosyn and IV Clindamycin.  If pt remains septic and febrile would recommend ID consultation     Bacteriuria  Assessment & Plan  Denies any dysuria.  Currently on IV Zosyn.  Urine culture pending     ADHD (attention deficit hyperactivity disorder)  Assessment & Plan  Resume Prozac    Sleep apnea  Assessment & Plan  Unsure if using CPAP at home.    Pneumonia  Assessment & Plan  Patient was hospitalized 5/5-5/7 for sepsis multifocal pneumonia.  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  CT showed hepatomegaly with steatosis and splenomegaly.    Type 2 diabetes mellitus with hyperglycemia, with long-term current use of insulin (HCC)  Assessment & Plan  Lab Results   Component Value Date    HGBA1C 11.6 (H) 05/07/2024       No results for input(s): \"POCGLU\" in the last 72 hours.    Blood Sugar Average: Last 72 hrs:  Resume Lantus with SSI coverage       * Sepsis (HCC)  Assessment & Plan    CT concerning for Humberto's  WBC 17, no bands, fever 101.8, tachycardic ED arrival.    Sepsis, present on admission, as evidenced by leukocytosis, fever, tachycardia, with source of infection scrotal " "infection/Humberto's.  Pt is s/p I&D   Follow-up blood cultures  Follow-up OR cultures  If pt remains febrile will recommend ID consultation            VTE Prophylaxis:    Lovenox     Mobility:   Basic Mobility Inpatient Raw Score: 18  -[facility] Goal: 6: Walk 10 steps or more  -HL Achieved: 1: Laying in bed      Total Time Spent on Date of Encounter in care of patient: 35 mins. This time was spent on one or more of the following: performing physical exam; counseling and coordination of care; obtaining or reviewing history; documenting in the medical record; reviewing/ordering tests, medications or procedures; communicating with other healthcare professionals and discussing with patient's family/caregivers.    Collaboration of Care: Were Recommendations Directly Discussed with Primary Treatment Team? No      Past Medical and Surgical History:   Past Medical History:   Diagnosis Date    ADHD (attention deficit hyperactivity disorder) 5/11/2024    Diabetes mellitus (HCC)     5/2023    Lung collapse     Sleep apnea     Viral meningitis        Past Surgical History:   Procedure Laterality Date    TONSILECTOMY AND ADNOIDECTOMY      2020         Allergies: No Known Allergies    Social History:  Marital Status: Single  Substance Use History:   Social History     Substance and Sexual Activity   Alcohol Use Yes    Alcohol/week: 2.0 standard drinks of alcohol    Types: 2 Shots of liquor per week     Social History     Tobacco Use   Smoking Status Former    Types: Cigarettes    Passive exposure: Yes   Smokeless Tobacco Current     Social History     Substance and Sexual Activity   Drug Use Yes    Types: Marijuana         Subjective:   Pt seen and examined at bedside. Reports \"tailbone pain\" which is chronic.  Reports mild chest pain only when coughing.  Denies any SOB or abdominal pain.     Physical Exam:   Vitals:   Blood Pressure: 136/69 (05/12/24 0713)  Pulse: 105 (05/12/24 0703)  Temperature: (!) 102.1 °F (38.9 °C) " "(05/12/24 0713)  Temp Source: Oral (05/12/24 0713)  Respirations: 22 (05/12/24 0713)  Height: 6' 2\" (188 cm) (05/11/24 2238)  Weight - Scale: (!) 177 kg (391 lb) (05/11/24 2238)  SpO2: 95 % (05/12/24 0703)    Physical Exam   General: in no acute distress, obese   HEENT: atraumatic, normocephalic  Skin: no jaundice  CVS: tachycardic, regular rhythm, no murmurs appreciated  Lungs: CTAL, no wheezing or rales appreciated  Abdomen: soft, nondistended, bowel sounds normal, nontender upon palpation, no guarding or rebound tenderness  Extremities: no edema, no calf swelling or tenderness  Neuro: alert and oriented x3, no tremors   Psych: slightly anxiosu, cooperative      Additional Data:   Lab Results:    Results from last 7 days   Lab Units 05/12/24 0523 05/11/24  1726   WBC Thousand/uL 11.44* 17.27*   HEMOGLOBIN g/dL 9.5* 12.5   HEMATOCRIT % 30.0* 39.2   PLATELETS Thousands/uL 188 281   BANDS PCT % 3  --    SEGS PCT %  --  84*   LYMPHO PCT % 13* 10*   MONO PCT % 6 5   EOS PCT % 0 0     Results from last 7 days   Lab Units 05/12/24 0523 05/11/24  1726   SODIUM mmol/L 136 128*   POTASSIUM mmol/L 3.1* 3.7   CHLORIDE mmol/L 112* 99   CO2 mmol/L 17* 21   BUN mg/dL 6 7   CREATININE mg/dL 0.56* 0.76   ANION GAP mmol/L 7 8   CALCIUM mg/dL 6.4* 8.8   ALBUMIN g/dL  --  3.8   TOTAL BILIRUBIN mg/dL  --  0.92   ALK PHOS U/L  --  70   ALT U/L  --  20   AST U/L  --  21   GLUCOSE RANDOM mg/dL 182* 234*     Results from last 7 days   Lab Units 05/11/24  1726   INR  1.14         Lab Results   Component Value Date/Time    HGBA1C 11.6 (H) 05/07/2024 01:42 AM    HGBA1C 10.9 (H) 09/26/2023 09:26 AM    HGBA1C 8.8 (H) 06/16/2023 01:01 PM     Results from last 7 days   Lab Units 05/12/24  0700 05/12/24  0144 05/11/24  2142 05/07/24  1634 05/07/24  1129 05/07/24  0712 05/06/24  2053 05/06/24  1647 05/06/24  1219 05/06/24  0808 05/06/24  0051 05/05/24  2256   POC GLUCOSE mg/dl 213* 212* 182* 170* 261* 197* 311* 240* 270* 298* 395* 368* "     Results from last 7 days   Lab Units 05/12/24  0523 05/11/24  1726 05/07/24  0142   LACTIC ACID mmol/L  --  1.4  --    PROCALCITONIN ng/ml 0.33* 0.28* 0.07       Imaging: Reviewed radiology reports from this admission including: chest xray, chest CT scan, and abdominal/pelvic CT  CT chest abdomen pelvis w contrast   Final Result by Yoav Hines MD (05/11 1936)      Extensive cellulitis, subcutaneous edema and subcutaneous air in the left anterior perineum extending inferior alongside the left testicle consistent with infection/Humberto's gangrene. Patient already scheduled for operating room in 15 minutes according    to Dr. Snyder.      Near complete resolution of multilobar pneumonia.      Hepatomegaly with steatosis and splenomegaly.      I personally discussed this study with KIA SNYDER on 5/11/2024 7:34 PM.            Workstation performed: [id number]         XR chest portable   Final Result by Yaz Weatehrs MD (05/12 0638)      Compromised by body habitus and positioning with no acute disease.            Workstation performed: [id number]             ** Please Note: This note may have been constructed using a voice recognition system. **

## 2024-05-12 NOTE — ASSESSMENT & PLAN NOTE
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    Sepsis, present on admission, as evidenced by leukocytosis, fever, tachycardia, with source of infection scrotal infection/Humberto's.  Pt is s/p I&D   Follow-up blood cultures  Follow-up OR cultures  If pt remains febrile will recommend ID consultation

## 2024-05-12 NOTE — ASSESSMENT & PLAN NOTE
Pt is s/p I&D by Surgery team and is on IV Vancomycin, IV Zosyn and IV Clindamycin.  If pt remains septic and febrile would recommend ID consultation

## 2024-05-13 ENCOUNTER — ANESTHESIA EVENT (INPATIENT)
Dept: PERIOP | Facility: HOSPITAL | Age: 19
DRG: 710 | End: 2024-05-13
Payer: COMMERCIAL

## 2024-05-13 ENCOUNTER — ANESTHESIA (INPATIENT)
Dept: PERIOP | Facility: HOSPITAL | Age: 19
DRG: 710 | End: 2024-05-13
Payer: COMMERCIAL

## 2024-05-13 PROBLEM — R78.81 GRAM-NEGATIVE BACTEREMIA: Status: ACTIVE | Noted: 2024-05-13

## 2024-05-13 LAB
ANION GAP SERPL CALCULATED.3IONS-SCNC: 6 MMOL/L (ref 4–13)
ATRIAL RATE: 118 BPM
BACTERIA UR CULT: NORMAL
BUN SERPL-MCNC: 8 MG/DL (ref 5–25)
CALCIUM SERPL-MCNC: 8.1 MG/DL (ref 8.4–10.2)
CHLORIDE SERPL-SCNC: 106 MMOL/L (ref 96–108)
CO2 SERPL-SCNC: 22 MMOL/L (ref 21–32)
CREAT SERPL-MCNC: 0.83 MG/DL (ref 0.6–1.3)
ERYTHROCYTE [DISTWIDTH] IN BLOOD BY AUTOMATED COUNT: 14.3 % (ref 11.6–15.1)
GFR SERPL CREATININE-BSD FRML MDRD: 126 ML/MIN/1.73SQ M
GLUCOSE SERPL-MCNC: 140 MG/DL (ref 65–140)
GLUCOSE SERPL-MCNC: 153 MG/DL (ref 65–140)
GLUCOSE SERPL-MCNC: 162 MG/DL (ref 65–140)
GLUCOSE SERPL-MCNC: 250 MG/DL (ref 65–140)
GLUCOSE SERPL-MCNC: 291 MG/DL (ref 65–140)
HCT VFR BLD AUTO: 35.6 % (ref 36.5–49.3)
HGB BLD-MCNC: 11 G/DL (ref 12–17)
MCH RBC QN AUTO: 27.1 PG (ref 26.8–34.3)
MCHC RBC AUTO-ENTMCNC: 30.9 G/DL (ref 31.4–37.4)
MCV RBC AUTO: 88 FL (ref 82–98)
P AXIS: 45 DEGREES
PLATELET # BLD AUTO: 240 THOUSANDS/UL (ref 149–390)
PMV BLD AUTO: 10.4 FL (ref 8.9–12.7)
POTASSIUM SERPL-SCNC: 3.6 MMOL/L (ref 3.5–5.3)
PR INTERVAL: 164 MS
QRS AXIS: 80 DEGREES
QRSD INTERVAL: 92 MS
QT INTERVAL: 328 MS
QTC INTERVAL: 459 MS
RBC # BLD AUTO: 4.06 MILLION/UL (ref 3.88–5.62)
SODIUM SERPL-SCNC: 134 MMOL/L (ref 135–147)
T WAVE AXIS: 53 DEGREES
VENTRICULAR RATE: 118 BPM
WBC # BLD AUTO: 11.95 THOUSAND/UL (ref 4.31–10.16)

## 2024-05-13 PROCEDURE — 94660 CPAP INITIATION&MGMT: CPT

## 2024-05-13 PROCEDURE — 85027 COMPLETE CBC AUTOMATED: CPT | Performed by: PHYSICIAN ASSISTANT

## 2024-05-13 PROCEDURE — 82948 REAGENT STRIP/BLOOD GLUCOSE: CPT

## 2024-05-13 PROCEDURE — 99024 POSTOP FOLLOW-UP VISIT: CPT | Performed by: PHYSICIAN ASSISTANT

## 2024-05-13 PROCEDURE — 80048 BASIC METABOLIC PNL TOTAL CA: CPT | Performed by: PHYSICIAN ASSISTANT

## 2024-05-13 PROCEDURE — 99255 IP/OBS CONSLTJ NEW/EST HI 80: CPT | Performed by: INTERNAL MEDICINE

## 2024-05-13 PROCEDURE — 0JDB0ZZ EXTRACTION OF PERINEUM SUBCUTANEOUS TISSUE AND FASCIA, OPEN APPROACH: ICD-10-PCS | Performed by: SURGERY

## 2024-05-13 PROCEDURE — 93010 ELECTROCARDIOGRAM REPORT: CPT | Performed by: INTERNAL MEDICINE

## 2024-05-13 PROCEDURE — 10061 I&D ABSCESS COMP/MULTIPLE: CPT | Performed by: PHYSICIAN ASSISTANT

## 2024-05-13 PROCEDURE — 10180 I&D COMPLEX PO WOUND INFCTJ: CPT | Performed by: SURGERY

## 2024-05-13 PROCEDURE — 94760 N-INVAS EAR/PLS OXIMETRY 1: CPT

## 2024-05-13 PROCEDURE — 99252 IP/OBS CONSLTJ NEW/EST SF 35: CPT | Performed by: STUDENT IN AN ORGANIZED HEALTH CARE EDUCATION/TRAINING PROGRAM

## 2024-05-13 RX ORDER — GLYCINE 1.5 G/100ML
SOLUTION IRRIGATION AS NEEDED
Status: DISCONTINUED | OUTPATIENT
Start: 2024-05-13 | End: 2024-05-13 | Stop reason: HOSPADM

## 2024-05-13 RX ORDER — DEXAMETHASONE SODIUM PHOSPHATE 10 MG/ML
INJECTION, SOLUTION INTRAMUSCULAR; INTRAVENOUS AS NEEDED
Status: DISCONTINUED | OUTPATIENT
Start: 2024-05-13 | End: 2024-05-13

## 2024-05-13 RX ORDER — FENTANYL CITRATE/PF 50 MCG/ML
25 SYRINGE (ML) INJECTION
Status: DISCONTINUED | OUTPATIENT
Start: 2024-05-13 | End: 2024-05-13 | Stop reason: HOSPADM

## 2024-05-13 RX ORDER — BUPIVACAINE HYDROCHLORIDE AND EPINEPHRINE 2.5; 5 MG/ML; UG/ML
INJECTION, SOLUTION EPIDURAL; INFILTRATION; INTRACAUDAL; PERINEURAL AS NEEDED
Status: DISCONTINUED | OUTPATIENT
Start: 2024-05-13 | End: 2024-05-13 | Stop reason: HOSPADM

## 2024-05-13 RX ORDER — SUCCINYLCHOLINE/SOD CL,ISO/PF 100 MG/5ML
SYRINGE (ML) INTRAVENOUS AS NEEDED
Status: DISCONTINUED | OUTPATIENT
Start: 2024-05-13 | End: 2024-05-13

## 2024-05-13 RX ORDER — METRONIDAZOLE 500 MG/100ML
500 INJECTION, SOLUTION INTRAVENOUS EVERY 12 HOURS
Status: DISCONTINUED | OUTPATIENT
Start: 2024-05-13 | End: 2024-05-14

## 2024-05-13 RX ORDER — ROCURONIUM BROMIDE 10 MG/ML
INJECTION, SOLUTION INTRAVENOUS AS NEEDED
Status: DISCONTINUED | OUTPATIENT
Start: 2024-05-13 | End: 2024-05-13

## 2024-05-13 RX ORDER — MAGNESIUM HYDROXIDE 1200 MG/15ML
LIQUID ORAL AS NEEDED
Status: DISCONTINUED | OUTPATIENT
Start: 2024-05-13 | End: 2024-05-13 | Stop reason: HOSPADM

## 2024-05-13 RX ORDER — SODIUM CHLORIDE, SODIUM LACTATE, POTASSIUM CHLORIDE, CALCIUM CHLORIDE 600; 310; 30; 20 MG/100ML; MG/100ML; MG/100ML; MG/100ML
INJECTION, SOLUTION INTRAVENOUS CONTINUOUS PRN
Status: DISCONTINUED | OUTPATIENT
Start: 2024-05-13 | End: 2024-05-13

## 2024-05-13 RX ORDER — FENTANYL CITRATE 50 UG/ML
INJECTION, SOLUTION INTRAMUSCULAR; INTRAVENOUS AS NEEDED
Status: DISCONTINUED | OUTPATIENT
Start: 2024-05-13 | End: 2024-05-13

## 2024-05-13 RX ORDER — CEFEPIME HYDROCHLORIDE 2 G/50ML
2000 INJECTION, SOLUTION INTRAVENOUS EVERY 8 HOURS
Status: DISCONTINUED | OUTPATIENT
Start: 2024-05-13 | End: 2024-05-14

## 2024-05-13 RX ORDER — PROPOFOL 10 MG/ML
INJECTION, EMULSION INTRAVENOUS AS NEEDED
Status: DISCONTINUED | OUTPATIENT
Start: 2024-05-13 | End: 2024-05-13

## 2024-05-13 RX ORDER — LIDOCAINE HYDROCHLORIDE 10 MG/ML
INJECTION, SOLUTION EPIDURAL; INFILTRATION; INTRACAUDAL; PERINEURAL AS NEEDED
Status: DISCONTINUED | OUTPATIENT
Start: 2024-05-13 | End: 2024-05-13

## 2024-05-13 RX ORDER — ONDANSETRON 2 MG/ML
4 INJECTION INTRAMUSCULAR; INTRAVENOUS ONCE AS NEEDED
Status: DISCONTINUED | OUTPATIENT
Start: 2024-05-13 | End: 2024-05-13 | Stop reason: HOSPADM

## 2024-05-13 RX ORDER — LANOLIN ALCOHOL/MO/W.PET/CERES
6 CREAM (GRAM) TOPICAL
Status: DISCONTINUED | OUTPATIENT
Start: 2024-05-13 | End: 2024-05-18 | Stop reason: HOSPADM

## 2024-05-13 RX ORDER — ONDANSETRON 2 MG/ML
INJECTION INTRAMUSCULAR; INTRAVENOUS AS NEEDED
Status: DISCONTINUED | OUTPATIENT
Start: 2024-05-13 | End: 2024-05-13

## 2024-05-13 RX ORDER — KETOROLAC TROMETHAMINE 30 MG/ML
15 INJECTION, SOLUTION INTRAMUSCULAR; INTRAVENOUS ONCE
Status: COMPLETED | OUTPATIENT
Start: 2024-05-13 | End: 2024-05-13

## 2024-05-13 RX ADMIN — KETOROLAC TROMETHAMINE 15 MG: 30 INJECTION, SOLUTION INTRAMUSCULAR; INTRAVENOUS at 01:48

## 2024-05-13 RX ADMIN — PROPOFOL 250 MG: 10 INJECTION, EMULSION INTRAVENOUS at 10:57

## 2024-05-13 RX ADMIN — ROCURONIUM BROMIDE 20 MG: 10 INJECTION, SOLUTION INTRAVENOUS at 11:03

## 2024-05-13 RX ADMIN — INSULIN LISPRO 3 UNITS: 100 INJECTION, SOLUTION INTRAVENOUS; SUBCUTANEOUS at 16:38

## 2024-05-13 RX ADMIN — ONDANSETRON 4 MG: 2 INJECTION INTRAMUSCULAR; INTRAVENOUS at 11:07

## 2024-05-13 RX ADMIN — FENTANYL CITRATE 50 MCG: 50 INJECTION, SOLUTION INTRAMUSCULAR; INTRAVENOUS at 10:57

## 2024-05-13 RX ADMIN — INSULIN LISPRO 9 UNITS: 100 INJECTION, SOLUTION INTRAVENOUS; SUBCUTANEOUS at 09:07

## 2024-05-13 RX ADMIN — TOPIRAMATE 50 MG: 25 TABLET, FILM COATED ORAL at 17:16

## 2024-05-13 RX ADMIN — SUGAMMADEX 200 MG: 100 INJECTION, SOLUTION INTRAVENOUS at 11:36

## 2024-05-13 RX ADMIN — CEFEPIME HYDROCHLORIDE 2000 MG: 2 INJECTION, SOLUTION INTRAVENOUS at 16:28

## 2024-05-13 RX ADMIN — Medication 250 MG: at 17:16

## 2024-05-13 RX ADMIN — Medication 160 MG: at 10:57

## 2024-05-13 RX ADMIN — CLINDAMYCIN IN 5 PERCENT DEXTROSE 900 MG: 18 INJECTION, SOLUTION INTRAVENOUS at 10:47

## 2024-05-13 RX ADMIN — METRONIDAZOLE 500 MG: 500 INJECTION, SOLUTION INTRAVENOUS at 16:33

## 2024-05-13 RX ADMIN — FENTANYL CITRATE 25 MCG: 50 INJECTION INTRAMUSCULAR; INTRAVENOUS at 12:22

## 2024-05-13 RX ADMIN — VANCOMYCIN HYDROCHLORIDE 2000 MG: 10 INJECTION, POWDER, LYOPHILIZED, FOR SOLUTION INTRAVENOUS at 06:47

## 2024-05-13 RX ADMIN — FLUOXETINE 10 MG: 10 CAPSULE ORAL at 13:41

## 2024-05-13 RX ADMIN — PROPOFOL 30 MG: 10 INJECTION, EMULSION INTRAVENOUS at 11:33

## 2024-05-13 RX ADMIN — TOPIRAMATE 50 MG: 25 TABLET, FILM COATED ORAL at 13:41

## 2024-05-13 RX ADMIN — Medication 6 MG: at 21:43

## 2024-05-13 RX ADMIN — Medication 250 MG: at 13:41

## 2024-05-13 RX ADMIN — PIPERACILLIN SODIUM,TAZOBACTAM SODIUM 4.5 G: 4; .5 INJECTION, POWDER, FOR SOLUTION INTRAVENOUS at 00:34

## 2024-05-13 RX ADMIN — FENTANYL CITRATE 50 MCG: 50 INJECTION, SOLUTION INTRAMUSCULAR; INTRAVENOUS at 11:26

## 2024-05-13 RX ADMIN — ENOXAPARIN SODIUM 40 MG: 40 INJECTION SUBCUTANEOUS at 10:58

## 2024-05-13 RX ADMIN — CLINDAMYCIN IN 5 PERCENT DEXTROSE 900 MG: 18 INJECTION, SOLUTION INTRAVENOUS at 17:17

## 2024-05-13 RX ADMIN — INSULIN LISPRO 2 UNITS: 100 INJECTION, SOLUTION INTRAVENOUS; SUBCUTANEOUS at 21:42

## 2024-05-13 RX ADMIN — SODIUM CHLORIDE, SODIUM LACTATE, POTASSIUM CHLORIDE, AND CALCIUM CHLORIDE: .6; .31; .03; .02 INJECTION, SOLUTION INTRAVENOUS at 10:43

## 2024-05-13 RX ADMIN — VANCOMYCIN HYDROCHLORIDE 2000 MG: 10 INJECTION, POWDER, LYOPHILIZED, FOR SOLUTION INTRAVENOUS at 20:20

## 2024-05-13 RX ADMIN — OXYCODONE HYDROCHLORIDE 10 MG: 10 TABLET ORAL at 19:24

## 2024-05-13 RX ADMIN — DEXAMETHASONE SODIUM PHOSPHATE 10 MG: 10 INJECTION, SOLUTION INTRAMUSCULAR; INTRAVENOUS at 11:07

## 2024-05-13 RX ADMIN — SODIUM CHLORIDE 125 ML/HR: 0.9 INJECTION, SOLUTION INTRAVENOUS at 02:00

## 2024-05-13 RX ADMIN — SODIUM CHLORIDE, SODIUM LACTATE, POTASSIUM CHLORIDE, AND CALCIUM CHLORIDE 1000 ML: .6; .31; .03; .02 INJECTION, SOLUTION INTRAVENOUS at 00:25

## 2024-05-13 RX ADMIN — INSULIN LISPRO 9 UNITS: 100 INJECTION, SOLUTION INTRAVENOUS; SUBCUTANEOUS at 16:41

## 2024-05-13 RX ADMIN — FENTANYL CITRATE 25 MCG: 50 INJECTION INTRAMUSCULAR; INTRAVENOUS at 12:13

## 2024-05-13 RX ADMIN — ENOXAPARIN SODIUM 40 MG: 40 INJECTION SUBCUTANEOUS at 17:17

## 2024-05-13 RX ADMIN — CEFEPIME HYDROCHLORIDE 2000 MG: 2 INJECTION, SOLUTION INTRAVENOUS at 22:54

## 2024-05-13 RX ADMIN — LIDOCAINE HYDROCHLORIDE 50 MG: 10 INJECTION, SOLUTION EPIDURAL; INFILTRATION; INTRACAUDAL; PERINEURAL at 10:57

## 2024-05-13 RX ADMIN — PROPOFOL 30 MG: 10 INJECTION, EMULSION INTRAVENOUS at 11:29

## 2024-05-13 RX ADMIN — INSULIN GLARGINE 30 UNITS: 100 INJECTION, SOLUTION SUBCUTANEOUS at 21:43

## 2024-05-13 RX ADMIN — CLINDAMYCIN IN 5 PERCENT DEXTROSE 900 MG: 18 INJECTION, SOLUTION INTRAVENOUS at 01:49

## 2024-05-13 RX ADMIN — HYDROMORPHONE HYDROCHLORIDE 0.5 MG: 1 INJECTION, SOLUTION INTRAMUSCULAR; INTRAVENOUS; SUBCUTANEOUS at 13:13

## 2024-05-13 NOTE — ASSESSMENT & PLAN NOTE
Lab Results   Component Value Date    HGBA1C 11.6 (H) 05/07/2024       Recent Labs     05/12/24  1604 05/12/24 2055 05/13/24  0125 05/13/24  0723   POCGLU 216* 194* 140 162*       Blood Sugar Average: Last 72 hrs:  Resume Lantus with SSI coverage

## 2024-05-13 NOTE — ANESTHESIA PREPROCEDURE EVALUATION
Procedure:  INCISION AND DRAINAGE (I&D) GROIN (Left: Groin)    Relevant Problems   CARDIO   (+) Hypertriglyceridemia      ENDO   (+) Type 2 diabetes mellitus with hyperglycemia, with long-term current use of insulin (HCC)      HEMATOLOGY   (+) Acute anemia      NEURO/PSYCH   (+) MDD (major depressive disorder)      PULMONARY   (+) Pneumonia   (+) Sleep apnea        Physical Exam    Airway    Mallampati score: II  TM Distance: >3 FB  Neck ROM: full     Dental   No notable dental hx     Cardiovascular  Cardiovascular exam normal    Pulmonary  Pulmonary exam normal     Other Findings        Anesthesia Plan  ASA Score- 3     Anesthesia Type- general with ASA Monitors.         Additional Monitors:     Airway Plan: ETT.           Plan Factors-Exercise tolerance (METS): >4 METS.    Chart reviewed. EKG reviewed.  Existing labs reviewed. Patient summary reviewed.    Patient is not a current smoker.      Obstructive sleep apnea risk education given perioperatively.        Induction- intravenous.    Postoperative Plan- Plan for postoperative opioid use.     Informed Consent- Anesthetic plan and risks discussed with patient.  I personally reviewed this patient with the CRNA. Discussed and agreed on the Anesthesia Plan with the CRNA..

## 2024-05-13 NOTE — ASSESSMENT & PLAN NOTE
Pt is s/p I&D by Surgery team and is on IV Vancomycin, IV Zosyn and IV Clindamycin. Pt is planned for washout today as well.  ID consulted

## 2024-05-13 NOTE — PROGRESS NOTES
Rik Marin is a 19 y.o. male who is currently ordered Vancomycin IV with management by the Pharmacy Consult service.  Relevant clinical data and objective / subjective history reviewed.  Vancomycin Assessment:  Indication and Goal AUC/Trough: Soft tissue (goal -600, trough >10)  Clinical Status: improving  Micro:     Renal Function:  SCr: 0.83 mg/dL  CrCl: 241.0 mL/min  Renal replacement: Not on dialysis  Days of Therapy: 3  Current Dose: 2000mg IV q8h  Vancomycin Plan:  New Dosinmg IV q12h  Estimated AUC: 462 mcg*hr/mL  Estimated Trough: 12.0 mcg/mL  Next Level: 24 @ 0600 (patient is a HARD STICK- trough not taken 24 re-ordered/dose adjusted per [facility])  Renal Function Monitoring: Daily BMP and UOP  Pharmacy will continue to follow closely for s/sx of nephrotoxicity, infusion reactions and appropriateness of therapy.  BMP and CBC will be ordered per protocol. We will continue to follow the patient’s culture results and clinical progress daily.    Nimo Christensen, Pharmacist

## 2024-05-13 NOTE — ANESTHESIA PREPROCEDURE EVALUATION
Procedure:  INCISION AND DRAINAGE (I&D) GROIN (Left: Groin)    Relevant Problems   CARDIO   (+) Hypertriglyceridemia      ENDO   (+) Type 2 diabetes mellitus with hyperglycemia, with long-term current use of insulin (HCC)      HEMATOLOGY   (+) Acute anemia      NEURO/PSYCH   (+) MDD (major depressive disorder)      PULMONARY   (+) Pneumonia (Resolved)   (+) Sleep apnea      Other   (+) Morbid obesity (HCC)        Physical Exam    Airway    Mallampati score: IV  TM Distance: <3 FB  Neck ROM: full     Dental       Cardiovascular  Rhythm: regular, Rate: normal    Pulmonary   Breath sounds clear to auscultation    Other Findings        Anesthesia Plan  ASA Score- 3     Anesthesia Type- general with ASA Monitors.         Additional Monitors:     Airway Plan: ETT.    Comment: Cullen intubation recommended.       Plan Factors-    Chart reviewed.        Patient is not a current smoker.              Induction- intravenous.    Postoperative Plan- Plan for postoperative opioid use.         Informed Consent- Anesthetic plan and risks discussed with patient.  I personally reviewed this patient with the CRNA. Discussed and agreed on the Anesthesia Plan with the CRNA..

## 2024-05-13 NOTE — CONSULTS
[facility]  Consult  Name: Rik Marin 19 y.o. male I MRN: [id number]  Unit/Bed#: 2 [address] Date of Admission: 5/11/2024   Date of Service: 5/13/2024 I Hospital Day: 2    Consults    Assessment/Plan   Gram-negative bacteremia  Assessment & Plan  Currently on IV Zosyn.  Repeat blood cultures ordered.  ID consulted     Acute anemia  Assessment & Plan  Possibly blood loss related from I&D.  Follow CBC     Humberto's gangrene of scrotum  Assessment & Plan  Pt is s/p I&D by Surgery team and is on IV Vancomycin, IV Zosyn and IV Clindamycin. Pt is planned for washout today as well.  ID consulted     Bacteriuria  Assessment & Plan  Denies any dysuria.  Currently on IV Zosyn.  Urine culture pending     Hyponatremia  Assessment & Plan  Mild.  Follow BMP     ADHD (attention deficit hyperactivity disorder)  Assessment & Plan  Resume Prozac    Type 2 diabetes mellitus with hyperglycemia, with long-term current use of insulin (HCC)  Assessment & Plan  Lab Results   Component Value Date    HGBA1C 11.6 (H) 05/07/2024       Recent Labs     05/12/24  1604 05/12/24  2055 05/13/24  0125 05/13/24  0723   POCGLU 216* 194* 140 162*       Blood Sugar Average: Last 72 hrs:  Resume Lantus with SSI coverage       * Sepsis (HCC)  Assessment & Plan    CT concerning for Humberto's  Sepsis, present on admission, as evidenced by leukocytosis, fever, tachycardia, with source of infection scrotal infection/Humberto's.  Pt is s/p I&D by Surgical team  Blood cultures gram negative rods and gram positive cocci in clusters  Currently on IV Vancomycin, Zosyn and Clindamycin  ID team consulted            VTE Prophylaxis:    Lovenox    Mobility:   Basic Mobility Inpatient Raw Score: 18  JH-HLM Goal: 6: Walk 10 steps or more  JH-HLM Achieved: 7: Walk 25 feet or more      Total Time Spent on Date of Encounter in care of patient: 35 mins. This time was spent on one or more of the following: performing physical exam;  "counseling and coordination of care; obtaining or reviewing history; documenting in the medical record; reviewing/ordering tests, medications or procedures; communicating with other healthcare professionals and discussing with patient's family/caregivers.    Collaboration of Care: Were Recommendations Directly Discussed with Primary Treatment Team? No    Subjective:   Pt seen and examined at bedside.  Denies any chest pain or shortness of breath.      Past Medical and Surgical History:   Past Medical History:   Diagnosis Date    ADHD (attention deficit hyperactivity disorder) 5/11/2024    Diabetes mellitus (HCC)     5/2023    Lung collapse     Sleep apnea     Viral meningitis        Past Surgical History:   Procedure Laterality Date    NH I&D VULVA/PERINEAL ABSCESS N/A 5/11/2024    Procedure: INCISION AND DRAINAGE (I&D) PERINEAL ABSCESS;  Surgeon: Tim Ch MD;  Location: [facility];  Service: General    TONSILECTOMY AND ADNOIDECTOMY      2020       Social History:  Marital Status: Single  Substance Use History:   Social History     Substance and Sexual Activity   Alcohol Use Yes    Alcohol/week: 2.0 standard drinks of alcohol    Types: 2 Shots of liquor per week     Social History     Tobacco Use   Smoking Status Former    Types: Cigarettes    Passive exposure: Yes   Smokeless Tobacco Current     Social History     Substance and Sexual Activity   Drug Use Yes    Types: Marijuana         Physical Exam:   Vitals:   Blood Pressure: 108/67 (05/13/24 0742)  Pulse: 88 (05/13/24 0742)  Temperature: (!) 96.3 °F (35.7 °C) (05/13/24 0742)  Temp Source: Oral (05/13/24 0742)  Respirations: 17 (05/13/24 0742)  Height: 6' 2\" (188 cm) (05/11/24 2238)  Weight - Scale: (!) 177 kg (391 lb) (05/11/24 2238)  SpO2: 96 % (05/13/24 0742)    Physical Exam   General: in no acute distress, obese  HEENT: atraumatic, normocephalic  Skin: no jaundice  CVS: RRR, no murmurs appreciated  Lungs: CTAL, no wheezing or rales " appreciated  Abdomen: soft, nondistended, bowel sounds normal, nontender upon palpation, no guarding or rebound tenderness  Extremities: no edema, no calf swelling or tenderness  Neuro: alert and oriented x3, no tremors   Psych: calm, cooperative      Additional Data:   Lab Results:    Results from last 7 days   Lab Units 05/13/24  0647 05/12/24  0523 05/11/24  1726   WBC Thousand/uL 11.95* 11.44* 17.27*   HEMOGLOBIN g/dL 11.0* 9.5* 12.5   HEMATOCRIT % 35.6* 30.0* 39.2   PLATELETS Thousands/uL 240 188 281   BANDS PCT %  --  3  --    SEGS PCT %  --   --  84*   LYMPHO PCT %  --  13* 10*   MONO PCT %  --  6 5   EOS PCT %  --  0 0     Results from last 7 days   Lab Units 05/13/24  0647 05/12/24  0523 05/11/24  1726   SODIUM mmol/L 134*   < > 128*   POTASSIUM mmol/L 3.6   < > 3.7   CHLORIDE mmol/L 106   < > 99   CO2 mmol/L 22   < > 21   BUN mg/dL 8   < > 7   CREATININE mg/dL 0.83   < > 0.76   ANION GAP mmol/L 6   < > 8   CALCIUM mg/dL 8.1*   < > 8.8   ALBUMIN g/dL  --   --  3.8   TOTAL BILIRUBIN mg/dL  --   --  0.92   ALK PHOS U/L  --   --  70   ALT U/L  --   --  20   AST U/L  --   --  21   GLUCOSE RANDOM mg/dL 153*   < > 234*    < > = values in this interval not displayed.     Results from last 7 days   Lab Units 05/11/24  1726   INR  1.14         Lab Results   Component Value Date/Time    HGBA1C 11.6 (H) 05/07/2024 01:42 AM    HGBA1C 10.9 (H) 09/26/2023 09:26 AM    HGBA1C 8.8 (H) 06/16/2023 01:01 PM     Results from last 7 days   Lab Units 05/13/24  0723 05/13/24  0125 05/12/24  2055 05/12/24  1604 05/12/24  1105 05/12/24  0700 05/12/24  0144 05/11/24  2142 05/07/24  1634 05/07/24  1129 05/07/24  0712 05/06/24 2053   POC GLUCOSE mg/dl 162* 140 194* 216* 190* 213* 212* 182* 170* 261* 197* 311*     Results from last 7 days   Lab Units 05/12/24  0523 05/11/24  1726 05/07/24  0142   LACTIC ACID mmol/L  --  1.4  --    PROCALCITONIN ng/ml 0.33* 0.28* 0.07       Imaging: No pertinent imaging reviewed.  CT chest abdomen  pelvis w contrast   Final Result by Yoav Hines MD (05/11 1936)      Extensive cellulitis, subcutaneous edema and subcutaneous air in the left anterior perineum extending inferior alongside the left testicle consistent with infection/Humberto's gangrene. Patient already scheduled for operating room in 15 minutes according    to Dr. Snyder.      Near complete resolution of multilobar pneumonia.      Hepatomegaly with steatosis and splenomegaly.      I personally discussed this study with KIA SNYDER on 5/11/2024 7:34 PM.            Workstation performed: [id number]         XR chest portable   Final Result by Yaz Weathers MD (05/12 0638)      Compromised by body habitus and positioning with no acute disease.            Workstation performed: [id number]             ** Please Note: This note may have been constructed using a voice recognition system. **

## 2024-05-13 NOTE — UTILIZATION REVIEW
"Initial Clinical Review    Admission: Date/Time/Statement:   Admission Orders (From admission, onward)       Ordered        05/11/24 1944  Inpatient Admission  Once                          Orders Placed This Encounter   Procedures    Inpatient Admission     Standing Status:   Standing     Number of Occurrences:   1     Order Specific Question:   Level of Care     Answer:   Med Surg [16]     Order Specific Question:   Estimated length of stay     Answer:   More than 2 Midnights     Order Specific Question:   Certification     Answer:   I certify that inpatient services are medically necessary for this patient for a duration of greater than two midnights. See H&P and MD Progress Notes for additional information about the patient's course of treatment.     ED Arrival Information       Expected   -    Arrival   5/11/2024 15:56    Acuity   Urgent              Means of arrival   Walk-In    Escorted by   Self    Service   Trauma    Admission type   Emergency              Arrival complaint   testicle pain             Chief Complaint   Patient presents with    Testicle Pain     Today pt reports of testicle pain \" I feel like its on fire and its swollen.\"       Initial Presentation:   19 yom to ER from home for testicle pain & swelling x 2-3 days, now with difficulty urinating. Pt recently diagnosed with pneumonia and states he has been taking his p.o. antibiotics regularly. Patient states that he feels as though his cough and his shortness of breath have been getting worse regardless. Hx diabetes mellitus, morbid obesity, sleep apnea, ADHD. Presents febrile, tachycardic, tachypneic, scrotal selling & pain. Admission CT scan showing extensive cellulitis, subcutaneous edema and subcutaneous air in the left anterior perineum extending inferiorly alongside the left testicle, consistent with foreigners gangrene. Labs: WBC 17.27, Na 128, PT 14.8, procalcitonin 0.28, u/a+ gluc, prot, urobilinogen, bacteria.  Admitted to inpatient " status for sepsis. Taken to OR, started on IVABT, cultures pending.    To OR for: INCISION AND DRAINAGE (I&D) PERINEAL   Anesthesia Type: General  Operative Findings: Necrotizing soft tissue infection left groin    Date: 5/12/24   Day 2:   POD 1 incision and drainage left groin necrotizing soft tissue infection.  Dressing intact, pain controlled. Plan re-exploration & washout. Blood cultures positive for gram negative rods. Currently on IV Zosyn, IV Vancomycin, IV Clindamycin. Will check repeat blood cultures and consult ID. Hgb drop to 9.5, possibly 2nd post-op blood loss, follow CBC.    Return to OR for: Left - INCISION AND DRAINAGE (I&D) GROI   Anesthesia Type: General  Operative Findings:  Area of previous drainage much improved, but continued induration and erythema anterior and superior with 2 undrained areas of infection.      ED Triage Vitals   Temperature Pulse Respirations Blood Pressure SpO2   05/11/24 1558 05/11/24 1558 05/11/24 1558 05/11/24 1558 05/11/24 1558   (!) 101.8 °F (38.8 °C) (!) 114 22 156/97 100 %      Temp Source Heart Rate Source Patient Position - Orthostatic VS BP Location FiO2 (%)   05/11/24 1558 05/11/24 1558 05/11/24 1815 05/11/24 1815 05/12/24 2230   Tympanic Monitor Lying Right arm 35      Pain Score       05/11/24 1812       10 - Worst Possible Pain          Wt Readings from Last 1 Encounters:   05/11/24 (!) 177 kg (391 lb)     Additional Vital Signs:   05/12/24 23:16:15 100.6 °F (38.1 °C) Abnormal  105 -- 116/65 82 93 % -- -- -- -- -- -- --   05/12/24 23:14:11 100.6 °F (38.1 °C) Abnormal  108 Abnormal  57 Abnormal  -- -- 92 % -- -- -- -- -- -- --   05/12/24 2240 -- -- 33 Abnormal  -- -- -- -- -- -- -- -- -- --   05/12/24 2230 -- -- 22 -- -- -- 35 -- -- BiPAP Face mask -- --   05/12/24 22:22:49 99.1 °F (37.3 °C) 106 Abnormal  -- 99/46 Abnormal  64 Abnormal  92 % -- -- -- -- -- -- --   05/12/24 15:18:44 98.6 °F (37 °C) 104 20 113/68 83 98 % -- -- -- -- -- -- --   05/12/24 1323 -- 103  22 -- -- 94 % -- 24 1 L/min Nasal cannula -- -- --   05/12/24 11:11:21 97.3 °F (36.3 °C) Abnormal  101 -- -- -- 94 % -- -- -- -- -- -- --   05/12/24 0900 -- -- -- -- -- 96 % -- 28 2 L/min Nasal cannula -- -- --   05/12/24 07:13:17 102.1 °F (38.9 °C) Abnormal  -- 22 136/69 91 -- -- -- -- -- -- -- Lying   05/12/24 07:03:15 99.7 °F (37.6 °C) 105 -- 110/65 80 95 % -- -- -- -- -- -- --   05/11/24 2238 98.4 °F (36.9 °C) 97 22 107/58 -- -- -- -- -- -- -- -- --   05/11/24 2228 -- -- -- -- -- -- -- 24 1 L/min Nasal cannula -- -- --   05/11/24 22:14:35 98.4 °F (36.9 °C) 97 23 Abnormal  107/58 74 95 % -- 28 2 L/min Nasal cannula -- -- --   05/11/24 2158 98.4 °F (36.9 °C) 96 24 Abnormal  119/62 -- 96 % -- 28 2 L/min Nasal cannula -- -- --   05/11/24 2147 -- 98 35 Abnormal  113/58 -- 95 % -- 28 2 L/min Nasal cannula -- -- --   05/11/24 2145 -- 99 35 Abnormal  105/57 -- -- -- -- -- -- -- -- --   05/11/24 2138 -- 100 32 Abnormal  116/58 -- 96 % -- 44 6 L/min Simple mask -- -- --   05/11/24 2128 101.8 °F (38.8 °C) Abnormal  104 26 Abnormal  113/54 -- 94 % -- 44 6 L/min Simple mask -- WDL --   05/11/24 1930 -- 102 22 119/56 81 94 % -- -- -- -- -- -- --   05/11/24 1915 -- 104 24 Abnormal  122/59 85 95 % -- -- -- -- -- -- --   05/11/24 1900 -- 120 Abnormal  24 Abnormal  117/59 83 96 % -- 28 2 L/min Nasal cannula -- -- --   05/11/24 1845 100.1 °F (37.8 °C) 114 Abnormal  24 Abnormal  119/56 82 94 % -- -- -- None (Room air) -- -- Lying   05/11/24 1815 -- 116 Abnormal  28 Abnormal  134/63 89 94 % -- -- -- None (Room air) -- -- Lying   05/11/24 1745 -- -- -- -- -- -- -- -- -- None (Room air) -- -- --   05/11/24 1558 101.8 °F (38.8 °C) Abnormal  114 Abnormal  22 156/97 -- 100 % -- -- -- -- -- -- --     Pertinent Labs/Diagnostic Test Results:   CT chest abdomen pelvis w contrast   Final Result  (05/11 1936)      Extensive cellulitis, subcutaneous edema and subcutaneous air in the left anterior perineum extending inferior alongside the left  testicle consistent with infection/Humberto's gangrene. Patient already scheduled for operating room in 15 minutes according    to Dr. Snyder.      Near complete resolution of multilobar pneumonia.      Hepatomegaly with steatosis and splenomegaly.         XR chest portable   Final Result  (05/12 0638)      Compromised by body habitus and positioning with no acute disease.       Results from last 7 days   Lab Units 05/11/24  1726   SARS-COV-2  Negative     Results from last 7 days   Lab Units 05/13/24  0647 05/12/24  0523 05/11/24  1726 05/07/24  0142   WBC Thousand/uL 11.95* 11.44* 17.27* 8.04   HEMOGLOBIN g/dL 11.0* 9.5* 12.5 13.4   HEMATOCRIT % 35.6* 30.0* 39.2 42.3   PLATELETS Thousands/uL 240 188 281 256   TOTAL NEUT ABS Thousands/µL  --   --  14.34*  --    BANDS PCT %  --  3  --   --      Results from last 7 days   Lab Units 05/13/24  0647 05/12/24  0523 05/11/24 1726 05/07/24  0142   SODIUM mmol/L 134* 136 128* 136   POTASSIUM mmol/L 3.6 3.1* 3.7 4.0   CHLORIDE mmol/L 106 112* 99 101   CO2 mmol/L 22 17* 21 26   ANION GAP mmol/L 6 7 8 9   BUN mg/dL 8 6 7 15   CREATININE mg/dL 0.83 0.56* 0.76 0.68   EGFR ml/min/1.73sq m 126 148 131 137   CALCIUM mg/dL 8.1* 6.4* 8.8 9.0   MAGNESIUM mg/dL  --  1.4*  --   --      Results from last 7 days   Lab Units 05/11/24  1726   AST U/L 21   ALT U/L 20   ALK PHOS U/L 70   TOTAL PROTEIN g/dL 8.0   ALBUMIN g/dL 3.8   TOTAL BILIRUBIN mg/dL 0.92     Results from last 7 days   Lab Units 05/13/24  0723 05/13/24  0125 05/12/24  2055 05/12/24  1604 05/12/24  1105 05/12/24  0700 05/12/24  0144 05/11/24  2142 05/07/24  1634 05/07/24  1129 05/07/24  0712 05/06/24 2053   POC GLUCOSE mg/dl 162* 140 194* 216* 190* 213* 212* 182* 170* 261* 197* 311*     Results from last 7 days   Lab Units 05/13/24  0647 05/12/24  0523 05/11/24  1726 05/07/24  0142   GLUCOSE RANDOM mg/dL 153* 182* 234* 283*         Results from last 7 days   Lab Units 05/07/24  0142   HEMOGLOBIN A1C % 11.6*   EAG mg/dl  286     Results from last 7 days   Lab Units 05/11/24  1726   PROTIME seconds 14.8*   INR  1.14   PTT seconds 33     Results from last 7 days   Lab Units 05/12/24  0523 05/11/24  1726 05/07/24  0142   PROCALCITONIN ng/ml 0.33* 0.28* 0.07     Results from last 7 days   Lab Units 05/11/24  1726   LACTIC ACID mmol/L 1.4     Results from last 7 days   Lab Units 05/11/24  1740   CLARITY UA  Clear   COLOR UA  Light Orange   SPEC GRAV UA  1.020   PH UA  6.0   GLUCOSE UA mg/dl 70 (7/100%)*   KETONES UA mg/dl Trace*   BLOOD UA  Negative   PROTEIN UA mg/dl 30 (1+)*   NITRITE UA  Negative   BILIRUBIN UA  Negative   UROBILINOGEN UA (BE) mg/dl 2.0*   LEUKOCYTES UA  Negative   WBC UA /hpf 1-2   RBC UA /hpf None Seen   BACTERIA UA /hpf Moderate*   EPITHELIAL CELLS WET PREP /hpf Occasional   MUCUS THREADS  Occasional*     Results from last 7 days   Lab Units 05/11/24  1726   INFLUENZA A PCR  Negative   INFLUENZA B PCR  Negative   RSV PCR  Negative     Results from last 7 days   Lab Units 05/12/24  1521 05/11/24  2104 05/11/24  1740 05/11/24  1726 05/08/24  1015   BLOOD CULTURE  Received in Microbiology Lab. Culture in Progress.  Received in Microbiology Lab. Culture in Progress.  --   --  Staphylococcus epidermidis*  Acinetobacter lwoffii*  No Growth at 24 hrs.  --    SPUTUM CULTURE   --   --   --   --  4+ Growth of   GRAM STAIN RESULT   --  No Polys*  4+ Gram negative rods*  4+ Gram positive cocci in pairs, chains and clusters*  --  Gram negative rods*  Gram positive cocci in clusters* 2+ Epithelial cells per low power field*  2+ Gram negative rods*  1+ Gram positive cocci in clusters*  1+ Gram positive rods*  No polys seen*   URINE CULTURE   --   --  No Growth <1000 cfu/mL  --   --    WOUND CULTURE   --  Culture too young- will reincubate  --   --   --        ED Treatment:   Medication Administration from 05/11/2024 1556 to 05/11/2024 1946         Date/Time Order Dose Route Action     05/11/2024 1813 EDT cefTRIAXone  (ROCEPHIN) IVPB (premix in dextrose) 1,000 mg 50 mL 1,000 mg Intravenous New Bag     05/11/2024 1811 EDT acetaminophen (Ofirmev) injection 1,000 mg 1,000 mg Intravenous New Bag     05/11/2024 1812 EDT ketorolac (TORADOL) injection 15 mg 15 mg Intravenous Given     05/11/2024 1810 EDT sodium chloride 0.9 % bolus 1,000 mL 1,000 mL Intravenous New Bag     05/11/2024 1759 EDT iohexol (OMNIPAQUE) 350 MG/ML injection (MULTI-DOSE) 100 mL 120 mL Intravenous Given     05/11/2024 1847 EDT vancomycin (VANCOCIN) 2,000 mg in sodium chloride 0.9 % 500 mL IVPB 2,000 mg Intravenous New Bag     05/11/2024 1829 EDT clindamycin in dextrose 5% (Cleocin) IVPB (premix) 600 mg 50 mL 600 mg Intravenous New Bag          Past Medical History:   Diagnosis Date    ADHD (attention deficit hyperactivity disorder) 5/11/2024    Diabetes mellitus (HCC)     5/2023    Lung collapse     Sleep apnea     Viral meningitis      Present on Admission:   Sepsis (HCC)   Morbid obesity (HCC)   Pneumonia    Admitting Diagnosis: Diabetes mellitus (HCC) [E11.9]  Humberto's gangrene [N49.3]  Testicle pain [N50.819]  Humberto's gangrene in male [N49.3]  Sepsis (HCC) [A41.9]  Age/Sex: 19 y.o. male  Admission Orders:  Accuchecks  Scd  Consult ID  Consult internal medicine    Scheduled Medications:  clindamycin, 900 mg, Intravenous, Q8H  enoxaparin, 40 mg, Subcutaneous, BID  FLUoxetine, 10 mg, Oral, Daily  insulin glargine, 30 Units, Subcutaneous, HS  insulin lispro, 1-5 Units, Subcutaneous, TID AC  insulin lispro, 1-5 Units, Subcutaneous, HS  insulin lispro, 1-5 Units, Subcutaneous, 0200  insulin lispro, 9 Units, Subcutaneous, TID With Meals  piperacillin-tazobactam, 4.5 g, Intravenous, Q8H  saccharomyces boulardii, 250 mg, Oral, BID  topiramate, 50 mg, Oral, BID  vancomycin, 2,000 mg, Intravenous, Q8H    Continuous IV Infusions:  sodium chloride, 125 mL/hr, Intravenous, Continuous    PRN Meds:  acetaminophen, 650 mg, Oral, Q6H PRN  albuterol, 2.5 mg, Nebulization,  Q6H PRN  benzonatate, 100 mg, Oral, TID PRN  HYDROmorphone, 0.5 mg, Intravenous, Q4H PRN  ondansetron, 4 mg, Intravenous, Q6H PRN  oxyCODONE, 10 mg, Oral, Q4H PRN  oxyCODONE, 5 mg, Oral, Q4H PRN    Network Utilization Review Department  ATTENTION: Please call with any questions or concerns to [phone] and carefully listen to the prompts so that you are directed to the right person. All voicemails are confidential.   For Discharge needs, contact Care Management DC Support Team at [phone] opt. [phone]  Send all requests for admission clinical reviews, approved or denied determinations and any other requests to dedicated fax number below belonging to the campus where the patient is receiving treatment. List of dedicated fax numbers for the Facilities:  FACILITY NAME UR FAX NUMBER   ADMISSION DENIALS (Administrative/Medical Necessity) [phone]   DISCHARGE SUPPORT TEAM (NETWORK) [phone]   PARENT CHILD HEALTH (Maternity/NICU/Pediatrics) [phone]   [facility] [phone]   [facility] [phone]   [facility] [phone]   [facility] [phone]   [facility] [phone]   [facility] [phone]   [facility] [phone]   [facility] [phone]   [facility] [phone]   [facility] [phone]   [facility] [phone]   [facility] [phone]

## 2024-05-13 NOTE — CONSULTS
Consultation - Infectious Disease   Rik Marin 19 y.o. male MRN: [id number]  Unit/Bed#: OR [place] Encounter: [id number]      IMPRESSION & RECOMMENDATIONS:   1. Sepsis, evidenced by fever and leukocytosis. In setting of #2/3  -antibiotics as below  -follow-up cultures and adjust antibiotics as needed  -monitor temperature and hemodynamics  -serial exam  -recheck CBC and BMP in a.m. - monitoring treatment response and any developing toxicities     2. Mixed bacteremia. Admission blood cultures preliminarily growing Staph epi and GNR in 1 of 2 sets. Consider true bacteremia in setting of #3 versus skin contaminant.   -antibiotics as below   -follow up blood cultures  -follow up repeat blood cultures  -management as below    3. Necrotizing soft tissue infection Left groin.   5/11/24 OR I&D with foul-smelling green-brown fluid encountered and sent for culture. OR gs with 4+ GNR, 4+ GPCPairs, Chains, and Clusters  5/13/24 s/p 2nd I&D today   -continues Vancomycin IV dosed by pharmacy, high dose Zosyn, and keep Clindamycin IV another 24 hours.   -serial exam  -follow up OR cultures  -close surgical follow-up  -plan for return to OR 5/15/24  -additional interventions pending clinical course     4. Type 2 Diabetes Mellitus with hyperglycemia. 5/7/24 HgbA1c 11.6 Risk factor for poor wound healing and infection.  -blood glucose management per primary    5. Morbid Obesity, BMI 50.2 Can affect antibiotic dosing/absorption  -dose adjust antibiotics as needed.     Antibiotics:  Vancomycin/Zosyn/Clindamycin    I have discussed the above management plan in detail with patient, RN, and Dr. Mccormack, of [facility]. They concur with ID treatment plan and close monitoring as above.    Extensive review of the medical records in epic including review of the notes, radiographs, and laboratory results     HISTORY OF PRESENT ILLNESS:  Reason for Consult: 1. Sepsis 2. Humberto's gangrene 3. Gram-negative bacteremia    HPI: Rik Marin is a 19  y.o. year old male with diabetes mellitus, morbid obesity, sleep apnea, ADHD who presented to [facility] ER 5/11/24 with left testicular pain and swelling x 2 to 3 days.  5/11/24 CT C/A/P showed extensive cellulitis, subcutaneous edema and subcutaneous air in the left anterior perineum extending inferiorly alongside the left testicle, consistent with Humberto's gangrene. Blood cultures were obtained, patient loaded with Vancomycin, Ceftriaxone and clindamycin and taken to the OR for I&D of left groin with foul-smelling green-brown fluid encountered and sent for culture. 3 incisions made to large necrotizing soft tissue infection. Post op, patient transitioned to Vancomycin/Zosyn/Clindamycin. Infectious Disease is now being consulted regarding evaluation and management of Sepsis, Humberto's gangrene, and Gram-negative bacteremia.   Patient now s/p 2nd I&D today with previous drainage sites much improved with 2 undrained areas further I&D'd. Patient may discomfort is toward tailbone region. No reported fever, chills, sweats, N/V/D, CP, SOB, cough, dysuria.    REVIEW OF SYSTEMS:  A complete review of systems is negative other than that noted in the HPI.    PAST MEDICAL HISTORY:  Past Medical History:   Diagnosis Date    ADHD (attention deficit hyperactivity disorder) 5/11/2024    Diabetes mellitus (HCC)     5/2023    Lung collapse     Sleep apnea     Viral meningitis      Past Surgical History:   Procedure Laterality Date    WI I&D VULVA/PERINEAL ABSCESS N/A 5/11/2024    Procedure: INCISION AND DRAINAGE (I&D) PERINEAL ABSCESS;  Surgeon: Tim Ch MD;  Location: [facility];  Service: General    TONSILECTOMY AND ADNOIDECTOMY      2020       FAMILY HISTORY:  Non-contributory    SOCIAL HISTORY:  Social History   Social History     Substance and Sexual Activity   Alcohol Use Yes    Alcohol/week: 2.0 standard drinks of alcohol    Types: 2 Shots of liquor per week     Social History     Substance and Sexual Activity   Drug Use  Yes    Types: Marijuana     Social History     Tobacco Use   Smoking Status Former    Types: Cigarettes    Passive exposure: Yes   Smokeless Tobacco Current       ALLERGIES:  No Known Allergies    MEDICATIONS:  All current active medications have been reviewed.    PHYSICAL EXAM:  Temp:  [96.3 °F (35.7 °C)-100.6 °F (38.1 °C)] 97.4 °F (36.3 °C)  HR:  [] 104  Resp:  [16-57] 20  BP: ()/(46-76) 133/69  SpO2:  [92 %-98 %] 95 %  Temp (24hrs), Av.6 °F (37 °C), Min:96.3 °F (35.7 °C), Max:100.6 °F (38.1 °C)  Current: Temperature: (!) 97.4 °F (36.3 °C)    Intake/Output Summary (Last 24 hours) at 2024 1209  Last data filed at 2024 1135  Gross per 24 hour   Intake 150 ml   Output 3760 ml   Net -3610 ml       General Appearance:  19 year old male, appearing propped in recliner with waffle cushion and uncomfortable in coccyx region without palpable point tenderness or visible abnormality of skin/spine in lumbar region   Head:  Normocephalic, without obvious abnormality, atraumatic   Eyes:  Conjunctiva pink and sclera anicteric, both eyes   Nose: Nares normal, mucosa normal, no drainage   Throat: Oropharynx moist without lesions   Neck: Supple, symmetrical, no adenopathy, no tenderness/mass/nodules   Back:   Symmetric, no curvature, ROM normal, no CVA tenderness   Lungs:   Clear to auscultation bilaterally, respirations unlabored   Chest Wall:  No tenderness or deformity   Heart:  RRR; no murmur, rub or gallop   Abdomen:   Soft, protuberant pannus, positive bowel sounds    : Olson with clear, yellow urine in bag.   Extremities: No cyanosis, clubbing, Left groin ABD post op dressing in place without strike through drainage or spreading erythema outside dressing   Skin: No rashes or lesions. IV sites nontender   Lymph nodes: Cervical, supraclavicular nodes normal   Neurologic: Alert and oriented times 3, extremity strength 5/5 and symmetric       LABS, IMAGING, & OTHER STUDIES:  Lab Results:  I have  personally reviewed pertinent labs.  Results from last 7 days   Lab Units 05/13/24  0647 05/12/24  0523 05/11/24  1726   WBC Thousand/uL 11.95* 11.44* 17.27*   HEMOGLOBIN g/dL 11.0* 9.5* 12.5   PLATELETS Thousands/uL 240 188 281     Results from last 7 days   Lab Units 05/13/24  0647 05/12/24  0523 05/11/24  1726   SODIUM mmol/L 134* 136 128*   POTASSIUM mmol/L 3.6 3.1* 3.7   CHLORIDE mmol/L 106 112* 99   CO2 mmol/L 22 17* 21   BUN mg/dL 8 6 7   CREATININE mg/dL 0.83 0.56* 0.76   EGFR ml/min/1.73sq m 126 148 131   CALCIUM mg/dL 8.1* 6.4* 8.8   AST U/L  --   --  21   ALT U/L  --   --  20   ALK PHOS U/L  --   --  70     Results from last 7 days   Lab Units 05/12/24  1521 05/11/24  2104 05/11/24  1726 05/08/24  1015   BLOOD CULTURE  Received in Microbiology Lab. Culture in Progress.  Received in Microbiology Lab. Culture in Progress.  --  Staphylococcus epidermidis*  No Growth at 24 hrs.  --    SPUTUM CULTURE   --   --   --  4+ Growth of   GRAM STAIN RESULT   --  No Polys*  4+ Gram negative rods*  4+ Gram positive cocci in pairs, chains and clusters* Gram negative rods*  Gram positive cocci in clusters* 2+ Epithelial cells per low power field*  2+ Gram negative rods*  1+ Gram positive cocci in clusters*  1+ Gram positive rods*  No polys seen*   WOUND CULTURE   --  Culture too young- will reincubate  --   --      Results from last 7 days   Lab Units 05/12/24  0523 05/11/24  1726 05/07/24  0142   PROCALCITONIN ng/ml 0.33* 0.28* 0.07                   Imaging Studies:   Imaging study reports and images in PACS reviewed personally.  5/11/24 CT C/A/P showed extensive cellulitis, subcutaneous edema and subcutaneous air in the left anterior perineum extending inferiorly alongside the left testicle, consistent with Humberto's gangrene    Other Studies:   I have personally reviewed pertinent reports.

## 2024-05-13 NOTE — ASSESSMENT & PLAN NOTE
CT concerning for Humberto's  Sepsis, present on admission, as evidenced by leukocytosis, fever, tachycardia, with source of infection scrotal infection/Humberto's.  Pt is s/p I&D by Surgical team  Blood cultures gram negative rods and gram positive cocci in clusters  Currently on IV Vancomycin, Zosyn and Clindamycin  ID team consulted

## 2024-05-13 NOTE — ANESTHESIA POSTPROCEDURE EVALUATION
Post-Op Assessment Note    CV Status:  Stable  Pain Score: 0    Pain management: adequate    Multimodal analgesia used between 6 hours prior to anesthesia start to PACU discharge    Mental Status:  Alert   Hydration Status:  Stable   PONV Controlled:  None   Airway Patency:  Patent  Two or more mitigation strategies used for obstructive sleep apnea   Post Op Vitals Reviewed: Yes    No anethesia notable event occurred.    Staff: CRNA               BP   141/77   Temp 99   Pulse 104   Resp 16   SpO2 98

## 2024-05-13 NOTE — PROGRESS NOTES
"Progress Note - General Surgery   Rik Marin 19 y.o. male MRN: [id number]  Unit/Bed#: 2 [place] [address] Encounter: [id number]    Assessment:  18yo male with history of ADHD, uncontrolled type 2 diabetes with insulin use, and morbid obesity presenting with sepsis and GNR bacteremia secondary to scrotal necrotizing soft tissue infection/Humberto's gangrene.  Now POD#2 s/p incision and drainage left groin       Plan:  Reexploration and washout today in the OR  NPO, IV fluid hydration  SSI  Currently on zosyn, clindamycin, vancomycin day 3   Probiotic ordered   Procedure consent photo copy placed in chart media  Outpatient referral for weight management   Outpatient follow up with endocrinology       Subjective/Objective     Subjective: Patient's main complaint this morning is neck pain and pain near his tailbone. He reports these are chronic issues for him.  Patient states he feels like everything bad happens to him. He reports his siblings at home refuse to let him cook because he doesn't clean up properly according to them. Patient worried about losing his job and never finding another one due to being hospitalized.   Patient refuses to drive because he doesn't want to so he hitches a ride from family or Ly[employer]. He last saw endocrinology in October 2023.   He seems receptive to following up with weight management        Objective:     Blood pressure 108/67, pulse 88, temperature (!) 96.3 °F (35.7 °C), temperature source Oral, resp. rate 17, height 6' 2\" (1.88 m), weight (!) 177 kg (391 lb), SpO2 96%.,Body mass index is 50.2 kg/m².      Intake/Output Summary (Last 24 hours) at 5/13/2024 0816  Last data filed at 5/13/2024 0636  Gross per 24 hour   Intake --   Output 3350 ml   Net -3350 ml       Invasive Devices       Peripheral Intravenous Line  Duration             Peripheral IV 05/11/24 Left;Upper;Ventral (anterior) Arm 1 day    Peripheral IV 05/12/24 Distal;Right;Upper;Ventral (anterior) Arm <1 day              Drain "  Duration             Urethral Catheter Latex 16 Fr. 1 day                    Physical Exam:   General:  Olson catheter in place with cloudy urine  Cardiovascular:  regular rate and rhythm  Lungs: tachypneic, CTA anteriorly  Abdomen: obese, soft, nttp  Skin:  no rashes or lesions      Lab, Imaging and other studies:I have personally reviewed pertinent lab results.  , CBC:   Lab Results   Component Value Date    WBC 11.95 (H) 05/13/2024    HGB 11.0 (L) 05/13/2024    HCT 35.6 (L) 05/13/2024    MCV 88 05/13/2024     05/13/2024    RBC 4.06 05/13/2024    MCH 27.1 05/13/2024    MCHC 30.9 (L) 05/13/2024    RDW 14.3 05/13/2024    MPV 10.4 05/13/2024   , CMP:   Lab Results   Component Value Date    SODIUM 134 (L) 05/13/2024    K 3.6 05/13/2024     05/13/2024    CO2 22 05/13/2024    BUN 8 05/13/2024    CREATININE 0.83 05/13/2024    CALCIUM 8.1 (L) 05/13/2024    EGFR 126 05/13/2024     VTE Pharmacologic Prophylaxis: lovenox SQ 40mg BID  VTE Mechanical Prophylaxis: sequential compression device

## 2024-05-13 NOTE — OP NOTE
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    Complications:   None    Procedure and Technique:  Incision and drainage left groin necrotizing soft tissue infection.    Patient was taken back to main operating room, placed supine the operating table, general anesthesia was induced, and he was repositioned into lithotomy.    The previous 1 inch iodoform packing was removed and the 3 previous incisions were in an area that was much improved and erythema and induration.  Anterior and superiorly however, the induration worsened and erythema persisted.  This area was was explored and there were 2 additional small collections of purulent fluid.    2 additional incisions were made in this area with tracking noted to the previous collection.  All tracts were explored and debrided.  3 L Pulsavac was used to further irrigate the tracts and cavity.    Devitalized subcutaneous adipose tissue was removed utilizing forceps and Bovie cautery.  Size of  subcutaneous tissue surgically excised: 10 cm x 8 cm x 1 cm.  All grossly devitalized subcutaneous tissue was removed.  A large field block around the area was created with local anesthesia.    Area was cleaned and 1 inch iodoform packing was placed through all 5 incisions to facilitate drainage.  Gauze, ABD pad and mesh panties were placed.    The patient awoke from anesthesia, was extubated in the operating room, and sent to the PACU in stable condition.    Plan will be to return to the operating room on 15 May 2024 for reexploration, irrigation and debridement if required.  Potential closure over Penrose drains if able  ALISHA Esparza was required for technical assistance and retraction     I was present for the entire procedure.    Patient Disposition:  PACU         SIGNATURE: Tim Ch MD  DATE: May 13, 2024  TIME: 11:30 AM

## 2024-05-13 NOTE — UTILIZATION REVIEW
NOTIFICATION OF INPATIENT ADMISSION   AUTHORIZATION REQUEST   SERVICING FACILITY:   [place] [address]  Tax ID: [id number]  NPI: [id number] ATTENDING PROVIDER:  Attending Name and NPI#: Tim Ch Md [[id number]]  Address: [address] [place] [address]  Phone: [phone]   ADMISSION INFORMATION:  Place of Service: Inpatient [facility]  Place of Service Code: 21  Inpatient Admission Date/Time: 5/11/24  7:45 PM  Discharge Date/Time: No discharge date for patient encounter.  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     PHYSICIAN ADVISORY SERVICES:  Medical Necessity Denial & Mzkc-rc-Xbsu Review  Phone: [phone]  Fax: [phone]  Email: [email or web address]     DISCHARGE SUPPORT TEAM:  For Patients Discharge Needs & Updates  Phone: [phone] opt. 2 Fax: [phone]  Email: [email or web address]

## 2024-05-14 PROBLEM — E87.8 ELECTROLYTE ABNORMALITY: Status: ACTIVE | Noted: 2024-05-12

## 2024-05-14 PROBLEM — E87.1 HYPONATREMIA: Status: RESOLVED | Noted: 2024-05-11 | Resolved: 2024-05-14

## 2024-05-14 PROBLEM — J18.9 PNEUMONIA: Status: RESOLVED | Noted: 2024-05-05 | Resolved: 2024-05-14

## 2024-05-14 LAB
ANION GAP SERPL CALCULATED.3IONS-SCNC: 8 MMOL/L (ref 4–13)
BACTERIA BLD CULT: ABNORMAL
BACTERIA BLD CULT: ABNORMAL
BACTERIA WND AEROBE CULT: ABNORMAL
BACTERIA WND AEROBE CULT: ABNORMAL
BUN SERPL-MCNC: 12 MG/DL (ref 5–25)
CALCIUM SERPL-MCNC: 8 MG/DL (ref 8.4–10.2)
CHLORIDE SERPL-SCNC: 106 MMOL/L (ref 96–108)
CO2 SERPL-SCNC: 20 MMOL/L (ref 21–32)
CREAT SERPL-MCNC: 1.31 MG/DL (ref 0.6–1.3)
ERYTHROCYTE [DISTWIDTH] IN BLOOD BY AUTOMATED COUNT: 14.1 % (ref 11.6–15.1)
GFR SERPL CREATININE-BSD FRML MDRD: 77 ML/MIN/1.73SQ M
GLUCOSE SERPL-MCNC: 174 MG/DL (ref 65–140)
GLUCOSE SERPL-MCNC: 179 MG/DL (ref 65–140)
GLUCOSE SERPL-MCNC: 189 MG/DL (ref 65–140)
GLUCOSE SERPL-MCNC: 232 MG/DL (ref 65–140)
GLUCOSE SERPL-MCNC: 235 MG/DL (ref 65–140)
GLUCOSE SERPL-MCNC: 267 MG/DL (ref 65–140)
GRAM STN SPEC: ABNORMAL
HCT VFR BLD AUTO: 32.4 % (ref 36.5–49.3)
HGB BLD-MCNC: 10.2 G/DL (ref 12–17)
MCH RBC QN AUTO: 27.3 PG (ref 26.8–34.3)
MCHC RBC AUTO-ENTMCNC: 31.5 G/DL (ref 31.4–37.4)
MCV RBC AUTO: 87 FL (ref 82–98)
PLATELET # BLD AUTO: 264 THOUSANDS/UL (ref 149–390)
PMV BLD AUTO: 10.8 FL (ref 8.9–12.7)
POTASSIUM SERPL-SCNC: 3.6 MMOL/L (ref 3.5–5.3)
RBC # BLD AUTO: 3.74 MILLION/UL (ref 3.88–5.62)
S EPIDERMIDIS DNA BLD POS QL NAA+NON-PRB: DETECTED
SODIUM SERPL-SCNC: 134 MMOL/L (ref 135–147)
VANCOMYCIN SERPL-MCNC: 13.6 UG/ML (ref 10–20)
WBC # BLD AUTO: 11.85 THOUSAND/UL (ref 4.31–10.16)

## 2024-05-14 PROCEDURE — 99232 SBSQ HOSP IP/OBS MODERATE 35: CPT | Performed by: FAMILY MEDICINE

## 2024-05-14 PROCEDURE — 99024 POSTOP FOLLOW-UP VISIT: CPT | Performed by: SURGERY

## 2024-05-14 PROCEDURE — 94760 N-INVAS EAR/PLS OXIMETRY 1: CPT

## 2024-05-14 PROCEDURE — 82948 REAGENT STRIP/BLOOD GLUCOSE: CPT

## 2024-05-14 PROCEDURE — 94660 CPAP INITIATION&MGMT: CPT

## 2024-05-14 PROCEDURE — 99233 SBSQ HOSP IP/OBS HIGH 50: CPT | Performed by: INTERNAL MEDICINE

## 2024-05-14 PROCEDURE — 80202 ASSAY OF VANCOMYCIN: CPT | Performed by: PHYSICIAN ASSISTANT

## 2024-05-14 PROCEDURE — 85027 COMPLETE CBC AUTOMATED: CPT | Performed by: PHYSICIAN ASSISTANT

## 2024-05-14 PROCEDURE — 80048 BASIC METABOLIC PNL TOTAL CA: CPT | Performed by: PHYSICIAN ASSISTANT

## 2024-05-14 RX ORDER — OXYCODONE HYDROCHLORIDE 10 MG/1
10 TABLET ORAL EVERY 4 HOURS PRN
Status: DISCONTINUED | OUTPATIENT
Start: 2024-05-14 | End: 2024-05-16

## 2024-05-14 RX ORDER — INSULIN GLARGINE 100 [IU]/ML
45 INJECTION, SOLUTION SUBCUTANEOUS
Status: DISCONTINUED | OUTPATIENT
Start: 2024-05-14 | End: 2024-05-14

## 2024-05-14 RX ORDER — HYDROMORPHONE HCL IN WATER/PF 6 MG/30 ML
0.2 PATIENT CONTROLLED ANALGESIA SYRINGE INTRAVENOUS
Status: DISCONTINUED | OUTPATIENT
Start: 2024-05-14 | End: 2024-05-16

## 2024-05-14 RX ORDER — INSULIN LISPRO 100 [IU]/ML
2-12 INJECTION, SOLUTION INTRAVENOUS; SUBCUTANEOUS
Status: DISCONTINUED | OUTPATIENT
Start: 2024-05-14 | End: 2024-05-18 | Stop reason: HOSPADM

## 2024-05-14 RX ORDER — INSULIN GLARGINE 100 [IU]/ML
20 INJECTION, SOLUTION SUBCUTANEOUS
Status: DISCONTINUED | OUTPATIENT
Start: 2024-05-14 | End: 2024-05-15

## 2024-05-14 RX ADMIN — HYDROMORPHONE HYDROCHLORIDE 0.2 MG: 0.2 INJECTION, SOLUTION INTRAMUSCULAR; INTRAVENOUS; SUBCUTANEOUS at 16:21

## 2024-05-14 RX ADMIN — OXYCODONE HYDROCHLORIDE 10 MG: 10 TABLET ORAL at 03:23

## 2024-05-14 RX ADMIN — CLINDAMYCIN IN 5 PERCENT DEXTROSE 900 MG: 18 INJECTION, SOLUTION INTRAVENOUS at 02:32

## 2024-05-14 RX ADMIN — HYDROMORPHONE HYDROCHLORIDE 0.5 MG: 1 INJECTION, SOLUTION INTRAMUSCULAR; INTRAVENOUS; SUBCUTANEOUS at 04:09

## 2024-05-14 RX ADMIN — INSULIN LISPRO 2 UNITS: 100 INJECTION, SOLUTION INTRAVENOUS; SUBCUTANEOUS at 02:25

## 2024-05-14 RX ADMIN — SODIUM CHLORIDE 125 ML/HR: 0.9 INJECTION, SOLUTION INTRAVENOUS at 22:05

## 2024-05-14 RX ADMIN — HYDROMORPHONE HYDROCHLORIDE 0.2 MG: 0.2 INJECTION, SOLUTION INTRAMUSCULAR; INTRAVENOUS; SUBCUTANEOUS at 11:59

## 2024-05-14 RX ADMIN — TOPIRAMATE 50 MG: 25 TABLET, FILM COATED ORAL at 08:17

## 2024-05-14 RX ADMIN — TOPIRAMATE 50 MG: 25 TABLET, FILM COATED ORAL at 18:43

## 2024-05-14 RX ADMIN — SODIUM CHLORIDE, SODIUM LACTATE, POTASSIUM CHLORIDE, AND CALCIUM CHLORIDE 500 ML: .6; .31; .03; .02 INJECTION, SOLUTION INTRAVENOUS at 22:08

## 2024-05-14 RX ADMIN — INSULIN LISPRO 2 UNITS: 100 INJECTION, SOLUTION INTRAVENOUS; SUBCUTANEOUS at 08:17

## 2024-05-14 RX ADMIN — Medication 250 MG: at 18:43

## 2024-05-14 RX ADMIN — AMPICILLIN SODIUM AND SULBACTAM SODIUM 3 G: 2; 1 INJECTION, POWDER, FOR SOLUTION INTRAMUSCULAR; INTRAVENOUS at 18:43

## 2024-05-14 RX ADMIN — AMPICILLIN SODIUM AND SULBACTAM SODIUM 3 G: 2; 1 INJECTION, POWDER, FOR SOLUTION INTRAMUSCULAR; INTRAVENOUS at 23:59

## 2024-05-14 RX ADMIN — INSULIN LISPRO 2 UNITS: 100 INJECTION, SOLUTION INTRAVENOUS; SUBCUTANEOUS at 16:21

## 2024-05-14 RX ADMIN — Medication 6 MG: at 22:00

## 2024-05-14 RX ADMIN — INSULIN LISPRO 9 UNITS: 100 INJECTION, SOLUTION INTRAVENOUS; SUBCUTANEOUS at 08:18

## 2024-05-14 RX ADMIN — INSULIN GLARGINE 20 UNITS: 100 INJECTION, SOLUTION SUBCUTANEOUS at 22:02

## 2024-05-14 RX ADMIN — Medication 250 MG: at 08:17

## 2024-05-14 RX ADMIN — INSULIN LISPRO 2 UNITS: 100 INJECTION, SOLUTION INTRAVENOUS; SUBCUTANEOUS at 12:12

## 2024-05-14 RX ADMIN — CLINDAMYCIN IN 5 PERCENT DEXTROSE 900 MG: 18 INJECTION, SOLUTION INTRAVENOUS at 13:21

## 2024-05-14 RX ADMIN — ENOXAPARIN SODIUM 40 MG: 40 INJECTION SUBCUTANEOUS at 18:43

## 2024-05-14 RX ADMIN — SODIUM CHLORIDE 125 ML/HR: 0.9 INJECTION, SOLUTION INTRAVENOUS at 00:53

## 2024-05-14 RX ADMIN — FLUOXETINE 10 MG: 10 CAPSULE ORAL at 08:17

## 2024-05-14 RX ADMIN — ENOXAPARIN SODIUM 40 MG: 40 INJECTION SUBCUTANEOUS at 08:17

## 2024-05-14 RX ADMIN — HYDROMORPHONE HYDROCHLORIDE 0.5 MG: 1 INJECTION, SOLUTION INTRAMUSCULAR; INTRAVENOUS; SUBCUTANEOUS at 08:17

## 2024-05-14 RX ADMIN — OXYCODONE HYDROCHLORIDE 10 MG: 10 TABLET ORAL at 22:00

## 2024-05-14 RX ADMIN — CEFEPIME HYDROCHLORIDE 2000 MG: 2 INJECTION, SOLUTION INTRAVENOUS at 08:17

## 2024-05-14 RX ADMIN — METRONIDAZOLE 500 MG: 500 INJECTION, SOLUTION INTRAVENOUS at 03:39

## 2024-05-14 RX ADMIN — SODIUM CHLORIDE, SODIUM LACTATE, POTASSIUM CHLORIDE, AND CALCIUM CHLORIDE 500 ML: .6; .31; .03; .02 INJECTION, SOLUTION INTRAVENOUS at 12:00

## 2024-05-14 NOTE — ASSESSMENT & PLAN NOTE
Patient was hospitalized 5/5-5/7 for sepsis multifocal pneumonia.  Was treated with Rocephin and Zithromax, discharged on Ceftin and Zithromax

## 2024-05-14 NOTE — ASSESSMENT & PLAN NOTE
Pt is s/p I&D by Surgery team on 5/11 and 5/13   OR culture with actinomyces is, Prevotella and mixed skin jalyn   Transitioned to Unasyn  Return to OR on 5/15

## 2024-05-14 NOTE — ASSESSMENT & PLAN NOTE
Body mass index is 50.2 kg/m².   Diet and lifestyle modification.  CT showed hepatomegaly with steatosis and splenomegaly.

## 2024-05-14 NOTE — PROGRESS NOTES
Progress Note - Infectious Disease   Rik Marin 19 y.o. male MRN: [id number]  Unit/Bed#: 2 [place] [address] Encounter: [id number]      Impression/Plan:  1. Sepsis, evidenced by fever and leukocytosis. In setting of #2/3  -antibiotics as below  -follow-up cultures and adjust antibiotics as needed  -monitor temperature and hemodynamics  -serial exam  -recheck CBC and BMP in a.m. - monitoring treatment response and any developing toxicities     2. Acinetobacter bacteremia. Admission blood cultures growing Staph epi and Acinetobacter lwoffii in 1 of 2 sets. Consider true Acinetobacter bacteremia in setting of #3 and staph epi possible skin contaminant in single set.   -antibiotics as below   -follow up repeat blood cultures  -management as below     3. Necrotizing soft tissue infection Left groin.   5/11/24 OR I&D with foul-smelling green-brown fluid encountered and sent for culture. OR cx 1+ Actinomyces species, few MSF, 4+ prevotella bivia  5/13/24 s/p 2nd I&D today   -discontinue Vancomycin, Zosyn and Clindamycin.   -transition to Unasyn 3 g IV q 6 hours  -serial exam  -follow up OR culture finals  -close surgical follow-up  -plan for return to OR 5/15/24  -additional interventions pending clinical course     4. Type 2 Diabetes Mellitus with hyperglycemia. 5/7/24 HgbA1c 11.6 Risk factor for poor wound healing and infection.  -blood glucose management per primary     5. Morbid Obesity, BMI 50.2 Can affect antibiotic dosing/absorption  -dose adjust antibiotics as needed.      Antibiotics:  Unasyn D1     I have discussed the above management plan in detail with patient, RN, and primary care team. They concur with ID treatment plan and close monitoring as above.    Subjective:  Patient has no fever, chills, sweats; no nausea, vomiting, diarrhea; no cough, shortness of breath; + post op region discomfort, difficult for patient to turn self in bed. No tailbone site pain at present.   Objective:  Vitals:  Temp:  [97.4 °F  (36.3 °C)-98.1 °F (36.7 °C)] 98.1 °F (36.7 °C)  HR:  [] 87  Resp:  [20-48] 22  BP: (110-154)/(65-84) 110/69  SpO2:  [91 %-96 %] 95 %  Temp (24hrs), Av.8 °F (36.6 °C), Min:97.4 °F (36.3 °C), Max:98.1 °F (36.7 °C)  Current: Temperature: 98.1 °F (36.7 °C)    Physical Exam:   General Appearance:  19 year old male, propped in bed, debilitated, nontoxic, resting fairly stiffly due to discomfort with movement in groin region   HEENT: Atraumatic normocephalic   Throat: Oropharynx moist. Poor dentition   Pulmonary:   Normal respiratory excursion without accessory muscle use   Cardiac:  RRR   Abdomen:   Soft, no focal tenderness, protuberant pannus   Extremities: No edema   : Olson with clear, yellow urine in bag, no SPT, left groin region with strike through serosangious drainage to surrounding linens, dressing   Psychiatric: Awake, cooperative   Skin: No new rashes. IV site nontender.        Labs, Imaging, & Other studies:   All pertinent labs and imaging studies were personally reviewed  Results from last 7 days   Lab Units 24  0637 24  0647 24  0523   WBC Thousand/uL 11.85* 11.95* 11.44*   HEMOGLOBIN g/dL 10.2* 11.0* 9.5*   PLATELETS Thousands/uL 264 240 188     Results from last 7 days   Lab Units 24  0637 24  0647 24  0523 24  1726   SODIUM mmol/L 134* 134* 136 128*   POTASSIUM mmol/L 3.6 3.6 3.1* 3.7   CHLORIDE mmol/L 106 106 112* 99   CO2 mmol/L 20* 22 17* 21   BUN mg/dL 12 8 6 7   CREATININE mg/dL 1.31* 0.83 0.56* 0.76   EGFR ml/min/1.73sq m 77 126 148 131   CALCIUM mg/dL 8.0* 8.1* 6.4* 8.8   AST U/L  --   --   --  21   ALT U/L  --   --   --  20   ALK PHOS U/L  --   --   --  70     Results from last 7 days   Lab Units 24  1521 24  2104 24  1740 24  1726 24  1015   BLOOD CULTURE  No Growth at 24 hrs.  No Growth at 24 hrs.  --   --  Staphylococcus epidermidis*  Acinetobacter lwoffii*  No Growth at 48 hrs.  --    SPUTUM CULTURE   --    --   --   --  4+ Growth of   GRAM STAIN RESULT   --  No Polys*  4+ Gram negative rods*  4+ Gram positive cocci in pairs, chains and clusters*  --  Gram negative rods*  Gram positive cocci in clusters* 2+ Epithelial cells per low power field*  2+ Gram negative rods*  1+ Gram positive cocci in clusters*  1+ Gram positive rods*  No polys seen*   URINE CULTURE   --   --  No Growth <1000 cfu/mL  --   --    WOUND CULTURE   --  Culture too young- will reincubate  --   --   --      Results from last 7 days   Lab Units 05/12/24  0523 05/11/24  1726   PROCALCITONIN ng/ml 0.33* 0.28*

## 2024-05-14 NOTE — CONSULTS
Vancomycin IV Pharmacy-to-Dose Consultation     Vancomycin has been discontinued.  Pharmacy will sign off.  Please contact or re-consult with questions.    Shira Lopez, Pharmacist

## 2024-05-14 NOTE — ASSESSMENT & PLAN NOTE
Possibly blood loss related to I&D and possibly dilutional in nature.  Follow CBC     Results from last 7 days   Lab Units 05/14/24  0637 05/13/24  0647 05/12/24  0523 05/11/24  1726   HEMOGLOBIN g/dL 10.2* 11.0* 9.5* 12.5   HEMATOCRIT % 32.4* 35.6* 30.0* 39.2   MCV fL 87 88 87 86

## 2024-05-14 NOTE — ASSESSMENT & PLAN NOTE
Lab Results   Component Value Date    HGBA1C 11.6 (H) 05/07/2024       Recent Labs     05/14/24  0113 05/14/24  0803 05/14/24  1124 05/14/24  1606   POCGLU 235* 232* 174* 189*       As patient will be n.p.o. after midnight, will decrease dose of Lantus to 20 units.  Once taking p.o. intake will restart insulin with meals  Continue SSI.  Blood sugars are not well-controlled

## 2024-05-14 NOTE — ASSESSMENT & PLAN NOTE
Patient initially presented with left testicular and pubic pain for 2 to 3 days associated with fevers, chills, nausea and vomiting  CT concerning for Humberto's  Sepsis, present on admission, as evidenced by leukocytosis, fever, tachycardia, with source being left groin infection/Humberto's gangrene  Status post 2 washouts by Surgical team  Repeat lab work in a.m.

## 2024-05-14 NOTE — PROGRESS NOTES
[facility]  Progress Note  Name: Rik Marin I  MRN: [id number]  Unit/Bed#: 2 [place] [address] I Date of Admission: 5/11/2024   Date of Service: 5/14/2024 I Hospital Day: 3    Assessment/Plan   * Type 2 diabetes mellitus with hyperglycemia, with long-term current use of insulin (HCC)  Assessment & Plan  Lab Results   Component Value Date    HGBA1C 11.6 (H) 05/07/2024       Recent Labs     05/14/24  0113 05/14/24  0803 05/14/24  1124 05/14/24  1606   POCGLU 235* 232* 174* 189*       As patient will be n.p.o. after midnight, will decrease dose of Lantus to 20 units.  Once taking p.o. intake will restart insulin with meals  Continue SSI.  Blood sugars are not well-controlled      Humberto's gangrene of scrotum  Assessment & Plan  Pt is s/p I&D by Surgery team on 5/11 and 5/13   OR culture with actinomyces is, Prevotella and mixed skin jalyn   Transitioned to Unasyn  Return to OR on 5/15    Gram-negative bacteremia  Assessment & Plan  Transitioned to IV Unasyn.  Discontinued vancomycin, Zosyn and clindamycin   Admission blood cultures with Acinetobacter Iwoffii staph epi.  Staph epi likely skin contaminant  repeat blood cultures negative  ID input appreciated    Acute anemia  Assessment & Plan  Possibly blood loss related to I&D and possibly dilutional in nature.  Follow CBC     Results from last 7 days   Lab Units 05/14/24  0637 05/13/24  0647 05/12/24  0523 05/11/24  1726   HEMOGLOBIN g/dL 10.2* 11.0* 9.5* 12.5   HEMATOCRIT % 32.4* 35.6* 30.0* 39.2   MCV fL 87 88 87 86        Sepsis (HCC)  Assessment & Plan  Patient initially presented with left testicular and pubic pain for 2 to 3 days associated with fevers, chills, nausea and vomiting  CT concerning for Humberto's  Sepsis, present on admission, as evidenced by leukocytosis, fever, tachycardia, with source being left groin infection/Humberto's gangrene  Status post 2 washouts by Surgical team  Repeat lab work in a.m.    Electrolyte  abnormality  Assessment & Plan  Potassium and magnesium level has improved status post repletion  Repeat lab work in a.m.    ADHD (attention deficit hyperactivity disorder)  Assessment & Plan  Continue Prozac    Sleep apnea  Assessment & Plan  Unsure if using CPAP at home    Morbid obesity (HCC)  Assessment & Plan  Body mass index is 50.2 kg/m².   Diet and lifestyle modification.  CT showed hepatomegaly with steatosis and splenomegaly.      Hyponatremia-resolved as of 5/14/2024  Assessment & Plan  Mild, most likely pseudohyponatremia from hyperglycemia.     Pneumonia-resolved as of 5/14/2024  Assessment & Plan  Patient was hospitalized 5/5-5/7 for sepsis multifocal pneumonia.  Was treated with Rocephin and Zithromax, discharged on Ceftin and Zithromax               VTE Pharmacologic Prophylaxis: VTE Score: 6 High Risk (Score >/= 5) - Pharmacological DVT Prophylaxis Ordered: enoxaparin (Lovenox). Sequential Compression Devices Ordered.    Mobility:   Basic Mobility Inpatient Raw Score: 18  JH-HLM Goal: 6: Walk 10 steps or more  JH-HLM Achieved: 7: Walk 25 feet or more  JH-HLM Goal achieved. Continue to encourage appropriate mobility.    Patient Centered Rounds: I performed bedside rounds with nursing staff today.   Discussions with Specialists or Other Care Team Provider: yes    Education and Discussions with Family / Patient: yes    Total Time Spent on Date of Encounter in care of patient:This time was spent on one or more of the following: performing physical exam; counseling and coordination of care; obtaining or reviewing history; documenting in the medical record; reviewing/ordering tests, medications or procedures; communicating with other healthcare professionals and discussing with patient's family/caregivers.    Current Length of Stay: 3 day(s)  Current Patient Status: Inpatient   Certification Statement: The patient will continue to require additional inpatient hospital stay due to Fornier's gangrene  requiring OR intervention  Discharge Plan: [facility] is following this patient on consult. They are not yet medically stable for discharge secondary to as noted above.    Code Status: Level 1 - Full Code    Subjective:   Reports 10/10 pain along his tailbone.  Denies any shortness of breath.  Reports good p.o. intake    Objective:     Vitals:   Temp (24hrs), Av.8 °F (36.6 °C), Min:97.4 °F (36.3 °C), Max:98.1 °F (36.7 °C)    Temp:  [97.4 °F (36.3 °C)-98.1 °F (36.7 °C)] 98.1 °F (36.7 °C)  HR:  [] 87  Resp:  [20-48] 22  BP: (110-154)/(65-84) 110/69  SpO2:  [91 %-96 %] 95 %  Body mass index is 50.2 kg/m².     Input and Output Summary (last 24 hours):     Intake/Output Summary (Last 24 hours) at 2024 0935  Last data filed at 2024 2145  Gross per 24 hour   Intake 150 ml   Output 3915 ml   Net -3765 ml       Physical Exam:   Physical Exam  Vitals reviewed.   Constitutional:       General: He is not in acute distress.     Appearance: He is not toxic-appearing.   HENT:      Head: Normocephalic and atraumatic.   Eyes:      General:         Right eye: No discharge.         Left eye: No discharge.   Cardiovascular:      Rate and Rhythm: Normal rate and regular rhythm.   Pulmonary:      Effort: Pulmonary effort is normal. No respiratory distress.      Breath sounds: Normal breath sounds. No wheezing or rales.   Abdominal:      General: Bowel sounds are normal. There is no distension.      Palpations: Abdomen is soft.      Tenderness: There is no abdominal tenderness.   Genitourinary:     Comments: Olson in place  Neurological:      Mental Status: He is alert.          Additional Data:     Labs:  Results from last 7 days   Lab Units 24  0637 24  0647 24  0523 24  1726   WBC Thousand/uL 11.85*   < > 11.44* 17.27*   HEMOGLOBIN g/dL 10.2*   < > 9.5* 12.5   HEMATOCRIT % 32.4*   < > 30.0* 39.2   PLATELETS Thousands/uL 264   < > 188 281   BANDS PCT %  --   --  3  --    SEGS PCT %  --   --   --   84*   LYMPHO PCT %  --   --  13* 10*   MONO PCT %  --   --  6 5   EOS PCT %  --   --  0 0    < > = values in this interval not displayed.     Results from last 7 days   Lab Units 05/14/24  0637 05/12/24  0523 05/11/24  1726   SODIUM mmol/L 134*   < > 128*   POTASSIUM mmol/L 3.6   < > 3.7   CHLORIDE mmol/L 106   < > 99   CO2 mmol/L 20*   < > 21   BUN mg/dL 12   < > 7   CREATININE mg/dL 1.31*   < > 0.76   ANION GAP mmol/L 8   < > 8   CALCIUM mg/dL 8.0*   < > 8.8   ALBUMIN g/dL  --   --  3.8   TOTAL BILIRUBIN mg/dL  --   --  0.92   ALK PHOS U/L  --   --  70   ALT U/L  --   --  20   AST U/L  --   --  21   GLUCOSE RANDOM mg/dL 267*   < > 234*    < > = values in this interval not displayed.     Results from last 7 days   Lab Units 05/11/24  1726   INR  1.14     Results from last 7 days   Lab Units 05/14/24  0803 05/14/24  0113 05/13/24  2117 05/13/24  1627 05/13/24  0723 05/13/24  0125 05/12/24  2055 05/12/24  1604 05/12/24  1105 05/12/24  0700 05/12/24  0144 05/11/24  2142   POC GLUCOSE mg/dl 232* 235* 250* 291* 162* 140 194* 216* 190* 213* 212* 182*         Results from last 7 days   Lab Units 05/12/24  0523 05/11/24  1726   LACTIC ACID mmol/L  --  1.4   PROCALCITONIN ng/ml 0.33* 0.28*       Lines/Drains:  Invasive Devices       Peripheral Intravenous Line  Duration             Peripheral IV 05/11/24 Left;Upper;Ventral (anterior) Arm 2 days              Drain  Duration             Urethral Catheter Latex 16 Fr. 2 days                  Urinary Catheter:  Goal for removal:  N/A               Imaging: Reviewed radiology reports from this admission including: chest CT scan and abdominal/pelvic CT    Recent Cultures (last 7 days):   Results from last 7 days   Lab Units 05/12/24  1521 05/11/24  2104 05/11/24  1740 05/11/24  1726 05/08/24  1015   BLOOD CULTURE  No Growth at 24 hrs.  No Growth at 24 hrs.  --   --  Staphylococcus epidermidis*  Acinetobacter lwoffii*  No Growth at 48 hrs.  --    SPUTUM CULTURE   --   --   --    --  4+ Growth of   GRAM STAIN RESULT   --  No Polys*  4+ Gram negative rods*  4+ Gram positive cocci in pairs, chains and clusters*  --  Gram negative rods*  Gram positive cocci in clusters* 2+ Epithelial cells per low power field*  2+ Gram negative rods*  1+ Gram positive cocci in clusters*  1+ Gram positive rods*  No polys seen*   URINE CULTURE   --   --  No Growth <1000 cfu/mL  --   --    WOUND CULTURE   --  Culture too young- will reincubate  --   --   --        Last 24 Hours Medication List:   Current Facility-Administered Medications   Medication Dose Route Frequency Provider Last Rate    acetaminophen  650 mg Oral Q6H PRN Anastacio Esparza PA-C      albuterol  2.5 mg Nebulization Q6H PRN Anastacio Esparza PA-C      benzonatate  100 mg Oral TID PRN Anastacio Esparza PA-C      cefepime  2,000 mg Intravenous Q8H Martin Decker MD 2,000 mg (05/14/24 0817)    clindamycin  900 mg Intravenous Q8H Anastacio Esparza PA-C 900 mg (05/14/24 0232)    enoxaparin  40 mg Subcutaneous BID Anastacio Esparza PA-C      FLUoxetine  10 mg Oral Daily Anastacio Esparza PA-C      HYDROmorphone  0.5 mg Intravenous Q4H PRN Anastacio Esparza PA-C      insulin glargine  30 Units Subcutaneous HS Anastacio Esparza PA-C      insulin lispro  1-5 Units Subcutaneous TID AC Anastacio Esparza PA-C      insulin lispro  1-5 Units Subcutaneous HS Anastacio Esparza PA-C      insulin lispro  1-5 Units Subcutaneous 0200 Anastacio Esparza PA-C      insulin lispro  9 Units Subcutaneous TID With Meals Anastacio Esparza PA-C      melatonin  6 mg Oral HS ANDRES Saldivar      metroNIDAZOLE  500 mg Intravenous Q12H Martin Decker  mg (05/14/24 3119)    ondansetron  4 mg Intravenous Q6H PRN Anastacio Esparza PA-C      oxyCODONE  10 mg Oral Q4H PRN Anastacio Esparza PA-C      oxyCODONE  5 mg Oral Q4H PRN Anastacio Esparza PA-C      saccharomyces boulardii  250 mg Oral BID Anastacio Esparza PA-C      sodium chloride  125 mL/hr Intravenous Continuous Anastacio Esparza PA-C 125 mL/hr (05/14/24 0053)     topiramate  50 mg Oral BID Anastacio Esparza PA-C      vancomycin  1,500 mg Intravenous Once Heather Hillman PA-C          Today, Patient Was Seen By: Ginny Weaver DO    **Please Note: This note may have been constructed using a voice recognition system.**

## 2024-05-14 NOTE — ASSESSMENT & PLAN NOTE
Transitioned to IV Unasyn.  Discontinued vancomycin, Zosyn and clindamycin   Admission blood cultures with Acinetobacter Iwoffii staph epi.  Staph epi likely skin contaminant  repeat blood cultures negative  ID input appreciated

## 2024-05-14 NOTE — PROGRESS NOTES
Progress Note - General Surgery   Rik Marin 19 y.o. male MRN: [id number]  Unit/Bed#: 2 [place] [address] Encounter: [id number]    Assessment:  1) 20yo male with history of ADHD, uncontrolled type 2 diabetes with insulin use, and morbid obesity presenting with sepsis and GNR bacteremia secondary to scrotal necrotizing soft tissue infection/Humberto's gangrene  POD#3 s/p incision and drainage left groin - AVSS, elevation in creatinine to 1.31, consistently hyperglycemic on fingerstick glucose checks, wound packed without any signs of obvious necrosis, dressings are not completely saturated, prolonged immobility has led to the development of likely a sacral ulcer prior to admission based on patient's reports of tailbone discomfort and pain, discomfort is still persisting    Plan:  1)   -Will adjust as needed analgesic regimen to assist with discomfort  -Continue IV fluids but will give to 500 cc boluses throughout the course of today  -Continue IV antibiotics  -Recheck CBC, BMP, mag, Phos with a.m. labs  -Will reevaluate and readjust sliding scale insulin as well as basal rate  -Continue current dressings with plan for changing dressings tomorrow in the OR  -Reevaluation of wound base in the OR  -Coordinated with nursing to apply Allevyn border dressing and take pictures of sacrum patient was seen examined at bedside.,  Foam wedges    Subjective/Objective   Chief Complaint: I am having some pain on my tailbone can something be done about that    Subjective: Patient was seen and examined at bedside.  Patient has soreness over the left groin but denies any progressively worsening pain at this current moment.  Patient denies any fevers or chills.  Patient does report drainage but not completely saturating the gauze.  Patient denies any abdominal pain.  Patient denies any cardiopulmonary symptoms.  Patient still has Olson catheter in place.  Patient does report that he has pain over side of his tailbone.  Patient reports  "that the pain present overlying his sacrum was present prior to hospital admission.  Patient does not know if he has any open wound on the backside.  Patient is immobile for prolonged periods of time.  Patient understands that he has to move himself regularly to avoid any sort of sacral ulcer formation.  Patient does appear as if he is breathing heavily but he does not endorse any severe shortness of breath.  Patient reports that his breathing is per his baseline.    Objective:     Blood pressure 110/69, pulse 87, temperature 98.1 °F (36.7 °C), resp. rate 22, height 6' 2\" (1.88 m), weight (!) 177 kg (391 lb), SpO2 95%.,Body mass index is 50.2 kg/m².      Intake/Output Summary (Last 24 hours) at 5/14/2024 1014  Last data filed at 5/13/2024 2145  Gross per 24 hour   Intake 150 ml   Output 3915 ml   Net -3765 ml       Invasive Devices       Peripheral Intravenous Line  Duration             Peripheral IV 05/11/24 Left;Upper;Ventral (anterior) Arm 2 days              Drain  Duration             Urethral Catheter Latex 16 Fr. 2 days                    Physical Exam: /69   Pulse 87   Temp 98.1 °F (36.7 °C)   Resp 22   Ht 6' 2\" (1.88 m)   Wt (!) 177 kg (391 lb)   SpO2 95%   BMI 50.20 kg/m²   General appearance: alert and oriented, in no acute distress  Head: Normocephalic, without obvious abnormality, atraumatic  Lungs: clear to auscultation bilaterally slightly labored  Heart: regular rate and rhythm, S1, S2 normal, no murmur, click, rub or gallop  Abdomen: soft, non-tender; bowel sounds normal; no masses,  no organomegaly  Male genitalia:  open inguinal canal open without obvious signs of necrosis   Skin:  open wound of left inguinal canal, packed, ABDs overlying not saturated    Lab, Imaging and other studies:I have personally reviewed pertinent lab results.  , CBC:   Lab Results   Component Value Date    WBC 11.85 (H) 05/14/2024    HGB 10.2 (L) 05/14/2024    HCT 32.4 (L) 05/14/2024    MCV 87 05/14/2024    "  05/14/2024    RBC 3.74 (L) 05/14/2024    MCH 27.3 05/14/2024    MCHC 31.5 05/14/2024    RDW 14.1 05/14/2024    MPV 10.8 05/14/2024   , CMP:   Lab Results   Component Value Date    SODIUM 134 (L) 05/14/2024    K 3.6 05/14/2024     05/14/2024    CO2 20 (L) 05/14/2024    BUN 12 05/14/2024    CREATININE 1.31 (H) 05/14/2024    CALCIUM 8.0 (L) 05/14/2024    EGFR 77 05/14/2024     VTE Pharmacologic Prophylaxis: Enoxaparin (Lovenox)  VTE Mechanical Prophylaxis: sequential compression device

## 2024-05-15 ENCOUNTER — APPOINTMENT (INPATIENT)
Dept: RADIOLOGY | Facility: HOSPITAL | Age: 19
DRG: 710 | End: 2024-05-15
Payer: COMMERCIAL

## 2024-05-15 ENCOUNTER — PATIENT OUTREACH (OUTPATIENT)
Dept: CASE MANAGEMENT | Facility: OTHER | Age: 19
End: 2024-05-15

## 2024-05-15 ENCOUNTER — ANESTHESIA (INPATIENT)
Dept: PERIOP | Facility: HOSPITAL | Age: 19
DRG: 710 | End: 2024-05-15
Payer: COMMERCIAL

## 2024-05-15 DIAGNOSIS — Z71.89 ENCOUNTER FOR COORDINATION OF COMPLEX CARE: Primary | ICD-10-CM

## 2024-05-15 DIAGNOSIS — E11.65 UNCONTROLLED TYPE 2 DIABETES MELLITUS WITH HYPERGLYCEMIA (HCC): ICD-10-CM

## 2024-05-15 PROBLEM — E87.8 ELECTROLYTE ABNORMALITY: Status: RESOLVED | Noted: 2024-05-12 | Resolved: 2024-05-15

## 2024-05-15 LAB
ANION GAP SERPL CALCULATED.3IONS-SCNC: 7 MMOL/L (ref 4–13)
BACTERIA SPEC ANAEROBE CULT: ABNORMAL
BASE EX.OXY STD BLDV CALC-SCNC: 95.4 % (ref 60–80)
BASE EXCESS BLDV CALC-SCNC: -6.5 MMOL/L
BASOPHILS # BLD AUTO: 0.03 THOUSANDS/ÂΜL (ref 0–0.1)
BASOPHILS # BLD AUTO: 0.03 THOUSANDS/ÂΜL (ref 0–0.1)
BASOPHILS NFR BLD AUTO: 0 % (ref 0–1)
BASOPHILS NFR BLD AUTO: 0 % (ref 0–1)
BUN SERPL-MCNC: 12 MG/DL (ref 5–25)
CALCIUM SERPL-MCNC: 8.6 MG/DL (ref 8.4–10.2)
CHLORIDE SERPL-SCNC: 107 MMOL/L (ref 96–108)
CO2 SERPL-SCNC: 22 MMOL/L (ref 21–32)
CREAT SERPL-MCNC: 1.3 MG/DL (ref 0.6–1.3)
EOSINOPHIL # BLD AUTO: 0.16 THOUSAND/ÂΜL (ref 0–0.61)
EOSINOPHIL # BLD AUTO: 0.21 THOUSAND/ÂΜL (ref 0–0.61)
EOSINOPHIL NFR BLD AUTO: 1 % (ref 0–6)
EOSINOPHIL NFR BLD AUTO: 2 % (ref 0–6)
ERYTHROCYTE [DISTWIDTH] IN BLOOD BY AUTOMATED COUNT: 14.3 % (ref 11.6–15.1)
ERYTHROCYTE [DISTWIDTH] IN BLOOD BY AUTOMATED COUNT: 14.4 % (ref 11.6–15.1)
GFR SERPL CREATININE-BSD FRML MDRD: 78 ML/MIN/1.73SQ M
GLUCOSE SERPL-MCNC: 145 MG/DL (ref 65–140)
GLUCOSE SERPL-MCNC: 149 MG/DL (ref 65–140)
GLUCOSE SERPL-MCNC: 160 MG/DL (ref 65–140)
GLUCOSE SERPL-MCNC: 188 MG/DL (ref 65–140)
HCO3 BLDV-SCNC: 18.6 MMOL/L (ref 24–30)
HCT VFR BLD AUTO: 31.2 % (ref 36.5–49.3)
HCT VFR BLD AUTO: 33.1 % (ref 36.5–49.3)
HGB BLD-MCNC: 10.1 G/DL (ref 12–17)
HGB BLD-MCNC: 9.8 G/DL (ref 12–17)
IMM GRANULOCYTES # BLD AUTO: 0.07 THOUSAND/UL (ref 0–0.2)
IMM GRANULOCYTES # BLD AUTO: 0.11 THOUSAND/UL (ref 0–0.2)
IMM GRANULOCYTES NFR BLD AUTO: 1 % (ref 0–2)
IMM GRANULOCYTES NFR BLD AUTO: 1 % (ref 0–2)
LACTATE SERPL-SCNC: 0.7 MMOL/L (ref 0.5–2)
LYMPHOCYTES # BLD AUTO: 1.76 THOUSANDS/ÂΜL (ref 0.6–4.47)
LYMPHOCYTES # BLD AUTO: 2.1 THOUSANDS/ÂΜL (ref 0.6–4.47)
LYMPHOCYTES NFR BLD AUTO: 14 % (ref 14–44)
LYMPHOCYTES NFR BLD AUTO: 18 % (ref 14–44)
MAGNESIUM SERPL-MCNC: 2 MG/DL (ref 1.9–2.7)
MCH RBC QN AUTO: 26.8 PG (ref 26.8–34.3)
MCH RBC QN AUTO: 27.5 PG (ref 26.8–34.3)
MCHC RBC AUTO-ENTMCNC: 30.5 G/DL (ref 31.4–37.4)
MCHC RBC AUTO-ENTMCNC: 31.4 G/DL (ref 31.4–37.4)
MCV RBC AUTO: 87 FL (ref 82–98)
MCV RBC AUTO: 88 FL (ref 82–98)
MONOCYTES # BLD AUTO: 0.78 THOUSAND/ÂΜL (ref 0.17–1.22)
MONOCYTES # BLD AUTO: 0.92 THOUSAND/ÂΜL (ref 0.17–1.22)
MONOCYTES NFR BLD AUTO: 6 % (ref 4–12)
MONOCYTES NFR BLD AUTO: 8 % (ref 4–12)
NEUTROPHILS # BLD AUTO: 10.06 THOUSANDS/ÂΜL (ref 1.85–7.62)
NEUTROPHILS # BLD AUTO: 8.24 THOUSANDS/ÂΜL (ref 1.85–7.62)
NEUTS SEG NFR BLD AUTO: 71 % (ref 43–75)
NEUTS SEG NFR BLD AUTO: 78 % (ref 43–75)
NRBC BLD AUTO-RTO: 0 /100 WBCS
NRBC BLD AUTO-RTO: 0 /100 WBCS
O2 CT BLDV-SCNC: 15.4 ML/DL
PCO2 BLDV: 35.7 MM HG (ref 42–50)
PH BLDV: 7.33 [PH] (ref 7.3–7.4)
PHOSPHATE SERPL-MCNC: 4.5 MG/DL (ref 2.7–4.5)
PLATELET # BLD AUTO: 292 THOUSANDS/UL (ref 149–390)
PLATELET # BLD AUTO: 294 THOUSANDS/UL (ref 149–390)
PMV BLD AUTO: 10.8 FL (ref 8.9–12.7)
PMV BLD AUTO: 11.7 FL (ref 8.9–12.7)
PO2 BLDV: 91.6 MM HG (ref 35–45)
POTASSIUM SERPL-SCNC: 3.8 MMOL/L (ref 3.5–5.3)
PROCALCITONIN SERPL-MCNC: 0.32 NG/ML
RBC # BLD AUTO: 3.57 MILLION/UL (ref 3.88–5.62)
RBC # BLD AUTO: 3.77 MILLION/UL (ref 3.88–5.62)
SODIUM SERPL-SCNC: 136 MMOL/L (ref 135–147)
WBC # BLD AUTO: 11.57 THOUSAND/UL (ref 4.31–10.16)
WBC # BLD AUTO: 12.9 THOUSAND/UL (ref 4.31–10.16)

## 2024-05-15 PROCEDURE — 85025 COMPLETE CBC W/AUTO DIFF WBC: CPT

## 2024-05-15 PROCEDURE — 71045 X-RAY EXAM CHEST 1 VIEW: CPT

## 2024-05-15 PROCEDURE — 99233 SBSQ HOSP IP/OBS HIGH 50: CPT | Performed by: INTERNAL MEDICINE

## 2024-05-15 PROCEDURE — 82805 BLOOD GASES W/O2 SATURATION: CPT

## 2024-05-15 PROCEDURE — 0JDB0ZZ EXTRACTION OF PERINEUM SUBCUTANEOUS TISSUE AND FASCIA, OPEN APPROACH: ICD-10-PCS | Performed by: SURGERY

## 2024-05-15 PROCEDURE — 99232 SBSQ HOSP IP/OBS MODERATE 35: CPT | Performed by: FAMILY MEDICINE

## 2024-05-15 PROCEDURE — 10061 I&D ABSCESS COMP/MULTIPLE: CPT | Performed by: SURGERY

## 2024-05-15 PROCEDURE — 94760 N-INVAS EAR/PLS OXIMETRY 1: CPT

## 2024-05-15 PROCEDURE — 94660 CPAP INITIATION&MGMT: CPT

## 2024-05-15 PROCEDURE — 82948 REAGENT STRIP/BLOOD GLUCOSE: CPT

## 2024-05-15 PROCEDURE — 80048 BASIC METABOLIC PNL TOTAL CA: CPT | Performed by: PHYSICIAN ASSISTANT

## 2024-05-15 PROCEDURE — 10061 I&D ABSCESS COMP/MULTIPLE: CPT | Performed by: PHYSICIAN ASSISTANT

## 2024-05-15 PROCEDURE — 87040 BLOOD CULTURE FOR BACTERIA: CPT

## 2024-05-15 PROCEDURE — 84145 PROCALCITONIN (PCT): CPT

## 2024-05-15 PROCEDURE — 83605 ASSAY OF LACTIC ACID: CPT

## 2024-05-15 PROCEDURE — 99024 POSTOP FOLLOW-UP VISIT: CPT | Performed by: SURGERY

## 2024-05-15 PROCEDURE — 83735 ASSAY OF MAGNESIUM: CPT | Performed by: PHYSICIAN ASSISTANT

## 2024-05-15 PROCEDURE — 85025 COMPLETE CBC W/AUTO DIFF WBC: CPT | Performed by: PHYSICIAN ASSISTANT

## 2024-05-15 PROCEDURE — 84100 ASSAY OF PHOSPHORUS: CPT | Performed by: PHYSICIAN ASSISTANT

## 2024-05-15 RX ORDER — INSULIN LISPRO 100 [IU]/ML
9 INJECTION, SOLUTION INTRAVENOUS; SUBCUTANEOUS
Status: DISCONTINUED | OUTPATIENT
Start: 2024-05-16 | End: 2024-05-18 | Stop reason: HOSPADM

## 2024-05-15 RX ORDER — MIDAZOLAM HYDROCHLORIDE 2 MG/2ML
INJECTION, SOLUTION INTRAMUSCULAR; INTRAVENOUS AS NEEDED
Status: DISCONTINUED | OUTPATIENT
Start: 2024-05-15 | End: 2024-05-15

## 2024-05-15 RX ORDER — ONDANSETRON 2 MG/ML
4 INJECTION INTRAMUSCULAR; INTRAVENOUS ONCE AS NEEDED
Status: DISCONTINUED | OUTPATIENT
Start: 2024-05-15 | End: 2024-05-15 | Stop reason: HOSPADM

## 2024-05-15 RX ORDER — SODIUM CHLORIDE, SODIUM LACTATE, POTASSIUM CHLORIDE, CALCIUM CHLORIDE 600; 310; 30; 20 MG/100ML; MG/100ML; MG/100ML; MG/100ML
INJECTION, SOLUTION INTRAVENOUS CONTINUOUS PRN
Status: DISCONTINUED | OUTPATIENT
Start: 2024-05-15 | End: 2024-05-15

## 2024-05-15 RX ORDER — LIDOCAINE HYDROCHLORIDE 10 MG/ML
INJECTION, SOLUTION EPIDURAL; INFILTRATION; INTRACAUDAL; PERINEURAL AS NEEDED
Status: DISCONTINUED | OUTPATIENT
Start: 2024-05-15 | End: 2024-05-15

## 2024-05-15 RX ORDER — FENTANYL CITRATE 50 UG/ML
INJECTION, SOLUTION INTRAMUSCULAR; INTRAVENOUS AS NEEDED
Status: DISCONTINUED | OUTPATIENT
Start: 2024-05-15 | End: 2024-05-15

## 2024-05-15 RX ORDER — INSULIN GLARGINE 100 [IU]/ML
30 INJECTION, SOLUTION SUBCUTANEOUS
Status: DISCONTINUED | OUTPATIENT
Start: 2024-05-15 | End: 2024-05-16

## 2024-05-15 RX ORDER — ROCURONIUM BROMIDE 10 MG/ML
INJECTION, SOLUTION INTRAVENOUS AS NEEDED
Status: DISCONTINUED | OUTPATIENT
Start: 2024-05-15 | End: 2024-05-15

## 2024-05-15 RX ORDER — PROPOFOL 10 MG/ML
INJECTION, EMULSION INTRAVENOUS AS NEEDED
Status: DISCONTINUED | OUTPATIENT
Start: 2024-05-15 | End: 2024-05-15

## 2024-05-15 RX ORDER — KETAMINE HCL IN NACL, ISO-OSM 100MG/10ML
SYRINGE (ML) INJECTION AS NEEDED
Status: DISCONTINUED | OUTPATIENT
Start: 2024-05-15 | End: 2024-05-15

## 2024-05-15 RX ORDER — SUCCINYLCHOLINE/SOD CL,ISO/PF 100 MG/5ML
SYRINGE (ML) INTRAVENOUS AS NEEDED
Status: DISCONTINUED | OUTPATIENT
Start: 2024-05-15 | End: 2024-05-15

## 2024-05-15 RX ORDER — MAGNESIUM HYDROXIDE 1200 MG/15ML
LIQUID ORAL AS NEEDED
Status: DISCONTINUED | OUTPATIENT
Start: 2024-05-15 | End: 2024-05-15 | Stop reason: HOSPADM

## 2024-05-15 RX ORDER — KETOROLAC TROMETHAMINE 30 MG/ML
INJECTION, SOLUTION INTRAMUSCULAR; INTRAVENOUS AS NEEDED
Status: DISCONTINUED | OUTPATIENT
Start: 2024-05-15 | End: 2024-05-15

## 2024-05-15 RX ORDER — FENTANYL CITRATE/PF 50 MCG/ML
25 SYRINGE (ML) INJECTION
Status: DISCONTINUED | OUTPATIENT
Start: 2024-05-15 | End: 2024-05-15 | Stop reason: HOSPADM

## 2024-05-15 RX ORDER — ONDANSETRON 2 MG/ML
INJECTION INTRAMUSCULAR; INTRAVENOUS AS NEEDED
Status: DISCONTINUED | OUTPATIENT
Start: 2024-05-15 | End: 2024-05-15

## 2024-05-15 RX ORDER — HYDROMORPHONE HCL/PF 1 MG/ML
1 SYRINGE (ML) INJECTION ONCE AS NEEDED
Status: COMPLETED | OUTPATIENT
Start: 2024-05-15 | End: 2024-05-15

## 2024-05-15 RX ADMIN — Medication 250 MG: at 08:10

## 2024-05-15 RX ADMIN — AMPICILLIN SODIUM AND SULBACTAM SODIUM 3 G: 2; 1 INJECTION, POWDER, FOR SOLUTION INTRAMUSCULAR; INTRAVENOUS at 08:10

## 2024-05-15 RX ADMIN — FENTANYL CITRATE 100 MCG: 50 INJECTION, SOLUTION INTRAMUSCULAR; INTRAVENOUS at 13:58

## 2024-05-15 RX ADMIN — Medication 25 MG: at 14:31

## 2024-05-15 RX ADMIN — ENOXAPARIN SODIUM 40 MG: 40 INJECTION SUBCUTANEOUS at 16:59

## 2024-05-15 RX ADMIN — Medication 25 MG: at 14:12

## 2024-05-15 RX ADMIN — Medication 250 MG: at 17:00

## 2024-05-15 RX ADMIN — SODIUM CHLORIDE 125 ML/HR: 0.9 INJECTION, SOLUTION INTRAVENOUS at 11:34

## 2024-05-15 RX ADMIN — Medication 6 MG: at 21:50

## 2024-05-15 RX ADMIN — LIDOCAINE HYDROCHLORIDE 50 MG: 10 INJECTION, SOLUTION EPIDURAL; INFILTRATION; INTRACAUDAL; PERINEURAL at 14:07

## 2024-05-15 RX ADMIN — PROPOFOL 200 MG: 10 INJECTION, EMULSION INTRAVENOUS at 14:07

## 2024-05-15 RX ADMIN — OXYCODONE HYDROCHLORIDE 10 MG: 10 TABLET ORAL at 06:30

## 2024-05-15 RX ADMIN — FENTANYL CITRATE 25 MCG: 50 INJECTION INTRAMUSCULAR; INTRAVENOUS at 16:10

## 2024-05-15 RX ADMIN — Medication 200 MG: at 14:07

## 2024-05-15 RX ADMIN — ENOXAPARIN SODIUM 40 MG: 40 INJECTION SUBCUTANEOUS at 08:10

## 2024-05-15 RX ADMIN — TOPIRAMATE 50 MG: 25 TABLET, FILM COATED ORAL at 17:00

## 2024-05-15 RX ADMIN — AMPICILLIN SODIUM AND SULBACTAM SODIUM 3 G: 2; 1 INJECTION, POWDER, FOR SOLUTION INTRAMUSCULAR; INTRAVENOUS at 18:17

## 2024-05-15 RX ADMIN — AMPICILLIN SODIUM AND SULBACTAM SODIUM 3 G: 2; 1 INJECTION, POWDER, FOR SOLUTION INTRAMUSCULAR; INTRAVENOUS at 04:00

## 2024-05-15 RX ADMIN — ACETAMINOPHEN 650 MG: 325 TABLET ORAL at 01:21

## 2024-05-15 RX ADMIN — KETOROLAC TROMETHAMINE 30 MG: 30 INJECTION, SOLUTION INTRAMUSCULAR at 14:17

## 2024-05-15 RX ADMIN — ROCURONIUM BROMIDE 30 MG: 10 INJECTION, SOLUTION INTRAVENOUS at 14:16

## 2024-05-15 RX ADMIN — HYDROMORPHONE HYDROCHLORIDE 0.5 MG: 1 INJECTION, SOLUTION INTRAMUSCULAR; INTRAVENOUS; SUBCUTANEOUS at 08:11

## 2024-05-15 RX ADMIN — AMPICILLIN SODIUM AND SULBACTAM SODIUM 3 G: 2; 1 INJECTION, POWDER, FOR SOLUTION INTRAMUSCULAR; INTRAVENOUS at 22:15

## 2024-05-15 RX ADMIN — TOPIRAMATE 50 MG: 25 TABLET, FILM COATED ORAL at 08:10

## 2024-05-15 RX ADMIN — FLUOXETINE 10 MG: 10 CAPSULE ORAL at 08:10

## 2024-05-15 RX ADMIN — SUGAMMADEX 600 MG: 100 INJECTION, SOLUTION INTRAVENOUS at 14:58

## 2024-05-15 RX ADMIN — HYDROMORPHONE HYDROCHLORIDE 1 MG: 1 INJECTION, SOLUTION INTRAMUSCULAR; INTRAVENOUS; SUBCUTANEOUS at 13:54

## 2024-05-15 RX ADMIN — OXYCODONE HYDROCHLORIDE 10 MG: 10 TABLET ORAL at 21:50

## 2024-05-15 RX ADMIN — INSULIN GLARGINE 30 UNITS: 100 INJECTION, SOLUTION SUBCUTANEOUS at 21:50

## 2024-05-15 RX ADMIN — AMPICILLIN SODIUM AND SULBACTAM SODIUM 3 G: 2; 1 INJECTION, POWDER, FOR SOLUTION INTRAMUSCULAR; INTRAVENOUS at 11:34

## 2024-05-15 RX ADMIN — SODIUM CHLORIDE, SODIUM LACTATE, POTASSIUM CHLORIDE, AND CALCIUM CHLORIDE: .6; .31; .03; .02 INJECTION, SOLUTION INTRAVENOUS at 13:58

## 2024-05-15 RX ADMIN — ONDANSETRON 4 MG: 2 INJECTION INTRAMUSCULAR; INTRAVENOUS at 14:17

## 2024-05-15 RX ADMIN — MIDAZOLAM 2 MG: 1 INJECTION INTRAMUSCULAR; INTRAVENOUS at 13:58

## 2024-05-15 NOTE — OP NOTE
OPERATIVE REPORT  PATIENT NAME: Rik Marin    :  2005  MRN: [id number]  Pt Location: [facility] OR ROOM 02    SURGERY DATE: 5/15/2024    Surgeons and Role:     * Tim Ch MD - Primary     * Kee Mccray PA-C - Assisting     * Amado Mc MD - Assisting    Preop Diagnosis:  Humberto's gangrene in male [N49.3]    Post-Op Diagnosis Codes:     * Humberto's gangrene in male [N49.3]    Procedure(s):  Left - INCISION AND DRAINAGE (I&D) GROIN    Specimen(s):  * No specimens in log *    Estimated Blood Loss:   Minimal    Drains:  Urethral Catheter Latex 16 Fr. (Active)   Reasons to continue Urinary Catheter  Post-operative urological requirements 24 1730   Site Assessment Clean;Skin intact 24 1730   Olson Care Done 05/15/24 0900   Collection Container Standard drainage bag 24 1730   Securement Method Tape 24 1730   Output (mL) 1700 mL 05/15/24 1056   Number of days: 4       Anesthesia Type:   General    Operative Indications:  Humberto's gangrene in male [N49.3]    Operative Findings:  Wound base appeared clean with no nonviable tissue and no purulent or dishwater drainage  3 penrose drains placed through all 5 communicating incisions and sutured together outside of the body      Complications:   None    Procedure and Technique:  After informed consent was obtained explaining the risks/benefits/alternatives of the procedure, the patient was taken to the OR. Sequential compression devices were placed. General anesthesia was induced and the patient was prepped and draped in the usual sterile fashion. A time out was performed to verify correct site and procedure; all present were in agreement.    A curette was used to scrape a small amount of fibrinous tissue from the subcutaneous walls of the incisions. The tissue itself otherwise appeared healthy. There was no nonviable tissue, no purulence, and no dishwater drainage appreciated. Additionally, the overlying skin appeared  "healthy without any remaining erythema and only minimal periwound induration. The wounds were then copiously irrigated using 2L normal saline solution with asepto bulb syringe. Wounds were then inspected for hemostasis which was excellent. Three 1/2\" penrose drains were then placed into the incisions and looped through to be secured outside of the body. The tails were tied together using nylon suture. All incisions were then covered using ABD pads and secured using mesh underwear. The Olson catheter was removed prior to awakening the patient.     Counts were correct x2 at the conclusion of the procedure. RF wanding was negative for any retained sponges.     The patient was awakened and taken to the PACU without any immediate post op complications.     Dr. Ch was present for the entire procedure.       Patient Disposition:  PACU         SIGNATURE: Amado Mc MD  DATE: May 15, 2024  TIME: 2:53 PM                "

## 2024-05-15 NOTE — PROGRESS NOTES
Pending sale to [facility]  Progress Note  Name: Rik Marin I  MRN: [id number]  Unit/Bed#: 2 [place] [address] I Date of Admission: 5/11/2024   Date of Service: 5/15/2024 I Hospital Day: 4    Assessment & Plan   * Type 2 diabetes mellitus with hyperglycemia, with long-term current use of insulin (HCC)  Assessment & Plan  Lab Results   Component Value Date    HGBA1C 11.6 (H) 05/07/2024       Recent Labs     05/14/24  1606 05/14/24  2057 05/15/24  0743 05/15/24  1042   POCGLU 189* 179* 160* 145*     Increase dose of Lantus to 30 units as patient is currently back on diet.  Restart scheduled Humalog with meals.   Continue SSI.  Blood sugars are better controlled today      Humberto's gangrene  Assessment & Plan  Pt is s/p I&D by Surgery team on 5/11 and 5/13   OR culture with actinomyces is, Prevotella and mixed skin jalyn   Transitioned to Unasyn  Status post left groin I&D in the OR again today    Gram-negative bacteremia  Assessment & Plan  Currently on IV Unasyn.  Discontinued vancomycin, Zosyn and clindamycin   Admission blood cultures with Acinetobacter Iwoffii staph epi.  Staph epi likely skin contaminant  repeat blood cultures negative  ID input appreciated    Acute anemia  Assessment & Plan  Possibly blood loss related to I&D and possibly dilutional in nature.  Follow CBC.    Results from last 7 days   Lab Units 05/15/24  0555 05/15/24  0235 05/14/24  0637 05/13/24  0647 05/12/24  0523 05/11/24  1726   HEMOGLOBIN g/dL 9.8* 10.1* 10.2* 11.0* 9.5* 12.5   HEMATOCRIT % 31.2* 33.1* 32.4* 35.6* 30.0* 39.2   MCV fL 87 88 87 88 87 86        Sepsis (HCC)  Assessment & Plan  Patient initially presented with left testicular and pubic pain for 2 to 3 days associated with fevers, chills, nausea and vomiting  CT concerning for Humberto's  Sepsis, present on admission, as evidenced by leukocytosis, fever, tachycardia, with source being left groin infection/Humberto's gangrene  Status post 3 I&D by Surgical team so  far  Repeat lab work in a.m.    ADHD (attention deficit hyperactivity disorder)  Assessment & Plan  Continue Prozac.    Sleep apnea  Assessment & Plan  Unsure if using CPAP at home.    Morbid obesity (HCC)  Assessment & Plan  Body mass index is 50.2 kg/m².   Diet and lifestyle modification.  CT showed hepatomegaly with steatosis and splenomegaly.      Electrolyte abnormality-resolved as of 5/15/2024  Assessment & Plan  Potassium and magnesium level has improved status post repletion  Repeat lab work in a.m.    Hyponatremia-resolved as of 5/14/2024  Assessment & Plan  Mild, most likely pseudohyponatremia from hyperglycemia.     Pneumonia-resolved as of 5/14/2024  Assessment & Plan  Patient was hospitalized 5/5-5/7 for sepsis multifocal pneumonia.  Was treated with Rocephin and Zithromax, discharged on Ceftin and Zithromax               VTE Pharmacologic Prophylaxis: VTE Score: 6 High Risk (Score >/= 5) - Pharmacological DVT Prophylaxis Ordered: enoxaparin (Lovenox). Sequential Compression Devices Ordered.    Mobility:   Basic Mobility Inpatient Raw Score: 19  JH-HLM Goal: 6: Walk 10 steps or more  JH-HLM Achieved: 6: Walk 10 steps or more  JH-HLM Goal achieved. Continue to encourage appropriate mobility.    Patient Centered Rounds: I performed bedside rounds with nursing staff today.   Discussions with Specialists or Other Care Team Provider: yes     Education and Discussions with Family / Patient: yes    Total Time Spent on Date of Encounter in care of patient: This time was spent on one or more of the following: performing physical exam; counseling and coordination of care; obtaining or reviewing history; documenting in the medical record; reviewing/ordering tests, medications or procedures; communicating with other healthcare professionals and discussing with patient's family/caregivers.    Current Length of Stay: 4 day(s)  Current Patient Status: Inpatient   Certification Statement: The patient will continue to  require additional inpatient hospital stay due to Fourneirs gangrene status post I&D requiring IV antibiotics  Discharge Plan: [facility] is following this patient on consult. They are not yet medically stable for discharge secondary to as noted above.    Code Status: Level 1 - Full Code    Subjective:     Patient seen post OR.  Reports pain along his buttock area    Objective:     Vitals:   Temp (24hrs), Av °F (37.8 °C), Min:99 °F (37.2 °C), Max:102.1 °F (38.9 °C)    Temp:  [99 °F (37.2 °C)-102.1 °F (38.9 °C)] 99.5 °F (37.5 °C)  HR:  [] 92  Resp:  [18-50] 25  BP: (116-145)/(58-85) 135/70  SpO2:  [92 %-97 %] 94 %  Body mass index is 50.2 kg/m².     Input and Output Summary (last 24 hours):     Intake/Output Summary (Last 24 hours) at 5/15/2024 1910  Last data filed at 5/15/2024 1516  Gross per 24 hour   Intake 800 ml   Output 3850 ml   Net -3050 ml       Physical Exam:   Physical Exam  Vitals reviewed.   Constitutional:       General: He is not in acute distress.     Appearance: He is not toxic-appearing.      Comments: Sleepy but easily arousable and answers questions   HENT:      Head: Normocephalic and atraumatic.   Eyes:      General:         Right eye: No discharge.         Left eye: No discharge.   Cardiovascular:      Rate and Rhythm: Normal rate and regular rhythm.   Pulmonary:      Effort: Pulmonary effort is normal. No respiratory distress.      Breath sounds: Normal breath sounds. No wheezing or rales.   Abdominal:      General: Bowel sounds are normal. There is no distension.      Palpations: Abdomen is soft.      Tenderness: There is no abdominal tenderness.          Additional Data:     Labs:  Results from last 7 days   Lab Units 05/15/24  0555 24  0647 24  0523   WBC Thousand/uL 11.57*   < > 11.44*   HEMOGLOBIN g/dL 9.8*   < > 9.5*   HEMATOCRIT % 31.2*   < > 30.0*   PLATELETS Thousands/uL 292   < > 188   BANDS PCT %  --   --  3   SEGS PCT % 71   < >  --    LYMPHO PCT % 18   < > 13*    MONO PCT % 8   < > 6   EOS PCT % 2   < > 0    < > = values in this interval not displayed.     Results from last 7 days   Lab Units 05/15/24  0555 05/12/24  0523 05/11/24  1726   SODIUM mmol/L 136   < > 128*   POTASSIUM mmol/L 3.8   < > 3.7   CHLORIDE mmol/L 107   < > 99   CO2 mmol/L 22   < > 21   BUN mg/dL 12   < > 7   CREATININE mg/dL 1.30   < > 0.76   ANION GAP mmol/L 7   < > 8   CALCIUM mg/dL 8.6   < > 8.8   ALBUMIN g/dL  --   --  3.8   TOTAL BILIRUBIN mg/dL  --   --  0.92   ALK PHOS U/L  --   --  70   ALT U/L  --   --  20   AST U/L  --   --  21   GLUCOSE RANDOM mg/dL 149*   < > 234*    < > = values in this interval not displayed.     Results from last 7 days   Lab Units 05/11/24  1726   INR  1.14     Results from last 7 days   Lab Units 05/15/24  1042 05/15/24  0743 05/14/24  2057 05/14/24  1606 05/14/24  1124 05/14/24  0803 05/14/24  0113 05/13/24  2117 05/13/24  1627 05/13/24  0723 05/13/24  0125 05/12/24  2055   POC GLUCOSE mg/dl 145* 160* 179* 189* 174* 232* 235* 250* 291* 162* 140 194*         Results from last 7 days   Lab Units 05/15/24  0235 05/12/24  0523 05/11/24  1726   LACTIC ACID mmol/L 0.7  --  1.4   PROCALCITONIN ng/ml 0.32* 0.33* 0.28*       Lines/Drains:  Invasive Devices       Peripheral Intravenous Line  Duration             Peripheral IV 05/14/24 Distal;Right;Upper;Ventral (anterior) Arm 1 day                  Urinary Catheter:  Goal for removal:  N/A - per primary team               Imaging: Reviewed radiology reports from this admission including: procedure reports    Recent Cultures (last 7 days):   Results from last 7 days   Lab Units 05/15/24  0240 05/12/24  1521 05/11/24  2104 05/11/24  1740 05/11/24  1726   BLOOD CULTURE  Received in Microbiology Lab. Culture in Progress.  Received in Microbiology Lab. Culture in Progress. No Growth at 48 hrs.  No Growth at 48 hrs.  --   --  No Growth at 72 hrs.  Staphylococcus epidermidis*  Acinetobacter lwoffii*   GRAM STAIN RESULT   --   --   No Polys*  4+ Gram negative rods*  4+ Gram positive cocci in pairs, chains and clusters*  --  Gram negative rods*  Gram positive cocci in clusters*   URINE CULTURE   --   --   --  No Growth <1000 cfu/mL  --    WOUND CULTURE   --   --  1+ Growth of Actinomyces species*  Few Colonies of  --   --        Last 24 Hours Medication List:   Current Facility-Administered Medications   Medication Dose Route Frequency Provider Last Rate    acetaminophen  650 mg Oral Q6H PRN Amado Mc MD      albuterol  2.5 mg Nebulization Q6H PRN Amado Mc MD      ampicillin-sulbactam  3 g Intravenous Q4H Amado Mc MD 3 g (05/15/24 1817)    benzonatate  100 mg Oral TID PRN Amado Mc MD      enoxaparin  40 mg Subcutaneous BID Amado Mc MD      FLUoxetine  10 mg Oral Daily Amado Mc MD      HYDROmorphone  0.5 mg Intravenous Q4H PRN Amado Mc MD      HYDROmorphone  0.2 mg Intravenous Q3H PRN Amado Mc MD      insulin glargine  30 Units Subcutaneous HS Ginny Weaver DO      insulin lispro  2-12 Units Subcutaneous TID AC Amado Mc MD      [START ON 5/16/2024] insulin lispro  9 Units Subcutaneous TID With Meals Ginny Weavre DO      melatonin  6 mg Oral HS Amado Mc MD      ondansetron  4 mg Intravenous Q6H PRN Amado Mc MD      oxyCODONE  10 mg Oral Q4H PRN Amado Mc MD      saccharomyces boulardii  250 mg Oral BID Amado Mc MD      sodium chloride  125 mL/hr Intravenous Continuous Amado Mc  mL/hr (05/15/24 1134)    topiramate  50 mg Oral BID Amado Mc MD          Today, Patient Was Seen By: Ginny Weaver DO    **Please Note: This note may have been constructed using a voice recognition system.**

## 2024-05-15 NOTE — ANESTHESIA POSTPROCEDURE EVALUATION
Post-Op Assessment Note    CV Status:  Stable  Pain Score: 0    Pain management: adequate       Mental Status:  Awake   Hydration Status:  Stable   PONV Controlled:  None   Airway Patency:  Patent  Two or more mitigation strategies used for obstructive sleep apnea   Post Op Vitals Reviewed: Yes    No anethesia notable event occurred.    Staff: CRNA   Comments: spontaneosuly breathing, HOB @ 40 degrees, simple maask to o2, vss, fully endorsed torecovery w/o AC              /58 (05/15/24 1515)    Temp 100.1 °F (37.8 °C) (05/15/24 1515)    Pulse 97 (05/15/24 1515)   Resp 20 (05/15/24 1515)    SpO2 97 % (05/15/24 1515)

## 2024-05-15 NOTE — PROGRESS NOTES
Progress Note - General Surgery   Rik Marin 19 y.o. male MRN: [id number]  Unit/Bed#: 2 [place] [address] Encounter: [id number]    Assessment:  1) 20yo male with history of ADHD, uncontrolled type 2 diabetes with insulin use, and morbid obesity presenting with sepsis and GNR bacteremia secondary to scrotal necrotizing soft tissue infection/Humberto's gangrene  POD#4 s/p incision and drainage left groin -AVSS, creatinine still elevated at 1.3, fingerstick glucose resemble better/tighter glucose control, wound packed and draining appropriately, shallow respiratory effort without use of CPAP while laying in supine position, relatively comfortable currently in bed, lingering leukocytosis at 12.9, Tmax of 102.1, appropriate urinary output at 1.2 mL/kg/h    Plan:  1)   -Insulin per medicine team  -Continue Unasyn per infectious disease  -As needed analgesia  -Trend CBC, BMP, mag, Phos  -Encourage patient to use CPAP  -DVT prophylaxis  -N.p.o.  -Wound reexploration today  -Continue normal saline 125 mill per hour  -Trend creatinine and will discuss with surgeon  - border dressing on backside, take pictures per nursing when available, foam wedges    Subjective/Objective   Chief Complaint: Sleeping, when can I eat?    Subjective: Patient was seen and examined at bedside.  Patient denies any acute events overnight.  Patient does not seem to have any complaints of pain.  Patient does not feel it is worsened at all overnight.  Patient does not feel that he saturated any dressings.  Patient again denies any shortness of breath but is breathing faster than normal.  Patient reports that he needs to be on CPAP.  Patient does not normally on nasal cannula.  Patient is not using his CPAP currently even though it is in the room.  Patient is fatigued and does not want to set up.  Patient does not want to take deep inspirations.  Patient knows that we are planning for the OR today.  Patient is preoccupied with the idea of when can eat.   "Patient denies any abdominal pain.    Objective:     Blood pressure 145/85, pulse 74, temperature 99.3 °F (37.4 °C), resp. rate (!) 33, height 6' 2\" (1.88 m), weight (!) 177 kg (391 lb), SpO2 97%.,Body mass index is 50.2 kg/m².      Intake/Output Summary (Last 24 hours) at 5/15/2024 0857  Last data filed at 5/14/2024 2113  Gross per 24 hour   Intake --   Output 3450 ml   Net -3450 ml       Invasive Devices       Peripheral Intravenous Line  Duration             Peripheral IV 05/14/24 Distal;Right;Upper;Ventral (anterior) Arm <1 day              Drain  Duration             Urethral Catheter Latex 16 Fr. 3 days                    Physical Exam: /85   Pulse 74   Temp 99.3 °F (37.4 °C)   Resp (!) 33   Ht 6' 2\" (1.88 m)   Wt (!) 177 kg (391 lb)   SpO2 97%   BMI 50.20 kg/m²   General appearance: alert  Head: Normocephalic, without obvious abnormality, atraumatic  Lungs:  shallow respirations mild tachypnea  Heart:  mild sinus tachycardia  Abdomen: soft, non-tender; bowel sounds normal; no masses,  no organomegaly  Skin:  open left inguinal canal with packing in place, moderate drainage    Lab, Imaging and other studies:I have personally reviewed pertinent lab results.  , CBC:   Lab Results   Component Value Date    WBC 11.57 (H) 05/15/2024    HGB 9.8 (L) 05/15/2024    HCT 31.2 (L) 05/15/2024    MCV 87 05/15/2024     05/15/2024    RBC 3.57 (L) 05/15/2024    MCH 27.5 05/15/2024    MCHC 31.4 05/15/2024    RDW 14.3 05/15/2024    MPV 10.8 05/15/2024    NRBC 0 05/15/2024   , CMP:   Lab Results   Component Value Date    SODIUM 136 05/15/2024    K 3.8 05/15/2024     05/15/2024    CO2 22 05/15/2024    BUN 12 05/15/2024    CREATININE 1.30 05/15/2024    CALCIUM 8.6 05/15/2024    EGFR 78 05/15/2024     VTE Pharmacologic Prophylaxis: Enoxaparin (Lovenox)  VTE Mechanical Prophylaxis: sequential compression device  "

## 2024-05-15 NOTE — ASSESSMENT & PLAN NOTE
Patient initially presented with left testicular and pubic pain for 2 to 3 days associated with fevers, chills, nausea and vomiting  CT concerning for Humberto's  Sepsis, present on admission, as evidenced by leukocytosis, fever, tachycardia, with source being left groin infection/Humberto's gangrene  Status post 3 I&D by Surgical team so far  Repeat lab work in a.m.

## 2024-05-15 NOTE — ASSESSMENT & PLAN NOTE
Possibly blood loss related to I&D and possibly dilutional in nature.  Follow CBC.    Results from last 7 days   Lab Units 05/15/24  0555 05/15/24  0235 05/14/24  0637 05/13/24  0647 05/12/24  0523 05/11/24  1726   HEMOGLOBIN g/dL 9.8* 10.1* 10.2* 11.0* 9.5* 12.5   HEMATOCRIT % 31.2* 33.1* 32.4* 35.6* 30.0* 39.2   MCV fL 87 88 87 88 87 86

## 2024-05-15 NOTE — ASSESSMENT & PLAN NOTE
Currently on IV Unasyn.  Discontinued vancomycin, Zosyn and clindamycin   Admission blood cultures with Acinetobacter Iwoffii staph epi.  Staph epi likely skin contaminant  repeat blood cultures negative  ID input appreciated

## 2024-05-15 NOTE — ASSESSMENT & PLAN NOTE
Lab Results   Component Value Date    HGBA1C 11.6 (H) 05/07/2024       Recent Labs     05/14/24  1606 05/14/24 2057 05/15/24  0743 05/15/24  1042   POCGLU 189* 179* 160* 145*     Increase dose of Lantus to 30 units as patient is currently back on diet.  Restart scheduled Humalog with meals.   Continue SSI.  Blood sugars are better controlled today

## 2024-05-15 NOTE — QUICK NOTE
Notified by nursing, patient took edible from home from friends.  Patient vital signs stable patient is slightly feeling anxious but otherwise stable.  Educated the patient not to take outside drugs or edibles while in the hospital. continue to monitor neurochecks every 4.

## 2024-05-15 NOTE — PROGRESS NOTES
Progress Note - Infectious Disease   Rik Marin 19 y.o. male MRN: [id number]  Unit/Bed#: OR POOL Encounter: [id number]      Impression/Plan:  1. Sepsis, evidenced by fever and leukocytosis. In setting of #2/3  -antibiotics as below  -follow-up cultures   -monitor temperature and hemodynamics  -serial exam  -recheck CBC and BMP in a.m. - monitoring treatment response and any developing toxicities     2. Acinetobacter bacteremia. Admission blood cultures growing Staph epi and Acinetobacter lwoffii in 1 of 2 sets. Consider true Acinetobacter bacteremia in setting of #3 and staph epi possible skin contaminant in single set.   -antibiotics as below   -follow up repeat blood cultures  -management as below     3. Necrotizing soft tissue infection Left groin.   5/11/24 OR I&D with foul-smelling green-brown fluid encountered and sent for culture. OR cx 1+ Actinomyces species, few MSF, 4+ prevotella bivia  5/13/24 s/p 2nd I&D today   -continues Unasyn 3 g IV q 6 hours  -serial exam  -follow up OR culture finals  -plan for return to OR 5/15/24  -additional interventions pending clinical course     4. Type 2 Diabetes Mellitus with hyperglycemia. 5/7/24 HgbA1c 11.6 Risk factor for poor wound healing and infection.  -blood glucose management per primary     5. Morbid Obesity, BMI 50.2 Can affect antibiotic dosing/absorption  -dose adjust antibiotics as needed.      Antibiotics:  Unasyn D2     I have discussed the above management plan in detail with patient, RN, and primary care team. They concur with ID treatment plan and close monitoring as above.    Subjective:  Patient has no fever, chills, sweats; no nausea, vomiting, diarrhea; no cough, shortness of breath; + post op region discomfort, difficult for patient to turn self in bed. No tailbone site pain at present.     Objective:  Vitals:  Temp:  [99 °F (37.2 °C)-102.1 °F (38.9 °C)] 100.1 °F (37.8 °C)  HR:  [] 97  Resp:  [20-50] 20  BP: (116-145)/(58-85)  123/58  SpO2:  [92 %-97 %] 97 %  Temp (24hrs), Av.1 °F (37.8 °C), Min:99 °F (37.2 °C), Max:102.1 °F (38.9 °C)  Current: Temperature: 100.1 °F (37.8 °C)    Physical Exam:   General Appearance:  19 year old male, propped in bed, debilitated, nontoxic, resting fairly stiffly due to discomfort with movement in groin region   HEENT: Atraumatic normocephalic   Throat: Oropharynx moist. Poor dentition   Pulmonary:   Normal respiratory excursion without accessory muscle use   Cardiac:  RRR   Abdomen:   Soft, no focal tenderness, protuberant pannus   Extremities: No edema   : Olson with clear, yellow urine in bag, no SPT, left groin region dressing in place   Psychiatric: Awake, cooperative   Skin: No new rashes. IV site nontender.        Labs, Imaging, & Other studies:   All pertinent labs and imaging studies were personally reviewed  Results from last 7 days   Lab Units 05/15/24  0555 05/15/24  0235 24  0637   WBC Thousand/uL 11.57* 12.90* 11.85*   HEMOGLOBIN g/dL 9.8* 10.1* 10.2*   PLATELETS Thousands/uL 292 294 264     Results from last 7 days   Lab Units 05/15/24  0555 24  0637 24  0647 24  0523 24  1726   SODIUM mmol/L 136 134* 134*   < > 128*   POTASSIUM mmol/L 3.8 3.6 3.6   < > 3.7   CHLORIDE mmol/L 107 106 106   < > 99   CO2 mmol/L 22 20* 22   < > 21   BUN mg/dL 12 12 8   < > 7   CREATININE mg/dL 1.30 1.31* 0.83   < > 0.76   EGFR ml/min/1.73sq m 78 77 126   < > 131   CALCIUM mg/dL 8.6 8.0* 8.1*   < > 8.8   AST U/L  --   --   --   --  21   ALT U/L  --   --   --   --  20   ALK PHOS U/L  --   --   --   --  70    < > = values in this interval not displayed.     Results from last 7 days   Lab Units 05/15/24  0240 24  1521 24  2104 24  1740 24  1726   BLOOD CULTURE  Received in Microbiology Lab. Culture in Progress.  Received in Microbiology Lab. Culture in Progress. No Growth at 48 hrs.  No Growth at 48 hrs.  --   --  No Growth at 72 hrs.  Staphylococcus  epidermidis*  Acinetobacter lwoffii*   GRAM STAIN RESULT   --   --  No Polys*  4+ Gram negative rods*  4+ Gram positive cocci in pairs, chains and clusters*  --  Gram negative rods*  Gram positive cocci in clusters*   URINE CULTURE   --   --   --  No Growth <1000 cfu/mL  --    WOUND CULTURE   --   --  1+ Growth of Actinomyces species*  Few Colonies of  --   --      Results from last 7 days   Lab Units 05/15/24  0235 05/12/24  0523 05/11/24  1726   PROCALCITONIN ng/ml 0.32* 0.33* 0.28*

## 2024-05-15 NOTE — ASSESSMENT & PLAN NOTE
Pt is s/p I&D by Surgery team on 5/11 and 5/13   OR culture with actinomyces is, Prevotella and mixed skin jalyn   Transitioned to Unasyn  Status post left groin I&D in the OR again today

## 2024-05-15 NOTE — PROGRESS NOTES
"Referral received via email. Referred to  and Medical High Utilizer Care Plan Committee on 5/15/24 to be reviewed for careplan.    Medical Committee reviewed and determined a care plan is not appropriate at this time as \"all medical admissions were reasonable.\"      Patient is referred to complex care management as a Rising Utilizer.   RU episode opened , added myself to care team, and chart review completed.      Patient admitted to [facility] on 5/11/24 for DM2 with hyperglycemia and left testicular pain.  CT scan showed \"extensive cellulitis, subcutaneous edema and subcutaneous air in the left anterior perineum extending inferiorly alongside the left testicle, consistent with foreigners gangrene\".  Patient was taken urgently to the OR for I&D .  He remains hospitalized at this time.       There have been 2 hospitalizations and 14 ED visits in the past 6 months   High Risk Admission / ED Risk Score:  90    PMH includes: DM2 with A1C 11.6 on 5/7/24, ADHD, morbid obesity, sleep apnea, depression    It appears per [facility] records patient may not have a PCP.    Will continue to monitor EMR for progress towards discharge and [place].   "

## 2024-05-15 NOTE — CONSULTS
The patient's vancomycin therapy has been completed/discontinued. Thank you for this consult. Pharmacy will sign off now.   Candice Schulte, Pharmacist

## 2024-05-15 NOTE — CASE MANAGEMENT
Case Management Assessment & Discharge Planning Note    Patient name Rik Marin  Location [address] [place] [address]/[address] [place] [address] MRN [id number]  : 2005 Date 5/15/2024       Current Admission Date: 2024  Current Admission Diagnosis:Type 2 diabetes mellitus with hyperglycemia, with long-term current use of insulin ([facility])   Patient Active Problem List    Diagnosis Date Noted Date Diagnosed    Gram-negative bacteremia 2024     Electrolyte abnormality 2024     Acute anemia 2024     Sleep apnea 2024     ADHD (attention deficit hyperactivity disorder) 2024     Humberto's gangrene of scrotum 2024     Sepsis ([facility]) 2024     Hypertriglyceridemia 10/18/2023     Morbid obesity ([facility]) 10/17/2023     Uncontrolled type 2 diabetes mellitus with hyperglycemia ([facility]) 10/17/2023     Type 2 diabetes mellitus with hyperglycemia, with long-term current use of insulin ([facility]) 10/11/2023     Closed fracture of distal end of femur, unspecified fracture morphology, initial encounter ([facility]) 2023     Acute medial meniscus tear of left knee 2023     Patellar dislocation, left, subsequent encounter 2023     Closed dislocation of right patella 2023     Closed nondisplaced fracture of right patella 2023     MDD (major depressive disorder) 2023     Conduct disorder 2021       LOS (days): 4  Geometric Mean LOS (GMLOS) (days):   Days to GMLOS:     OBJECTIVE:  PATIENT READMITTED TO HOSPITAL  Risk of Unplanned Readmission Score: 49.34         Current admission status: Inpatient       Preferred Pharmacy:   [id number] - [place], [place] - [address]  [address]  [facility] [address]  Phone: [phone] Fax: [phone]    Primary Care Provider: Gustavo Fajardo MD    Primary Insurance: [employer] [facility]  Secondary Insurance:     ASSESSMENT:  Readmission Root Cause  30 Day Readmission: Yes  Who directed you to return  to the hospital?: Self, Family  Did you understand whom to contact if you had questions or problems?: Yes  Did you get your prescriptions before you left the hospital?: No  Reason:: Not preferred pharmacy, Preference for own pharmacy  Were you able to get your prescriptions filled when you left the hospital?: Yes  Did you take your medications as prescribed?: Yes  Were you able to get to your follow-up appointments?: No  Reason:: Compliance  During previous admission, was a post-acute recommendation made?: No    Patient Information  Admitted from:: Home  Mental Status: Alert  During Assessment patient was accompanied by: Not accompanied during assessment  Assessment information provided by:: Patient  Primary Caregiver: Self  Support Systems: Parent, Family members, Organized support group (Comment), Therapist, Psychiatrist  County of Residence: [place]  What city do you live in?: [place]  Living Arrangements: Lives w/ Parent(s), Lives w/ Extended Family  Is patient a ?: No    Activities of Daily Living Prior to Admission  Functional Status: Independent  Completes ADLs independently?: Yes  Ambulates independently?: Yes  Does patient use assisted devices?: No  Does patient currently own DME?: No  Does patient have a history of Outpatient Therapy (PT/OT)?: No  Does the patient have a history of Short-Term Rehab?: No  Does patient have a history of HHC?: No  Does patient currently have HHC?: No    Patient Information Continued  Income Source: Employed  Does patient have prescription coverage?: Yes  Does patient receive dialysis treatments?: No  Does patient have a history of substance abuse?: Yes  Historical substance use preference: Marijuana  Does patient have a history of Mental Health Diagnosis?: Yes (MDD, Conduct Disorder, ADHD, ASD)  Is patient receiving treatment for mental health?: Yes (has attended PHP previously, confirmed has an OP therapist and psychiatrist with the [facility] in  [place], [place])  Has patient received inpatient treatment related to mental health in the last 2 years?: Yes    PHQ 2/9 Screening   Reviewed PHQ 2/9 Depression Screening Score?: No    Means of Transportation  Means of Transport to Appts:: Family transport    Social Determinants of Health (SDOH)      Flowsheet Row Most Recent Value   Housing Stability    In the last 12 months, was there a time when you were not able to pay the mortgage or rent on time? N   In the past 12 months, how many times have you moved where you were living? 1   At any time in the past 12 months, were you homeless or living in a shelter (including now)? N   Transportation Needs    In the past 12 months, has lack of transportation kept you from medical appointments or from getting medications? no   In the past 12 months, has lack of transportation kept you from meetings, work, or from getting things needed for daily living? No   Food Insecurity    Within the past 12 months, you worried that your food would run out before you got the money to buy more. Never true   Within the past 12 months, the food you bought just didn't last and you didn't have money to get more. Never true   Utilities    In the past 12 months has the electric, gas, oil, or water company threatened to shut off services in your home? No            DISCHARGE DETAILS:    Discharge planning discussed with:: Preston  Freedom of Choice: Yes     CM contacted family/caregiver?: Yes (VM left for mother)  Were Treatment Team discharge recommendations reviewed with patient/caregiver?: Yes  Did patient/caregiver verbalize understanding of patient care needs?: Yes  Were patient/caregiver advised of the risks associated with not following Treatment Team discharge recommendations?: Yes    Requested Home Health Care         Is the patient interested in HHC at discharge?: No    DME Referral Provided  Referral made for DME?: No    Other Referral/Resources/Interventions Provided:  Interventions:  Outpatient , High Utilizer  Referral Comments: Referrals made to [facility] and to OP CM to follow-up with patient.    Would you like to participate in our [facility] service program?  : No - Declined    Treatment Team Recommendation: Home  Discharge Destination Plan:: Home  Transport at Discharge : Family

## 2024-05-16 PROBLEM — A41.9 SEPSIS (HCC): Status: RESOLVED | Noted: 2024-05-06 | Resolved: 2024-05-16

## 2024-05-16 PROBLEM — N49.3 FOURNIER'S GANGRENE: Status: RESOLVED | Noted: 2024-05-11 | Resolved: 2024-05-16

## 2024-05-16 PROBLEM — R78.81 GRAM-NEGATIVE BACTEREMIA: Status: RESOLVED | Noted: 2024-05-13 | Resolved: 2024-05-16

## 2024-05-16 LAB
ANION GAP SERPL CALCULATED.3IONS-SCNC: 12 MMOL/L (ref 4–13)
BUN SERPL-MCNC: 10 MG/DL (ref 5–25)
CALCIUM SERPL-MCNC: 8.1 MG/DL (ref 8.4–10.2)
CHLORIDE SERPL-SCNC: 103 MMOL/L (ref 96–108)
CO2 SERPL-SCNC: 20 MMOL/L (ref 21–32)
CREAT SERPL-MCNC: 1.17 MG/DL (ref 0.6–1.3)
ERYTHROCYTE [DISTWIDTH] IN BLOOD BY AUTOMATED COUNT: 14.6 % (ref 11.6–15.1)
GFR SERPL CREATININE-BSD FRML MDRD: 89 ML/MIN/1.73SQ M
GLUCOSE SERPL-MCNC: 138 MG/DL (ref 65–140)
GLUCOSE SERPL-MCNC: 139 MG/DL (ref 65–140)
GLUCOSE SERPL-MCNC: 144 MG/DL (ref 65–140)
GLUCOSE SERPL-MCNC: 183 MG/DL (ref 65–140)
GLUCOSE SERPL-MCNC: 184 MG/DL (ref 65–140)
HCT VFR BLD AUTO: 31.3 % (ref 36.5–49.3)
HGB BLD-MCNC: 9.6 G/DL (ref 12–17)
MCH RBC QN AUTO: 27.2 PG (ref 26.8–34.3)
MCHC RBC AUTO-ENTMCNC: 30.7 G/DL (ref 31.4–37.4)
MCV RBC AUTO: 89 FL (ref 82–98)
PLATELET # BLD AUTO: 210 THOUSANDS/UL (ref 149–390)
PMV BLD AUTO: 11.1 FL (ref 8.9–12.7)
POTASSIUM SERPL-SCNC: 3.9 MMOL/L (ref 3.5–5.3)
RBC # BLD AUTO: 3.53 MILLION/UL (ref 3.88–5.62)
SODIUM SERPL-SCNC: 135 MMOL/L (ref 135–147)
WBC # BLD AUTO: 15.56 THOUSAND/UL (ref 4.31–10.16)

## 2024-05-16 PROCEDURE — 99024 POSTOP FOLLOW-UP VISIT: CPT | Performed by: PHYSICIAN ASSISTANT

## 2024-05-16 PROCEDURE — 99233 SBSQ HOSP IP/OBS HIGH 50: CPT | Performed by: INTERNAL MEDICINE

## 2024-05-16 PROCEDURE — 80048 BASIC METABOLIC PNL TOTAL CA: CPT | Performed by: FAMILY MEDICINE

## 2024-05-16 PROCEDURE — 82948 REAGENT STRIP/BLOOD GLUCOSE: CPT

## 2024-05-16 PROCEDURE — 99232 SBSQ HOSP IP/OBS MODERATE 35: CPT | Performed by: FAMILY MEDICINE

## 2024-05-16 PROCEDURE — 94660 CPAP INITIATION&MGMT: CPT

## 2024-05-16 PROCEDURE — 85027 COMPLETE CBC AUTOMATED: CPT | Performed by: FAMILY MEDICINE

## 2024-05-16 PROCEDURE — 94760 N-INVAS EAR/PLS OXIMETRY 1: CPT

## 2024-05-16 RX ORDER — INSULIN GLARGINE 100 [IU]/ML
25 INJECTION, SOLUTION SUBCUTANEOUS
Status: DISCONTINUED | OUTPATIENT
Start: 2024-05-17 | End: 2024-05-16

## 2024-05-16 RX ORDER — OXYCODONE HYDROCHLORIDE 5 MG/1
5 TABLET ORAL EVERY 4 HOURS PRN
Status: DISCONTINUED | OUTPATIENT
Start: 2024-05-16 | End: 2024-05-18 | Stop reason: HOSPADM

## 2024-05-16 RX ORDER — OXYCODONE HYDROCHLORIDE 5 MG/1
5 TABLET ORAL EVERY 6 HOURS PRN
Qty: 10 TABLET | Refills: 0 | Status: CANCELLED | OUTPATIENT
Start: 2024-05-16 | End: 2024-05-21

## 2024-05-16 RX ORDER — ACETAMINOPHEN 325 MG/1
650 TABLET ORAL EVERY 6 HOURS SCHEDULED
Status: DISCONTINUED | OUTPATIENT
Start: 2024-05-16 | End: 2024-05-18 | Stop reason: HOSPADM

## 2024-05-16 RX ORDER — INSULIN GLARGINE 100 [IU]/ML
25 INJECTION, SOLUTION SUBCUTANEOUS
Status: DISCONTINUED | OUTPATIENT
Start: 2024-05-16 | End: 2024-05-18 | Stop reason: HOSPADM

## 2024-05-16 RX ORDER — HYDROMORPHONE HCL IN WATER/PF 6 MG/30 ML
0.2 PATIENT CONTROLLED ANALGESIA SYRINGE INTRAVENOUS
Status: DISCONTINUED | OUTPATIENT
Start: 2024-05-16 | End: 2024-05-18 | Stop reason: HOSPADM

## 2024-05-16 RX ADMIN — AMPICILLIN SODIUM AND SULBACTAM SODIUM 3 G: 2; 1 INJECTION, POWDER, FOR SOLUTION INTRAMUSCULAR; INTRAVENOUS at 18:27

## 2024-05-16 RX ADMIN — ENOXAPARIN SODIUM 40 MG: 40 INJECTION SUBCUTANEOUS at 08:02

## 2024-05-16 RX ADMIN — AMPICILLIN SODIUM AND SULBACTAM SODIUM 3 G: 2; 1 INJECTION, POWDER, FOR SOLUTION INTRAMUSCULAR; INTRAVENOUS at 08:03

## 2024-05-16 RX ADMIN — Medication 250 MG: at 08:02

## 2024-05-16 RX ADMIN — AMPICILLIN SODIUM AND SULBACTAM SODIUM 3 G: 2; 1 INJECTION, POWDER, FOR SOLUTION INTRAMUSCULAR; INTRAVENOUS at 14:45

## 2024-05-16 RX ADMIN — TOPIRAMATE 50 MG: 25 TABLET, FILM COATED ORAL at 08:02

## 2024-05-16 RX ADMIN — Medication 250 MG: at 17:09

## 2024-05-16 RX ADMIN — AMPICILLIN SODIUM AND SULBACTAM SODIUM 3 G: 2; 1 INJECTION, POWDER, FOR SOLUTION INTRAMUSCULAR; INTRAVENOUS at 23:04

## 2024-05-16 RX ADMIN — ENOXAPARIN SODIUM 40 MG: 40 INJECTION SUBCUTANEOUS at 17:09

## 2024-05-16 RX ADMIN — OXYCODONE HYDROCHLORIDE 10 MG: 10 TABLET ORAL at 05:38

## 2024-05-16 RX ADMIN — OXYCODONE HYDROCHLORIDE 10 MG: 10 TABLET ORAL at 10:07

## 2024-05-16 RX ADMIN — ACETAMINOPHEN 650 MG: 325 TABLET ORAL at 17:09

## 2024-05-16 RX ADMIN — FLUOXETINE 10 MG: 10 CAPSULE ORAL at 08:02

## 2024-05-16 RX ADMIN — AMPICILLIN SODIUM AND SULBACTAM SODIUM 3 G: 2; 1 INJECTION, POWDER, FOR SOLUTION INTRAMUSCULAR; INTRAVENOUS at 02:26

## 2024-05-16 RX ADMIN — HYDROMORPHONE HYDROCHLORIDE 0.5 MG: 1 INJECTION, SOLUTION INTRAMUSCULAR; INTRAVENOUS; SUBCUTANEOUS at 02:26

## 2024-05-16 RX ADMIN — INSULIN GLARGINE 25 UNITS: 100 INJECTION, SOLUTION SUBCUTANEOUS at 23:21

## 2024-05-16 RX ADMIN — OXYCODONE HYDROCHLORIDE 10 MG: 10 TABLET ORAL at 14:45

## 2024-05-16 RX ADMIN — Medication 6 MG: at 22:59

## 2024-05-16 RX ADMIN — ACETAMINOPHEN 650 MG: 325 TABLET ORAL at 23:15

## 2024-05-16 RX ADMIN — INSULIN LISPRO 9 UNITS: 100 INJECTION, SOLUTION INTRAVENOUS; SUBCUTANEOUS at 08:03

## 2024-05-16 RX ADMIN — TOPIRAMATE 50 MG: 25 TABLET, FILM COATED ORAL at 17:09

## 2024-05-16 RX ADMIN — AMPICILLIN SODIUM AND SULBACTAM SODIUM 3 G: 2; 1 INJECTION, POWDER, FOR SOLUTION INTRAMUSCULAR; INTRAVENOUS at 11:32

## 2024-05-16 RX ADMIN — INSULIN LISPRO 9 UNITS: 100 INJECTION, SOLUTION INTRAVENOUS; SUBCUTANEOUS at 17:09

## 2024-05-16 RX ADMIN — INSULIN LISPRO 2 UNITS: 100 INJECTION, SOLUTION INTRAVENOUS; SUBCUTANEOUS at 11:31

## 2024-05-16 RX ADMIN — INSULIN LISPRO 9 UNITS: 100 INJECTION, SOLUTION INTRAVENOUS; SUBCUTANEOUS at 11:31

## 2024-05-16 NOTE — PROGRESS NOTES
[facility]  Progress Note  Name: Rik Marin I  MRN: [id number]  Unit/Bed#: [address] [place] [address] I Date of Admission: 5/11/2024   Date of Service: 5/16/2024 I Hospital Day: 5    Assessment & Plan   * Type 2 diabetes mellitus with hyperglycemia, with long-term current use of insulin (HCC)  Assessment & Plan  Lab Results   Component Value Date    HGBA1C 11.6 (H) 05/07/2024       Recent Labs     05/15/24  2045 05/16/24  0732 05/16/24  1121 05/16/24  1538   POCGLU 188* 139 184* 144*       Continue Lantus 30 units as patient is currently back on diet.  Continue scheduled Humalog with meals.   Continue SSI.  Blood sugars are now better controlled      Humberto's gangrene  Assessment & Plan  Pt is s/p I&D and washout by Surgery team on 5/11 and 5/13 and 5/15  OR culture with actinomyces is, Prevotella and mixed skin jalyn   Currently on IV Unasyn  Now with Penrose drains in place    Gram-negative bacteremia  Assessment & Plan  Currently on IV Unasyn.  Discontinued vancomycin, Zosyn and clindamycin   Admission blood cultures with Acinetobacter Iwoffii staph epi.  Staph epi likely skin contaminant  repeat blood cultures negative  Per ID continue antibiotics through 5/20.  Likely transition to Augmentin tomorrow  ID input appreciated.    Acute anemia  Assessment & Plan  Possibly blood loss related to I&D and possibly dilutional in nature.  Follow CBC    Results from last 7 days   Lab Units 05/16/24  0904 05/15/24  0555 05/15/24  0235 05/14/24  0637 05/13/24  0647 05/12/24  0523 05/11/24  1726   HEMOGLOBIN g/dL 9.6* 9.8* 10.1* 10.2* 11.0* 9.5* 12.5   HEMATOCRIT % 31.3* 31.2* 33.1* 32.4* 35.6* 30.0* 39.2   MCV fL 89 87 88 87 88 87 86        Sepsis (HCC)-resolved as of 5/16/2024  Assessment & Plan  Patient initially presented with left testicular and pubic pain for 2 to 3 days associated with fevers, chills, nausea and vomiting  CT concerning for Humberto's  Sepsis, present on admission, as evidenced by  leukocytosis, fever, tachycardia, with source being left groin infection/Humberto's gangrene  Status post 3 I&D by Surgical team so far  Repeat lab work in a.m    ADHD (attention deficit hyperactivity disorder)  Assessment & Plan  Continue Prozac    Sleep apnea  Assessment & Plan  Unsure if using CPAP at home  Continue CPAP use while here    Morbid obesity (HCC)  Assessment & Plan  Body mass index is 50.2 kg/m².   Diet and lifestyle modification.  CT showed hepatomegaly with steatosis and splenomegaly.      Electrolyte abnormality-resolved as of 5/15/2024  Assessment & Plan  Potassium and magnesium level has improved status post repletion  Repeat lab work in a.m.    Hyponatremia-resolved as of 5/14/2024  Assessment & Plan  Mild, most likely pseudohyponatremia from hyperglycemia.     Pneumonia-resolved as of 5/14/2024  Assessment & Plan  Patient was hospitalized 5/5-5/7 for sepsis multifocal pneumonia.  Was treated with Rocephin and Zithromax, discharged on Ceftin and Zithromax               VTE Pharmacologic Prophylaxis: VTE Score: 6 High Risk (Score >/= 5) - Pharmacological DVT Prophylaxis Ordered: enoxaparin (Lovenox). Sequential Compression Devices Ordered.    Mobility:   Basic Mobility Inpatient Raw Score: 19  JH-HLM Goal: 6: Walk 10 steps or more  JH-HLM Achieved: 8: Walk 250 feet ot more  JH-HLM Goal achieved. Continue to encourage appropriate mobility.    Patient Centered Rounds: I performed bedside rounds with nursing staff today.   Discussions with Specialists or Other Care Team Provider: yes    Education and Discussions with Family / Patient: yes    Total Time Spent on Date of Encounter in care of patient: This time was spent on one or more of the following: performing physical exam; counseling and coordination of care; obtaining or reviewing history; documenting in the medical record; reviewing/ordering tests, medications or procedures; communicating with other healthcare professionals and discussing with  patient's family/caregivers.    Current Length of Stay: 5 day(s)  Current Patient Status: Inpatient   Certification Statement: The patient will continue to require additional inpatient hospital stay due to Humberto's gangrene  Discharge Plan: [facility] is following this patient on consult. They are not yet medically stable for discharge secondary to as noted above.    Code Status: Level 1 - Full Code    Subjective:     Patient sleeping on my evaluation with CPAP in place.  does wake up to answer questions.  reports some pain    Objective:     Vitals:   Temp (24hrs), Av.8 °F (37.1 °C), Min:98.5 °F (36.9 °C), Max:99.3 °F (37.4 °C)    Temp:  [98.5 °F (36.9 °C)-99.3 °F (37.4 °C)] 99.3 °F (37.4 °C)  HR:  [] 102  Resp:  [18-28] 20  BP: (134-141)/(70-81) 134/70  SpO2:  [92 %-97 %] 92 %  Body mass index is 50.2 kg/m².     Input and Output Summary (last 24 hours):     Intake/Output Summary (Last 24 hours) at 2024 204  Last data filed at 2024 1503  Gross per 24 hour   Intake 600 ml   Output 2400 ml   Net -1800 ml       Physical Exam:   Physical Exam  Vitals reviewed.   Constitutional:       General: He is not in acute distress.     Appearance: He is not toxic-appearing.      Comments: Sleeping but arousable and answer some simple questions   HENT:      Head: Normocephalic and atraumatic.   Eyes:      General:         Right eye: No discharge.         Left eye: No discharge.   Cardiovascular:      Rate and Rhythm: Normal rate and regular rhythm.   Pulmonary:      Effort: No respiratory distress.      Breath sounds: No wheezing or rales.      Comments: Decreased breath sounds bilaterally although patient with decreased inspiratory effort  Abdominal:      General: Bowel sounds are normal. There is no distension.      Palpations: Abdomen is soft.      Tenderness: There is no abdominal tenderness.          Additional Data:     Labs:  Results from last 7 days   Lab Units 24  0904 05/15/24  0555 24  0647  05/12/24 0523   WBC Thousand/uL 15.56* 11.57*   < > 11.44*   HEMOGLOBIN g/dL 9.6* 9.8*   < > 9.5*   HEMATOCRIT % 31.3* 31.2*   < > 30.0*   PLATELETS Thousands/uL 210 292   < > 188   BANDS PCT %  --   --   --  3   SEGS PCT %  --  71   < >  --    LYMPHO PCT %  --  18   < > 13*   MONO PCT %  --  8   < > 6   EOS PCT %  --  2   < > 0    < > = values in this interval not displayed.     Results from last 7 days   Lab Units 05/16/24  0904 05/12/24  0523 05/11/24  1726   SODIUM mmol/L 135   < > 128*   POTASSIUM mmol/L 3.9   < > 3.7   CHLORIDE mmol/L 103   < > 99   CO2 mmol/L 20*   < > 21   BUN mg/dL 10   < > 7   CREATININE mg/dL 1.17   < > 0.76   ANION GAP mmol/L 12   < > 8   CALCIUM mg/dL 8.1*   < > 8.8   ALBUMIN g/dL  --   --  3.8   TOTAL BILIRUBIN mg/dL  --   --  0.92   ALK PHOS U/L  --   --  70   ALT U/L  --   --  20   AST U/L  --   --  21   GLUCOSE RANDOM mg/dL 183*   < > 234*    < > = values in this interval not displayed.     Results from last 7 days   Lab Units 05/11/24  1726   INR  1.14     Results from last 7 days   Lab Units 05/16/24  2042 05/16/24  1538 05/16/24  1121 05/16/24  0732 05/15/24  2045 05/15/24  1042 05/15/24  0743 05/14/24  2057 05/14/24  1606 05/14/24  1124 05/14/24  0803 05/14/24  0113   POC GLUCOSE mg/dl 138 144* 184* 139 188* 145* 160* 179* 189* 174* 232* 235*         Results from last 7 days   Lab Units 05/15/24  0235 05/12/24  0523 05/11/24  1726   LACTIC ACID mmol/L 0.7  --  1.4   PROCALCITONIN ng/ml 0.32* 0.33* 0.28*       Lines/Drains:  Invasive Devices       Peripheral Intravenous Line  Duration             Peripheral IV 05/14/24 Distal;Right;Upper;Ventral (anterior) Arm 2 days    Peripheral IV 05/16/24 Left;Ventral (anterior) Forearm <1 day                          Imaging: No pertinent imaging reviewed.    Recent Cultures (last 7 days):   Results from last 7 days   Lab Units 05/15/24  0240 05/12/24  1521 05/11/24  2104 05/11/24  1740 05/11/24  1726   BLOOD CULTURE  No Growth at 24 hrs.   No Growth at 24 hrs. No Growth at 72 hrs.  No Growth at 72 hrs.  --   --  No Growth After 4 Days.  Staphylococcus epidermidis*  Acinetobacter lwoffii*   GRAM STAIN RESULT   --   --  No Polys*  4+ Gram negative rods*  4+ Gram positive cocci in pairs, chains and clusters*  --  Gram negative rods*  Gram positive cocci in clusters*   URINE CULTURE   --   --   --  No Growth <1000 cfu/mL  --    WOUND CULTURE   --   --  1+ Growth of Actinomyces species*  Few Colonies of  --   --        Last 24 Hours Medication List:   Current Facility-Administered Medications   Medication Dose Route Frequency Provider Last Rate    acetaminophen  650 mg Oral Q6H TANESHA Anastacio Esparza PA-C      albuterol  2.5 mg Nebulization Q6H PRN Amado Mc MD      ampicillin-sulbactam  3 g Intravenous Q4H Amado Mc MD 3 g (05/16/24 2338)    benzonatate  100 mg Oral TID PRN Amado Mc MD      enoxaparin  40 mg Subcutaneous BID Amado Mc MD      FLUoxetine  10 mg Oral Daily Amado Mc MD      HYDROmorphone  0.2 mg Intravenous Q3H PRN Anastacio Esparza PA-C      insulin glargine  30 Units Subcutaneous HS Ginny Weaver DO      insulin lispro  2-12 Units Subcutaneous TID AC Amado Mc MD      insulin lispro  9 Units Subcutaneous TID With Meals Ginny Weaver DO      melatonin  6 mg Oral HS Amado Mc MD      ondansetron  4 mg Intravenous Q6H PRN Amado Mc MD      oxyCODONE  5 mg Oral Q4H PRN Anastacio Esparza PA-C      saccharomyces boulardii  250 mg Oral BID Amado Mc MD      topiramate  50 mg Oral BID Amado Mc MD          Today, Patient Was Seen By: Ginny Weaver DO    **Please Note: This note may have been constructed using a voice recognition system.**

## 2024-05-16 NOTE — PROGRESS NOTES
Progress Note - Infectious Disease   Rik Marin 19 y.o. male MRN: [id number]  Unit/Bed#: [address] [place] [address] Encounter: [id number]      Impression/Plan:  1. Sepsis, evidenced by fever and leukocytosis. In setting of #2/3  WBC 15 K, possibly reactive post op  -antibiotics as below  -follow-up cultures   -monitor temperature and hemodynamics  -serial exam  -recheck CBC and BMP in a.m. - monitoring treatment response and any developing toxicities     2. Acinetobacter bacteremia. Admission blood cultures growing Staph epi and Acinetobacter lwoffii in 1 of 2 sets. Consider possible true Acinetobacter bacteremia in setting of #3 and staph epi possible skin contaminant in single set.   -antibiotics as below   -follow up repeat blood cultures  -management as below     3. Necrotizing soft tissue infection Left groin.   5/11/24 OR I&D with foul-smelling green-brown fluid encountered and sent for culture. OR cx 1+ Actinomyces species, few MSF, 4+ prevotella bivia  5/13/24 s/p 2nd I&D   5/15/24 s/p 3rd washout with penrose drains in place now  -continues Unasyn 3 g IV q 6 hours  -serial exam  -post op surgical management     4. Type 2 Diabetes Mellitus with hyperglycemia. 5/7/24 HgbA1c 11.6 Risk factor for poor wound healing and infection.  -blood glucose management per primary     5. Morbid Obesity, BMI 50.2 Can affect antibiotic dosing/absorption  -dose adjust antibiotics as needed.      Antibiotics:  Unasyn D3 - abx D6     I have discussed the above management plan in detail with patient, RN, and Anastacio surgical PA of surgical team. They concur with ID treatment plan and close monitoring as above.    Subjective:  Patient had low grade post op temp, no chills, sweats; no nausea, vomiting, diarrhea; no cough, shortness of breath; + post op region discomfort being managed. No tailbone site pain at present.     Objective:  Vitals:  Temp:  [98.5 °F (36.9 °C)-100.1 °F (37.8 °C)] 98.9 °F (37.2 °C)  HR:  [] 98  Resp:  [18-28]  28  BP: (123-147)/(58-85) 139/80  SpO2:  [93 %-99 %] 97 %  Temp (24hrs), Av.2 °F (37.3 °C), Min:98.5 °F (36.9 °C), Max:100.1 °F (37.8 °C)  Current: Temperature: 98.9 °F (37.2 °C)    Physical Exam:   General Appearance:  19 year old male, propped in recliner on CPAP, debilitated, nontoxic, in no acute distress   HEENT: Atraumatic normocephalic   Throat: Oropharynx moist. Poor dentition   Pulmonary:   Normal respiratory excursion without accessory muscle use   Cardiac:  RRR   Abdomen:   Soft, no focal tenderness, protuberant pannus   Extremities: No edema   : Olson out, no SPT, left groin region penrose drains in place, no spreading erythema outside drains   Psychiatric: Awake, resting on CPAP, cooperative   Skin: No new rashes. IV site nontender.        Labs, Imaging, & Other studies:   All pertinent labs and imaging studies were personally reviewed  Results from last 7 days   Lab Units 24  0904 05/15/24  0555 05/15/24  0235   WBC Thousand/uL 15.56* 11.57* 12.90*   HEMOGLOBIN g/dL 9.6* 9.8* 10.1*   PLATELETS Thousands/uL 210 292 294     Results from last 7 days   Lab Units 24  0904 05/15/24  0555 24  0637 24  0523 24  1726   SODIUM mmol/L 135 136 134*   < > 128*   POTASSIUM mmol/L 3.9 3.8 3.6   < > 3.7   CHLORIDE mmol/L 103 107 106   < > 99   CO2 mmol/L 20* 22 20*   < > 21   BUN mg/dL 10 12 12   < > 7   CREATININE mg/dL 1.17 1.30 1.31*   < > 0.76   EGFR ml/min/1.73sq m 89 78 77   < > 131   CALCIUM mg/dL 8.1* 8.6 8.0*   < > 8.8   AST U/L  --   --   --   --  21   ALT U/L  --   --   --   --  20   ALK PHOS U/L  --   --   --   --  70    < > = values in this interval not displayed.     Results from last 7 days   Lab Units 05/15/24  0240 24  1521 24  2104 24  1740 24  1726   BLOOD CULTURE  No Growth at 24 hrs.  No Growth at 24 hrs. No Growth at 72 hrs.  No Growth at 72 hrs.  --   --  No Growth After 4 Days.  Staphylococcus epidermidis*  Acinetobacter lwoffii*    GRAM STAIN RESULT   --   --  No Polys*  4+ Gram negative rods*  4+ Gram positive cocci in pairs, chains and clusters*  --  Gram negative rods*  Gram positive cocci in clusters*   URINE CULTURE   --   --   --  No Growth <1000 cfu/mL  --    WOUND CULTURE   --   --  1+ Growth of Actinomyces species*  Few Colonies of  --   --      Results from last 7 days   Lab Units 05/15/24  0235 05/12/24  0523 05/11/24  1726   PROCALCITONIN ng/ml 0.32* 0.33* 0.28*

## 2024-05-16 NOTE — PROGRESS NOTES
"Progress Note - General Surgery   Rik Marin 19 y.o. male MRN: [id number]  Unit/Bed#: [address] [place] [address] Encounter: [id number]    Assessment:  1) 18yo male with history of ADHD, uncontrolled type 2 diabetes with insulin use, and morbid obesity presenting with sepsis and GNR bacteremia secondary to necrotizing soft tissue infection of groin/Humberto's gangrene.   Initial blood cultures 1 of 2 grew actinomyces, Prevotella, and mixed skin jalyn (possible contaminant). Repeat blood cultures were negative but then patient had new fever of 102 on 5/15.   He is hospital day 5 s/p incision and drainage and wound washout left groin x3 - with penrose drains now in place, tmax 100.1 over the past 24 hours, AVSS      Plan:  1)   -Insulin per medicine team  - regular diabetic diet   -Follow-up blood cultures from 5/15  -Continue Unasyn per ID through 5/20, will reach out to them about oral equivalent for discharge   -follow up creatinine, awaiting lab results for this morning  -consider decreasing or discontinuing IV fluids  - border foam dressing for sacral ulcer along with foam wedges  -OOB as much as possible  -outpatient follow up with endocrinology and weight management      Subjective/Objective     Subjective:   Patient reports he is doing okay.  Patient denies any fevers, chills, nausea, vomiting.  Patient doesn't want to ambulate due to groin tenderness.  He reports having multiple loose bowel movements overnight.      Objective:   UOP 0.9    Blood pressure 139/80, pulse 97, temperature 98.9 °F (37.2 °C), temperature source Oral, resp. rate 22, height 6' 2\" (1.88 m), weight (!) 177 kg (391 lb), SpO2 95%.,Body mass index is 50.2 kg/m².      Intake/Output Summary (Last 24 hours) at 5/16/2024 0800  Last data filed at 5/16/2024 0601  Gross per 24 hour   Intake 800 ml   Output 3700 ml   Net -2900 ml       Invasive Devices       Peripheral Intravenous Line  Duration             Peripheral IV 05/14/24 Distal;Right;Upper;Ventral " "(anterior) Arm 1 day                    Physical Exam: /80 (BP Location: Left arm)   Pulse 97   Temp 98.9 °F (37.2 °C) (Oral)   Resp 22   Ht 6' 2\" (1.88 m)   Wt (!) 177 kg (391 lb)   SpO2 95%   BMI 50.20 kg/m²   General appearance: awake, alert, no acute distress  Head: Normocephalic, without obvious abnormality, atraumatic  Lungs:  short, shallow respirations, mild tachypnea  Heart:  mild sinus tachycardia  Abdomen: obese, soft, non-tender; bowel sounds normal; no masses,  no organomegaly  Skin: 5 open incisions in left inguinal canal region with penrose drains x3    Lab, Imaging and other studies:I have personally reviewed pertinent lab results.  , CBC:   No results found for: \"WBC\", \"HGB\", \"HCT\", \"MCV\", \"PLT\", \"ADJUSTEDWBC\", \"RBC\", \"MCH\", \"MCHC\", \"RDW\", \"MPV\", \"NRBC\"  , CMP:   No results found for: \"SODIUM\", \"K\", \"CL\", \"CO2\", \"ANIONGAP\", \"BUN\", \"CREATININE\", \"GLUCOSE\", \"CALCIUM\", \"AST\", \"ALT\", \"ALKPHOS\", \"PROT\", \"BILITOT\", \"EGFR\"    VTE Pharmacologic Prophylaxis: Enoxaparin (Lovenox) SQ 40mg BID  VTE Mechanical Prophylaxis: sequential compression device  "

## 2024-05-17 LAB
ANION GAP SERPL CALCULATED.3IONS-SCNC: 7 MMOL/L (ref 4–13)
BACTERIA BLD CULT: NORMAL
BASOPHILS # BLD AUTO: 0.05 THOUSANDS/ÂΜL (ref 0–0.1)
BASOPHILS NFR BLD AUTO: 0 % (ref 0–1)
BUN SERPL-MCNC: 9 MG/DL (ref 5–25)
CALCIUM SERPL-MCNC: 8.4 MG/DL (ref 8.4–10.2)
CHLORIDE SERPL-SCNC: 104 MMOL/L (ref 96–108)
CO2 SERPL-SCNC: 24 MMOL/L (ref 21–32)
CREAT SERPL-MCNC: 1.17 MG/DL (ref 0.6–1.3)
EOSINOPHIL # BLD AUTO: 0.43 THOUSAND/ÂΜL (ref 0–0.61)
EOSINOPHIL NFR BLD AUTO: 3 % (ref 0–6)
ERYTHROCYTE [DISTWIDTH] IN BLOOD BY AUTOMATED COUNT: 14.1 % (ref 11.6–15.1)
GFR SERPL CREATININE-BSD FRML MDRD: 89 ML/MIN/1.73SQ M
GLUCOSE SERPL-MCNC: 128 MG/DL (ref 65–140)
GLUCOSE SERPL-MCNC: 138 MG/DL (ref 65–140)
GLUCOSE SERPL-MCNC: 164 MG/DL (ref 65–140)
GLUCOSE SERPL-MCNC: 179 MG/DL (ref 65–140)
GLUCOSE SERPL-MCNC: 190 MG/DL (ref 65–140)
HCT VFR BLD AUTO: 31.9 % (ref 36.5–49.3)
HGB BLD-MCNC: 9.7 G/DL (ref 12–17)
IMM GRANULOCYTES # BLD AUTO: 0.24 THOUSAND/UL (ref 0–0.2)
IMM GRANULOCYTES NFR BLD AUTO: 2 % (ref 0–2)
LYMPHOCYTES # BLD AUTO: 2.37 THOUSANDS/ÂΜL (ref 0.6–4.47)
LYMPHOCYTES NFR BLD AUTO: 15 % (ref 14–44)
MCH RBC QN AUTO: 26.8 PG (ref 26.8–34.3)
MCHC RBC AUTO-ENTMCNC: 30.4 G/DL (ref 31.4–37.4)
MCV RBC AUTO: 88 FL (ref 82–98)
MONOCYTES # BLD AUTO: 0.8 THOUSAND/ÂΜL (ref 0.17–1.22)
MONOCYTES NFR BLD AUTO: 5 % (ref 4–12)
NEUTROPHILS # BLD AUTO: 12.2 THOUSANDS/ÂΜL (ref 1.85–7.62)
NEUTS SEG NFR BLD AUTO: 75 % (ref 43–75)
NRBC BLD AUTO-RTO: 0 /100 WBCS
PLATELET # BLD AUTO: 361 THOUSANDS/UL (ref 149–390)
PMV BLD AUTO: 10.5 FL (ref 8.9–12.7)
POTASSIUM SERPL-SCNC: 3.4 MMOL/L (ref 3.5–5.3)
RBC # BLD AUTO: 3.62 MILLION/UL (ref 3.88–5.62)
SODIUM SERPL-SCNC: 135 MMOL/L (ref 135–147)
WBC # BLD AUTO: 16.09 THOUSAND/UL (ref 4.31–10.16)

## 2024-05-17 PROCEDURE — 99232 SBSQ HOSP IP/OBS MODERATE 35: CPT | Performed by: FAMILY MEDICINE

## 2024-05-17 PROCEDURE — 85025 COMPLETE CBC W/AUTO DIFF WBC: CPT | Performed by: PHYSICIAN ASSISTANT

## 2024-05-17 PROCEDURE — 82948 REAGENT STRIP/BLOOD GLUCOSE: CPT

## 2024-05-17 PROCEDURE — 99024 POSTOP FOLLOW-UP VISIT: CPT | Performed by: PHYSICIAN ASSISTANT

## 2024-05-17 PROCEDURE — 99024 POSTOP FOLLOW-UP VISIT: CPT | Performed by: SURGERY

## 2024-05-17 PROCEDURE — 80048 BASIC METABOLIC PNL TOTAL CA: CPT | Performed by: PHYSICIAN ASSISTANT

## 2024-05-17 PROCEDURE — 99233 SBSQ HOSP IP/OBS HIGH 50: CPT | Performed by: INTERNAL MEDICINE

## 2024-05-17 RX ORDER — OXYCODONE HYDROCHLORIDE 5 MG/1
5 TABLET ORAL EVERY 8 HOURS PRN
Qty: 6 TABLET | Refills: 0 | Status: SHIPPED | OUTPATIENT
Start: 2024-05-17 | End: 2024-05-24

## 2024-05-17 RX ORDER — POTASSIUM CHLORIDE 20 MEQ/1
40 TABLET, EXTENDED RELEASE ORAL ONCE
Status: COMPLETED | OUTPATIENT
Start: 2024-05-17 | End: 2024-05-17

## 2024-05-17 RX ORDER — ACETAMINOPHEN 325 MG/1
650 TABLET ORAL EVERY 6 HOURS SCHEDULED
Start: 2024-05-17 | End: 2024-05-24

## 2024-05-17 RX ORDER — AMOXICILLIN AND CLAVULANATE POTASSIUM 875; 125 MG/1; MG/1
1 TABLET, FILM COATED ORAL EVERY 12 HOURS SCHEDULED
Qty: 8 TABLET | Refills: 0 | Status: SHIPPED | OUTPATIENT
Start: 2024-05-17 | End: 2024-05-21

## 2024-05-17 RX ADMIN — ACETAMINOPHEN 650 MG: 325 TABLET ORAL at 11:25

## 2024-05-17 RX ADMIN — AMPICILLIN SODIUM AND SULBACTAM SODIUM 3 G: 2; 1 INJECTION, POWDER, FOR SOLUTION INTRAMUSCULAR; INTRAVENOUS at 22:17

## 2024-05-17 RX ADMIN — Medication 250 MG: at 17:24

## 2024-05-17 RX ADMIN — AMPICILLIN SODIUM AND SULBACTAM SODIUM 3 G: 2; 1 INJECTION, POWDER, FOR SOLUTION INTRAMUSCULAR; INTRAVENOUS at 06:26

## 2024-05-17 RX ADMIN — INSULIN LISPRO 9 UNITS: 100 INJECTION, SOLUTION INTRAVENOUS; SUBCUTANEOUS at 08:08

## 2024-05-17 RX ADMIN — INSULIN LISPRO 2 UNITS: 100 INJECTION, SOLUTION INTRAVENOUS; SUBCUTANEOUS at 11:25

## 2024-05-17 RX ADMIN — ENOXAPARIN SODIUM 40 MG: 40 INJECTION SUBCUTANEOUS at 17:24

## 2024-05-17 RX ADMIN — AMPICILLIN SODIUM AND SULBACTAM SODIUM 3 G: 2; 1 INJECTION, POWDER, FOR SOLUTION INTRAMUSCULAR; INTRAVENOUS at 03:13

## 2024-05-17 RX ADMIN — B-COMPLEX W/ C & FOLIC ACID TAB 1 TABLET: TAB at 15:03

## 2024-05-17 RX ADMIN — ACETAMINOPHEN 650 MG: 325 TABLET ORAL at 17:24

## 2024-05-17 RX ADMIN — AMPICILLIN SODIUM AND SULBACTAM SODIUM 3 G: 2; 1 INJECTION, POWDER, FOR SOLUTION INTRAMUSCULAR; INTRAVENOUS at 11:25

## 2024-05-17 RX ADMIN — INSULIN LISPRO 9 UNITS: 100 INJECTION, SOLUTION INTRAVENOUS; SUBCUTANEOUS at 16:26

## 2024-05-17 RX ADMIN — Medication 6 MG: at 22:13

## 2024-05-17 RX ADMIN — INSULIN GLARGINE 25 UNITS: 100 INJECTION, SOLUTION SUBCUTANEOUS at 22:17

## 2024-05-17 RX ADMIN — OXYCODONE HYDROCHLORIDE 5 MG: 5 TABLET ORAL at 16:26

## 2024-05-17 RX ADMIN — OXYCODONE HYDROCHLORIDE 5 MG: 5 TABLET ORAL at 01:12

## 2024-05-17 RX ADMIN — TOPIRAMATE 50 MG: 25 TABLET, FILM COATED ORAL at 08:07

## 2024-05-17 RX ADMIN — Medication 250 MG: at 08:07

## 2024-05-17 RX ADMIN — POTASSIUM CHLORIDE 40 MEQ: 1500 TABLET, EXTENDED RELEASE ORAL at 15:06

## 2024-05-17 RX ADMIN — AMPICILLIN SODIUM AND SULBACTAM SODIUM 3 G: 2; 1 INJECTION, POWDER, FOR SOLUTION INTRAMUSCULAR; INTRAVENOUS at 18:17

## 2024-05-17 RX ADMIN — ACETAMINOPHEN 650 MG: 325 TABLET ORAL at 06:26

## 2024-05-17 RX ADMIN — INSULIN LISPRO 9 UNITS: 100 INJECTION, SOLUTION INTRAVENOUS; SUBCUTANEOUS at 11:25

## 2024-05-17 RX ADMIN — OXYCODONE HYDROCHLORIDE 5 MG: 5 TABLET ORAL at 06:26

## 2024-05-17 RX ADMIN — FLUOXETINE 10 MG: 10 CAPSULE ORAL at 08:07

## 2024-05-17 RX ADMIN — ENOXAPARIN SODIUM 40 MG: 40 INJECTION SUBCUTANEOUS at 08:08

## 2024-05-17 RX ADMIN — AMPICILLIN SODIUM AND SULBACTAM SODIUM 3 G: 2; 1 INJECTION, POWDER, FOR SOLUTION INTRAMUSCULAR; INTRAVENOUS at 15:03

## 2024-05-17 RX ADMIN — TOPIRAMATE 50 MG: 25 TABLET, FILM COATED ORAL at 17:24

## 2024-05-17 NOTE — ASSESSMENT & PLAN NOTE
Patient initially presented with left testicular and pubic pain for 2 to 3 days associated with fevers, chills, nausea and vomiting  CT concerning for Humberto's  Sepsis, present on admission, as evidenced by leukocytosis, fever, tachycardia, with source being left groin infection/Humberto's gangrene  Status post 3 I&D by Surgical team so far

## 2024-05-17 NOTE — PROGRESS NOTES
Progress Note - Infectious Disease   Rik Marin 19 y.o. male MRN: [id number]  Unit/Bed#: [address] [place] [address] Encounter: [id number]      Impression/Plan:  19-year-old male with morbid obesity, ADHD, controlled type 2 diabetes, major depressive disorder who was admitted with left groin necrotizing infection/Humberto's gangrene. Blood culture positive with Acinetobacter, OR cultures with Actinomyces, skin jalyn and anaerobe. Status post OR washout on 5/11 and 5/13. Final OR on 5/15 with wound base appearing clean, no nonviable tissue or purulence noted. 3 penrose drains in place.     1.  Sepsis; fever, leukocytosis, tachycardia  2.  Humberto's gangrene/necrotizing left groin infection; OR culture grew 1+ Actinomyces, mixed skin jalyn, Prevotella.  3.  Polymicrobial blood culture; Staphylococcus epidermidis, Acinetobacter Iwoffii in one set from 5/11 (admission), suspect may be contaminate  4.  Uncontrolled T2DM  5.  Morbid obesity     Patient with low grade temp overnight to 100.5. WBC slightly increased today. Blood cultures from 5/12 and 5/15 all remain negative thus far. He has no new complaints to localize a new source of inflammation.     For today will continue Unasyn to cover all prior bacteria in OR cultures and the Acinetobacter in blood culture from admission. Follow up repeat blood cultures. Will need to monitor WBC, consider watching through tomorrow to ensure does not continue to rise. Current plan to continue antibiotics through 5/20. This would be 5 days from last washout and a total of >7 days of treatment for the Acinetobacter. Consider evaluation for urinary retention as he complains of difficulty with urination, though may be more chronic.  IF he has more fever or WBC continues to increase then may need further evaluation. If remains afebrile over next 24 hours and WBC improving tomorrow then consider transition to PO Augmentin to complete the antibiotic course.    Of note, it is possible the  Acinetobacter in blood culture from  was a contaminate as was in only 1 set along with a coagulase negative Staph, did not grow in OR cultures and did not grow in repeat blood cultures.    Antibiotics:  Unasyn: 4  Total abx days: 7    24 Hour Events:  Tmax 100.5 overnight.     Subjective:  Patient denies cough, shortness of breath, abdominal pain, nausea, diarrhea, dysuria. He reports trouble controlling his urine stream but this is a chronic issue.     Objective:  Vitals:  Temp:  [98.5 °F (36.9 °C)-100.5 °F (38.1 °C)] 98.6 °F (37 °C)  HR:  [] 90  Resp:  [16-28] 16  BP: (134-163)/(70-87) 155/87  SpO2:  [92 %-97 %] 94 %  Temp (24hrs), Av.2 °F (37.3 °C), Min:98.5 °F (36.9 °C), Max:100.5 °F (38.1 °C)  Current: Temperature: 98.6 °F (37 °C)    Physical Exam:   General Appearance:  Alert, interactive, nontoxic, no acute distress.   Throat: Oropharynx moist without lesions.    Lungs:   Respirations unlabored   Heart:  RRR; no murmur   Abdomen:   Soft, non-tender.     Extremities: No clubbing, cyanosis   Skin: No new rashes       Labs:   All pertinent labs and imaging studies were personally reviewed  Results from last 7 days   Lab Units 24  0904 05/15/24  0555 05/15/24  0235   WBC Thousand/uL 15.56* 11.57* 12.90*   HEMOGLOBIN g/dL 9.6* 9.8* 10.1*   PLATELETS Thousands/uL 210 292 294     Results from last 7 days   Lab Units 24  0904 05/15/24  0555 24  0637 24  0523 24  1726   SODIUM mmol/L 135 136 134*   < > 128*   POTASSIUM mmol/L 3.9 3.8 3.6   < > 3.7   CHLORIDE mmol/L 103 107 106   < > 99   CO2 mmol/L 20* 22 20*   < > 21   BUN mg/dL 10 12 12   < > 7   CREATININE mg/dL 1.17 1.30 1.31*   < > 0.76   EGFR ml/min/1.73sq m 89 78 77   < > 131   CALCIUM mg/dL 8.1* 8.6 8.0*   < > 8.8   AST U/L  --   --   --   --  21   ALT U/L  --   --   --   --  20   ALK PHOS U/L  --   --   --   --  70    < > = values in this interval not displayed.     Results from last 7 days   Lab Units  05/15/24  0235 05/12/24  0523 05/11/24  1726   PROCALCITONIN ng/ml 0.32* 0.33* 0.28*                   Micro:  Results from last 7 days   Lab Units 05/15/24  0240 05/12/24  1521 05/11/24  2104 05/11/24  1740 05/11/24  1726   BLOOD CULTURE  No Growth at 24 hrs.  No Growth at 24 hrs. No Growth After 4 Days.  No Growth After 4 Days.  --   --  No Growth After 5 Days.  Staphylococcus epidermidis*  Acinetobacter lwoffii*   GRAM STAIN RESULT   --   --  No Polys*  4+ Gram negative rods*  4+ Gram positive cocci in pairs, chains and clusters*  --  Gram negative rods*  Gram positive cocci in clusters*   URINE CULTURE   --   --   --  No Growth <1000 cfu/mL  --    WOUND CULTURE   --   --  1+ Growth of Actinomyces species*  Few Colonies of  --   --        Imaging:          Martin Decker MD  [facility]

## 2024-05-17 NOTE — PROGRESS NOTES
"Progress Note - General Surgery   Rik Marin 19 y.o. male MRN: [id number]  Unit/Bed#: [address] [address] Encounter: [id number]    Assessment:  1) 19-year-old male with type 2 diabetes on insulin and morbid obesity with gram-negative bacteremia and necrotizing soft tissue infection of the mons pubis and left inguinal canal now postop day 2 status post reevaluation of wound and suturing and 3 Penrose drains -AVSS, appropriate urinary output, creatinine slightly improved, glucose better controlled on sliding scale insulin currently, Penrose is in place with gauze in place, leukocytosis still elevated without any significant erythema of the mons pubis, reduced tenderness, no purulence noted with palpation, clean healthy appearing wound bases    Plan:  1)   -Appreciate infectious disease input, will continue with p.o. Augmentin to complete up to 5/20  -Discontinue IV antibiotics  -Home insulin regimen  -Follow-up with [facility] and endocrinology upon discharge to manage and monitor diabetic status   -wound care management/education and provided withBag of materials for wound dressing changes  -Shower daily   - coordinated and spoke with family  -Discussed and coordinated with case management  -Discharge today 5/17/2024    Subjective/Objective   Chief Complaint: I will shower    Subjective: Patient was seen and examined at bedside.  Patient currently somewhat quiet.  Patient denies any pain in the standing currently ambulating.  Patient denies any significant drainage.  Patient reports that he will be showering.  Patient was educated on how to do wound care dressing changes.  Patient does not know who does his insulin prescriptions.    Objective:     Blood pressure 155/87, pulse 90, temperature 98.6 °F (37 °C), temperature source Oral, resp. rate 16, height 6' 2\" (1.88 m), weight (!) 177 kg (391 lb), SpO2 94%.,Body mass index is 50.2 kg/m².      Intake/Output Summary (Last 24 hours) at 5/17/2024 " "1111  Last data filed at 5/17/2024 0343  Gross per 24 hour   Intake 460 ml   Output 1850 ml   Net -1390 ml       Invasive Devices       Peripheral Intravenous Line  Duration             Peripheral IV 05/14/24 Distal;Right;Upper;Ventral (anterior) Arm 2 days    Peripheral IV 05/16/24 Left;Ventral (anterior) Forearm 1 day                    Physical Exam: /87 (BP Location: Left arm)   Pulse 90   Temp 98.6 °F (37 °C) (Oral)   Resp 16   Ht 6' 2\" (1.88 m)   Wt (!) 177 kg (391 lb)   SpO2 94%   BMI 50.20 kg/m²   General appearance: alert and oriented, in no acute distress  Head: Normocephalic, without obvious abnormality, atraumatic  Lungs: clear to auscultation bilaterally  Heart: regular rate and rhythm, S1, S2 normal, no murmur, click, rub or gallop  Abdomen: soft, non-tender; bowel sounds normal; no masses,  no organomegaly  Skin: Skin color, texture, turgor normal. No rashes or lesions or 3 penrose drain in place with ABD over top    Lab, Imaging and other studies:I have personally reviewed pertinent lab results.  , CBC:   Lab Results   Component Value Date    WBC 16.09 (H) 05/17/2024    HGB 9.7 (L) 05/17/2024    HCT 31.9 (L) 05/17/2024    MCV 88 05/17/2024     05/17/2024    RBC 3.62 (L) 05/17/2024    MCH 26.8 05/17/2024    MCHC 30.4 (L) 05/17/2024    RDW 14.1 05/17/2024    MPV 10.5 05/17/2024    NRBC 0 05/17/2024   , CMP:   Lab Results   Component Value Date    SODIUM 135 05/17/2024    K 3.4 (L) 05/17/2024     05/17/2024    CO2 24 05/17/2024    BUN 9 05/17/2024    CREATININE 1.17 05/17/2024    CALCIUM 8.4 05/17/2024    EGFR 89 05/17/2024     VTE Pharmacologic Prophylaxis: Enoxaparin (Lovenox)  VTE Mechanical Prophylaxis: sequential compression device  "

## 2024-05-17 NOTE — ASSESSMENT & PLAN NOTE
Patient initially presented with left testicular and pubic pain for 2 to 3 days associated with fevers, chills, nausea and vomiting  CT concerning for Humberto's  Sepsis, present on admission, as evidenced by leukocytosis, fever, tachycardia, with source being left groin infection/Humberto's gangrene  Status post 3 I&D by Surgical team so far  Repeat lab work in a.m

## 2024-05-17 NOTE — PROGRESS NOTES
[facility]  Progress Note  Name: Rik Marin I  MRN: [id number]  Unit/Bed#: [address] [address] I Date of Admission: 5/11/2024   Date of Service: 5/17/2024 I Hospital Day: 6    Assessment & Plan   * Type 2 diabetes mellitus with hyperglycemia, with long-term current use of insulin (HCC)  Assessment & Plan  Lab Results   Component Value Date    HGBA1C 11.6 (H) 05/07/2024       Recent Labs     05/16/24  1538 05/16/24  2042 05/17/24  0736 05/17/24  1042   POCGLU 144* 138 128 164*       Continue Lantus 25 units and 9 units scheduled Humalog with meals.   Continue SSI.  Blood sugars are now better controlled      Humberto's gangrene  Assessment & Plan  Pt is s/p I&D and washout by Surgery team on 5/11 and 5/13 and 5/15  OR culture with actinomyces is, Prevotella and mixed skin jalyn   Currently on IV Unasyn, likely transition to Augmentin on discharge  Further management per primary team  Leukocytosis slowly trending up    Gram-negative bacteremia  Assessment & Plan  Currently on IV Unasyn.  Discontinued vancomycin, Zosyn and clindamycin   Admission blood cultures with Acinetobacter Iwoffii staph epi.  Staph epi likely skin contaminant  repeat blood cultures negative  Per ID continue antibiotics through 5/20.  Likely transition to Augmentin tomorrow.  ID input appreciated.    Acute anemia  Assessment & Plan  Possibly blood loss related to I&D and possibly dilutional in nature.    Hemoglobin overall stable in 9s range.  Follow CBC    Results from last 7 days   Lab Units 05/17/24  0935 05/16/24  0904 05/15/24  0555 05/15/24  0235 05/14/24  0637 05/13/24  0647 05/12/24  0523 05/11/24  1726   HEMOGLOBIN g/dL 9.7* 9.6* 9.8* 10.1* 10.2* 11.0* 9.5* 12.5   HEMATOCRIT % 31.9* 31.3* 31.2* 33.1* 32.4* 35.6* 30.0* 39.2   MCV fL 88 89 87 88 87 88 87 86        Sepsis (HCC)-resolved as of 5/16/2024  Assessment & Plan  Patient initially presented with left testicular and pubic pain for 2 to 3 days associated with  fevers, chills, nausea and vomiting  CT concerning for Humberto's  Sepsis, present on admission, as evidenced by leukocytosis, fever, tachycardia, with source being left groin infection/Humberto's gangrene  Status post 3 I&D by Surgical team so far    Electrolyte abnormality  Assessment & Plan  Replete potassium today    ADHD (attention deficit hyperactivity disorder)  Assessment & Plan  Continue Prozac.    Sleep apnea  Assessment & Plan  Unsure if using CPAP at home  Continue CPAP use while here    Morbid obesity (HCC)  Assessment & Plan  Body mass index is 50.2 kg/m².   Diet and lifestyle modification.  CT showed hepatomegaly with steatosis and splenomegaly.      Hyponatremia-resolved as of 5/14/2024  Assessment & Plan  Mild, most likely pseudohyponatremia from hyperglycemia.     Pneumonia-resolved as of 5/14/2024  Assessment & Plan  Patient was hospitalized 5/5-5/7 for sepsis multifocal pneumonia.  Was treated with Rocephin and Zithromax, discharged on Ceftin and Zithromax               VTE Pharmacologic Prophylaxis: VTE Score: 6 High Risk (Score >/= 5) - Pharmacological DVT Prophylaxis Ordered: enoxaparin (Lovenox). Sequential Compression Devices Ordered.    Mobility:   Basic Mobility Inpatient Raw Score: 22  JH-HLM Goal: 7: Walk 25 feet or more  JH-HLM Achieved: 7: Walk 25 feet or more  JH-HLM Goal achieved. Continue to encourage appropriate mobility.    Patient Centered Rounds: I performed bedside rounds with nursing staff today.   Discussions with Specialists or Other Care Team Provider: yes - surgery    Education and Discussions with Family / Patient: yes    Total Time Spent on Date of Encounter in care of patient:  This time was spent on one or more of the following: performing physical exam; counseling and coordination of care; obtaining or reviewing history; documenting in the medical record; reviewing/ordering tests, medications or procedures; communicating with other healthcare professionals and  discussing with patient's family/caregivers.    Current Length of Stay: 6 day(s)  Current Patient Status: Inpatient   Discharge Plan: [facility] is following this patient on consult. They are not yet medically stable for discharge secondary to bernadette's gangrene with worsening leukocytosis.     Code Status: Level 1 - Full Code    Subjective:     Patient states he is doing okay.  Denies any significant pain, shortness of breath    Objective:     Vitals:   Temp (24hrs), Av.2 °F (37.3 °C), Min:98.5 °F (36.9 °C), Max:100.5 °F (38.1 °C)    Temp:  [98.5 °F (36.9 °C)-100.5 °F (38.1 °C)] 98.6 °F (37 °C)  HR:  [] 90  Resp:  [16-28] 16  BP: (134-163)/(70-87) 155/87  SpO2:  [92 %-97 %] 94 %  Body mass index is 50.2 kg/m².     Input and Output Summary (last 24 hours):     Intake/Output Summary (Last 24 hours) at 2024 0858  Last data filed at 2024 0343  Gross per 24 hour   Intake 700 ml   Output 2350 ml   Net -1650 ml       Physical Exam:   Physical Exam  Vitals reviewed.   Constitutional:       General: He is not in acute distress.     Appearance: He is not toxic-appearing.   HENT:      Head: Normocephalic and atraumatic.   Eyes:      General:         Right eye: No discharge.         Left eye: No discharge.   Cardiovascular:      Rate and Rhythm: Normal rate and regular rhythm.   Pulmonary:      Effort: Pulmonary effort is normal. No respiratory distress.      Breath sounds: Normal breath sounds. No wheezing or rales.   Abdominal:      General: Bowel sounds are normal. There is no distension.      Palpations: Abdomen is soft.      Tenderness: There is no abdominal tenderness.   Neurological:      Mental Status: He is alert.          Additional Data:     Labs:  Results from last 7 days   Lab Units 24  0904 05/15/24  0555 24  0647 24  0523   WBC Thousand/uL 15.56* 11.57*   < > 11.44*   HEMOGLOBIN g/dL 9.6* 9.8*   < > 9.5*   HEMATOCRIT % 31.3* 31.2*   < > 30.0*   PLATELETS Thousands/uL 210 292   <  > 188   BANDS PCT %  --   --   --  3   SEGS PCT %  --  71   < >  --    LYMPHO PCT %  --  18   < > 13*   MONO PCT %  --  8   < > 6   EOS PCT %  --  2   < > 0    < > = values in this interval not displayed.     Results from last 7 days   Lab Units 05/16/24  0904 05/12/24  0523 05/11/24  1726   SODIUM mmol/L 135   < > 128*   POTASSIUM mmol/L 3.9   < > 3.7   CHLORIDE mmol/L 103   < > 99   CO2 mmol/L 20*   < > 21   BUN mg/dL 10   < > 7   CREATININE mg/dL 1.17   < > 0.76   ANION GAP mmol/L 12   < > 8   CALCIUM mg/dL 8.1*   < > 8.8   ALBUMIN g/dL  --   --  3.8   TOTAL BILIRUBIN mg/dL  --   --  0.92   ALK PHOS U/L  --   --  70   ALT U/L  --   --  20   AST U/L  --   --  21   GLUCOSE RANDOM mg/dL 183*   < > 234*    < > = values in this interval not displayed.     Results from last 7 days   Lab Units 05/11/24  1726   INR  1.14     Results from last 7 days   Lab Units 05/17/24  0736 05/16/24  2042 05/16/24  1538 05/16/24  1121 05/16/24  0732 05/15/24  2045 05/15/24  1042 05/15/24  0743 05/14/24  2057 05/14/24  1606 05/14/24  1124 05/14/24  0803   POC GLUCOSE mg/dl 128 138 144* 184* 139 188* 145* 160* 179* 189* 174* 232*         Results from last 7 days   Lab Units 05/15/24  0235 05/12/24  0523 05/11/24  1726   LACTIC ACID mmol/L 0.7  --  1.4   PROCALCITONIN ng/ml 0.32* 0.33* 0.28*       Lines/Drains:  Invasive Devices       Peripheral Intravenous Line  Duration             Peripheral IV 05/14/24 Distal;Right;Upper;Ventral (anterior) Arm 2 days    Peripheral IV 05/16/24 Left;Ventral (anterior) Forearm <1 day                          Imaging: No pertinent imaging reviewed.    Recent Cultures (last 7 days):   Results from last 7 days   Lab Units 05/15/24  0240 05/12/24  1521 05/11/24  2104 05/11/24  1740 05/11/24  1726   BLOOD CULTURE  No Growth at 24 hrs.  No Growth at 24 hrs. No Growth After 4 Days.  No Growth After 4 Days.  --   --  No Growth After 5 Days.  Staphylococcus epidermidis*  Acinetobacter lwoffii*   GRAM STAIN  RESULT   --   --  No Polys*  4+ Gram negative rods*  4+ Gram positive cocci in pairs, chains and clusters*  --  Gram negative rods*  Gram positive cocci in clusters*   URINE CULTURE   --   --   --  No Growth <1000 cfu/mL  --    WOUND CULTURE   --   --  1+ Growth of Actinomyces species*  Few Colonies of  --   --        Last 24 Hours Medication List:   Current Facility-Administered Medications   Medication Dose Route Frequency Provider Last Rate    acetaminophen  650 mg Oral Q6H TANESHA Anastacio Esparza PA-C      albuterol  2.5 mg Nebulization Q6H PRN Amado Mc MD      ampicillin-sulbactam  3 g Intravenous Q4H Amado Mc MD 3 g (05/17/24 0626)    benzonatate  100 mg Oral TID PRN Amado Mc MD      enoxaparin  40 mg Subcutaneous BID Amado Mc MD      FLUoxetine  10 mg Oral Daily Amado Mc MD      HYDROmorphone  0.2 mg Intravenous Q3H PRN Anastacio Esparza PA-C      insulin glargine  25 Units Subcutaneous HS ANDRES Garcia      insulin lispro  2-12 Units Subcutaneous TID AC Amado Mc MD      insulin lispro  9 Units Subcutaneous TID With Meals Ginny Weaver DO      melatonin  6 mg Oral HS Amado Mc MD      ondansetron  4 mg Intravenous Q6H PRN Amado Mc MD      oxyCODONE  5 mg Oral Q4H PRN Anastacio Esparza PA-C      saccharomyces boulardii  250 mg Oral BID Amado Mc MD      topiramate  50 mg Oral BID Amaod Mc MD          Today, Patient Was Seen By: Ginny Weaver DO    **Please Note: This note may have been constructed using a voice recognition system.**

## 2024-05-17 NOTE — ASSESSMENT & PLAN NOTE
Currently on IV Unasyn.  Discontinued vancomycin, Zosyn and clindamycin   Admission blood cultures with Acinetobacter Iwoffii staph epi.  Staph epi likely skin contaminant  repeat blood cultures negative  Per ID continue antibiotics through 5/20.  Likely transition to Augmentin tomorrow.  ID input appreciated.

## 2024-05-17 NOTE — ASSESSMENT & PLAN NOTE
Possibly blood loss related to I&D and possibly dilutional in nature.    Hemoglobin overall stable in 9s range.  Follow CBC    Results from last 7 days   Lab Units 05/17/24  0935 05/16/24  0904 05/15/24  0555 05/15/24  0235 05/14/24  0637 05/13/24  0647 05/12/24  0523 05/11/24  1726   HEMOGLOBIN g/dL 9.7* 9.6* 9.8* 10.1* 10.2* 11.0* 9.5* 12.5   HEMATOCRIT % 31.9* 31.3* 31.2* 33.1* 32.4* 35.6* 30.0* 39.2   MCV fL 88 89 87 88 87 88 87 86

## 2024-05-17 NOTE — ANESTHESIA POSTPROCEDURE EVALUATION
Post-Op Assessment Note                Reason for prolonged intubation > 24 hours:  ETT was removed in the OR post procedureReason for prolonged intubation > 48 hours:  NA          BP      Temp      Pulse     Resp      SpO2

## 2024-05-17 NOTE — CASE MANAGEMENT
Case Management Discharge Planning Note    Patient name Rik Marin  Location [address] [place] [address]/[address] [place] [address] MRN [id number]  : 2005 Date 2024       Current Admission Date: 2024  Current Admission Diagnosis:Type 2 diabetes mellitus with hyperglycemia, with long-term current use of insulin ([facility])   Patient Active Problem List    Diagnosis Date Noted Date Diagnosed    Gram-negative bacteremia 2024     Acute anemia 2024     Sleep apnea 2024     ADHD (attention deficit hyperactivity disorder) 2024     Humberto's gangrene 2024     Hypertriglyceridemia 10/18/2023     Morbid obesity ([facility]) 10/17/2023     Uncontrolled type 2 diabetes mellitus with hyperglycemia ([facility]) 10/17/2023     Type 2 diabetes mellitus with hyperglycemia, with long-term current use of insulin ([facility]) 10/11/2023     Closed fracture of distal end of femur, unspecified fracture morphology, initial encounter ([facility]) 2023     Acute medial meniscus tear of left knee 2023     Patellar dislocation, left, subsequent encounter 2023     Closed dislocation of right patella 2023     Closed nondisplaced fracture of right patella 2023     MDD (major depressive disorder) 2023     Conduct disorder 2021       LOS (days): 6  Geometric Mean LOS (GMLOS) (days): 9.9  Days to GMLOS:4.3     OBJECTIVE:  Risk of Unplanned Readmission Score: 50.32         Current admission status: Inpatient   Preferred Pharmacy:   [id number] - [place], [place] - [address]  [address]  [facility] [address]  Phone: [phone] Fax: [phone]    Primary Care Provider: Gustavo Fajardo MD    Primary Insurance: [employer] [facility]  Secondary Insurance:     DISCHARGE DETAILS:    Discharge planning discussed with:: Patient and mother  Freedom of Choice: Yes  Comments - Freedom of Choice: D/C home  CM contacted family/caregiver?: Yes  Were Treatment Team discharge  recommendations reviewed with patient/caregiver?: Yes  Did patient/caregiver verbalize understanding of patient care needs?: Yes  Were patient/caregiver advised of the risks associated with not following Treatment Team discharge recommendations?: Yes    Contacts  Patient Contacts: Lissa Lang (mother)  Relationship to Patient:: Family  Contact Method: Phone  Phone Number: [phone]  Reason/Outcome: Continuity of Care, Discharge Planning    Requested Home Health Care         Is the patient interested in HHC at discharge?: No    DME Referral Provided  Referral made for DME?: No    Other Referral/Resources/Interventions Provided:  Referral Comments: CM spoke with patient at bedside regarding independence with dressing changes and follow up appts and offered to make his follow up pcp and endocrinology appt. Patient stated he is indepedent with dressing changes. CM asked patient about his pcp and the prescribing physician for his insulin. Patient stated he does not know who his pcp is and has not seen the pcp in a while. CM offered to make a new patient appt with [facility], patient denied. Patient stated he wants to go back to work today. CM asked if he discussed this with the surgeon and if he was cleared. Patient stated he will still go to work regardless. CM asked him regarding transportation to work and he stated he usually either takes lyft or his mother drops him to work and stated he is workingon getting a ride and called his mother while CM in room. Patient asked his mother if she would provide transportation to work today and his mother stated he should not be going to work until he is medically cleared. CM introduced self and role to his mother over phone and askd about follow up pcp appt and if CM could make a new patient appt with [facility]. Mother stated that patient already has a pcp and she will make the follow up appt. Patient continued to argue with his mother about going to work. CM reached out to surgery  PA regarding leave note for work. CM called [facility] and left  regarding request for a follow up appt for patient.     Treatment Team Recommendation: Home  Discharge Destination Plan:: Home  Transport at Discharge : Family

## 2024-05-17 NOTE — ASSESSMENT & PLAN NOTE
Lab Results   Component Value Date    HGBA1C 11.6 (H) 05/07/2024       Recent Labs     05/15/24  2045 05/16/24  0732 05/16/24  1121 05/16/24  1538   POCGLU 188* 139 184* 144*       Continue Lantus 30 units as patient is currently back on diet.  Continue scheduled Humalog with meals.   Continue SSI.  Blood sugars are now better controlled

## 2024-05-17 NOTE — DISCHARGE INSTR - AVS FIRST PAGE
- Continue to change dressings with care package you were sent home with, cotton ABD gauze overlying, change at least once daily but can change more frequently if draining  -Shower daily  -Take 2 weeks off from work  -Follow-up with general surgery department in 2 weeks  -Make sure to follow-up with endocrinology and family medicine doctor within the next week in order to monitor and maintain management of glucose levels  -Complete Augmentin for 4-day course by mouth  -Ibuprofen and Tylenol for pain

## 2024-05-17 NOTE — ASSESSMENT & PLAN NOTE
Pt is s/p I&D and washout by Surgery team on 5/11 and 5/13 and 5/15  OR culture with actinomyces is, Prevotella and mixed skin jalyn   Currently on IV Unasyn  Now with Penrose drains in place

## 2024-05-17 NOTE — ASSESSMENT & PLAN NOTE
Pt is s/p I&D and washout by Surgery team on 5/11 and 5/13 and 5/15  OR culture with actinomyces is, Prevotella and mixed skin jalyn   Currently on IV Unasyn, likely transition to Augmentin on discharge  Further management per primary team  Leukocytosis slowly trending up

## 2024-05-17 NOTE — ASSESSMENT & PLAN NOTE
Lab Results   Component Value Date    HGBA1C 11.6 (H) 05/07/2024       Recent Labs     05/16/24  1538 05/16/24  2042 05/17/24  0736 05/17/24  1042   POCGLU 144* 138 128 164*       Continue Lantus 25 units and 9 units scheduled Humalog with meals.   Continue SSI.  Blood sugars are now better controlled

## 2024-05-17 NOTE — PLAN OF CARE
Problem: Prexisting or High Potential for Compromised Skin Integrity  Goal: Skin integrity is maintained or improved  Description: INTERVENTIONS:  - Identify patients at risk for skin breakdown  - Assess and monitor skin integrity  - Assess and monitor nutrition and hydration status  - Monitor labs   - Assess for incontinence   - Turn and reposition patient  - Assist with mobility/ambulation  - Relieve pressure over bony prominences  - Avoid friction and shearing  - Provide appropriate hygiene as needed including keeping skin clean and dry  - Evaluate need for skin moisturizer/barrier cream  - Collaborate with interdisciplinary team   - Patient/family teaching  - Consider wound care consult   5/16/2024 2348 by Jakub Juan RN  Outcome: Progressing  5/16/2024 2347 by Jakub Juan RN  Outcome: Progressing     Problem: RESPIRATORY - ADULT  Goal: Achieves optimal ventilation and oxygenation  Description: INTERVENTIONS:  - Assess for changes in respiratory status  - Assess for changes in mentation and behavior  - Position to facilitate oxygenation and minimize respiratory effort  - Oxygen administered by appropriate delivery if ordered  - Initiate smoking cessation education as indicated  - Encourage broncho-pulmonary hygiene including cough, deep breathe, Incentive Spirometry  - Assess the need for suctioning and aspirate as needed  - Assess and instruct to report SOB or any respiratory difficulty  - Respiratory Therapy support as indicated  5/16/2024 2348 by Jakub Juan RN  Outcome: Progressing  5/16/2024 2347 by Jakub Juan RN  Outcome: Progressing

## 2024-05-17 NOTE — DISCHARGE SUMMARY
Discharge Summary - Rik Marin 19 y.o. 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 male past medical history of diabetes mellitus, morbid obesity, sleep apnea, ADHD presenting with left testicular pain and swelling x 2 to 3 days.  CT scan showing extensive cellulitis, subcutaneous edema and subcutaneous air in the left anterior perineum extending inferiorly alongside the left testicle, consistent with foreigners gangrene.     Procedures Performed: Incision and drainage x 5 and multiple wound checks and reevaluation    Orders Placed This Encounter   Procedures    Critical Care       Summary of Hospital Course: Patient presented 5/11/2024 with necrotizing soft tissue infection of left inguinal canal.  Patient had immediate incision and drainage x 5 of the left inguinal canal.  Patient had appropriate sepsis treatment protocol as well as blood cultures which were positive and IV antibiotics.  Infectious disease, internal medicine were both consulted, patient had sliding scale insulin implemented for diabetes as well as routine dressing care, as needed analgesia, electrolyte repletion as needed, regulation of diet, IV fluids.  Postop day 2 patient did go back to the OR for wound examination/reexploration.  Patient did have continued antibiotics, pain control, dressing care, infectious disease, sliding scale insulin.  Postoperative day 3 patient was made comfortable and routine labs were performed  Patient of going back to the OR postop day 4.  Postop day 4 patient was taken back to the OR for wound reexploration  which showed clean healthy margins from wound and no purulent drainage.  Penrose were sutured into the wound to keep them open and patient was educated on routine wound care.  Patient had continued as needed analgesia and routine labs.  Patient did have mild low-grade fever and increase in leukocytosis which led to infectious disease recommending to be observed for little bit longer in the hospital.  Patient was continued on IV antibiotics and on routine labs leukocytosis did decrease and patient was AVSS by 5/18/2024 patient was discharged accordingly to finish p.o. Augmentin, had pain control, follow-up with [facility], follow-up with endocrinology.  Patient was given instructions on how to take care of.    Significant Findings, Care, Treatment and Services Provided: Necrotizing soft tissue infection, routine dressing care, multiple wound explorations, suturing Penrose drains in place, IV antibiotics, coordinating with infectious disease and internal medicine, sliding scale insulin, patient education, IV fluids, routine labs, electrolyte repletion as needed, advancement of diet, as needed analgesia    Complications: none    Discharge Diagnosis: Necrotizing soft tissue infection of the left groin, status post incision and drainage    Medical Problems       Resolved Problems  Date Reviewed: 5/17/2024            Resolved    Pneumonia 5/14/2024     Resolved by  Ginny Weaver DO    Sepsis (HCC) 5/16/2024     Resolved by  Anastacio sEparza PA-C    Hyponatremia 5/14/2024     Resolved by  Ginny Weaver DO    Electrolyte abnormality 5/15/2024     Resolved by  Ginny Weaver DO          Condition at Discharge: fair         Discharge instructions/Information to patient and family:   See after visit summary for information provided to patient and family.      Provisions for Follow-Up Care:  See after visit summary for information related to follow-up care and any pertinent home health orders.      PCP: Gustavo Fajardo,  MD    Disposition: Home    Planned Readmission: Yes high risk for readmission secondary to noncompliance with diabetic medications historically and concerns for the patient and family members not adequately caring for wound dressing changes and general hygiene.      Discharge Statement   I spent 25 minutes discharging the patient. This time was spent on the day of discharge. I had direct contact with the patient on the day of discharge. Additional documentation is required if more than 30 minutes were spent on discharge.     Discharge Medications:  See after visit summary for reconciled discharge medications provided to patient and family.

## 2024-05-17 NOTE — ASSESSMENT & PLAN NOTE
Currently on IV Unasyn.  Discontinued vancomycin, Zosyn and clindamycin   Admission blood cultures with Acinetobacter Iwoffii staph epi.  Staph epi likely skin contaminant  repeat blood cultures negative  Per ID continue antibiotics through 5/20.  Likely transition to Augmentin tomorrow  ID input appreciated.

## 2024-05-17 NOTE — ASSESSMENT & PLAN NOTE
Possibly blood loss related to I&D and possibly dilutional in nature.  Follow CBC    Results from last 7 days   Lab Units 05/16/24  0904 05/15/24  0555 05/15/24  0235 05/14/24  0637 05/13/24  0647 05/12/24  0523 05/11/24  1726   HEMOGLOBIN g/dL 9.6* 9.8* 10.1* 10.2* 11.0* 9.5* 12.5   HEMATOCRIT % 31.3* 31.2* 33.1* 32.4* 35.6* 30.0* 39.2   MCV fL 89 87 88 87 88 87 86

## 2024-05-18 VITALS
HEIGHT: 74 IN | OXYGEN SATURATION: 94 % | HEART RATE: 85 BPM | WEIGHT: 315 LBS | TEMPERATURE: 98.4 F | SYSTOLIC BLOOD PRESSURE: 139 MMHG | RESPIRATION RATE: 17 BRPM | DIASTOLIC BLOOD PRESSURE: 64 MMHG | BODY MASS INDEX: 40.43 KG/M2

## 2024-05-18 LAB
ANION GAP SERPL CALCULATED.3IONS-SCNC: 8 MMOL/L (ref 4–13)
BASOPHILS # BLD AUTO: 0.04 THOUSANDS/ÂΜL (ref 0–0.1)
BASOPHILS NFR BLD AUTO: 0 % (ref 0–1)
BUN SERPL-MCNC: 7 MG/DL (ref 5–25)
CALCIUM SERPL-MCNC: 8.3 MG/DL (ref 8.4–10.2)
CHLORIDE SERPL-SCNC: 107 MMOL/L (ref 96–108)
CO2 SERPL-SCNC: 23 MMOL/L (ref 21–32)
CREAT SERPL-MCNC: 1.07 MG/DL (ref 0.6–1.3)
EOSINOPHIL # BLD AUTO: 0.4 THOUSAND/ÂΜL (ref 0–0.61)
EOSINOPHIL NFR BLD AUTO: 3 % (ref 0–6)
ERYTHROCYTE [DISTWIDTH] IN BLOOD BY AUTOMATED COUNT: 14.1 % (ref 11.6–15.1)
GFR SERPL CREATININE-BSD FRML MDRD: 99 ML/MIN/1.73SQ M
GLUCOSE SERPL-MCNC: 116 MG/DL (ref 65–140)
GLUCOSE SERPL-MCNC: 182 MG/DL (ref 65–140)
HCT VFR BLD AUTO: 29.8 % (ref 36.5–49.3)
HGB BLD-MCNC: 9.1 G/DL (ref 12–17)
IMM GRANULOCYTES # BLD AUTO: 0.21 THOUSAND/UL (ref 0–0.2)
IMM GRANULOCYTES NFR BLD AUTO: 2 % (ref 0–2)
LYMPHOCYTES # BLD AUTO: 2.87 THOUSANDS/ÂΜL (ref 0.6–4.47)
LYMPHOCYTES NFR BLD AUTO: 22 % (ref 14–44)
MCH RBC QN AUTO: 26.6 PG (ref 26.8–34.3)
MCHC RBC AUTO-ENTMCNC: 30.5 G/DL (ref 31.4–37.4)
MCV RBC AUTO: 87 FL (ref 82–98)
MONOCYTES # BLD AUTO: 0.75 THOUSAND/ÂΜL (ref 0.17–1.22)
MONOCYTES NFR BLD AUTO: 6 % (ref 4–12)
NEUTROPHILS # BLD AUTO: 8.63 THOUSANDS/ÂΜL (ref 1.85–7.62)
NEUTS SEG NFR BLD AUTO: 67 % (ref 43–75)
NRBC BLD AUTO-RTO: 0 /100 WBCS
PLATELET # BLD AUTO: 345 THOUSANDS/UL (ref 149–390)
PMV BLD AUTO: 10.8 FL (ref 8.9–12.7)
POTASSIUM SERPL-SCNC: 3.6 MMOL/L (ref 3.5–5.3)
RBC # BLD AUTO: 3.42 MILLION/UL (ref 3.88–5.62)
SODIUM SERPL-SCNC: 138 MMOL/L (ref 135–147)
WBC # BLD AUTO: 12.9 THOUSAND/UL (ref 4.31–10.16)

## 2024-05-18 PROCEDURE — 99239 HOSP IP/OBS DSCHRG MGMT >30: CPT | Performed by: STUDENT IN AN ORGANIZED HEALTH CARE EDUCATION/TRAINING PROGRAM

## 2024-05-18 PROCEDURE — 85025 COMPLETE CBC W/AUTO DIFF WBC: CPT | Performed by: PHYSICIAN ASSISTANT

## 2024-05-18 PROCEDURE — 80048 BASIC METABOLIC PNL TOTAL CA: CPT | Performed by: PHYSICIAN ASSISTANT

## 2024-05-18 PROCEDURE — 82948 REAGENT STRIP/BLOOD GLUCOSE: CPT

## 2024-05-18 PROCEDURE — 99024 POSTOP FOLLOW-UP VISIT: CPT | Performed by: PHYSICIAN ASSISTANT

## 2024-05-18 RX ADMIN — OXYCODONE HYDROCHLORIDE 5 MG: 5 TABLET ORAL at 01:10

## 2024-05-18 RX ADMIN — OXYCODONE HYDROCHLORIDE 5 MG: 5 TABLET ORAL at 10:36

## 2024-05-18 RX ADMIN — ACETAMINOPHEN 650 MG: 325 TABLET ORAL at 05:27

## 2024-05-18 RX ADMIN — ACETAMINOPHEN 650 MG: 325 TABLET ORAL at 00:19

## 2024-05-18 RX ADMIN — AMPICILLIN SODIUM AND SULBACTAM SODIUM 3 G: 2; 1 INJECTION, POWDER, FOR SOLUTION INTRAMUSCULAR; INTRAVENOUS at 08:05

## 2024-05-18 RX ADMIN — ENOXAPARIN SODIUM 40 MG: 40 INJECTION SUBCUTANEOUS at 08:54

## 2024-05-18 RX ADMIN — Medication 250 MG: at 08:51

## 2024-05-18 RX ADMIN — FLUOXETINE 10 MG: 10 CAPSULE ORAL at 08:51

## 2024-05-18 RX ADMIN — TOPIRAMATE 50 MG: 25 TABLET, FILM COATED ORAL at 08:51

## 2024-05-18 RX ADMIN — AMPICILLIN SODIUM AND SULBACTAM SODIUM 3 G: 2; 1 INJECTION, POWDER, FOR SOLUTION INTRAMUSCULAR; INTRAVENOUS at 04:12

## 2024-05-18 RX ADMIN — INSULIN LISPRO 2 UNITS: 100 INJECTION, SOLUTION INTRAVENOUS; SUBCUTANEOUS at 08:55

## 2024-05-18 RX ADMIN — INSULIN LISPRO 9 UNITS: 100 INJECTION, SOLUTION INTRAVENOUS; SUBCUTANEOUS at 08:55

## 2024-05-18 RX ADMIN — B-COMPLEX W/ C & FOLIC ACID TAB 1 TABLET: TAB at 08:51

## 2024-05-18 NOTE — NURSING NOTE
Patient discharged home with family. Patient left unit with all personal belongings, accompanied by PCA.

## 2024-05-18 NOTE — ASSESSMENT & PLAN NOTE
Possibly blood loss related to I&D and possibly dilutional in nature.    Hemoglobin overall stable in 9s range.  Follow CBC    Results from last 7 days   Lab Units 05/18/24  0432 05/17/24  0935 05/16/24  0904 05/15/24  0555 05/15/24  0235 05/14/24  0637 05/13/24  0647 05/12/24  0523 05/11/24  1726   HEMOGLOBIN g/dL 9.1* 9.7* 9.6* 9.8* 10.1* 10.2* 11.0* 9.5* 12.5   HEMATOCRIT % 29.8* 31.9* 31.3* 31.2* 33.1* 32.4* 35.6* 30.0* 39.2   MCV fL 87 88 89 87 88 87 88 87 86

## 2024-05-18 NOTE — PROGRESS NOTES
"Progress Note - General Surgery   Rik Marin 19 y.o. male MRN: [id number]  Unit/Bed#: [address] [address] Encounter: [id number]    Assessment:  1) 19-year-old male with type 2 diabetes on insulin and morbid obesity with gram-negative bacteremia and necrotizing soft tissue infection of the mons pubis and left inguinal canal now postop day 2 status post reevaluation of wound and suturing and 3 Penrose drains -fingerstick glucose resembles relatively well-controlled blood glucose, leukocytosis decreased to 12.9, AVSS overnight, hemoglobin stable, Penrose drains in place and draining minimally with gauze in place, patient mobilizing more, healthy appearing bases    Plan:  1)   -Appreciate infectious disease output if patient is amenable to discharge 5/18/2024 we will discharge accordingly  -Continue IV Unasyn until discharge  -Continue current sliding scale insulin regiment  -Follow-up with, entry and endocrinology  -Discharge planning for patient and patient education  -Wound care management plan reiterated the patient  -Shower daily  -Carbohydrate controlled diet  -Discharge if ID feels appropriate    Subjective/Objective   Chief Complaint: I am doing okay    Subjective: Patient was seen and examined at bedside.  Patient denies any acute events overnight.  Patient denies any fevers or chills.  Patient is still having slight amount of drainage but not much.  Patient was able to change his gauze.  Patient still has Penrose in place.  Patient did get up and out of bed and mobilize this morning.  Patient was able to shower.  Patient has no concerns or complaints currently.    Objective:     Blood pressure 139/64, pulse 85, temperature 98.4 °F (36.9 °C), temperature source Oral, resp. rate 17, height 6' 2\" (1.88 m), weight (!) 177 kg (391 lb), SpO2 94%.,Body mass index is 50.2 kg/m².      Intake/Output Summary (Last 24 hours) at 5/18/2024 1109  Last data filed at 5/17/2024 2300  Gross per 24 hour   Intake --   Output 1850 " "ml   Net -1850 ml       Invasive Devices       Peripheral Intravenous Line  Duration             Peripheral IV 05/14/24 Distal;Right;Upper;Ventral (anterior) Arm 3 days    Peripheral IV 05/16/24 Left;Ventral (anterior) Forearm 2 days                    Physical Exam: /64 (BP Location: Right arm)   Pulse 85   Temp 98.4 °F (36.9 °C) (Oral)   Resp 17   Ht 6' 2\" (1.88 m)   Wt (!) 177 kg (391 lb)   SpO2 94%   BMI 50.20 kg/m²   General appearance: alert and oriented, in no acute distress  Head: Normocephalic, without obvious abnormality, atraumatic  Lungs: clear to auscultation bilaterally  Heart: regular rate and rhythm, S1, S2 normal, no murmur, click, rub or gallop  Abdomen: soft, non-tender; bowel sounds normal; no masses,  no organomegaly  Skin:  open wounds X5 with 3 penrose drains surture in    Lab, Imaging and other studies:I have personally reviewed pertinent lab results.  , CBC:   Lab Results   Component Value Date    WBC 12.90 (H) 05/18/2024    HGB 9.1 (L) 05/18/2024    HCT 29.8 (L) 05/18/2024    MCV 87 05/18/2024     05/18/2024    RBC 3.42 (L) 05/18/2024    MCH 26.6 (L) 05/18/2024    MCHC 30.5 (L) 05/18/2024    RDW 14.1 05/18/2024    MPV 10.8 05/18/2024    NRBC 0 05/18/2024   , CMP:   Lab Results   Component Value Date    SODIUM 138 05/18/2024    K 3.6 05/18/2024     05/18/2024    CO2 23 05/18/2024    BUN 7 05/18/2024    CREATININE 1.07 05/18/2024    CALCIUM 8.3 (L) 05/18/2024    EGFR 99 05/18/2024     VTE Pharmacologic Prophylaxis: Enoxaparin (Lovenox)  VTE Mechanical Prophylaxis: sequential compression device  "

## 2024-05-18 NOTE — ASSESSMENT & PLAN NOTE
Lab Results   Component Value Date    HGBA1C 11.6 (H) 05/07/2024       Recent Labs     05/17/24  1042 05/17/24  1623 05/17/24 2048 05/18/24  0830   POCGLU 164* 138 190* 182*       Continue Lantus 25 units and 9 units scheduled Humalog with meals.   Continue SSI.  Blood sugars are now better controlled

## 2024-05-18 NOTE — DISCHARGE SUMMARY
INTERNAL MEDICINE  DISCHARGE SUMMARY     Rik Marin   19 y.o. male  MRN: [id number]  Room/Bed: [address] [place] [address]   Encounter: [id number]    Principal Problem:    Type 2 diabetes mellitus with hyperglycemia, with long-term current use of insulin (HCC)  Active Problems:    Morbid obesity (HCC)    Sleep apnea    ADHD (attention deficit hyperactivity disorder)    Humberto's gangrene    Electrolyte abnormality    Acute anemia    Gram-negative bacteremia      Gram-negative bacteremia  Assessment & Plan  Currently on IV Unasyn.  Discontinued vancomycin, Zosyn and clindamycin   Admission blood cultures with Acinetobacter Iwoffii staph epi.  Staph epi likely skin contaminant  repeat blood cultures negative  Per ID continue antibiotics through 5/20.  Likely transition to Augmentin tomorrow.  ID input appreciated.    Acute anemia  Assessment & Plan  Possibly blood loss related to I&D and possibly dilutional in nature.    Hemoglobin overall stable in 9s range.  Follow CBC    Results from last 7 days   Lab Units 05/18/24  0432 05/17/24  0935 05/16/24  0904 05/15/24  0555 05/15/24  0235 05/14/24  0637 05/13/24  0647 05/12/24  0523 05/11/24  1726   HEMOGLOBIN g/dL 9.1* 9.7* 9.6* 9.8* 10.1* 10.2* 11.0* 9.5* 12.5   HEMATOCRIT % 29.8* 31.9* 31.3* 31.2* 33.1* 32.4* 35.6* 30.0* 39.2   MCV fL 87 88 89 87 88 87 88 87 86          Electrolyte abnormality  Assessment & Plan  K >4, Mg >2    Humberto's gangrene  Assessment & Plan  Pt is s/p I&D and washout by Surgery team on 5/11 and 5/13 and 5/15  OR culture with actinomyces is, Prevotella and mixed skin jalyn   Currently on IV Unasyn, likely transition to Augmentin on discharge  Infectious disease recommendations reviewed, continue Augmentin through 5/20/2024    ADHD (attention deficit hyperactivity disorder)  Assessment & Plan  Continue Prozac.    Sleep apnea  Assessment & Plan  Unsure if using CPAP at home  Continue CPAP  use while here    Morbid obesity (HCC)  Assessment & Plan  Body mass index is 50.2 kg/m².   Diet and lifestyle modification.  CT showed hepatomegaly with steatosis and splenomegaly.      * Type 2 diabetes mellitus with hyperglycemia, with long-term current use of insulin (HCC)  Assessment & Plan  Lab Results   Component Value Date    HGBA1C 11.6 (H) 05/07/2024       Recent Labs     05/17/24  1042 05/17/24  1623 05/17/24  2048 05/18/24  0830   POCGLU 164* 138 190* 182*       Continue Lantus 25 units and 9 units scheduled Humalog with meals.   Continue SSI.  Blood sugars are now better controlled          DETAILS OF HOSPITAL COURSE     Patient is a pleasant 19-year-old notable history of type 2 diabetes on insulin therapy, morbid obesity, hospitalized secondary to gram-negative bacteremia, necrotizing soft tissue infection mons pubis and left inguinal canal, s/p surgical hide met with general surgery wound dressed appropriately and following up in the outpatient setting with surgery team/wound care.    Patient will continue oral Augmentin therapy through 5/20/2024, white blood cell count improvement, no fever in the last 24 hours prior to discharge.    Patient will follow-up with [facility] practice and endocrinology within the next 7 days.    Patient is medically stable for discharge, vital stable, patient is happy to be leaving hospital today.    Recommend repeat blood work within 7 days of discharge.  Continue to monitor wound healing until resolution.      Discharging Physician / Practitioner: Alexandre Rodney DO  PCP: Gustavo Fajardo MD  Admission Date:   Admission Orders (From admission, onward)       Ordered        05/11/24 1944  Inpatient Admission  Once                          Discharge Date: 05/18/24    Medical Problems       Resolved Problems  Date Reviewed: 5/17/2024            Resolved    Pneumonia 5/14/2024     Resolved by  Ginny Weaver DO    Sepsis (HCC) 5/16/2024     Resolved by   "Anastacio Esparza PA-C    Hyponatremia 5/14/2024     Resolved by  Ginny Weaver DO          Consultations During Hospital Stay:  Appreciate general surgery recommendations    Discharge Day Visit / Exam:     Subjective: Patient seen and examined at bedside this morning, wound appropriately dressed, cleared by surgery for discharge today, continue Augmentin through 5/20/2024.    Vitals: Blood Pressure: 139/64 (05/18/24 0849)  Pulse: 85 (05/18/24 0849)  Temperature: 98.4 °F (36.9 °C) (05/18/24 0849)  Temp Source: Oral (05/18/24 0849)  Respirations: 17 (05/18/24 0849)  Height: 6' 2\" (188 cm) (05/11/24 2238)  Weight - Scale: (!) 177 kg (391 lb) (05/11/24 2238)  SpO2: 94 % (05/18/24 0849)  Exam:   Physical Exam  Vitals and nursing note reviewed.   Constitutional:       General: He is not in acute distress.     Appearance: He is well-developed.   HENT:      Head: Normocephalic and atraumatic.   Eyes:      General: No scleral icterus.     Conjunctiva/sclera: Conjunctivae normal.   Cardiovascular:      Rate and Rhythm: Normal rate and regular rhythm.      Pulses: Normal pulses.      Heart sounds: No murmur heard.  Pulmonary:      Effort: Pulmonary effort is normal. No respiratory distress.      Breath sounds: Normal breath sounds.   Abdominal:      General: Bowel sounds are normal. There is no distension.      Palpations: Abdomen is soft.      Tenderness: There is no abdominal tenderness.   Musculoskeletal:         General: No swelling. Normal range of motion.      Cervical back: Neck supple.   Skin:     General: Skin is warm and dry.      Capillary Refill: Capillary refill takes less than 2 seconds.   Neurological:      General: No focal deficit present.      Mental Status: He is alert and oriented to person, place, and time. Mental status is at baseline.   Psychiatric:         Mood and Affect: Mood normal.         Behavior: Behavior normal.           Discharge instructions/Information to patient and family:   See after " visit summary for information provided to patient and family.      Provisions for Follow-Up Care:  See after visit summary for information related to follow-up care and any pertinent home health orders.      Disposition:     Discharge home     Discharge Statement:  I spent 45 minutes discharging the patient. This time was spent on the day of discharge. I had direct contact with the patient on the day of discharge. Greater than 50% of the total time was spent examining patient, answering all patient questions, arranging and discussing plan of care with patient as well as directly providing post-discharge instructions.  Additional time then spent on discharge activities.    Discharge Medications:  See after visit summary for reconciled discharge medications provided to patient and family.      ** Please Note: This note has been constructed using a voice recognition system **

## 2024-05-18 NOTE — ASSESSMENT & PLAN NOTE
Pt is s/p I&D and washout by Surgery team on 5/11 and 5/13 and 5/15  OR culture with actinomyces is, Prevotella and mixed skin jalyn   Currently on IV Unasyn, likely transition to Augmentin on discharge  Infectious disease recommendations reviewed, continue Augmentin through 5/20/2024

## 2024-05-20 LAB
BACTERIA BLD CULT: NORMAL
BACTERIA BLD CULT: NORMAL

## 2024-05-20 NOTE — UTILIZATION REVIEW
NOTIFICATION OF ADMISSION DISCHARGE   This is a Notification of Discharge from [facility]. Please be advised that this patient has been discharge from our facility. Below you will find the admission and discharge date and time including the patient’s disposition.   UTILIZATION REVIEW CONTACT:  Opal Sow  Utilization   Network Utilization Review Department  Phone: [phone] x carefully listen to the prompts. All voicemails are confidential.  Email: [email or web address]     ADMISSION INFORMATION  PRESENTATION DATE: 5/11/2024  3:59 PM  OBERVATION ADMISSION DATE:   INPATIENT ADMISSION DATE: 5/11/24  7:45 PM   DISCHARGE DATE: 5/18/2024  1:17 PM   DISPOSITION:Home/Self Care    Network Utilization Review Department  ATTENTION: Please call with any questions or concerns to [phone] and carefully listen to the prompts so that you are directed to the right person. All voicemails are confidential.   For Discharge needs, contact Care Management DC Support Team at [phone] opt. 2  Send all requests for admission clinical reviews, approved or denied determinations and any other requests to dedicated fax number below belonging to the campus where the patient is receiving treatment. List of dedicated fax numbers for the Facilities:  FACILITY NAME UR FAX NUMBER   ADMISSION DENIALS (Administrative/Medical Necessity) [phone]   DISCHARGE SUPPORT TEAM ([facility]) [phone]   PARENT CHILD HEALTH (Maternity/NICU/Pediatrics) [phone]   [facility] [phone]   General acute hospital [phone]   [facility] [phone]   [facility] [phone]   [facility] [phone]   [facility] [phone]   [facility] [phone]   [facility] [phone]    [facility] [phone]   [facility] [phone]   [facility] [phone]   [facility] [phone]

## 2024-05-21 ENCOUNTER — PATIENT OUTREACH (OUTPATIENT)
Dept: CASE MANAGEMENT | Facility: OTHER | Age: 19
End: 2024-05-21

## 2024-05-21 NOTE — PROGRESS NOTES
This RN Care Manager is covering outpatient RN care manager Whitney AGUSTIN while OoO.      In basket ADT notification received for patient discharge from hospital.  Rik was admitted 7 days for management of gram-negative bacteremia, necrotizing soft tissue infection mons pubis and left inguinal canal requiring IV antibiotics and I&D and washout on 5/11, 5/13 and 5/15. He transitioned to oral Augmentin therapy through 5/20.  He discharged home in stable condition with orders for repat lab work within 7 days and direction to follow up with [facility] and endocrinology.      Patient has Gen Surg appt scheduled for 5/30 @ 815a.     Discharge follow up call. Patient discharged from 5/18/24.     Is this a good time for you to talk?     No answer when contacted on primary phone number ending [phone].  Left generic msg with Whitney's phone number encouraging return call.     Next outreach scheduled.     Update to provided to Whitney NASSAR upon her return to office.

## 2024-05-22 ENCOUNTER — HOSPITAL ENCOUNTER (EMERGENCY)
Facility: HOSPITAL | Age: 19
Discharge: HOME | End: 2024-05-22
Attending: EMERGENCY MEDICINE
Payer: COMMERCIAL

## 2024-05-22 VITALS
RESPIRATION RATE: 20 BRPM | HEART RATE: 80 BPM | BODY MASS INDEX: 50.2 KG/M2 | OXYGEN SATURATION: 97 % | SYSTOLIC BLOOD PRESSURE: 167 MMHG | TEMPERATURE: 98 F | DIASTOLIC BLOOD PRESSURE: 98 MMHG | HEIGHT: 74 IN

## 2024-05-22 DIAGNOSIS — Z51.89 VISIT FOR WOUND CHECK: Primary | ICD-10-CM

## 2024-05-22 PROCEDURE — 99282 EMERGENCY DEPT VISIT SF MDM: CPT

## 2024-05-22 PROCEDURE — 99284 EMERGENCY DEPT VISIT MOD MDM: CPT | Performed by: PHYSICIAN ASSISTANT

## 2024-05-22 NOTE — ED PROVIDER NOTES
History  Chief Complaint   Patient presents with    Testicle Pain     C/o testicle pain. Recent procedure at site. DC on 5/18/24. Pain 4/10. No fevers.     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  Patient unsure if he missed any doses of his antibiotic however presents today for worsening groin pain after he has been walking each day approximately 2 miles.  Has not had any continued drainage or fevers.  States that the pain radiates into the testicle, has not had any flank pain.  Denies nausea, vomiting, abdominal pain, changes in urination, etc. Differential includes but is not limited to cellulitis, abscess, wound check, etc        Prior to Admission Medications   Prescriptions Last Dose Informant Patient Reported? Taking?   Abilify Maintena 400 MG injection   Yes No   Sig: INJECT 2 ML INTRAMUSCUALRLY EVERY 4 WEEKS AS DIRECTED   Alcohol Swabs 70 % PADS   No No   Sig: May substitute brand based on insurance coverage. Check glucose TID.   Blood Glucose Monitoring Suppl (OneTouch Verio Reflect) w/Device KIT   No No   Sig: May substitute brand based on insurance coverage. Check glucose TID.   Blood Glucose Monitoring Suppl (OneTouch Verio) w/Device KIT   No No   Sig: Use 2 (two) times a day Ok to substitute with insurance covered brand   Dextromethorphan Polistirex ER 30 MG/5ML SUER   No No   Sig: Take 10 mL (60 mg total) by mouth 2 (two) times a day   FLUoxetine (PROzac) 10 mg capsule   No No   Sig: Take 1 capsule (10 mg total) by mouth daily   Insulin Glargine Solostar (Lantus SoloStar) 100 UNIT/ML SOPN   No No   Sig: Inject 0.3 mL (30 Units total) under the skin daily at bedtime   Insulin Pen Needle (BD Pen Needle Shani 2nd Gen) 32G X 4 MM MISC   No No   Sig: For use with insulin pen. Pharmacy may dispense brand covered by insurance.   Lancets (onetouch  ultrasoft) lancets   No No   Sig: Use as instructed   OneTouch Delica Lancets 33G MISC   No No   Sig: May substitute brand based on insurance coverage. Check glucose TID.   acetaminophen (TYLENOL) 325 mg tablet   No No   Sig: Take 2 tablets (650 mg total) by mouth every 6 (six) hours   albuterol (2.5 mg/3 mL) 0.083 % nebulizer solution   No No   Sig: Take 3 mL (2.5 mg total) by nebulization every 6 (six) hours as needed for wheezing or shortness of breath   amoxicillin-clavulanate (AUGMENTIN) 875-125 mg per tablet   No No   Sig: Take 1 tablet by mouth every 12 (twelve) hours for 4 days   benzonatate (TESSALON PERLES) 100 mg capsule   No No   Sig: Take 1 capsule (100 mg total) by mouth 3 (three) times a day as needed for cough   dicyclomine (BENTYL) 20 mg tablet   No No   Sig: Take 1 tablet (20 mg total) by mouth 2 (two) times a day   Patient not taking: Reported on 5/11/2024   fenofibrate (TRICOR) 54 MG tablet   No No   Sig: Take 1 tablet (54 mg total) by mouth daily   Patient not taking: Reported on 5/11/2024   glucose blood (OneTouch Verio) test strip   No No   Sig: TEST BLOOD SUGAR TWICE A DAY   glucose blood (OneTouch Verio) test strip   No No   Sig: May substitute brand based on insurance coverage. Check glucose TID.   insulin aspart (NovoLOG FlexPen) 100 UNIT/ML injection pen   No No   Sig: Inject 9 Units under the skin 3 (three) times a day with meals   metFORMIN (GLUCOPHAGE-XR) 500 mg 24 hr tablet   No No   Sig: Take 2 tablets (1,000 mg total) by mouth 2 (two) times a day with meals Start with 1 pill 500mg with breakfast x 1 week, then 1 pill 500mg with breakfast and 1 pill with dinner for 1 week , then 2 pills with breakfast (1000mg) and 1 pill with dinner (500mg) for 1 week, then 1000mg twice a day going forward   oxyCODONE (Roxicodone) 5 immediate release tablet   No No   Sig: Take 1 tablet (5 mg total) by mouth every 8 (eight) hours as needed for severe pain for up to 6 doses Max Daily Amount: 15 mg    sitaGLIPtin (JANUVIA) 50 mg tablet   No No   Sig: Take 1 tablet (50 mg total) by mouth daily   Patient not taking: Reported on 5/11/2024   topiramate (TOPAMAX) 50 MG tablet   Yes No   Sig: Take 50 mg by mouth 2 (two) times a day      Facility-Administered Medications: None       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        Past Surgical History:   Procedure Laterality Date    INCISION AND DRAINAGE OF WOUND Left 5/13/2024    Procedure: INCISION AND DRAINAGE (I&D) GROIN;  Surgeon: Tim Ch MD;  Location: [facility];  Service: General    INCISION AND DRAINAGE OF WOUND Left 5/15/2024    Procedure: INCISION AND DRAINAGE (I&D) GROIN;  Surgeon: Tim Ch MD;  Location: [facility];  Service: General    AZ I&D VULVA/PERINEAL ABSCESS N/A 5/11/2024    Procedure: INCISION AND DRAINAGE (I&D) PERINEAL ABSCESS;  Surgeon: Tim Ch MD;  Location: [facility];  Service: General    TONSILECTOMY AND ADNOIDECTOMY      2020       Family History   Problem Relation Age of Onset    Hyperlipidemia Mother     Diabetes type II Mother     Obesity Mother     Obesity Father     Drug abuse Father     Diabetes type II Maternal Aunt     Lung disease Maternal Aunt     Rheum arthritis Maternal Aunt     Fibromyalgia Maternal Aunt     Atrial fibrillation Maternal Grandmother     Hyperlipidemia Maternal Grandfather     Diabetes type II Maternal Grandfather     Stroke Maternal Grandfather     Heart disease Maternal Grandfather     Stroke Paternal Grandfather      I have reviewed and agree with the history as documented.    E-Cigarette/Vaping    E-Cigarette Use Current Some Day User     Comments smoke cigg. when he has money      E-Cigarette/Vaping Substances    Nicotine No     THC No      CBD No     Flavoring Yes     Other No     Unknown No      Social History     Tobacco Use    Smoking status: Former     Types: Cigarettes     Passive exposure: Yes    Smokeless tobacco: Current   Vaping Use    Vaping status: Some Days    Substances: Flavoring   Substance Use Topics    Alcohol use: Yes     Alcohol/week: 2.0 standard drinks of alcohol     Types: 2 Shots of liquor per week    Drug use: Yes     Types: Marijuana       Review of Systems   Constitutional: Negative.  Negative for chills, fatigue and fever.   HENT: Negative.  Negative for congestion, postnasal drip, rhinorrhea and sore throat.    Eyes: Negative.    Respiratory: Negative.  Negative for cough, shortness of breath and wheezing.    Cardiovascular: Negative.    Gastrointestinal: Negative.  Negative for abdominal pain, diarrhea, nausea and vomiting.   Endocrine: Negative.    Genitourinary: Negative.    Musculoskeletal: Negative.    Skin:  Positive for color change and wound. Negative for pallor and rash.   Neurological: Negative.    Hematological: Negative.    Psychiatric/Behavioral: Negative.     All other systems reviewed and are negative.      Physical Exam  Physical Exam  Vitals and nursing note reviewed.   Constitutional:       General: He is not in acute distress.     Appearance: Normal appearance. He is well-developed. He is obese. He is not diaphoretic.   HENT:      Head: Normocephalic and atraumatic.      Right Ear: External ear normal.      Left Ear: External ear normal.      Nose: Nose normal.      Mouth/Throat:      Pharynx: No oropharyngeal exudate.   Eyes:      General: No scleral icterus.        Right eye: No discharge.         Left eye: No discharge.      Conjunctiva/sclera: Conjunctivae normal.      Pupils: Pupils are equal, round, and reactive to light.   Cardiovascular:      Rate and Rhythm: Normal rate and regular rhythm.      Pulses: Normal pulses.      Heart sounds: Normal heart sounds. No murmur heard.     No friction rub.  No gallop.   Pulmonary:      Effort: Pulmonary effort is normal. No respiratory distress.      Breath sounds: Normal breath sounds. No stridor. No wheezing or rales.   Chest:      Chest wall: No tenderness.   Abdominal:      General: Abdomen is flat. Bowel sounds are normal. There is no distension.      Palpations: Abdomen is soft. There is no mass.      Tenderness: There is no abdominal tenderness. There is no right CVA tenderness, guarding or rebound.      Hernia: No hernia is present.   Musculoskeletal:      Cervical back: Normal range of motion and neck supple.   Lymphadenopathy:      Cervical: No cervical adenopathy.   Skin:     General: Skin is warm and dry.      Capillary Refill: Capillary refill takes less than 2 seconds.      Coloration: Skin is not pale.      Findings: Erythema present. No rash.          Neurological:      General: No focal deficit present.      Mental Status: He is alert and oriented to person, place, and time. Mental status is at baseline.   Psychiatric:         Thought Content: Thought content normal.         Judgment: Judgment normal.         Vital Signs  ED Triage Vitals [05/22/24 1101]   Temperature Pulse Respirations Blood Pressure SpO2   98 °F (36.7 °C) 82 20 157/90 98 %      Temp Source Heart Rate Source Patient Position - Orthostatic VS BP Location FiO2 (%)   Oral Monitor Lying Right arm --      Pain Score       4           Vitals:    05/22/24 1101 05/22/24 1130   BP: 157/90 167/98   Pulse: 82 80   Patient Position - Orthostatic VS: Lying Lying         Visual Acuity      ED Medications  Medications - No data to display    Diagnostic Studies  Results Reviewed       None                   No orders to display              Procedures  Procedures         ED Course  ED Course as of 05/22/24 1344   Wed May 22, 2024   1114 Sent message to Hal PAC on call for gen surg    1121 Hal coming down to see patient                                             Medical Decision Making  Discussed  case and presentation with general surgery on-call Hal LI who came to see patient at bedside.  General surgery relayed that incisions are healing well and patient appears to be doing well, no further intervention at this point in time.  Patient given education regarding wound care.    Patient is informed to return to the emergency department for worsening of symptoms and was given proper education regarding their diagnosis and symptoms. Otherwise the patient is informed to follow up with their primary care doctor and gen surg for re-evaluation. The patient verbalizes understanding and agrees with above assessment and plan. All questions were answered.                   Disposition  Final diagnoses:   Visit for wound check     Time reflects when diagnosis was documented in both MDM as applicable and the Disposition within this note       Time User Action Codes Description Comment    5/22/2024 11:50 AM Melissa Murillo [Z51.89] Visit for wound check           ED Disposition       ED Disposition   Discharge    Condition   Stable    Date/Time   Wed May 22, 2024 11:49 AM    Comment   Rik Marin discharge to home/self care.                   Follow-up Information       Follow up With Specialties Details Why Contact Info Additional Information    Gustavo Fajardo MD  Schedule an appointment as soon as possible for a visit   [address] [facility] [address]  [facility] [address]  [phone]       [facility] Emergency Department Emergency Medicine Go to  If symptoms worsen such as fevers, spreading redness, severe pain etc. [address] [facility] [id number] [phone] [facility] Emergency Department, [address] [place], [place], [address]            Discharge Medication List as of 5/22/2024 11:53 AM        CONTINUE these medications which have NOT CHANGED    Details   Abilify Maintena 400 MG injection INJECT 2 ML INTRAMUSCUALRLY EVERY 4 WEEKS AS DIRECTED,  Historical Med      acetaminophen (TYLENOL) 325 mg tablet Take 2 tablets (650 mg total) by mouth every 6 (six) hours, Starting Fri 5/17/2024, No Print      albuterol (2.5 mg/3 mL) 0.083 % nebulizer solution Take 3 mL (2.5 mg total) by nebulization every 6 (six) hours as needed for wheezing or shortness of breath, Starting Wed 5/8/2024, Normal      Alcohol Swabs 70 % PADS May substitute brand based on insurance coverage. Check glucose TID., Normal      benzonatate (TESSALON PERLES) 100 mg capsule Take 1 capsule (100 mg total) by mouth 3 (three) times a day as needed for cough, Starting Wed 5/8/2024, Normal      !! Blood Glucose Monitoring Suppl (OneTouch Verio Reflect) w/Device KIT May substitute brand based on insurance coverage. Check glucose TID., Normal      !! Blood Glucose Monitoring Suppl (OneTouch Verio) w/Device KIT Use 2 (two) times a day Ok to substitute with insurance covered brand, Starting Fri 10/27/2023, Normal      Dextromethorphan Polistirex ER 30 MG/5ML SUER Take 10 mL (60 mg total) by mouth 2 (two) times a day, Starting Sun 5/5/2024, Normal      dicyclomine (BENTYL) 20 mg tablet Take 1 tablet (20 mg total) by mouth 2 (two) times a day, Starting Fri 1/12/2024, Normal      fenofibrate (TRICOR) 54 MG tablet Take 1 tablet (54 mg total) by mouth daily, Starting Thu 9/28/2023, Normal      FLUoxetine (PROzac) 10 mg capsule Take 1 capsule (10 mg total) by mouth daily, Starting Wed 5/8/2024, Normal      !! glucose blood (OneTouch Verio) test strip TEST BLOOD SUGAR TWICE A DAY, Normal      !! glucose blood (OneTouch Verio) test strip May substitute brand based on insurance coverage. Check glucose TID., Normal      insulin aspart (NovoLOG FlexPen) 100 UNIT/ML injection pen Inject 9 Units under the skin 3 (three) times a day with meals, Starting Wed 5/8/2024, Normal      Insulin Glargine Solostar (Lantus SoloStar) 100 UNIT/ML SOPN Inject 0.3 mL (30 Units total) under the skin daily at bedtime, Starting Tue  5/7/2024, Normal      Insulin Pen Needle (BD Pen Needle Shani 2nd Gen) 32G X 4 MM MISC For use with insulin pen. Pharmacy may dispense brand covered by insurance., Normal      !! Lancets (onetouch ultrasoft) lancets Use as instructed, Normal      metFORMIN (GLUCOPHAGE-XR) 500 mg 24 hr tablet Take 2 tablets (1,000 mg total) by mouth 2 (two) times a day with meals Start with 1 pill 500mg with breakfast x 1 week, then 1 pill 500mg with breakfast and 1 pill with dinner for 1 week , then 2 pills with breakfast (1000mg) and 1 pill with dinner (500 mg) for 1 week, then 1000mg twice a day going forward, Starting Tue 5/7/2024, Normal      !! OneTouch Delica Lancets 33G MISC May substitute brand based on insurance coverage. Check glucose TID., Normal      oxyCODONE (Roxicodone) 5 immediate release tablet Take 1 tablet (5 mg total) by mouth every 8 (eight) hours as needed for severe pain for up to 6 doses Max Daily Amount: 15 mg, Starting Fri 5/17/2024, Normal      sitaGLIPtin (JANUVIA) 50 mg tablet Take 1 tablet (50 mg total) by mouth daily, Starting Tue 5/7/2024, Normal      topiramate (TOPAMAX) 50 MG tablet Take 50 mg by mouth 2 (two) times a day, Starting Thu 3/10/2022, Historical Med       !! - Potential duplicate medications found. Please discuss with provider.        STOP taking these medications       amoxicillin-clavulanate (AUGMENTIN) 875-125 mg per tablet Comments:   Reason for Stopping:               No discharge procedures on file.    PDMP Review       None            ED Provider  Electronically Signed by             Melissa Murillo PA-C  05/22/24 3947

## 2024-05-23 ENCOUNTER — HOSPITAL ENCOUNTER (EMERGENCY)
Facility: HOSPITAL | Age: 19
Discharge: HOME | End: 2024-05-23
Attending: EMERGENCY MEDICINE | Admitting: EMERGENCY MEDICINE
Payer: COMMERCIAL

## 2024-05-23 VITALS
RESPIRATION RATE: 20 BRPM | TEMPERATURE: 98 F | DIASTOLIC BLOOD PRESSURE: 87 MMHG | HEART RATE: 85 BPM | WEIGHT: 315 LBS | OXYGEN SATURATION: 96 % | BODY MASS INDEX: 50.2 KG/M2 | SYSTOLIC BLOOD PRESSURE: 138 MMHG

## 2024-05-23 DIAGNOSIS — F84.0 AUTISM: Primary | ICD-10-CM

## 2024-05-23 PROCEDURE — 99284 EMERGENCY DEPT VISIT MOD MDM: CPT | Performed by: EMERGENCY MEDICINE

## 2024-05-23 PROCEDURE — 99283 EMERGENCY DEPT VISIT LOW MDM: CPT

## 2024-05-23 NOTE — DISCHARGE INSTRUCTIONS
Follow up as per discharge plan of ED behavioral health worker    Return to ED for worsening symptoms

## 2024-05-23 NOTE — ED NOTES
"5/23/24 @ 1130:  PES met with patient who says, \"my mother told me to say I was suicidal so I can get some help,\" even though  SLIME had received a call from [facility] that patient was upset because his peer counselor wasn't able to see him today and he was \"bored.\"  Also, patient hasn't been taking his medications.    PES spoke to Zeyad from peer program and he will come to see patient in the ED after 1300.  Also, Mine from [facility] will see him as ES appointments until he's linked to outpatient, and Kai Huff will continue to see patient for medication management.  PES informed patient who was open to plan; also updated ALISHA Sheldon.  SLIME will continue to monitor.  MS Hanna  "

## 2024-05-23 NOTE — ED PROVIDER NOTES
"History  Chief Complaint   Patient presents with    Psychiatric Evaluation     States his mother wants him to be admitted for his explosive behavior. He states he has been yelling, punching things. Denies HI. States when he mad he thinks about SI. \" Needs a med adjustment\".      Patient is a 19-year-old white male with history of diabetes, ADHD, Humberto's gangrene who reports \"I want to get a med adjustment to figure out why I am so explosive\".  Denies SI or HI.  States his mother, who is currently admitted to this hospital, told him to tell us he is suicidal so he would be admitted.  Denies alcohol.  Smokes marijuana.  States he feels \"mildly depressed\".  Denies auditory visual hallucination.          Prior to Admission Medications   Prescriptions Last Dose Informant Patient Reported? Taking?   Abilify Maintena 400 MG injection   Yes No   Sig: INJECT 2 ML INTRAMUSCUALRLY EVERY 4 WEEKS AS DIRECTED   Alcohol Swabs 70 % PADS   No No   Sig: May substitute brand based on insurance coverage. Check glucose TID.   Blood Glucose Monitoring Suppl (OneTouch Verio Reflect) w/Device KIT   No No   Sig: May substitute brand based on insurance coverage. Check glucose TID.   Blood Glucose Monitoring Suppl (OneTouch Verio) w/Device KIT   No No   Sig: Use 2 (two) times a day Ok to substitute with insurance covered brand   Dextromethorphan Polistirex ER 30 MG/5ML SUER   No No   Sig: Take 10 mL (60 mg total) by mouth 2 (two) times a day   FLUoxetine (PROzac) 10 mg capsule   No No   Sig: Take 1 capsule (10 mg total) by mouth daily   Insulin Glargine Solostar (Lantus SoloStar) 100 UNIT/ML SOPN   No No   Sig: Inject 0.3 mL (30 Units total) under the skin daily at bedtime   Insulin Pen Needle (BD Pen Needle Shani 2nd Gen) 32G X 4 MM MISC   No No   Sig: For use with insulin pen. Pharmacy may dispense brand covered by insurance.   Lancets (onetouch ultrasoft) lancets   No No   Sig: Use as instructed   OneTouch Delica Lancets 33G MISC   No No "   Sig: May substitute brand based on insurance coverage. Check glucose TID.   acetaminophen (TYLENOL) 325 mg tablet   No No   Sig: Take 2 tablets (650 mg total) by mouth every 6 (six) hours   albuterol (2.5 mg/3 mL) 0.083 % nebulizer solution   No No   Sig: Take 3 mL (2.5 mg total) by nebulization every 6 (six) hours as needed for wheezing or shortness of breath   benzonatate (TESSALON PERLES) 100 mg capsule   No No   Sig: Take 1 capsule (100 mg total) by mouth 3 (three) times a day as needed for cough   dicyclomine (BENTYL) 20 mg tablet   No No   Sig: Take 1 tablet (20 mg total) by mouth 2 (two) times a day   Patient not taking: Reported on 5/11/2024   fenofibrate (TRICOR) 54 MG tablet   No No   Sig: Take 1 tablet (54 mg total) by mouth daily   Patient not taking: Reported on 5/11/2024   glucose blood (OneTouch Verio) test strip   No No   Sig: TEST BLOOD SUGAR TWICE A DAY   glucose blood (OneTouch Verio) test strip   No No   Sig: May substitute brand based on insurance coverage. Check glucose TID.   insulin aspart (NovoLOG FlexPen) 100 UNIT/ML injection pen   No No   Sig: Inject 9 Units under the skin 3 (three) times a day with meals   metFORMIN (GLUCOPHAGE-XR) 500 mg 24 hr tablet   No No   Sig: Take 2 tablets (1,000 mg total) by mouth 2 (two) times a day with meals Start with 1 pill 500mg with breakfast x 1 week, then 1 pill 500mg with breakfast and 1 pill with dinner for 1 week , then 2 pills with breakfast (1000mg) and 1 pill with dinner (500mg) for 1 week, then 1000mg twice a day going forward   oxyCODONE (Roxicodone) 5 immediate release tablet   No No   Sig: Take 1 tablet (5 mg total) by mouth every 8 (eight) hours as needed for severe pain for up to 6 doses Max Daily Amount: 15 mg   sitaGLIPtin (JANUVIA) 50 mg tablet   No No   Sig: Take 1 tablet (50 mg total) by mouth daily   Patient not taking: Reported on 5/11/2024   topiramate (TOPAMAX) 50 MG tablet   Yes No   Sig: Take 50 mg by mouth 2 (two) times a day       Facility-Administered Medications: None       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        Past Surgical History:   Procedure Laterality Date    INCISION AND DRAINAGE OF WOUND Left 5/13/2024    Procedure: INCISION AND DRAINAGE (I&D) GROIN;  Surgeon: Tim Ch MD;  Location: [facility];  Service: General    INCISION AND DRAINAGE OF WOUND Left 5/15/2024    Procedure: INCISION AND DRAINAGE (I&D) GROIN;  Surgeon: Tim Ch MD;  Location: [facility];  Service: General    DE I&D VULVA/PERINEAL ABSCESS N/A 5/11/2024    Procedure: INCISION AND DRAINAGE (I&D) PERINEAL ABSCESS;  Surgeon: Tim Ch MD;  Location: [facility];  Service: General    TONSILECTOMY AND ADNOIDECTOMY      2020       Family History   Problem Relation Age of Onset    Hyperlipidemia Mother     Diabetes type II Mother     Obesity Mother     Obesity Father     Drug abuse Father     Diabetes type II Maternal Aunt     Lung disease Maternal Aunt     Rheum arthritis Maternal Aunt     Fibromyalgia Maternal Aunt     Atrial fibrillation Maternal Grandmother     Hyperlipidemia Maternal Grandfather     Diabetes type II Maternal Grandfather     Stroke Maternal Grandfather     Heart disease Maternal Grandfather     Stroke Paternal Grandfather      I have reviewed and agree with the history as documented.    E-Cigarette/Vaping    E-Cigarette Use Current Some Day User     Comments smoke cigg. when he has money      E-Cigarette/Vaping Substances    Nicotine No     THC No     CBD No     Flavoring Yes     Other No     Unknown No      Social History     Tobacco Use    Smoking status: Former     Types: Cigarettes     Passive exposure: Yes    Smokeless tobacco: Current   Vaping Use    Vaping status:  Some Days    Substances: Flavoring   Substance Use Topics    Alcohol use: Yes     Alcohol/week: 2.0 standard drinks of alcohol     Types: 2 Shots of liquor per week    Drug use: Yes     Types: Marijuana       Review of Systems   Constitutional:  Negative for fever.   Respiratory:  Negative for shortness of breath.    Cardiovascular:  Negative for chest pain.   Gastrointestinal:  Negative for abdominal pain.   Psychiatric/Behavioral:  Negative for self-injury and suicidal ideas.        Physical Exam  Physical Exam  Vitals and nursing note reviewed.   Constitutional:       General: He is not in acute distress.     Appearance: Normal appearance. He is obese. He is not ill-appearing, toxic-appearing or diaphoretic.   Cardiovascular:      Rate and Rhythm: Normal rate and regular rhythm.      Pulses: Normal pulses.      Heart sounds: Normal heart sounds.   Pulmonary:      Effort: Pulmonary effort is normal.      Breath sounds: Normal breath sounds.   Abdominal:      General: Abdomen is flat. Bowel sounds are normal.      Palpations: Abdomen is soft.   Musculoskeletal:         General: Normal range of motion.      Cervical back: Normal range of motion and neck supple.   Skin:     General: Skin is warm and dry.      Capillary Refill: Capillary refill takes less than 2 seconds.   Neurological:      General: No focal deficit present.      Mental Status: He is alert and oriented to person, place, and time. Mental status is at baseline.   Psychiatric:         Mood and Affect: Mood normal.         Behavior: Behavior normal.         Thought Content: Thought content normal.         Vital Signs  ED Triage Vitals [05/23/24 1114]   Temperature Pulse Respirations Blood Pressure SpO2   98 °F (36.7 °C) 85 20 138/87 96 %      Temp src Heart Rate Source Patient Position - Orthostatic VS BP Location FiO2 (%)   -- -- -- -- --      Pain Score       No Pain           Vitals:    05/23/24 1114   BP: 138/87   Pulse: 85         Visual  "Acuity      ED Medications  Medications - No data to display    Diagnostic Studies  Results Reviewed       None                   No orders to display              Procedures  Procedures         ED Course         DIAZ      Flowsheet Row Most Recent Value   DIAZ Initial Screen: During the past 12 months, did you:    1. Drink any alcohol (more than a few sips)?  Yes Filed at: 05/23/2024 1117   2. Smoke any marijuana or hashish Yes Filed at: 05/23/2024 1117   3. Use anything else to get high? (\"anything else\" includes illegal drugs, over the counter and prescription drugs, and things that you sniff or 'patel')? No Filed at: 05/23/2024 1117   BISMARKT Full Screen: During the past 12 months:    1. Have you ever ridden in a car driven by someone (including yourself) who was \"high\" or had been using alcohol or drugs? 1 Filed at: 05/23/2024 1117   2. Do you ever use alcohol or drugs to relax, feel better about yourself, or fit in? 1 Filed at: 05/23/2024 1117   3. Do you ever use alcohol/drugs while you are by yourself, alone? 1 Filed at: 05/23/2024 1117   4. Do you ever forget things you did while using alcohol or drugs? 1 Filed at: 05/23/2024 1117   5. Do your family or friends ever tell you that you should cut down on your drinking or drug use? 1 Filed at: 05/23/2024 1117   6. Have you gotten into trouble while you were using alcohol or drugs? 0 Filed at: 05/23/2024 1117   CRAFFT Score 5 Filed at: 05/23/2024 1117                                            Medical Decision Making  Patient is medically cleared for behavioral health evaluation and final disposition  Pt evaluated by ED crisis worker Joshua.   Pt expressing no SI/HI to him or me  Plan is d/c home to follow up this afternoon 3pm with Nurse practitoner at [facility]             Disposition  Final diagnoses:   Autism     Time reflects when diagnosis was documented in both MDM as applicable and the Disposition within this note       Time User Action Codes " Description Comment    5/23/2024  8:34 PM Chuck Bernie MCADAMS Add [F84.0] Autism           ED Disposition       ED Disposition   Discharge    Condition   Stable    Date/Time   Thu May 23, 2024 [id number]    Comment   Rik Marin discharge to home/self care.                   Follow-up Information       Follow up With Specialties Details Why Contact Info Additional Information    [facility], [facility] Emergency Medicine   [address] [id number] [phone] [facility] Emergency Department, [address], [place], [address]            Discharge Medication List as of 5/23/2024  1:46 PM        CONTINUE these medications which have NOT CHANGED    Details   Abilify Maintena 400 MG injection INJECT 2 ML INTRAMUSCUALRLY EVERY 4 WEEKS AS DIRECTED, Historical Med      acetaminophen (TYLENOL) 325 mg tablet Take 2 tablets (650 mg total) by mouth every 6 (six) hours, Starting Fri 5/17/2024, No Print      albuterol (2.5 mg/3 mL) 0.083 % nebulizer solution Take 3 mL (2.5 mg total) by nebulization every 6 (six) hours as needed for wheezing or shortness of breath, Starting Wed 5/8/2024, Normal      Alcohol Swabs 70 % PADS May substitute brand based on insurance coverage. Check glucose TID., Normal      benzonatate (TESSALON PERLES) 100 mg capsule Take 1 capsule (100 mg total) by mouth 3 (three) times a day as needed for cough, Starting Wed 5/8/2024, Normal      !! Blood Glucose Monitoring Suppl (OneTouch Verio Reflect) w/Device KIT May substitute brand based on insurance coverage. Check glucose TID., Normal      !! Blood Glucose Monitoring Suppl (OneTouch Verio) w/Device KIT Use 2 (two) times a day Ok to substitute with insurance covered brand, Starting Fri 10/27/2023, Normal      Dextromethorphan Polistirex ER 30 MG/5ML SUER Take 10 mL (60 mg total) by mouth 2 (two) times a day, Starting Sun 5/5/2024, Normal      dicyclomine (BENTYL) 20 mg tablet  Take 1 tablet (20 mg total) by mouth 2 (two) times a day, Starting Fri 1/12/2024, Normal      fenofibrate (TRICOR) 54 MG tablet Take 1 tablet (54 mg total) by mouth daily, Starting Thu 9/28/2023, Normal      FLUoxetine (PROzac) 10 mg capsule Take 1 capsule (10 mg total) by mouth daily, Starting Wed 5/8/2024, Normal      !! glucose blood (OneTouch Verio) test strip TEST BLOOD SUGAR TWICE A DAY, Normal      !! glucose blood (OneTouch Verio) test strip May substitute brand based on insurance coverage. Check glucose TID., Normal      insulin aspart (NovoLOG FlexPen) 100 UNIT/ML injection pen Inject 9 Units under the skin 3 (three) times a day with meals, Starting Wed 5/8/2024, Normal      Insulin Glargine Solostar (Lantus SoloStar) 100 UNIT/ML SOPN Inject 0.3 mL (30 Units total) under the skin daily at bedtime, Starting Tue 5/7/2024, Normal      Insulin Pen Needle (BD Pen Needle Shani 2nd Gen) 32G X 4 MM MISC For use with insulin pen. Pharmacy may dispense brand covered by insurance., Normal      !! Lancets (onetouch ultrasoft) lancets Use as instructed, Normal      metFORMIN (GLUCOPHAGE-XR) 500 mg 24 hr tablet Take 2 tablets (1,000 mg total) by mouth 2 (two) times a day with meals Start with 1 pill 500mg with breakfast x 1 week, then 1 pill 500mg with breakfast and 1 pill with dinner for 1 week , then 2 pills with breakfast (1000mg) and 1 pill with dinner (500 mg) for 1 week, then 1000mg twice a day going forward, Starting Tue 5/7/2024, Normal      !! OneTouch Delica Lancets 33G MISC May substitute brand based on insurance coverage. Check glucose TID., Normal      oxyCODONE (Roxicodone) 5 immediate release tablet Take 1 tablet (5 mg total) by mouth every 8 (eight) hours as needed for severe pain for up to 6 doses Max Daily Amount: 15 mg, Starting Fri 5/17/2024, Normal      sitaGLIPtin (JANUVIA) 50 mg tablet Take 1 tablet (50 mg total) by mouth daily, Starting Tue 5/7/2024, Normal      topiramate (TOPAMAX) 50 MG tablet  Take 50 mg by mouth 2 (two) times a day, Starting Thu 3/10/2022, Historical Med       !! - Potential duplicate medications found. Please discuss with provider.          No discharge procedures on file.    PDMP Review       None            ED Provider  Electronically Signed by             Amado Neslon PA-C  05/23/24 1428       Amado Nelson PA-C  05/23/24 1429       Bernie Woods DO  05/23/24 2035

## 2024-05-24 ENCOUNTER — HOSPITAL ENCOUNTER (EMERGENCY)
Facility: HOSPITAL | Age: 19
Discharge: HOME | End: 2024-05-25
Attending: EMERGENCY MEDICINE | Admitting: EMERGENCY MEDICINE
Payer: COMMERCIAL

## 2024-05-24 DIAGNOSIS — F32.A DEPRESSION: Primary | ICD-10-CM

## 2024-05-24 LAB
ALBUMIN SERPL BCP-MCNC: 4.1 G/DL (ref 3.5–5)
ALP SERPL-CCNC: 65 U/L (ref 34–104)
ALT SERPL W P-5'-P-CCNC: 29 U/L (ref 7–52)
AMPHETAMINES SERPL QL SCN: NEGATIVE
ANION GAP SERPL CALCULATED.3IONS-SCNC: 9 MMOL/L (ref 4–13)
AST SERPL W P-5'-P-CCNC: 36 U/L (ref 13–39)
BACTERIA UR QL AUTO: ABNORMAL /HPF
BARBITURATES UR QL: NEGATIVE
BASOPHILS # BLD AUTO: 0.07 THOUSANDS/ÂΜL (ref 0–0.1)
BASOPHILS NFR BLD AUTO: 1 % (ref 0–1)
BENZODIAZ UR QL: NEGATIVE
BILIRUB SERPL-MCNC: 0.45 MG/DL (ref 0.2–1)
BILIRUB UR QL STRIP: NEGATIVE
BUN SERPL-MCNC: 15 MG/DL (ref 5–25)
CALCIUM SERPL-MCNC: 9.5 MG/DL (ref 8.4–10.2)
CHLORIDE SERPL-SCNC: 106 MMOL/L (ref 96–108)
CLARITY UR: CLEAR
CO2 SERPL-SCNC: 20 MMOL/L (ref 21–32)
COCAINE UR QL: NEGATIVE
COLOR UR: YELLOW
CREAT SERPL-MCNC: 0.94 MG/DL (ref 0.6–1.3)
EOSINOPHIL # BLD AUTO: 0.28 THOUSAND/ÂΜL (ref 0–0.61)
EOSINOPHIL NFR BLD AUTO: 3 % (ref 0–6)
ERYTHROCYTE [DISTWIDTH] IN BLOOD BY AUTOMATED COUNT: 14.4 % (ref 11.6–15.1)
ETHANOL SERPL-MCNC: <10 MG/DL
FENTANYL UR QL SCN: NEGATIVE
GFR SERPL CREATININE-BSD FRML MDRD: 116 ML/MIN/1.73SQ M
GLUCOSE SERPL-MCNC: 139 MG/DL (ref 65–140)
GLUCOSE SERPL-MCNC: 147 MG/DL (ref 65–140)
GLUCOSE SERPL-MCNC: 149 MG/DL (ref 65–140)
GLUCOSE UR STRIP-MCNC: NEGATIVE MG/DL
HCT VFR BLD AUTO: 39 % (ref 36.5–49.3)
HGB BLD-MCNC: 12.1 G/DL (ref 12–17)
HGB UR QL STRIP.AUTO: ABNORMAL
HYALINE CASTS #/AREA URNS LPF: ABNORMAL /LPF
HYDROCODONE UR QL SCN: NEGATIVE
IMM GRANULOCYTES # BLD AUTO: 0.16 THOUSAND/UL (ref 0–0.2)
IMM GRANULOCYTES NFR BLD AUTO: 1 % (ref 0–2)
KETONES UR STRIP-MCNC: NEGATIVE MG/DL
LEUKOCYTE ESTERASE UR QL STRIP: ABNORMAL
LYMPHOCYTES # BLD AUTO: 2.8 THOUSANDS/ÂΜL (ref 0.6–4.47)
LYMPHOCYTES NFR BLD AUTO: 25 % (ref 14–44)
MCH RBC QN AUTO: 26.9 PG (ref 26.8–34.3)
MCHC RBC AUTO-ENTMCNC: 31 G/DL (ref 31.4–37.4)
MCV RBC AUTO: 87 FL (ref 82–98)
METHADONE UR QL: NEGATIVE
MONOCYTES # BLD AUTO: 0.91 THOUSAND/ÂΜL (ref 0.17–1.22)
MONOCYTES NFR BLD AUTO: 8 % (ref 4–12)
NEUTROPHILS # BLD AUTO: 6.9 THOUSANDS/ÂΜL (ref 1.85–7.62)
NEUTS SEG NFR BLD AUTO: 62 % (ref 43–75)
NITRITE UR QL STRIP: NEGATIVE
NON-SQ EPI CELLS URNS QL MICRO: ABNORMAL /HPF
NRBC BLD AUTO-RTO: 0 /100 WBCS
OPIATES UR QL SCN: NEGATIVE
OXYCODONE+OXYMORPHONE UR QL SCN: NEGATIVE
PCP UR QL: NEGATIVE
PH UR STRIP.AUTO: 5.5 [PH]
PLATELET # BLD AUTO: 504 THOUSANDS/UL (ref 149–390)
PMV BLD AUTO: 10 FL (ref 8.9–12.7)
POTASSIUM SERPL-SCNC: 3.9 MMOL/L (ref 3.5–5.3)
PROT SERPL-MCNC: 9.5 G/DL (ref 6.4–8.4)
PROT UR STRIP-MCNC: ABNORMAL MG/DL
RBC # BLD AUTO: 4.5 MILLION/UL (ref 3.88–5.62)
RBC #/AREA URNS AUTO: ABNORMAL /HPF
SODIUM SERPL-SCNC: 135 MMOL/L (ref 135–147)
SP GR UR STRIP.AUTO: 1.02 (ref 1–1.03)
THC UR QL: POSITIVE
UROBILINOGEN UR STRIP-ACNC: <2 MG/DL
WBC # BLD AUTO: 11.12 THOUSAND/UL (ref 4.31–10.16)
WBC #/AREA URNS AUTO: ABNORMAL /HPF

## 2024-05-24 PROCEDURE — 80053 COMPREHEN METABOLIC PANEL: CPT | Performed by: PHYSICIAN ASSISTANT

## 2024-05-24 PROCEDURE — 96372 THER/PROPH/DIAG INJ SC/IM: CPT

## 2024-05-24 PROCEDURE — 99283 EMERGENCY DEPT VISIT LOW MDM: CPT

## 2024-05-24 PROCEDURE — 82948 REAGENT STRIP/BLOOD GLUCOSE: CPT

## 2024-05-24 PROCEDURE — 81001 URINALYSIS AUTO W/SCOPE: CPT | Performed by: PHYSICIAN ASSISTANT

## 2024-05-24 PROCEDURE — 85025 COMPLETE CBC W/AUTO DIFF WBC: CPT | Performed by: PHYSICIAN ASSISTANT

## 2024-05-24 PROCEDURE — 82077 ASSAY SPEC XCP UR&BREATH IA: CPT | Performed by: PHYSICIAN ASSISTANT

## 2024-05-24 PROCEDURE — 80307 DRUG TEST PRSMV CHEM ANLYZR: CPT | Performed by: PHYSICIAN ASSISTANT

## 2024-05-24 PROCEDURE — 99285 EMERGENCY DEPT VISIT HI MDM: CPT | Performed by: PHYSICIAN ASSISTANT

## 2024-05-24 PROCEDURE — 36415 COLL VENOUS BLD VENIPUNCTURE: CPT | Performed by: PHYSICIAN ASSISTANT

## 2024-05-24 RX ORDER — FLUOXETINE 10 MG/1
10 CAPSULE ORAL DAILY
Status: DISCONTINUED | OUTPATIENT
Start: 2024-05-24 | End: 2024-05-25 | Stop reason: HOSPADM

## 2024-05-24 RX ORDER — TOPIRAMATE 25 MG/1
50 TABLET ORAL 2 TIMES DAILY
Status: DISCONTINUED | OUTPATIENT
Start: 2024-05-24 | End: 2024-05-25 | Stop reason: HOSPADM

## 2024-05-24 RX ORDER — INSULIN GLARGINE 100 [IU]/ML
30 INJECTION, SOLUTION SUBCUTANEOUS
Status: DISCONTINUED | OUTPATIENT
Start: 2024-05-24 | End: 2024-05-25 | Stop reason: HOSPADM

## 2024-05-24 RX ORDER — LORAZEPAM 1 MG/1
1 TABLET ORAL ONCE
Status: COMPLETED | OUTPATIENT
Start: 2024-05-24 | End: 2024-05-24

## 2024-05-24 RX ORDER — INSULIN LISPRO 100 [IU]/ML
9 INJECTION, SOLUTION INTRAVENOUS; SUBCUTANEOUS
Status: DISCONTINUED | OUTPATIENT
Start: 2024-05-24 | End: 2024-05-25 | Stop reason: HOSPADM

## 2024-05-24 RX ADMIN — METFORMIN HYDROCHLORIDE 500 MG: 500 TABLET ORAL at 18:05

## 2024-05-24 RX ADMIN — METFORMIN HYDROCHLORIDE 500 MG: 500 TABLET ORAL at 12:40

## 2024-05-24 RX ADMIN — TOPIRAMATE 50 MG: 25 TABLET, FILM COATED ORAL at 18:05

## 2024-05-24 RX ADMIN — INSULIN LISPRO 9 UNITS: 100 INJECTION, SOLUTION INTRAVENOUS; SUBCUTANEOUS at 12:39

## 2024-05-24 RX ADMIN — INSULIN LISPRO 9 UNITS: 100 INJECTION, SOLUTION INTRAVENOUS; SUBCUTANEOUS at 16:49

## 2024-05-24 RX ADMIN — LORAZEPAM 1 MG: 1 TABLET ORAL at 16:49

## 2024-05-24 RX ADMIN — TOPIRAMATE 50 MG: 25 TABLET, FILM COATED ORAL at 13:58

## 2024-05-24 RX ADMIN — FLUOXETINE 10 MG: 10 CAPSULE ORAL at 12:40

## 2024-05-24 NOTE — ED PROVIDER NOTES
"History  Chief Complaint   Patient presents with    Psychiatric Evaluation     Pt brought in and dropped off by brother at registration after pt had fight with mother and pt reports having increased anxiety.  Pt reports feeling \"rage building\".  Pt denies any thoughts of self harm or suicidal at this time.       Patient is a 19-year-old white male with history of ADHD, diabetes, Humberto's gangrene reports having an argument with his mother this morning over payment of chore money.  Patient reports he then felt angry and unsafe with thoughts of self-harm (cutting).  States he has history of cutting.  Denies SI or HI.  Patient was seen in this ED yesterday and evaluated by behavioral health.  Smokes marijuana.  Denies alcohol or other illicit drug use.        Prior to Admission Medications   Prescriptions Last Dose Informant Patient Reported? Taking?   Abilify Maintena 400 MG injection   Yes Yes   Sig: INJECT 2 ML INTRAMUSCUALRLY EVERY 4 WEEKS AS DIRECTED   Alcohol Swabs 70 % PADS   No Yes   Sig: May substitute brand based on insurance coverage. Check glucose TID.   Blood Glucose Monitoring Suppl (OneTouch Verio Reflect) w/Device KIT   No Yes   Sig: May substitute brand based on insurance coverage. Check glucose TID.   Blood Glucose Monitoring Suppl (OneTouch Verio) w/Device KIT   No Yes   Sig: Use 2 (two) times a day Ok to substitute with insurance covered brand   FLUoxetine (PROzac) 10 mg capsule   No Yes   Sig: Take 1 capsule (10 mg total) by mouth daily   Insulin Glargine Solostar (Lantus SoloStar) 100 UNIT/ML SOPN   No Yes   Sig: Inject 0.3 mL (30 Units total) under the skin daily at bedtime   Insulin Pen Needle (BD Pen Needle Shani 2nd Gen) 32G X 4 MM MISC   No Yes   Sig: For use with insulin pen. Pharmacy may dispense brand covered by insurance.   Lancets (onetouch ultrasoft) lancets   No Yes   Sig: Use as instructed   OneTouch Delica Lancets 33G MISC   No Yes   Sig: May substitute brand based on insurance " coverage. Check glucose TID.   albuterol (2.5 mg/3 mL) 0.083 % nebulizer solution   No Yes   Sig: Take 3 mL (2.5 mg total) by nebulization every 6 (six) hours as needed for wheezing or shortness of breath   glucose blood (OneTouch Verio) test strip   No Yes   Sig: TEST BLOOD SUGAR TWICE A DAY   glucose blood (OneTouch Verio) test strip   No Yes   Sig: May substitute brand based on insurance coverage. Check glucose TID.   insulin aspart (NovoLOG FlexPen) 100 UNIT/ML injection pen   No Yes   Sig: Inject 9 Units under the skin 3 (three) times a day with meals   metFORMIN (GLUCOPHAGE-XR) 500 mg 24 hr tablet   No Yes   Sig: Take 2 tablets (1,000 mg total) by mouth 2 (two) times a day with meals Start with 1 pill 500mg with breakfast x 1 week, then 1 pill 500mg with breakfast and 1 pill with dinner for 1 week , then 2 pills with breakfast (1000mg) and 1 pill with dinner (500mg) for 1 week, then 1000mg twice a day going forward   topiramate (TOPAMAX) 50 MG tablet   Yes Yes   Sig: Take 50 mg by mouth 2 (two) times a day      Facility-Administered Medications: None       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        Past Surgical History:   Procedure Laterality Date    INCISION AND DRAINAGE OF WOUND Left 5/13/2024    Procedure: INCISION AND DRAINAGE (I&D) GROIN;  Surgeon: Tim Ch MD;  Location: [facility];  Service: General    INCISION AND DRAINAGE OF WOUND Left 5/15/2024    Procedure: INCISION AND DRAINAGE (I&D) GROIN;  Surgeon: Tim Ch MD;  Location: [facility];  Service: General    IN I&D VULVA/PERINEAL ABSCESS N/A 5/11/2024    Procedure: INCISION AND DRAINAGE (I&D) PERINEAL ABSCESS;  Surgeon: Tim Ch MD;  Location: [facility];  Service: General     TONSILECTOMY AND ADNOIDECTOMY      2020       Family History   Problem Relation Age of Onset    Hyperlipidemia Mother     Diabetes type II Mother     Obesity Mother     Obesity Father     Drug abuse Father     Diabetes type II Maternal Aunt     Lung disease Maternal Aunt     Rheum arthritis Maternal Aunt     Fibromyalgia Maternal Aunt     Atrial fibrillation Maternal Grandmother     Hyperlipidemia Maternal Grandfather     Diabetes type II Maternal Grandfather     Stroke Maternal Grandfather     Heart disease Maternal Grandfather     Stroke Paternal Grandfather      I have reviewed and agree with the history as documented.    E-Cigarette/Vaping    E-Cigarette Use Current Some Day User     Comments smoke cigg. when he has money      E-Cigarette/Vaping Substances    Nicotine No     THC No     CBD No     Flavoring Yes     Other No     Unknown No      Social History     Tobacco Use    Smoking status: Former     Types: Cigarettes     Passive exposure: Yes    Smokeless tobacco: Current   Vaping Use    Vaping status: Some Days    Substances: Flavoring   Substance Use Topics    Alcohol use: Yes     Alcohol/week: 2.0 standard drinks of alcohol     Types: 2 Shots of liquor per week    Drug use: Yes     Types: Marijuana       Review of Systems   Constitutional:  Negative for fever.   Respiratory:  Negative for shortness of breath.    Cardiovascular:  Negative for chest pain.   Gastrointestinal:  Negative for abdominal pain.   Musculoskeletal:  Negative for back pain and neck pain.   Psychiatric/Behavioral:  Negative for suicidal ideas.        Physical Exam  Physical Exam  Vitals and nursing note reviewed.   Constitutional:       General: He is not in acute distress.     Appearance: Normal appearance. He is obese. He is not ill-appearing, toxic-appearing or diaphoretic.   HENT:      Head: Normocephalic and atraumatic.      Right Ear: Tympanic membrane, ear canal and external ear normal.      Left Ear: Tympanic membrane, ear  canal and external ear normal.      Nose: Nose normal.      Mouth/Throat:      Mouth: Mucous membranes are moist.      Pharynx: Oropharynx is clear.   Eyes:      Extraocular Movements: Extraocular movements intact.      Conjunctiva/sclera: Conjunctivae normal.      Pupils: Pupils are equal, round, and reactive to light.   Cardiovascular:      Rate and Rhythm: Normal rate and regular rhythm.      Pulses: Normal pulses.      Heart sounds: Normal heart sounds.   Pulmonary:      Effort: Pulmonary effort is normal.      Breath sounds: Normal breath sounds.   Abdominal:      General: Abdomen is flat. Bowel sounds are normal.      Palpations: Abdomen is soft.   Musculoskeletal:         General: Normal range of motion.      Cervical back: Normal range of motion and neck supple. No tenderness.   Skin:     General: Skin is warm and dry.      Capillary Refill: Capillary refill takes less than 2 seconds.   Neurological:      General: No focal deficit present.      Mental Status: He is alert and oriented to person, place, and time. Mental status is at baseline.   Psychiatric:         Mood and Affect: Mood normal.         Vital Signs  ED Triage Vitals   Temperature Pulse Respirations Blood Pressure SpO2   05/24/24 1115 05/24/24 1115 05/24/24 1115 05/24/24 1115 05/24/24 1115   98.8 °F (37.1 °C) 89 20 130/70 97 %      Temp Source Heart Rate Source Patient Position - Orthostatic VS BP Location FiO2 (%)   05/24/24 1115 05/24/24 1115 05/24/24 1115 05/24/24 1115 --   Tympanic Monitor Lying Right arm       Pain Score       05/24/24 1102       No Pain           Vitals:    05/24/24 1115   BP: 130/70   Pulse: 89   Patient Position - Orthostatic VS: Lying         Visual Acuity      ED Medications  Medications   topiramate (TOPAMAX) tablet 50 mg (50 mg Oral Given 5/24/24 1358)   FLUoxetine (PROzac) capsule 10 mg (10 mg Oral Given 5/24/24 1240)   insulin lispro (HumALOG/ADMELOG) 100 units/mL subcutaneous injection 9 Units (9 Units  Subcutaneous Given 5/24/24 1649)   insulin glargine (LANTUS) subcutaneous injection 30 Units 0.3 mL (has no administration in time range)   metFORMIN (GLUCOPHAGE) tablet 500 mg (500 mg Oral Given 5/24/24 1240)   LORazepam (ATIVAN) tablet 1 mg (1 mg Oral Given 5/24/24 1649)       Diagnostic Studies  Results Reviewed       Procedure Component Value Units Date/Time    Fingerstick Glucose (POCT) [[id number]]  (Abnormal) Collected: 05/24/24 1803    Lab Status: Final result Specimen: Blood Updated: 05/24/24 1804     POC Glucose 147 mg/dl     Urine Microscopic [[id number]]  (Abnormal) Collected: 05/24/24 1310    Lab Status: Final result Specimen: Urine, Clean Catch Updated: 05/24/24 1351     RBC, UA 0-1 /hpf      WBC, UA 4-10 /hpf      Epithelial Cells Occasional /hpf      Bacteria, UA Occasional /hpf      Hyaline Casts, UA 2-4 /lpf     Rapid drug screen, urine [[id number]]  (Abnormal) Collected: 05/24/24 1310    Lab Status: Final result Specimen: Urine, Clean Catch Updated: 05/24/24 1333     Amph/Meth UR Negative     Barbiturate Ur Negative     Benzodiazepine Urine Negative     Cocaine Urine Negative     Methadone Urine Negative     Opiate Urine Negative     PCP Ur Negative     THC Urine Positive     Oxycodone Urine Negative     Fentanyl Urine Negative     HYDROCODONE URINE Negative    Narrative:      Presumptive report. If requested, specimen will be sent to reference lab for confirmation.  FOR MEDICAL PURPOSES ONLY.   IF CONFIRMATION NEEDED PLEASE CONTACT THE LAB WITHIN 5 DAYS.    Drug Screen Cutoff Levels:  AMPHETAMINE/METHAMPHETAMINES  1000 ng/mL  BARBITURATES     200 ng/mL  BENZODIAZEPINES     200 ng/mL  COCAINE      300 ng/mL  METHADONE      300 ng/mL  OPIATES      300 ng/mL  PHENCYCLIDINE     25 ng/mL  THC       50 ng/mL  OXYCODONE      100 ng/mL  FENTANYL      5 ng/mL  HYDROCODONE     300 ng/mL    UA w Reflex to Microscopic w Reflex to Culture [[id number]]  (Abnormal) Collected: 05/24/24 1310    Lab Status: Final  result Specimen: Urine, Clean Catch Updated: 05/24/24 1317     Color, UA Yellow     Clarity, UA Clear     Specific Gravity, UA 1.020     pH, UA 5.5     Leukocytes, UA Trace     Nitrite, UA Negative     Protein, UA 30 (1+) mg/dl      Glucose, UA Negative mg/dl      Ketones, UA Negative mg/dl      Urobilinogen, UA <2.0 mg/dl      Bilirubin, UA Negative     Occult Blood, UA Trace    Comprehensive metabolic panel [[id number]]  (Abnormal) Collected: 05/24/24 1154    Lab Status: Final result Specimen: Blood from Arm, Right Updated: 05/24/24 1217     Sodium 135 mmol/L      Potassium 3.9 mmol/L      Chloride 106 mmol/L      CO2 20 mmol/L      ANION GAP 9 mmol/L      BUN 15 mg/dL      Creatinine 0.94 mg/dL      Glucose 139 mg/dL      Calcium 9.5 mg/dL      AST 36 U/L      ALT 29 U/L      Alkaline Phosphatase 65 U/L      Total Protein 9.5 g/dL      Albumin 4.1 g/dL      Total Bilirubin 0.45 mg/dL      eGFR 116 ml/min/1.73sq m     Narrative:      National Kidney Disease Foundation guidelines for Chronic Kidney Disease (CKD):     Stage 1 with normal or high GFR (GFR > 90 mL/min/1.73 square meters)    Stage 2 Mild CKD (GFR = 60-89 mL/min/1.73 square meters)    Stage 3A Moderate CKD (GFR = 45-59 mL/min/1.73 square meters)    Stage 3B Moderate CKD (GFR = 30-44 mL/min/1.73 square meters)    Stage 4 Severe CKD (GFR = 15-29 mL/min/1.73 square meters)    Stage 5 End Stage CKD (GFR <15 mL/min/1.73 square meters)  Note: GFR calculation is accurate only with a steady state creatinine    Ethanol [[id number]]  (Normal) Collected: 05/24/24 1154    Lab Status: Final result Specimen: Blood from Arm, Right Updated: 05/24/24 1216     Ethanol Lvl <10 mg/dL     CBC and differential [[id number]]  (Abnormal) Collected: 05/24/24 1154    Lab Status: Final result Specimen: Blood from Arm, Right Updated: 05/24/24 1200     WBC 11.12 Thousand/uL      RBC 4.50 Million/uL      Hemoglobin 12.1 g/dL      Hematocrit 39.0 %      MCV 87 fL      MCH 26.9 pg       "MCHC 31.0 g/dL      RDW 14.4 %      MPV 10.0 fL      Platelets 504 Thousands/uL      nRBC 0 /100 WBCs      Segmented % 62 %      Immature Grans % 1 %      Lymphocytes % 25 %      Monocytes % 8 %      Eosinophils Relative 3 %      Basophils Relative 1 %      Absolute Neutrophils 6.90 Thousands/µL      Absolute Immature Grans 0.16 Thousand/uL      Absolute Lymphocytes 2.80 Thousands/µL      Absolute Monocytes 0.91 Thousand/µL      Eosinophils Absolute 0.28 Thousand/µL      Basophils Absolute 0.07 Thousands/µL                    No orders to display              Procedures  Procedures         ED Course         CRAFFT      Flowsheet Row Most Recent Value   CRAFFT Initial Screen: During the past 12 months, did you:    1. Drink any alcohol (more than a few sips)?  Yes Filed at: 05/24/2024 1111   2. Smoke any marijuana or hashish Yes Filed at: 05/24/2024 1111   3. Use anything else to get high? (\"anything else\" includes illegal drugs, over the counter and prescription drugs, and things that you sniff or 'patel')? No Filed at: 05/24/2024 1111                                            Medical Decision Making  Labs and UA reviewed.  Urine shows 4-10 WBCs per high-powered field.  Patient has no reports of dysuria, frequency, hematuria to indicate a UTI therefore no antibiotics indicated.      Patient is medically cleared for behavioral health evaluation and pending disposition    Patient evaluated by ED crisis worker. Patient pending bed search    Amount and/or Complexity of Data Reviewed  Labs: ordered.    Risk  Prescription drug management.  Decision regarding hospitalization.             Disposition  Final diagnoses:   None     ED Disposition       ED Disposition   Transfer to Behavioral Health    Condition   --    Date/Time   Fri May 24, 2024 1241    Comment   Rik Marin should be transferred out to behavioral health and has been medically cleared.               Follow-up Information    None         Patient's " Medications   Discharge Prescriptions    No medications on file       No discharge procedures on file.    PDMP Review       None            ED Provider  Electronically Signed by             Amado Nelson PA-C  05/24/24 6897

## 2024-05-24 NOTE — ED NOTES
Patient belongings; 4 pairs of socks, 3 pairs of sweatpants, 1 pair of shorts, 8 shirts, 1 pair slipper shoes, 1 pair red sneakers, 1 black cell phone, 1 can deodorant, 1 cpap machine with , placed into locker 22     Shima Pruitt RN  05/24/24 2612

## 2024-05-24 NOTE — ED NOTES
"Patient requested a smoking facility,    16:00 - chart faxed to [facility] - called to verify receipt - no answer.    17:30 - declined @ [facility] - \"autism dx\".    17:45 - chart faxed to [facility] (wait list); [facility] and [facility] - called each to verify receipt.    18:30 - declined @ [facility] - \"medical issues\"    18:55 - declined @  - \"to high acuity on unit and the patient is behavioral\"    Mother called for an update - it was provided.    18:55 - called [facility] for an update - they couldn't find the chart - re-faxed @ this time.    19:10 - called [facility] - when they have time they will look at the chart.    20:20 - [facility] declined - \"diabetes, recent medical procedure and size\"    20:30 - chart faxed to [facility] and [facility] - called to verify receipt.    End of [facility] shift - still pending with [facility].  "

## 2024-05-24 NOTE — ED NOTES
"5/24/24 @ 1200 : 19-year-old male arrived to ED for the second time in 2 days due to suicidal thoughts to cut himself. Patient came to ED yesterday, but PES was able to divert with a plan which included his community services. Today, Elvia had an arguement with his mother, who is in the hospital, and he once again became suicidal. Patient says, \"I don't want to do it and I know that it's stupid but sometimes, I just don't think I can control myeself; this is one of those times.\" Patient recently completed an IOP at [facility] services, but wasn't connected with any outpatient therapy, only the Peer program which is no where near the level of assistance he needs. Patient has an APN for medication, but no therapy. Patient says he hasn't been taking his medication because he doesn't have any; yesterdays plan included seeing [facility] and all medicaiton was sent to Pharmacy, but he didn't pick them up because his brother never got them. Plan yesterday was for him to obtain an Emergency service appointment until a therapist could be scheduled, but patient wasn't able to wait. Please see copy of crisis assessment for further details. Patient seeking inpatient psychiatric treatment.     MS Hanna  "

## 2024-05-24 NOTE — Clinical Note
Rik Marin should be transferred out to behavioral [facility] and has been medically cleared.

## 2024-05-25 VITALS
TEMPERATURE: 97.2 F | HEART RATE: 85 BPM | BODY MASS INDEX: 40.43 KG/M2 | HEIGHT: 74 IN | SYSTOLIC BLOOD PRESSURE: 125 MMHG | DIASTOLIC BLOOD PRESSURE: 75 MMHG | WEIGHT: 315 LBS | OXYGEN SATURATION: 97 % | RESPIRATION RATE: 19 BRPM

## 2024-05-25 LAB
FLUAV RNA RESP QL NAA+PROBE: NEGATIVE
FLUBV RNA RESP QL NAA+PROBE: NEGATIVE
GLUCOSE SERPL-MCNC: 135 MG/DL (ref 65–140)
GLUCOSE SERPL-MCNC: 152 MG/DL (ref 65–140)
GLUCOSE SERPL-MCNC: 153 MG/DL (ref 65–140)
RSV RNA RESP QL NAA+PROBE: NEGATIVE
SARS-COV-2 RNA RESP QL NAA+PROBE: NEGATIVE

## 2024-05-25 PROCEDURE — 82948 REAGENT STRIP/BLOOD GLUCOSE: CPT

## 2024-05-25 PROCEDURE — NC001 PR NO CHARGE: Performed by: EMERGENCY MEDICINE

## 2024-05-25 PROCEDURE — 0241U HB NFCT DS VIR RESP RNA 4 TRGT: CPT | Performed by: EMERGENCY MEDICINE

## 2024-05-25 PROCEDURE — 96372 THER/PROPH/DIAG INJ SC/IM: CPT

## 2024-05-25 RX ADMIN — FLUOXETINE 10 MG: 10 CAPSULE ORAL at 08:35

## 2024-05-25 RX ADMIN — TOPIRAMATE 50 MG: 25 TABLET, FILM COATED ORAL at 08:36

## 2024-05-25 RX ADMIN — TOPIRAMATE 50 MG: 25 TABLET, FILM COATED ORAL at 19:39

## 2024-05-25 RX ADMIN — INSULIN LISPRO 9 UNITS: 100 INJECTION, SOLUTION INTRAVENOUS; SUBCUTANEOUS at 12:01

## 2024-05-25 RX ADMIN — INSULIN LISPRO 9 UNITS: 100 INJECTION, SOLUTION INTRAVENOUS; SUBCUTANEOUS at 08:14

## 2024-05-25 RX ADMIN — METFORMIN HYDROCHLORIDE 500 MG: 500 TABLET ORAL at 19:39

## 2024-05-25 RX ADMIN — INSULIN LISPRO 9 UNITS: 100 INJECTION, SOLUTION INTRAVENOUS; SUBCUTANEOUS at 17:11

## 2024-05-25 RX ADMIN — METFORMIN HYDROCHLORIDE 500 MG: 500 TABLET ORAL at 08:35

## 2024-05-25 NOTE — ED NOTES
Patient cooperative with medication administration, speaking with crisis worker Liat at the bedside about plan of care. Patient has no concerns or questions at this time.      Noemy Mcelroy RN  05/25/24 1109

## 2024-05-25 NOTE — ED PROVIDER NOTES
"Daily Progress Note  Subjective  19 y.o. male presenting for SI. No acute overnight events.    Objective  /77 (BP Location: Right arm)   Pulse 85   Temp (!) 97 °F (36.1 °C) (Tympanic)   Resp 20   Ht 6' 2\" (1.88 m)   Wt (!) 177 kg (390 lb)   SpO2 100%   BMI 50.07 kg/m²   Physical Exam:   GENERAL APPEARANCE: NAD, resting comfortably in bed  HEENT: NC/AT, no obvious signs of trauma  CV: RRR  LUNGS: Chest rise equal B/L  ABD: Non-distended  MSK: No signs of edema  SKIN: No obvious signs of skin breakdown noted, warm/dry     Assessment/Plan:    19 y.o. male presenting for SI.  - Medically cleared for inpatient psych  - Crisis following   - Voluntary  - Awaiting placement     Echo Snyder MD  05/25/24 0926    "

## 2024-05-25 NOTE — ED NOTES
Pt requested conversation with this writer.  Pt inquired about status of bed search and reiterated that he would like to go to a facility where he can smoke.  Informed pt that while those accommodations can be explored, there is no guarantee that a bed will be available in such a facility.  Informed him that at this time, his referral was still being reviewed by [facility] and [facility].  Informed pt that he will be updated.  Pt had been calling into the POA several times.  Informed pt that PES is pretty busy today with several pts and updates will be provided as quickly as possible.    10:22 - Pt declined at [facility] due to medical concerns.    10:24 - Called [facility].  They requested additional information;   Glucose monitoring   COVID test   Information on pt's gangrene and medications administered to treat.   Medical clearance (already included in original referral. This writer circled the note to re-send.)    This writer gathered information to send over to [facility].    As per ED physician, pt's gangrene is resolved and therefore does not require medication treatment.     Pt asked to speak with writer again and stated that he wanted to smoke.  He stated that this is a situation he always gets into when he gets upset.  Stated that he is still interested in voluntary treatment.    11:51 - Requested information completely gathered and faxed to [facility].    Received call from Shaila at [facility].  She stated that she did not get the requested information.  Attempted to fax again.    18:35 - Shaila from [facility] called to confirm that she received the information requested.  She stated that the pt was declined at [facility] due to his medical issues.  She stated she has sent the referral to [facility].    Discussed options with pt who stated that he wanted to discuss with his mother.  When pt spoke with this writer again, he stated that his mother wanted him to go to the hospital and he was worried that if he goes home, he  will fight with her again.  This writer asked the pt if he was still feeling suicidal. Pt denied feeling suicidal at this time.  He also denied ever having intent to commit suicide.      20:05 - Pt reported to nurse that he wishes to go home.  Pt at this time does not pose a risk to himself or others.  Discussed with pt some appropriate coping skills when he is angry with his mother.  Pt stated that he will listen to music instead as a way to de-escalate.  Pt does not meet criteria for screening/commitment and therefore is cleared by PES for d/c.    Liat Estrada MA  Crisis Intervention Worker  05/25/24 8:10 PM

## 2024-05-25 NOTE — ED NOTES
Received patient from previous shift. Patient currently awake, resting on bed. Respirations even and non-labored with no distress noted at this time. Continual observation via ED secured holding remains at this time.     Katherine Lira RN  05/24/24 2002

## 2024-05-25 NOTE — ED NOTES
Pt inquired about the time and what time breakfast was served. Brought the patient a diet ginger ale and crackers.     Beatrice Barnes  05/25/24 0357

## 2024-05-26 NOTE — ED NOTES
Patient requesting ginger ale and turkey sandwich, diet ginger ale supplied and turkey sandwich given.     Pt informed this RN that he wants to go home, Liat from crisis made aware.      Noemy Mcelroy RN  05/25/24 2005

## 2024-05-26 NOTE — ED NOTES
Patient reports having all of his belongings, showed out to the waiting room. Informed a lyft was ordered for him.     Noemy Mcelroy RN  05/25/24 6285

## 2024-05-26 NOTE — ED NOTES
Patient given belongings from locker 22, instructed to look through belongings to make sure he has everything he came in with. Informed a Lyft ride could be arranged for him to get home.     Noemy Mcelroy RN  05/25/24 2055

## 2024-05-29 ENCOUNTER — PATIENT OUTREACH (OUTPATIENT)
Dept: CASE MANAGEMENT | Facility: OTHER | Age: 19
End: 2024-05-29

## 2024-05-29 NOTE — PROGRESS NOTES
Outpatient Care Management Note:  Several Inbasket  ADT ER alerts received. Chart review completed.    Patient was evaluated at [facility] ED department on :  5/22/24 for complaints of testicular pain.    5/23/24 for complaints of mild depression.    5/24/24 for complaints of depression.  Patient was evaluated for SI / HI which was negative.  Patient did not qualify for IP  admission.    FUTURE APPOINTMENTS:  5/30/24 with General Surgery - I&D Perineal and groin follow up  5/31/24 with Weight Management for surgical Consult    Telephone outreach attempt made to assist with care coordination, review meds, and identify needs.  No answer at either patient's home number or mother's cell phone number.   Left general contact information with name and contact phone number on voice mail.

## 2024-05-30 PROBLEM — M79.89 NECROTIZING SOFT TISSUE INFECTION: Status: ACTIVE | Noted: 2024-05-30

## 2024-05-31 ENCOUNTER — TELEPHONE (OUTPATIENT)
Age: 19
End: 2024-05-31

## 2024-06-02 ENCOUNTER — HOSPITAL ENCOUNTER (EMERGENCY)
Facility: HOSPITAL | Age: 19
Discharge: HOME | End: 2024-06-02
Attending: EMERGENCY MEDICINE
Payer: COMMERCIAL

## 2024-06-02 VITALS
SYSTOLIC BLOOD PRESSURE: 153 MMHG | HEART RATE: 94 BPM | TEMPERATURE: 97.6 F | DIASTOLIC BLOOD PRESSURE: 89 MMHG | RESPIRATION RATE: 20 BRPM | OXYGEN SATURATION: 97 %

## 2024-06-02 DIAGNOSIS — Z51.89 VISIT FOR WOUND CARE: Primary | ICD-10-CM

## 2024-06-02 PROCEDURE — 99283 EMERGENCY DEPT VISIT LOW MDM: CPT

## 2024-06-02 PROCEDURE — 99284 EMERGENCY DEPT VISIT MOD MDM: CPT | Performed by: EMERGENCY MEDICINE

## 2024-06-02 RX ORDER — DOXYCYCLINE HYCLATE 100 MG/1
100 CAPSULE ORAL 2 TIMES DAILY
Qty: 20 CAPSULE | Refills: 0 | Status: SHIPPED | OUTPATIENT
Start: 2024-06-02 | End: 2024-06-12

## 2024-06-02 RX ORDER — DOXYCYCLINE HYCLATE 100 MG/1
100 CAPSULE ORAL ONCE
Status: COMPLETED | OUTPATIENT
Start: 2024-06-02 | End: 2024-06-02

## 2024-06-02 RX ADMIN — DOXYCYCLINE 100 MG: 100 CAPSULE ORAL at 11:46

## 2024-06-02 NOTE — ED PROVIDER NOTES
"History  Chief Complaint   Patient presents with    Testicle Pain     \"My infection is getting worse\"     Patient status post multiple incisions and drainage in the left groin area for Humberto's 3 weeks ago.  Patient has not followed up with his surgeon.  He had an appointment on Friday to remove the drains but he states he forgot.  Patient presents with pain and drainage in the area.  He is afraid the infection might be back.  No fever.        Prior to Admission Medications   Prescriptions Last Dose Informant Patient Reported? Taking?   Abilify Maintena 400 MG injection   Yes No   Sig: INJECT 2 ML INTRAMUSCUALRLY EVERY 4 WEEKS AS DIRECTED   Alcohol Swabs 70 % PADS   No No   Sig: May substitute brand based on insurance coverage. Check glucose TID.   Blood Glucose Monitoring Suppl (OneTouch Verio Reflect) w/Device KIT   No No   Sig: May substitute brand based on insurance coverage. Check glucose TID.   Blood Glucose Monitoring Suppl (OneTouch Verio) w/Device KIT   No No   Sig: Use 2 (two) times a day Ok to substitute with insurance covered brand   FLUoxetine (PROzac) 10 mg capsule   No No   Sig: Take 1 capsule (10 mg total) by mouth daily   Insulin Glargine Solostar (Lantus SoloStar) 100 UNIT/ML SOPN   No No   Sig: Inject 0.3 mL (30 Units total) under the skin daily at bedtime   Insulin Pen Needle (BD Pen Needle Shani 2nd Gen) 32G X 4 MM MISC   No No   Sig: For use with insulin pen. Pharmacy may dispense brand covered by insurance.   Lancets (onetouch ultrasoft) lancets   No No   Sig: Use as instructed   OneTouch Delica Lancets 33G MISC   No No   Sig: May substitute brand based on insurance coverage. Check glucose TID.   albuterol (2.5 mg/3 mL) 0.083 % nebulizer solution   No No   Sig: Take 3 mL (2.5 mg total) by nebulization every 6 (six) hours as needed for wheezing or shortness of breath   glucose blood (OneTouch Verio) test strip   No No   Sig: TEST BLOOD SUGAR TWICE A DAY   glucose blood (OneTouch Verio) test " strip   No No   Sig: May substitute brand based on insurance coverage. Check glucose TID.   insulin aspart (NovoLOG FlexPen) 100 UNIT/ML injection pen   No No   Sig: Inject 9 Units under the skin 3 (three) times a day with meals   metFORMIN (GLUCOPHAGE-XR) 500 mg 24 hr tablet   No No   Sig: Take 2 tablets (1,000 mg total) by mouth 2 (two) times a day with meals Start with 1 pill 500mg with breakfast x 1 week, then 1 pill 500mg with breakfast and 1 pill with dinner for 1 week , then 2 pills with breakfast (1000mg) and 1 pill with dinner (500mg) for 1 week, then 1000mg twice a day going forward   topiramate (TOPAMAX) 50 MG tablet   Yes No   Sig: Take 50 mg by mouth 2 (two) times a day      Facility-Administered Medications: None       Past Medical History:   Diagnosis Date    ADHD (attention deficit hyperactivity disorder) 05/11/2024    Diabetes mellitus ([facility])     5/2023    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        Past Surgical History:   Procedure Laterality Date    INCISION AND DRAINAGE OF WOUND Left 5/13/2024    Procedure: INCISION AND DRAINAGE (I&D) GROIN;  Surgeon: Tim Ch MD;  Location: [facility];  Service: General    INCISION AND DRAINAGE OF WOUND Left 5/15/2024    Procedure: INCISION AND DRAINAGE (I&D) GROIN;  Surgeon: Tim Ch MD;  Location: [facility];  Service: General    MT I&D VULVA/PERINEAL ABSCESS N/A 5/11/2024    Procedure: INCISION AND DRAINAGE (I&D) PERINEAL ABSCESS;  Surgeon: Tim Ch MD;  Location: [facility];  Service: General    TONSILECTOMY AND ADNOIDECTOMY      2020       Family History   Problem Relation Age of Onset    Hyperlipidemia Mother     Diabetes type II Mother     Obesity Mother     Obesity Father     Drug abuse Father     Diabetes type II Maternal Aunt     Lung disease Maternal Aunt     Rheum arthritis Maternal Aunt     Fibromyalgia Maternal Aunt      Atrial fibrillation Maternal Grandmother     Hyperlipidemia Maternal Grandfather     Diabetes type II Maternal Grandfather     Stroke Maternal Grandfather     Heart disease Maternal Grandfather     Stroke Paternal Grandfather      I have reviewed and agree with the history as documented.    E-Cigarette/Vaping    E-Cigarette Use Current Some Day User     Comments smoke cigg. when he has money      E-Cigarette/Vaping Substances    Nicotine No     THC No     CBD No     Flavoring Yes     Other No     Unknown No      Social History     Tobacco Use    Smoking status: Former     Types: Cigarettes     Passive exposure: Yes    Smokeless tobacco: Current   Vaping Use    Vaping status: Some Days    Substances: Flavoring   Substance Use Topics    Alcohol use: Yes     Alcohol/week: 2.0 standard drinks of alcohol     Types: 2 Shots of liquor per week    Drug use: Yes     Types: Marijuana       Review of Systems   Constitutional:  Negative for fever.   HENT:  Negative for congestion.    Eyes:  Negative for visual disturbance.   Respiratory:  Negative for cough.    Cardiovascular:  Negative for chest pain.   Gastrointestinal:  Negative for abdominal pain and vomiting.   Genitourinary:  Negative for difficulty urinating.   Musculoskeletal:  Negative for back pain.   Skin:  Positive for rash and wound.   Neurological:  Negative for weakness.   Hematological:  Does not bruise/bleed easily.   Psychiatric/Behavioral:  Negative for behavioral problems.    All other systems reviewed and are negative.      Physical Exam  Physical Exam  Vitals and nursing note reviewed.   Constitutional:       Appearance: He is obese.   HENT:      Head: Normocephalic.      Right Ear: External ear normal.      Left Ear: External ear normal.      Nose: Nose normal.      Mouth/Throat:      Mouth: Mucous membranes are moist.   Eyes:      Conjunctiva/sclera: Conjunctivae normal.   Cardiovascular:      Rate and Rhythm: Normal rate.      Pulses: Normal pulses.    Pulmonary:      Effort: Pulmonary effort is normal.   Abdominal:      Palpations: Abdomen is soft.   Genitourinary:     Comments: There are 4 open wounds that are actively draining with Penrose drains present.  There is mild erythema of the skin around the wounds and in the inguinal crease.  No abscess present  Musculoskeletal:         General: Normal range of motion.      Cervical back: Normal range of motion.   Skin:     General: Skin is warm.      Capillary Refill: Capillary refill takes less than 2 seconds.   Neurological:      General: No focal deficit present.      Mental Status: He is alert.   Psychiatric:         Mood and Affect: Mood normal.         Vital Signs  ED Triage Vitals   Temperature Pulse Respirations Blood Pressure SpO2   06/02/24 1134 06/02/24 1134 06/02/24 1134 06/02/24 1134 06/02/24 1141   97.6 °F (36.4 °C) 93 20 153/89 98 %      Temp Source Heart Rate Source Patient Position - Orthostatic VS BP Location FiO2 (%)   06/02/24 1134 06/02/24 1134 -- 06/02/24 1134 --   Oral Monitor  Right arm       Pain Score       --                  Vitals:    06/02/24 1134   BP: 153/89   Pulse: 93         Visual Acuity      ED Medications  Medications   doxycycline hyclate (VIBRAMYCIN) capsule 100 mg (100 mg Oral Given 6/2/24 1146)       Diagnostic Studies  Results Reviewed       None                   No orders to display              Procedures  Procedures         ED Course                                             Medical Decision Making  Penrose drain x 4 were removed from the wound as directed by his surgeon    Risk  Prescription drug management.             Disposition  Final diagnoses:   Visit for wound care     Time reflects when diagnosis was documented in both MDM as applicable and the Disposition within this note       Time User Action Codes Description Comment    6/2/2024 11:43 AM Mino Bhatt Add [Z51.89] Visit for wound care           ED Disposition       ED Disposition   Discharge     Condition   Stable    Date/Time   Sun Jun 2, 2024 11:43 AM    Comment   Rikmadison Marin discharge to home/self care.                   Follow-up Information       Follow up With Specialties Details Why Contact Info    Tim Ch MD General Surgery Go in 1 day Tomorrow [address] [facility], [address] [place] [address] [id number] [phone]              Patient's Medications   Discharge Prescriptions    DOXYCYCLINE HYCLATE (VIBRAMYCIN) 100 MG CAPSULE    Take 1 capsule (100 mg total) by mouth 2 (two) times a day for 10 days       Start Date: 6/2/2024  End Date: 6/12/2024       Order Dose: 100 mg       Quantity: 20 capsule    Refills: 0       No discharge procedures on file.    PDMP Review       None            ED Provider  Electronically Signed by             Mino Bhatt MD  06/02/24 2335

## 2024-06-10 ENCOUNTER — HOSPITAL ENCOUNTER (EMERGENCY)
Facility: HOSPITAL | Age: 19
Discharge: HOME | End: 2024-06-10
Attending: EMERGENCY MEDICINE
Payer: COMMERCIAL

## 2024-06-10 VITALS
DIASTOLIC BLOOD PRESSURE: 83 MMHG | WEIGHT: 315 LBS | BODY MASS INDEX: 48.61 KG/M2 | TEMPERATURE: 98.1 F | SYSTOLIC BLOOD PRESSURE: 144 MMHG | OXYGEN SATURATION: 95 % | HEART RATE: 104 BPM | RESPIRATION RATE: 18 BRPM

## 2024-06-10 DIAGNOSIS — I83.90 VARICOSE VEIN OF LEG: Primary | ICD-10-CM

## 2024-06-10 PROCEDURE — 99284 EMERGENCY DEPT VISIT MOD MDM: CPT

## 2024-06-10 PROCEDURE — 99284 EMERGENCY DEPT VISIT MOD MDM: CPT | Performed by: EMERGENCY MEDICINE

## 2024-06-10 NOTE — ED NOTES
Pt seen, assessed and d/c by provider. Pt appeared to be in no acute distress upon discharge. Pt able to ambulate well without assistance upon exiting.      Roxy Isbell RN  06/10/24 [id number]

## 2024-06-10 NOTE — ED PROVIDER NOTES
"History  Chief Complaint   Patient presents with    Medical Problem     Patient states he called the ambulance because of the varicose veins in his left leg are getting larger.      18-year-old male with past history of morbid obesity, ADHD, suicidal ideations, viral meningitis, diabetes, sleep apnea, Humberto's gangrene, presents to the ED for evaluation of varicose veins.  Patient states that he has always had varicose veins to bilateral lower extremities.  His neighbor told him today that they appear to be \"popping out\" more.  Patient denies any pain or discomfort to the lower extremities.  Patient does not notice anything different about his legs.  Patient came to the ED via ambulance as his neighbors told him to come.      Medical Problem  Associated symptoms: no abdominal pain, no chest pain, no cough, no ear pain, no fever, no rash, no shortness of breath, no sore throat and no vomiting        Prior to Admission Medications   Prescriptions Last Dose Informant Patient Reported? Taking?   Abilify Maintena 400 MG injection   Yes No   Sig: INJECT 2 ML INTRAMUSCUALRLY EVERY 4 WEEKS AS DIRECTED   Alcohol Swabs 70 % PADS   No No   Sig: May substitute brand based on insurance coverage. Check glucose TID.   Blood Glucose Monitoring Suppl (OneTouch Verio Reflect) w/Device KIT   No No   Sig: May substitute brand based on insurance coverage. Check glucose TID.   Blood Glucose Monitoring Suppl (OneTouch Verio) w/Device KIT   No No   Sig: Use 2 (two) times a day Ok to substitute with insurance covered brand   FLUoxetine (PROzac) 10 mg capsule   No No   Sig: Take 1 capsule (10 mg total) by mouth daily   Insulin Glargine Solostar (Lantus SoloStar) 100 UNIT/ML SOPN   No No   Sig: Inject 0.3 mL (30 Units total) under the skin daily at bedtime   Insulin Pen Needle (BD Pen Needle Shani 2nd Gen) 32G X 4 MM MISC   No No   Sig: For use with insulin pen. Pharmacy may dispense brand covered by insurance.   Lancets (onetouch ultrasoft) " lancets   No No   Sig: Use as instructed   OneTouch Delica Lancets 33G MISC   No No   Sig: May substitute brand based on insurance coverage. Check glucose TID.   albuterol (2.5 mg/3 mL) 0.083 % nebulizer solution   No No   Sig: Take 3 mL (2.5 mg total) by nebulization every 6 (six) hours as needed for wheezing or shortness of breath   doxycycline hyclate (VIBRAMYCIN) 100 mg capsule   No No   Sig: Take 1 capsule (100 mg total) by mouth 2 (two) times a day for 10 days   glucose blood (OneTouch Verio) test strip   No No   Sig: TEST BLOOD SUGAR TWICE A DAY   glucose blood (OneTouch Verio) test strip   No No   Sig: May substitute brand based on insurance coverage. Check glucose TID.   insulin aspart (NovoLOG FlexPen) 100 UNIT/ML injection pen   No No   Sig: Inject 9 Units under the skin 3 (three) times a day with meals   metFORMIN (GLUCOPHAGE-XR) 500 mg 24 hr tablet   No No   Sig: Take 2 tablets (1,000 mg total) by mouth 2 (two) times a day with meals Start with 1 pill 500mg with breakfast x 1 week, then 1 pill 500mg with breakfast and 1 pill with dinner for 1 week , then 2 pills with breakfast (1000mg) and 1 pill with dinner (500mg) for 1 week, then 1000mg twice a day going forward   topiramate (TOPAMAX) 50 MG tablet   Yes No   Sig: Take 50 mg by mouth 2 (two) times a day      Facility-Administered Medications: None       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        Past Surgical History:   Procedure Laterality Date    INCISION AND DRAINAGE OF WOUND Left 5/13/2024    Procedure: INCISION AND DRAINAGE (I&D) GROIN;  Surgeon: Tim Ch MD;  Location: [facility];  Service: General    INCISION AND DRAINAGE OF WOUND Left 5/15/2024    Procedure: INCISION AND DRAINAGE  (I&D) GROIN;  Surgeon: Tim Ch MD;  Location: [facility];  Service: General    AL I&D VULVA/PERINEAL ABSCESS N/A 5/11/2024    Procedure: INCISION AND DRAINAGE (I&D) PERINEAL ABSCESS;  Surgeon: Tim Ch MD;  Location: [facility];  Service: General    TONSILECTOMY AND ADNOIDECTOMY      2020       Family History   Problem Relation Age of Onset    Hyperlipidemia Mother     Diabetes type II Mother     Obesity Mother     Obesity Father     Drug abuse Father     Diabetes type II Maternal Aunt     Lung disease Maternal Aunt     Rheum arthritis Maternal Aunt     Fibromyalgia Maternal Aunt     Atrial fibrillation Maternal Grandmother     Hyperlipidemia Maternal Grandfather     Diabetes type II Maternal Grandfather     Stroke Maternal Grandfather     Heart disease Maternal Grandfather     Stroke Paternal Grandfather      I have reviewed and agree with the history as documented.    E-Cigarette/Vaping    E-Cigarette Use Current Some Day User     Comments smoke cigg. when he has money      E-Cigarette/Vaping Substances    Nicotine No     THC No     CBD No     Flavoring Yes     Other No     Unknown No      Social History     Tobacco Use    Smoking status: Former     Types: Cigarettes     Passive exposure: Yes    Smokeless tobacco: Current   Vaping Use    Vaping status: Some Days    Substances: Flavoring   Substance Use Topics    Alcohol use: Yes     Alcohol/week: 2.0 standard drinks of alcohol     Types: 2 Shots of liquor per week    Drug use: Yes     Types: Marijuana       Review of Systems   Constitutional:  Negative for chills and fever.   HENT:  Negative for ear pain and sore throat.    Eyes:  Negative for pain and visual disturbance.   Respiratory:  Negative for cough and shortness of breath.    Cardiovascular:  Negative for chest pain and palpitations.   Gastrointestinal:  Negative for abdominal pain and vomiting.   Genitourinary:  Negative for dysuria and hematuria.   Musculoskeletal:  Negative for  arthralgias and back pain.   Skin:  Negative for color change and rash.   Neurological:  Negative for seizures and syncope.   All other systems reviewed and are negative.      Physical Exam  Physical Exam  Vitals and nursing note reviewed.   Constitutional:       General: He is not in acute distress.     Appearance: He is well-developed.   HENT:      Head: Normocephalic and atraumatic.   Eyes:      Conjunctiva/sclera: Conjunctivae normal.   Cardiovascular:      Rate and Rhythm: Normal rate and regular rhythm.      Heart sounds: No murmur heard.  Pulmonary:      Effort: Pulmonary effort is normal. No respiratory distress.      Breath sounds: Normal breath sounds.   Abdominal:      Palpations: Abdomen is soft.      Tenderness: There is no abdominal tenderness.   Musculoskeletal:         General: No swelling.      Cervical back: Neck supple.      Comments: Superficial venous varicosities noted to bilateral legs that are not tender to palpation.  No calf swelling or tenderness noted bilaterally.  Patient is in no distress.   Skin:     General: Skin is warm and dry.      Capillary Refill: Capillary refill takes less than 2 seconds.   Neurological:      Mental Status: He is alert.   Psychiatric:         Mood and Affect: Mood normal.         Vital Signs  ED Triage Vitals [06/10/24 1824]   Temperature Pulse Respirations Blood Pressure SpO2   98.1 °F (36.7 °C) 104 18 144/83 95 %      Temp Source Heart Rate Source Patient Position - Orthostatic VS BP Location FiO2 (%)   Tympanic Monitor Sitting Left arm --      Pain Score       No Pain           Vitals:    06/10/24 1824   BP: 144/83   Pulse: 104   Patient Position - Orthostatic VS: Sitting         Visual Acuity      ED Medications  Medications - No data to display    Diagnostic Studies  Results Reviewed       None                   VAS ARTERIAL DUPLEX- LOWER LIMB BILATERAL    (Results Pending)              Procedures  Procedures         ED Course         DIAZ      Flowsheet  "Row Most Recent Value   DIAZ Initial Screen: During the past 12 months, did you:    1. Drink any alcohol (more than a few sips)?  Yes Filed at: 06/10/2024 1826   2. Smoke any marijuana or hashish Yes Filed at: 06/10/2024 1826   3. Use anything else to get high? (\"anything else\" includes illegal drugs, over the counter and prescription drugs, and things that you sniff or 'patel')? No Filed at: 06/10/2024 1826                                            Medical Decision Making  Patient given venous duplex study of bilateral lower extremities to be performed in an outpatient basis as he has concerns for varicose veins.  Patient will otherwise follow-up with his PCP.  Close return instructions given to return to the ER for any worsening symptoms.  Patient agrees with discharge plan.  Patient well appearing at time of discharge.    Please Note: Fluency Direct voice recognition software may have been used in the creation of this document. Wrong words or sound a like substitutions may have occurred due to the inherent limitations of the voice software.                  Disposition  Final diagnoses:   Varicose vein of leg     Time reflects when diagnosis was documented in both MDM as applicable and the Disposition within this note       Time User Action Codes Description Comment    6/10/2024  7:09 PM Sharon Law Add [I83.90] Varicose vein of leg           ED Disposition       ED Disposition   Discharge    Condition   Stable    Date/Time   Mon [place] 10, 2024 1907    Comment   Rik Marin discharge to home/self care.                   Follow-up Information       Follow up With Specialties Details Why Contact Info    Gustavo Fajardo MD  In 3 days  [address] [address]  [phone]              Patient's Medications   Discharge Prescriptions    No medications on file       Outpatient Discharge Orders   VAS ARTERIAL DUPLEX- LOWER LIMB BILATERAL   Standing Status: Future Standing Exp. Date: " 06/10/28       PDMP Review       None            ED Provider  Electronically Signed by             Sharon Law DO  06/10/24 6191

## 2024-06-11 ENCOUNTER — APPOINTMENT (EMERGENCY)
Dept: RADIOLOGY | Facility: HOSPITAL | Age: 19
End: 2024-06-11
Payer: COMMERCIAL

## 2024-06-11 ENCOUNTER — HOSPITAL ENCOUNTER (EMERGENCY)
Facility: HOSPITAL | Age: 19
Discharge: HOME | End: 2024-06-11
Attending: EMERGENCY MEDICINE | Admitting: EMERGENCY MEDICINE
Payer: COMMERCIAL

## 2024-06-11 VITALS
HEART RATE: 104 BPM | OXYGEN SATURATION: 96 % | DIASTOLIC BLOOD PRESSURE: 88 MMHG | RESPIRATION RATE: 20 BRPM | TEMPERATURE: 97.2 F | WEIGHT: 315 LBS | SYSTOLIC BLOOD PRESSURE: 145 MMHG | BODY MASS INDEX: 47.63 KG/M2

## 2024-06-11 DIAGNOSIS — I83.893 VARICOSE VEINS OF BOTH LEGS WITH EDEMA: Primary | ICD-10-CM

## 2024-06-11 LAB
ALBUMIN SERPL BCP-MCNC: 4.2 G/DL (ref 3.5–5)
ALP SERPL-CCNC: 91 U/L (ref 34–104)
ALT SERPL W P-5'-P-CCNC: 33 U/L (ref 7–52)
ANION GAP SERPL CALCULATED.3IONS-SCNC: 8 MMOL/L (ref 4–13)
AST SERPL W P-5'-P-CCNC: 36 U/L (ref 13–39)
BASOPHILS # BLD AUTO: 0.03 THOUSANDS/ÂΜL (ref 0–0.1)
BASOPHILS NFR BLD AUTO: 0 % (ref 0–1)
BILIRUB SERPL-MCNC: 0.48 MG/DL (ref 0.2–1)
BUN SERPL-MCNC: 13 MG/DL (ref 5–25)
CALCIUM SERPL-MCNC: 9.4 MG/DL (ref 8.4–10.2)
CHLORIDE SERPL-SCNC: 102 MMOL/L (ref 96–108)
CO2 SERPL-SCNC: 24 MMOL/L (ref 21–32)
CREAT SERPL-MCNC: 0.75 MG/DL (ref 0.6–1.3)
EOSINOPHIL # BLD AUTO: 0.35 THOUSAND/ÂΜL (ref 0–0.61)
EOSINOPHIL NFR BLD AUTO: 4 % (ref 0–6)
ERYTHROCYTE [DISTWIDTH] IN BLOOD BY AUTOMATED COUNT: 15.8 % (ref 11.6–15.1)
GFR SERPL CREATININE-BSD FRML MDRD: 132 ML/MIN/1.73SQ M
GLUCOSE SERPL-MCNC: 171 MG/DL (ref 65–140)
HCT VFR BLD AUTO: 37 % (ref 36.5–49.3)
HGB BLD-MCNC: 11.8 G/DL (ref 12–17)
IMM GRANULOCYTES # BLD AUTO: 0.05 THOUSAND/UL (ref 0–0.2)
IMM GRANULOCYTES NFR BLD AUTO: 1 % (ref 0–2)
LYMPHOCYTES # BLD AUTO: 2.93 THOUSANDS/ÂΜL (ref 0.6–4.47)
LYMPHOCYTES NFR BLD AUTO: 31 % (ref 14–44)
MCH RBC QN AUTO: 27.6 PG (ref 26.8–34.3)
MCHC RBC AUTO-ENTMCNC: 31.9 G/DL (ref 31.4–37.4)
MCV RBC AUTO: 86 FL (ref 82–98)
MONOCYTES # BLD AUTO: 0.6 THOUSAND/ÂΜL (ref 0.17–1.22)
MONOCYTES NFR BLD AUTO: 6 % (ref 4–12)
NEUTROPHILS # BLD AUTO: 5.55 THOUSANDS/ÂΜL (ref 1.85–7.62)
NEUTS SEG NFR BLD AUTO: 58 % (ref 43–75)
NRBC BLD AUTO-RTO: 0 /100 WBCS
PLATELET # BLD AUTO: 311 THOUSANDS/UL (ref 149–390)
PMV BLD AUTO: 10.6 FL (ref 8.9–12.7)
POTASSIUM SERPL-SCNC: 4.2 MMOL/L (ref 3.5–5.3)
PROT SERPL-MCNC: 8.9 G/DL (ref 6.4–8.4)
RBC # BLD AUTO: 4.28 MILLION/UL (ref 3.88–5.62)
SODIUM SERPL-SCNC: 134 MMOL/L (ref 135–147)
WBC # BLD AUTO: 9.51 THOUSAND/UL (ref 4.31–10.16)

## 2024-06-11 PROCEDURE — 80053 COMPREHEN METABOLIC PANEL: CPT | Performed by: EMERGENCY MEDICINE

## 2024-06-11 PROCEDURE — 36415 COLL VENOUS BLD VENIPUNCTURE: CPT | Performed by: EMERGENCY MEDICINE

## 2024-06-11 PROCEDURE — 93971 EXTREMITY STUDY: CPT | Performed by: SURGERY

## 2024-06-11 PROCEDURE — 85025 COMPLETE CBC W/AUTO DIFF WBC: CPT | Performed by: EMERGENCY MEDICINE

## 2024-06-11 PROCEDURE — 99284 EMERGENCY DEPT VISIT MOD MDM: CPT | Performed by: EMERGENCY MEDICINE

## 2024-06-11 PROCEDURE — 99284 EMERGENCY DEPT VISIT MOD MDM: CPT

## 2024-06-11 PROCEDURE — 93971 EXTREMITY STUDY: CPT

## 2024-06-11 NOTE — ED PROVIDER NOTES
History  Chief Complaint   Patient presents with    Leg Swelling     Left leg swelling for months, worse for 3 days.      19-year-old male presents to the ED with enlarging varicose veins over the last 2 to 3 days.  Family member states that he was seen in the ED last night sent home with a prescription for arterial study today.  He showed up at the vascular lab today for arterial study and they said they do not do those stat without vascular surgery input.  So patient did return back to the ED for further information and workup.        Prior to Admission Medications   Prescriptions Last Dose Informant Patient Reported? Taking?   Abilify Maintena 400 MG injection   Yes No   Sig: INJECT 2 ML INTRAMUSCUALRLY EVERY 4 WEEKS AS DIRECTED   Alcohol Swabs 70 % PADS   No No   Sig: May substitute brand based on insurance coverage. Check glucose TID.   Blood Glucose Monitoring Suppl (OneTouch Verio Reflect) w/Device KIT   No No   Sig: May substitute brand based on insurance coverage. Check glucose TID.   Blood Glucose Monitoring Suppl (OneTouch Verio) w/Device KIT   No No   Sig: Use 2 (two) times a day Ok to substitute with insurance covered brand   FLUoxetine (PROzac) 10 mg capsule   No No   Sig: Take 1 capsule (10 mg total) by mouth daily   Insulin Glargine Solostar (Lantus SoloStar) 100 UNIT/ML SOPN   No No   Sig: Inject 0.3 mL (30 Units total) under the skin daily at bedtime   Insulin Pen Needle (BD Pen Needle Shani 2nd Gen) 32G X 4 MM MISC   No No   Sig: For use with insulin pen. Pharmacy may dispense brand covered by insurance.   Lancets (onetouch ultrasoft) lancets   No No   Sig: Use as instructed   OneTouch Delica Lancets 33G MISC   No No   Sig: May substitute brand based on insurance coverage. Check glucose TID.   albuterol (2.5 mg/3 mL) 0.083 % nebulizer solution   No No   Sig: Take 3 mL (2.5 mg total) by nebulization every 6 (six) hours as needed for wheezing or shortness of breath   doxycycline hyclate (VIBRAMYCIN)  100 mg capsule   No No   Sig: Take 1 capsule (100 mg total) by mouth 2 (two) times a day for 10 days   glucose blood (OneTouch Verio) test strip   No No   Sig: TEST BLOOD SUGAR TWICE A DAY   glucose blood (OneTouch Verio) test strip   No No   Sig: May substitute brand based on insurance coverage. Check glucose TID.   insulin aspart (NovoLOG FlexPen) 100 UNIT/ML injection pen   No No   Sig: Inject 9 Units under the skin 3 (three) times a day with meals   metFORMIN (GLUCOPHAGE-XR) 500 mg 24 hr tablet   No No   Sig: Take 2 tablets (1,000 mg total) by mouth 2 (two) times a day with meals Start with 1 pill 500mg with breakfast x 1 week, then 1 pill 500mg with breakfast and 1 pill with dinner for 1 week , then 2 pills with breakfast (1000mg) and 1 pill with dinner (500mg) for 1 week, then 1000mg twice a day going forward   topiramate (TOPAMAX) 50 MG tablet   Yes No   Sig: Take 50 mg by mouth 2 (two) times a day      Facility-Administered Medications: None       Past Medical History:   Diagnosis Date    ADHD (attention deficit hyperactivity disorder) 05/11/2024    Diabetes mellitus ([facility])     5/2023    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        Past Surgical History:   Procedure Laterality Date    INCISION AND DRAINAGE OF WOUND Left 5/13/2024    Procedure: INCISION AND DRAINAGE (I&D) GROIN;  Surgeon: Tim Ch MD;  Location: [facility];  Service: General    INCISION AND DRAINAGE OF WOUND Left 5/15/2024    Procedure: INCISION AND DRAINAGE (I&D) GROIN;  Surgeon: Tim Ch MD;  Location: [facility];  Service: General    IL I&D VULVA/PERINEAL ABSCESS N/A 5/11/2024    Procedure: INCISION AND DRAINAGE (I&D) PERINEAL ABSCESS;  Surgeon: Tim Ch MD;  Location: [facility];  Service: General    TONSILECTOMY AND ADNOIDECTOMY      2020       Family History   Problem Relation Age of Onset     Hyperlipidemia Mother     Diabetes type II Mother     Obesity Mother     Obesity Father     Drug abuse Father     Diabetes type II Maternal Aunt     Lung disease Maternal Aunt     Rheum arthritis Maternal Aunt     Fibromyalgia Maternal Aunt     Atrial fibrillation Maternal Grandmother     Hyperlipidemia Maternal Grandfather     Diabetes type II Maternal Grandfather     Stroke Maternal Grandfather     Heart disease Maternal Grandfather     Stroke Paternal Grandfather      I have reviewed and agree with the history as documented.    E-Cigarette/Vaping    E-Cigarette Use Current Some Day User     Comments smoke cigg. when he has money      E-Cigarette/Vaping Substances    Nicotine No     THC No     CBD No     Flavoring Yes     Other No     Unknown No      Social History     Tobacco Use    Smoking status: Former     Types: Cigarettes     Passive exposure: Yes    Smokeless tobacco: Current   Vaping Use    Vaping status: Some Days    Substances: Flavoring   Substance Use Topics    Alcohol use: Yes     Alcohol/week: 2.0 standard drinks of alcohol     Types: 2 Shots of liquor per week    Drug use: Yes     Types: Marijuana       Review of Systems   Constitutional:  Negative for activity change, chills, diaphoresis and fever.   HENT:  Negative for congestion, ear pain, nosebleeds, sore throat, trouble swallowing and voice change.    Eyes:  Negative for pain, discharge and redness.   Respiratory:  Negative for apnea, cough, choking, shortness of breath, wheezing and stridor.    Cardiovascular:  Positive for leg swelling. Negative for chest pain and palpitations.   Gastrointestinal:  Negative for abdominal distention, abdominal pain, constipation, diarrhea, nausea and vomiting.   Endocrine: Negative for polydipsia.   Genitourinary:  Negative for difficulty urinating, dysuria, flank pain, frequency, hematuria and urgency.   Musculoskeletal:  Negative for back pain, gait problem, joint swelling, myalgias, neck pain and neck  stiffness.   Skin:  Negative for pallor and rash.   Neurological:  Negative for dizziness, tremors, syncope, speech difficulty, weakness, numbness and headaches.   Hematological:  Negative for adenopathy.   Psychiatric/Behavioral:  Negative for confusion, hallucinations, self-injury and suicidal ideas. The patient is not nervous/anxious.        Physical Exam  Physical Exam  Vitals and nursing note reviewed.   Constitutional:       General: He is not in acute distress.     Appearance: He is well-developed. He is not diaphoretic.   HENT:      Head: Normocephalic and atraumatic.      Right Ear: External ear normal.      Left Ear: External ear normal.      Nose: Nose normal.   Eyes:      Conjunctiva/sclera: Conjunctivae normal.      Pupils: Pupils are equal, round, and reactive to light.   Cardiovascular:      Rate and Rhythm: Normal rate and regular rhythm.      Heart sounds: Normal heart sounds.   Pulmonary:      Effort: Pulmonary effort is normal.      Breath sounds: Normal breath sounds.   Abdominal:      General: Bowel sounds are normal.      Palpations: Abdomen is soft.      Tenderness: There is no abdominal tenderness.      Comments: Diffuse tenderness   Musculoskeletal:         General: Normal range of motion.      Cervical back: Normal range of motion and neck supple.   Skin:     General: Skin is warm and dry.   Neurological:      Mental Status: He is alert and oriented to person, place, and time.      Deep Tendon Reflexes: Reflexes are normal and symmetric.   Psychiatric:         Behavior: Behavior is cooperative.         Vital Signs  ED Triage Vitals [06/11/24 1357]   Temperature Pulse Respirations Blood Pressure SpO2   (!) 97.2 °F (36.2 °C) 104 20 145/88 96 %      Temp src Heart Rate Source Patient Position - Orthostatic VS BP Location FiO2 (%)   -- -- -- -- --      Pain Score       3           Vitals:    06/11/24 1357   BP: 145/88   Pulse: 104         Visual Acuity      ED Medications  Medications - No data  to display    Diagnostic Studies  Results Reviewed       Procedure Component Value Units Date/Time    Comprehensive metabolic panel [[id number]]  (Abnormal) Collected: 06/11/24 1429    Lab Status: Final result Specimen: Blood from Arm, Right Updated: 06/11/24 1515     Sodium 134 mmol/L      Potassium 4.2 mmol/L      Chloride 102 mmol/L      CO2 24 mmol/L      ANION GAP 8 mmol/L      BUN 13 mg/dL      Creatinine 0.75 mg/dL      Glucose 171 mg/dL      Calcium 9.4 mg/dL      AST 36 U/L      ALT 33 U/L      Alkaline Phosphatase 91 U/L      Total Protein 8.9 g/dL      Albumin 4.2 g/dL      Total Bilirubin 0.48 mg/dL      eGFR 132 ml/min/1.73sq m     Narrative:      National Kidney Disease Foundation guidelines for Chronic Kidney Disease (CKD):     Stage 1 with normal or high GFR (GFR > 90 mL/min/1.73 square meters)    Stage 2 Mild CKD (GFR = 60-89 mL/min/1.73 square meters)    Stage 3A Moderate CKD (GFR = 45-59 mL/min/1.73 square meters)    Stage 3B Moderate CKD (GFR = 30-44 mL/min/1.73 square meters)    Stage 4 Severe CKD (GFR = 15-29 mL/min/1.73 square meters)    Stage 5 End Stage CKD (GFR <15 mL/min/1.73 square meters)  Note: GFR calculation is accurate only with a steady state creatinine    CBC and differential [[id number]]  (Abnormal) Collected: 06/11/24 1429    Lab Status: Final result Specimen: Blood from Arm, Right Updated: 06/11/24 1434     WBC 9.51 Thousand/uL      RBC 4.28 Million/uL      Hemoglobin 11.8 g/dL      Hematocrit 37.0 %      MCV 86 fL      MCH 27.6 pg      MCHC 31.9 g/dL      RDW 15.8 %      MPV 10.6 fL      Platelets 311 Thousands/uL      nRBC 0 /100 WBCs      Segmented % 58 %      Immature Grans % 1 %      Lymphocytes % 31 %      Monocytes % 6 %      Eosinophils Relative 4 %      Basophils Relative 0 %      Absolute Neutrophils 5.55 Thousands/µL      Absolute Immature Grans 0.05 Thousand/uL      Absolute Lymphocytes 2.93 Thousands/µL      Absolute Monocytes 0.60 Thousand/µL      Eosinophils  "Absolute 0.35 Thousand/µL      Basophils Absolute 0.03 Thousands/µL                    VAS lower limb venous duplex study, unilateral/limited   Final Result by Ruben Mcgee DO (06/11 1665)                 Procedures  Procedures         ED Course         CRAFFBAY      Flowsheet Row Most Recent Value   DIAZ Initial Screen: During the past 12 months, did you:    1. Drink any alcohol (more than a few sips)?  No Filed at: 06/11/2024 0753   2. Smoke any marijuana or hashish No Filed at: 06/11/2024 6383   3. Use anything else to get high? (\"anything else\" includes illegal drugs, over the counter and prescription drugs, and things that you sniff or 'patel')? No Filed at: 06/11/2024 7345                                            Medical Decision Making  Amount and/or Complexity of Data Reviewed  Labs: ordered.             Disposition  Final diagnoses:   Varicose veins of both legs with edema     Time reflects when diagnosis was documented in both MDM as applicable and the Disposition within this note       Time User Action Codes Description Comment    6/11/2024  4:18 PM Mg Terrell Add [I83.893] Varicose veins of both legs with edema           ED Disposition       ED Disposition   Discharge    Condition   Stable    Date/Time   Tue Jun 11, 2024 1617    Comment   Rik Marin discharge to home/self care.                   Follow-up Information       Follow up With Specialties Details Why Contact Info    [facility]  Schedule an appointment as soon as possible for a visit  As needed [address] [address]  [phone]            Discharge Medication List as of 6/11/2024  4:20 PM        CONTINUE these medications which have NOT CHANGED    Details   Abilify Maintena 400 MG injection INJECT 2 ML INTRAMUSCUALRLY EVERY 4 WEEKS AS DIRECTED, Historical Med      albuterol (2.5 mg/3 mL) 0.083 % nebulizer solution Take 3 mL (2.5 mg total) by nebulization every 6 " (six) hours as needed for wheezing or shortness of breath, Starting Wed 5/8/2024, Normal      Alcohol Swabs 70 % PADS May substitute brand based on insurance coverage. Check glucose TID., Normal      !! Blood Glucose Monitoring Suppl (OneTouch Verio Reflect) w/Device KIT May substitute brand based on insurance coverage. Check glucose TID., Normal      !! Blood Glucose Monitoring Suppl (OneTouch Verio) w/Device KIT Use 2 (two) times a day Ok to substitute with insurance covered brand, Starting Fri 10/27/2023, Normal      doxycycline hyclate (VIBRAMYCIN) 100 mg capsule Take 1 capsule (100 mg total) by mouth 2 (two) times a day for 10 days, Starting Sun 6/2/2024, Until Wed 6/12/2024, Normal      FLUoxetine (PROzac) 10 mg capsule Take 1 capsule (10 mg total) by mouth daily, Starting Wed 5/8/2024, Normal      !! glucose blood (OneTouch Verio) test strip TEST BLOOD SUGAR TWICE A DAY, Normal      !! glucose blood (OneTouch Verio) test strip May substitute brand based on insurance coverage. Check glucose TID., Normal      insulin aspart (NovoLOG FlexPen) 100 UNIT/ML injection pen Inject 9 Units under the skin 3 (three) times a day with meals, Starting Wed 5/8/2024, Normal      Insulin Glargine Solostar (Lantus SoloStar) 100 UNIT/ML SOPN Inject 0.3 mL (30 Units total) under the skin daily at bedtime, Starting Tue 5/7/2024, Normal      Insulin Pen Needle (BD Pen Needle Shani 2nd Gen) 32G X 4 MM MISC For use with insulin pen. Pharmacy may dispense brand covered by insurance., Normal      !! Lancets (onetouch ultrasoft) lancets Use as instructed, Normal      metFORMIN (GLUCOPHAGE-XR) 500 mg 24 hr tablet Take 2 tablets (1,000 mg total) by mouth 2 (two) times a day with meals Start with 1 pill 500mg with breakfast x 1 week, then 1 pill 500mg with breakfast and 1 pill with dinner for 1 week , then 2 pills with breakfast (1000mg) and 1 pill with dinner (500 mg) for 1 week, then 1000mg twice a day going forward, Starting Tue  5/7/2024, Normal      !! OneTouch Delica Lancets 33G MISC May substitute brand based on insurance coverage. Check glucose TID., Normal      topiramate (TOPAMAX) 50 MG tablet Take 50 mg by mouth 2 (two) times a day, Starting u 3/10/2022, Historical Med       !! - Potential duplicate medications found. Please discuss with provider.          No discharge procedures on file.    PDMP Review       None            ED Provider  Electronically Signed by             Mg Terrell DO  06/11/24 2054

## 2024-06-11 NOTE — Clinical Note
Rik Marin was seen and treated in our emergency department on 6/11/2024.                Diagnosis:     Rik  may return to work on return date.    He may return on this date: 06/14/2024         If you have any questions or concerns, please don't hesitate to call.      Mg Terrell, DO    ______________________________           _______________          _______________  Hospital Representative                              Date                                Time

## 2024-06-13 ENCOUNTER — TELEPHONE (OUTPATIENT)
Age: 19
End: 2024-06-13

## 2024-06-13 ENCOUNTER — PATIENT OUTREACH (OUTPATIENT)
Dept: CASE MANAGEMENT | Facility: OTHER | Age: 19
End: 2024-06-13

## 2024-06-13 NOTE — PROGRESS NOTES
"Outpatient Care Management Note:  Two Inbasket  ADT ER alerts received over the last 2 days.  Chart review completed.  Patient was evaluated at [facility] ED department on 06/10/24 and 6/11/24 for complaints of varicose veins with swelling.    Per AVS patient was instructed to [facility] with [facility] for [facility].     Telephone outreach attempt made to assist with care coordination, review meds, and identify needs.  Spoke with patient.     Is this a good time for you to talk?   yes    We want to make sure you continue to improve now that you are home. Do you have your discharge instructions/AVS?   \"yes, and I read it\"  Do you have your medication list?     yes  Reviewed medication list with patient?     Patient declined   Do you have your medications?     yes  Do you have any questions about your medications?     No  Are you experiencing any new or worsening symptoms?     \"No.  I'm doing OK today\"  Did you make your follow-up appointments?     No  Do you need assistance scheduling follow-up appointments?   Yes.  What type of assistance do you need?  \"I tried to call the vascular center all day yesterday but was not able to talk to anyone to get an appointment\".  Do you have transportation for appointments & to  medications?    \"yes, my mother usually takes me to the appointments\".  Educated on appropriate ED use?     Yes.      Phone call made to  [facility].  Case reviewed with Kee.  Kee will attempt contacting patient to schedule a new patient appointment.    Will continue to monitor.   "

## 2024-06-13 NOTE — TELEPHONE ENCOUNTER
Lenora an [facility] care coordinator called as pt was having trouble reaching our office in ref to ED recommendations to f/u with [facility] for VV. After speaking with Lenora I called pt for scheduling but was unable to get in touch, I LVM for pt to call back to schedule appt in [place] office

## 2024-06-14 ENCOUNTER — HOSPITAL ENCOUNTER (EMERGENCY)
Facility: HOSPITAL | Age: 19
Discharge: HOME | End: 2024-06-14
Attending: EMERGENCY MEDICINE
Payer: COMMERCIAL

## 2024-06-14 VITALS
RESPIRATION RATE: 20 BRPM | TEMPERATURE: 98.1 F | OXYGEN SATURATION: 90 % | SYSTOLIC BLOOD PRESSURE: 118 MMHG | HEART RATE: 110 BPM | DIASTOLIC BLOOD PRESSURE: 56 MMHG

## 2024-06-14 DIAGNOSIS — L03.314 CELLULITIS OF GROIN, LEFT: ICD-10-CM

## 2024-06-14 DIAGNOSIS — E86.0 DEHYDRATION: ICD-10-CM

## 2024-06-14 DIAGNOSIS — L55.0 SUNBURN OF FIRST DEGREE: Primary | ICD-10-CM

## 2024-06-14 DIAGNOSIS — Z91.89 AT RISK FOR MEDICATION NONCOMPLIANCE: ICD-10-CM

## 2024-06-14 LAB
ALBUMIN SERPL BCP-MCNC: 3.8 G/DL (ref 3.5–5)
ALP SERPL-CCNC: 85 U/L (ref 34–104)
ALT SERPL W P-5'-P-CCNC: 25 U/L (ref 7–52)
ANION GAP SERPL CALCULATED.3IONS-SCNC: 12 MMOL/L (ref 4–13)
AST SERPL W P-5'-P-CCNC: 24 U/L (ref 13–39)
ATRIAL RATE: 108 BPM
BASOPHILS # BLD AUTO: 0.04 THOUSANDS/ÂΜL (ref 0–0.1)
BASOPHILS NFR BLD AUTO: 0 % (ref 0–1)
BILIRUB SERPL-MCNC: 0.48 MG/DL (ref 0.2–1)
BUN SERPL-MCNC: 16 MG/DL (ref 5–25)
CALCIUM SERPL-MCNC: 9 MG/DL (ref 8.4–10.2)
CHLORIDE SERPL-SCNC: 100 MMOL/L (ref 96–108)
CK SERPL-CCNC: 44 U/L (ref 39–308)
CO2 SERPL-SCNC: 19 MMOL/L (ref 21–32)
CREAT SERPL-MCNC: 0.81 MG/DL (ref 0.6–1.3)
EOSINOPHIL # BLD AUTO: 0.32 THOUSAND/ÂΜL (ref 0–0.61)
EOSINOPHIL NFR BLD AUTO: 2 % (ref 0–6)
ERYTHROCYTE [DISTWIDTH] IN BLOOD BY AUTOMATED COUNT: 16 % (ref 11.6–15.1)
GFR SERPL CREATININE-BSD FRML MDRD: 127 ML/MIN/1.73SQ M
GLUCOSE SERPL-MCNC: 226 MG/DL (ref 65–140)
GLUCOSE SERPL-MCNC: 228 MG/DL (ref 65–140)
HCT VFR BLD AUTO: 37.2 % (ref 36.5–49.3)
HGB BLD-MCNC: 11.8 G/DL (ref 12–17)
IMM GRANULOCYTES # BLD AUTO: 0.07 THOUSAND/UL (ref 0–0.2)
IMM GRANULOCYTES NFR BLD AUTO: 0 % (ref 0–2)
LACTATE SERPL-SCNC: 1.8 MMOL/L (ref 0.5–2)
LYMPHOCYTES # BLD AUTO: 2.92 THOUSANDS/ÂΜL (ref 0.6–4.47)
LYMPHOCYTES NFR BLD AUTO: 18 % (ref 14–44)
MCH RBC QN AUTO: 27.5 PG (ref 26.8–34.3)
MCHC RBC AUTO-ENTMCNC: 31.7 G/DL (ref 31.4–37.4)
MCV RBC AUTO: 87 FL (ref 82–98)
MONOCYTES # BLD AUTO: 0.97 THOUSAND/ÂΜL (ref 0.17–1.22)
MONOCYTES NFR BLD AUTO: 6 % (ref 4–12)
NEUTROPHILS # BLD AUTO: 12.22 THOUSANDS/ÂΜL (ref 1.85–7.62)
NEUTS SEG NFR BLD AUTO: 74 % (ref 43–75)
NRBC BLD AUTO-RTO: 0 /100 WBCS
P AXIS: 48 DEGREES
PLATELET # BLD AUTO: 339 THOUSANDS/UL (ref 149–390)
PMV BLD AUTO: 10.6 FL (ref 8.9–12.7)
POTASSIUM SERPL-SCNC: 4 MMOL/L (ref 3.5–5.3)
PR INTERVAL: 140 MS
PROT SERPL-MCNC: 8 G/DL (ref 6.4–8.4)
QRS AXIS: 70 DEGREES
QRSD INTERVAL: 92 MS
QT INTERVAL: 330 MS
QTC INTERVAL: 442 MS
RBC # BLD AUTO: 4.29 MILLION/UL (ref 3.88–5.62)
SODIUM SERPL-SCNC: 131 MMOL/L (ref 135–147)
T WAVE AXIS: 52 DEGREES
VENTRICULAR RATE: 108 BPM
WBC # BLD AUTO: 16.54 THOUSAND/UL (ref 4.31–10.16)

## 2024-06-14 PROCEDURE — 96365 THER/PROPH/DIAG IV INF INIT: CPT

## 2024-06-14 PROCEDURE — 96361 HYDRATE IV INFUSION ADD-ON: CPT

## 2024-06-14 PROCEDURE — 82550 ASSAY OF CK (CPK): CPT | Performed by: EMERGENCY MEDICINE

## 2024-06-14 PROCEDURE — 80053 COMPREHEN METABOLIC PANEL: CPT | Performed by: EMERGENCY MEDICINE

## 2024-06-14 PROCEDURE — 83605 ASSAY OF LACTIC ACID: CPT | Performed by: EMERGENCY MEDICINE

## 2024-06-14 PROCEDURE — 99284 EMERGENCY DEPT VISIT MOD MDM: CPT | Performed by: EMERGENCY MEDICINE

## 2024-06-14 PROCEDURE — 99283 EMERGENCY DEPT VISIT LOW MDM: CPT

## 2024-06-14 PROCEDURE — 96367 TX/PROPH/DG ADDL SEQ IV INF: CPT

## 2024-06-14 PROCEDURE — 96375 TX/PRO/DX INJ NEW DRUG ADDON: CPT

## 2024-06-14 PROCEDURE — 36415 COLL VENOUS BLD VENIPUNCTURE: CPT | Performed by: EMERGENCY MEDICINE

## 2024-06-14 PROCEDURE — 93010 ELECTROCARDIOGRAM REPORT: CPT | Performed by: INTERNAL MEDICINE

## 2024-06-14 PROCEDURE — 87040 BLOOD CULTURE FOR BACTERIA: CPT | Performed by: EMERGENCY MEDICINE

## 2024-06-14 PROCEDURE — 82948 REAGENT STRIP/BLOOD GLUCOSE: CPT

## 2024-06-14 PROCEDURE — 85025 COMPLETE CBC W/AUTO DIFF WBC: CPT | Performed by: EMERGENCY MEDICINE

## 2024-06-14 PROCEDURE — 93005 ELECTROCARDIOGRAM TRACING: CPT

## 2024-06-14 RX ORDER — SODIUM CHLORIDE, SODIUM GLUCONATE, SODIUM ACETATE, POTASSIUM CHLORIDE, MAGNESIUM CHLORIDE, SODIUM PHOSPHATE, DIBASIC, AND POTASSIUM PHOSPHATE .53; .5; .37; .037; .03; .012; .00082 G/100ML; G/100ML; G/100ML; G/100ML; G/100ML; G/100ML; G/100ML
1000 INJECTION, SOLUTION INTRAVENOUS ONCE
Status: COMPLETED | OUTPATIENT
Start: 2024-06-14 | End: 2024-06-14

## 2024-06-14 RX ORDER — ONDANSETRON 2 MG/ML
4 INJECTION INTRAMUSCULAR; INTRAVENOUS ONCE
Status: COMPLETED | OUTPATIENT
Start: 2024-06-14 | End: 2024-06-14

## 2024-06-14 RX ORDER — AMOXICILLIN AND CLAVULANATE POTASSIUM 875; 125 MG/1; MG/1
1 TABLET, FILM COATED ORAL EVERY 12 HOURS SCHEDULED
Qty: 14 TABLET | Refills: 0 | Status: SHIPPED | OUTPATIENT
Start: 2024-06-14 | End: 2024-06-21

## 2024-06-14 RX ADMIN — SODIUM CHLORIDE, SODIUM GLUCONATE, SODIUM ACETATE, POTASSIUM CHLORIDE, MAGNESIUM CHLORIDE, SODIUM PHOSPHATE, DIBASIC, AND POTASSIUM PHOSPHATE 1000 ML: .53; .5; .37; .037; .03; .012; .00082 INJECTION, SOLUTION INTRAVENOUS at 02:43

## 2024-06-14 RX ADMIN — SODIUM CHLORIDE 1000 ML: 0.9 INJECTION, SOLUTION INTRAVENOUS at 01:19

## 2024-06-14 RX ADMIN — ONDANSETRON 4 MG: 2 INJECTION INTRAMUSCULAR; INTRAVENOUS at 01:19

## 2024-06-14 RX ADMIN — SODIUM CHLORIDE 3 G: 9 INJECTION, SOLUTION INTRAVENOUS at 01:58

## 2024-06-16 LAB
BACTERIA BLD CULT: NORMAL
BACTERIA BLD CULT: NORMAL

## 2024-06-16 NOTE — ED PROVIDER NOTES
Final Diagnosis:  1. Sunburn of first degree    2. Cellulitis of groin, left    3. At risk for medication noncompliance    4. Dehydration        Chief Complaint   Patient presents with    Sunburn     Pt reports being at the pool today for five hours, no sunscreen applied.       HPI  Pt was at pool w/o any sunscreen. Has torso erythema, no blistering, blanches. Uncomfortable. Didn't drink a lot through day. Dehydrated by MM and skin tenting.     Recent admit for ashley  Was on prolonged augmentin by chart review. Doesn't look like doxy was part of    Currently he says he's lost his glucose monitor and when  I look he has some early cellulitis to groin. No crepitus, pain. No duskiness of appearance. Reports no fevers at home.       EMS additionally reports:     - Previous charting underwent limited review with attention to last ED visits, labs, ekgs, and prior imaging.  Chart review reveals :     Admission on 06/11/2024, Discharged on 06/11/2024   Component Date Value Ref Range Status    WBC 06/11/2024 9.51  4.31 - 10.16 Thousand/uL Final    RBC 06/11/2024 4.28  3.88 - 5.62 Million/uL Final    Hemoglobin 06/11/2024 11.8 (L)  12.0 - 17.0 g/dL Final    Hematocrit 06/11/2024 37.0  36.5 - 49.3 % Final    MCV 06/11/2024 86  82 - 98 fL Final    MCH 06/11/2024 27.6  26.8 - 34.3 pg Final    MCHC 06/11/2024 31.9  31.4 - 37.4 g/dL Final    RDW 06/11/2024 15.8 (H)  11.6 - 15.1 % Final    MPV 06/11/2024 10.6  8.9 - 12.7 fL Final    Platelets 06/11/2024 311  149 - 390 Thousands/uL Final    nRBC 06/11/2024 0  /100 WBCs Final    Segmented % 06/11/2024 58  43 - 75 % Final    Immature Grans % 06/11/2024 1  0 - 2 % Final    Lymphocytes % 06/11/2024 31  14 - 44 % Final    Monocytes % 06/11/2024 6  4 - 12 % Final    Eosinophils Relative 06/11/2024 4  0 - 6 % Final    Basophils Relative 06/11/2024 0  0 - 1 % Final    Absolute Neutrophils 06/11/2024 5.55  1.85 - 7.62 Thousands/µL Final    Absolute Immature Grans 06/11/2024 0.05  0.00 -  0.20 Thousand/uL Final    Absolute Lymphocytes 06/11/2024 2.93  0.60 - 4.47 Thousands/µL Final    Absolute Monocytes 06/11/2024 0.60  0.17 - 1.22 Thousand/µL Final    Eosinophils Absolute 06/11/2024 0.35  0.00 - 0.61 Thousand/µL Final    Basophils Absolute 06/11/2024 0.03  0.00 - 0.10 Thousands/µL Final    Sodium 06/11/2024 134 (L)  135 - 147 mmol/L Final    Potassium 06/11/2024 4.2  3.5 - 5.3 mmol/L Final    Chloride 06/11/2024 102  96 - 108 mmol/L Final    CO2 06/11/2024 24  21 - 32 mmol/L Final    ANION GAP 06/11/2024 8  4 - 13 mmol/L Final    BUN 06/11/2024 13  5 - 25 mg/dL Final    Creatinine 06/11/2024 0.75  0.60 - 1.30 mg/dL Final    Standardized to IDMS reference method    Glucose 06/11/2024 171 (H)  65 - 140 mg/dL Final    If the patient is fasting, the ADA then defines impaired fasting glucose as > 100 mg/dL and diabetes as > or equal to 123 mg/dL.    Calcium 06/11/2024 9.4  8.4 - 10.2 mg/dL Final    AST 06/11/2024 36  13 - 39 U/L Final    ALT 06/11/2024 33  7 - 52 U/L Final    Specimen collection should occur prior to Sulfasalazine administration due to the potential for falsely depressed results.     Alkaline Phosphatase 06/11/2024 91  34 - 104 U/L Final    Total Protein 06/11/2024 8.9 (H)  6.4 - 8.4 g/dL Final    Albumin 06/11/2024 4.2  3.5 - 5.0 g/dL Final    Total Bilirubin 06/11/2024 0.48  0.20 - 1.00 mg/dL Final    Use of this assay is not recommended for patients undergoing treatment with eltrombopag due to the potential for falsely elevated results.  N-acetyl-p-benzoquinone imine (metabolite of Acetaminophen) will generate erroneously low results in samples for patients that have taken an overdose of Acetaminophen.    eGFR 06/11/2024 132  ml/min/1.73sq m Final       - No language barrier.   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    PSH:   has a past surgical history that includes TONSILECTOMY AND ADNOIDECTOMY; pr i&d vulva/perineal abscess (N/A, 5/11/2024); Incision and drainage of wound (Left, 5/13/2024); and Incision and drainage of wound (Left, 5/15/2024).     Social History:  Tobacco Use: High Risk (6/14/2024)    Patient History     Smoking Tobacco Use: Former     Smokeless Tobacco Use: Current     Passive Exposure: Yes     Alcohol Use: Unknown (1/18/2023)    Received [facility]    AUDIT-C     Frequency of Alcohol Consumption: Patient declined     Average Number of Drinks: Patient declined     Frequency of Binge Drinking: Patient declined     No illicit use       ROS:  Pertinent positives/negatives: .     Some ROS may be present in the HPI and would take precedent over these standard questions asked below.   Review of Systems   Constitutional:  Positive for fatigue.   Genitourinary:  Positive for genital sores. Negative for penile discharge, penile pain, penile swelling, scrotal swelling and testicular pain.   Skin:  Positive for color change.   Neurological:  Positive for light-headedness.        CONSTITUTIONAL:  No lethargy. No unexpected weight loss. No change in behavior.  EYES:  No pain, redness, or discharge. No loss of vision. No orbital trauma or pain.   ENT:  No tinnitus or decreased hearing. No epistaxis/purulent rhinorrhea. No voice change, airway closing, trismus.   CARDIOVASCULAR:  No chest pain. No skin mottling or pallor. No change in exertional capacity  RESPIRATORY:  No hemoptysis. No paroxysmal nocturnal dyspnea. No stridor. No apnea or bluing.   GASTROINTESTINAL:  No vomiting, diarrhea. No distension. No melena. No hematochezia.   GENITOURINARY:  No nocturia. No hematuria or foul smelling or cloudy urine. No discharge. No  sores/adenopathy.   MUSCULOSKELETAL:  No contracture.  No new deformity.   INTEGUMENTARY:  No swelling. No unexpected contusions. No abrasions. No lymphangitis.  NEUROLOGIC:  No meningismus. No new numbness of the extremities. No new focal weakness. No postural instability  PSYCHIATRIC:  No SI HI AV  HEMATOLOGICAL:  No bleeding. No petechiae. No bruising.  ALLERGIES:  No urticaria. No sudden abd cramping. No stridor.    PE:     Physical exam highlights:   Physical Exam       Vitals:    06/14/24 0045 06/14/24 0230 06/14/24 0245 06/14/24 0315   BP: 153/76 125/68 123/60 118/56   BP Location: Left arm Left arm Left arm Left arm   Pulse: (!) 120 (!) 126 (!) 110 (!) 110   Resp: 20 20 22 20   Temp:       TempSrc:       SpO2: 96% 96% 91% 90%     Vitals reviewed by me.   Nursing note reviewed  Chaperone present for all sensitive exam.  Const: No acute distress. Alert. Nontoxic. Not diaphoretic.    HEENT: External ears normal. No protrusion drainage swelling. Nose normal. No drainage/traumatic deformity. MM. Mouth with baseline/symmetric movement. No trismus.   Eyes: No squinting. No icterus. No tearing/swelling/drainage. Tracks through the room with normal EOM.   Neck: ROM normal. No rigidity. No meningismus.  Cards: Rate as per vitals Compared to monitor sinus unless documented. Regular Well perfused.  Pulm: Effort and excursion normal. No distress. No audible wheezing/no stridor. Normal resp rate without retraction or change in work of breathing.  Abd: No distension beyond baseline. No fluctuant wave. Patient without peritoneal pain with shifting/bumping the bed.   MSK: ROM normal baseline. No deformity. No contractures from baseline.   Skin: significant sunburn torso face. Blanches. No blisters. Groin w/ cellulitis  Neuro: Nonfocal. Baseline. CN grossly intact. Moving all four with coordination.   Psych: Normal behavior and affect.        A:  - Nursing note reviewed.    Ddx and MDM  Considered diagnoses    Return to  augmentin course and f/u    Dehydrated?  EKG r/o arrythmia  Check CK  Fluids  Electrolyte check    Given tachycardia and prior infection, check cultures  Given unasyn    Overall very fluid rseponsive. Clinically looks ok and perks up w/ fluids        Dispo decision ok for home abx  Return prec for groin infectino  Lots of PO fluid  Moisturizer over torso    My conversation with consultant reveals:        Decision rules:                    ED Course as of 06/15/24 2215   Fri Jun 14, 2024   0128 Procedure Note: EKG  Date/Time: 06/14/24 1:28 AM   Interpreted by: Kanu Carter  Indications / Diagnosis: CP  ECG reviewed by me, the ED Provider: yes   The EKG demonstrates:  Rhythm: sinus  tach  Intervals: normal intervals  Axis: normal axis  QRS/Blocks: normal QRS  ST Changes: No acute ST Changes, no STD/WING.  T wave changes: none             My read of the XR/CT scan reveals:    No orders to display       Orders Placed This Encounter   Procedures    Blood culture    Blood culture    CBC and differential    Comprehensive metabolic panel    CK    Lactic acid, plasma (w/reflex if result > 2.0)    Fingerstick Glucose (POCT)    ECG 12 lead    ECG 12 lead     Labs Reviewed   CBC AND DIFFERENTIAL - Abnormal       Result Value Ref Range Status    WBC 16.54 (*) 4.31 - 10.16 Thousand/uL Final    RBC 4.29  3.88 - 5.62 Million/uL Final    Hemoglobin 11.8 (*) 12.0 - 17.0 g/dL Final    Hematocrit 37.2  36.5 - 49.3 % Final    MCV 87  82 - 98 fL Final    MCH 27.5  26.8 - 34.3 pg Final    MCHC 31.7  31.4 - 37.4 g/dL Final    RDW 16.0 (*) 11.6 - 15.1 % Final    MPV 10.6  8.9 - 12.7 fL Final    Platelets 339  149 - 390 Thousands/uL Final    nRBC 0  /100 WBCs Final    Segmented % 74  43 - 75 % Final    Immature Grans % 0  0 - 2 % Final    Lymphocytes % 18  14 - 44 % Final    Monocytes % 6  4 - 12 % Final    Eosinophils Relative 2  0 - 6 % Final    Basophils Relative 0  0 - 1 % Final    Absolute Neutrophils 12.22 (*) 1.85 - 7.62  Thousands/µL Final    Absolute Immature Grans 0.07  0.00 - 0.20 Thousand/uL Final    Absolute Lymphocytes 2.92  0.60 - 4.47 Thousands/µL Final    Absolute Monocytes 0.97  0.17 - 1.22 Thousand/µL Final    Eosinophils Absolute 0.32  0.00 - 0.61 Thousand/µL Final    Basophils Absolute 0.04  0.00 - 0.10 Thousands/µL Final   COMPREHENSIVE METABOLIC PANEL - Abnormal    Sodium 131 (*) 135 - 147 mmol/L Final    Potassium 4.0  3.5 - 5.3 mmol/L Final    Chloride 100  96 - 108 mmol/L Final    CO2 19 (*) 21 - 32 mmol/L Final    ANION GAP 12  4 - 13 mmol/L Final    BUN 16  5 - 25 mg/dL Final    Creatinine 0.81  0.60 - 1.30 mg/dL Final    Comment: Standardized to IDMS reference method    Glucose 228 (*) 65 - 140 mg/dL Final    Comment: If the patient is fasting, the ADA then defines impaired fasting glucose as > 100 mg/dL and diabetes as > or equal to 123 mg/dL.    Calcium 9.0  8.4 - 10.2 mg/dL Final    AST 24  13 - 39 U/L Final    ALT 25  7 - 52 U/L Final    Comment: Specimen collection should occur prior to Sulfasalazine administration due to the potential for falsely depressed results.     Alkaline Phosphatase 85  34 - 104 U/L Final    Total Protein 8.0  6.4 - 8.4 g/dL Final    Albumin 3.8  3.5 - 5.0 g/dL Final    Total Bilirubin 0.48  0.20 - 1.00 mg/dL Final    Comment: Use of this assay is not recommended for patients undergoing treatment with eltrombopag due to the potential for falsely elevated results.  N-acetyl-p-benzoquinone imine (metabolite of Acetaminophen) will generate erroneously low results in samples for patients that have taken an overdose of Acetaminophen.    eGFR 127  ml/min/1.73sq m Final    Narrative:     National Kidney Disease Foundation guidelines for Chronic Kidney Disease (CKD):     Stage 1 with normal or high GFR (GFR > 90 mL/min/1.73 square meters)    Stage 2 Mild CKD (GFR = 60-89 mL/min/1.73 square meters)    Stage 3A Moderate CKD (GFR = 45-59 mL/min/1.73 square meters)    Stage 3B Moderate CKD (GFR  = 30-44 mL/min/1.73 square meters)    Stage 4 Severe CKD (GFR = 15-29 mL/min/1.73 square meters)    Stage 5 End Stage CKD (GFR <15 mL/min/1.73 square meters)  Note: GFR calculation is accurate only with a steady state creatinine   POCT GLUCOSE - Abnormal    POC Glucose 226 (*) 65 - 140 mg/dl Final   CK - Normal    Total CK 44  39 - 308 U/L Final   LACTIC ACID, PLASMA (W/REFLEX IF RESULT > 2.0) - Normal    LACTIC ACID 1.8  0.5 - 2.0 mmol/L Final    Narrative:     Result may be elevated if tourniquet was used during collection.       *Each of these labs was reviewed. Particular standout labs will be noted in the ED Course above     Final Diagnosis:  1. Sunburn of first degree    2. Cellulitis of groin, left    3. At risk for medication noncompliance    4. Dehydration          P:  - hospital tx includes   Medications   sodium chloride 0.9 % bolus 1,000 mL (0 mL Intravenous Stopped 6/14/24 0242)   ondansetron (ZOFRAN) injection 4 mg (4 mg Intravenous Given 6/14/24 0119)   ampicillin-sulbactam (UNASYN) 3 g in sodium chloride 0.9 % 100 mL IVPB (0 g Intravenous Stopped 6/14/24 0228)   multi-electrolyte (ISOLYTE-S PH 7.4) bolus 1,000 mL (0 mL Intravenous Stopped 6/14/24 0343)         - disposition  Time reflects when diagnosis was documented in both MDM as applicable and the Disposition within this note       Time User Action Codes Description Comment    6/14/2024  3:01 AM Kanu Carter [L55.0] Sunburn of first degree     6/14/2024  3:02 AM Kanu Carter [L03.314] Cellulitis of groin, left     6/14/2024  3:02 AM Kanu Carter [Z91.89] At risk for medication noncompliance     6/14/2024  3:02 AM Kanu Carter [E86.0] Dehydration           ED Disposition       ED Disposition   Discharge    Condition   Stable    Date/Time   Fri Jun 14, 2024  3:01 AM    Comment   Rik Marin discharge to home/self care.                   Follow-up Information       Follow up With Specialties Details Why Contact  Info    Gustavo Fajardo MD    [address] [facility] [address]  [facility] [address]  [phone]              - patient will call their PCP to let them know they were in the emergency department. We discuss return precautions and patient is agreeable with plan and aformentioned disposition.       - additional treatment intended, if consistent with primary provider:  - patient to follow with :      Discharge Medication List as of 6/14/2024  3:03 AM        START taking these medications    Details   amoxicillin-clavulanate (AUGMENTIN) 875-125 mg per tablet Take 1 tablet by mouth every 12 (twelve) hours for 7 days, Starting Fri 6/14/2024, Until Fri 6/21/2024, Normal           CONTINUE these medications which have NOT CHANGED    Details   Abilify Maintena 400 MG injection INJECT 2 ML INTRAMUSCUALRLY EVERY 4 WEEKS AS DIRECTED, Historical Med      albuterol (2.5 mg/3 mL) 0.083 % nebulizer solution Take 3 mL (2.5 mg total) by nebulization every 6 (six) hours as needed for wheezing or shortness of breath, Starting Wed 5/8/2024, Normal      Alcohol Swabs 70 % PADS May substitute brand based on insurance coverage. Check glucose TID., Normal      !! Blood Glucose Monitoring Suppl (OneTouch Verio Reflect) w/Device KIT May substitute brand based on insurance coverage. Check glucose TID., Normal      !! Blood Glucose Monitoring Suppl (OneTouch Verio) w/Device KIT Use 2 (two) times a day Ok to substitute with insurance covered brand, Starting Fri 10/27/2023, Normal      FLUoxetine (PROzac) 10 mg capsule Take 1 capsule (10 mg total) by mouth daily, Starting Wed 5/8/2024, Normal      !! glucose blood (OneTouch Verio) test strip TEST BLOOD SUGAR TWICE A DAY, Normal      !! glucose blood (OneTouch Verio) test strip May substitute brand based on insurance coverage. Check glucose TID., Normal      insulin aspart (NovoLOG FlexPen) 100 UNIT/ML injection pen Inject 9 Units under the skin 3 (three) times a day with meals, Starting Wed 5/8/2024,  Normal      Insulin Glargine Solostar (Lantus SoloStar) 100 UNIT/ML SOPN Inject 0.3 mL (30 Units total) under the skin daily at bedtime, Starting Tue 5/7/2024, Normal      Insulin Pen Needle (BD Pen Needle Shani 2nd Gen) 32G X 4 MM MISC For use with insulin pen. Pharmacy may dispense brand covered by insurance., Normal      !! Lancets (onetouch ultrasoft) lancets Use as instructed, Normal      metFORMIN (GLUCOPHAGE-XR) 500 mg 24 hr tablet Take 2 tablets (1,000 mg total) by mouth 2 (two) times a day with meals Start with 1 pill 500mg with breakfast x 1 week, then 1 pill 500mg with breakfast and 1 pill with dinner for 1 week , then 2 pills with breakfast (1000mg) and 1 pill with dinner (500 mg) for 1 week, then 1000mg twice a day going forward, Starting Tue 5/7/2024, Normal      !! OneTouch Delica Lancets 33G MISC May substitute brand based on insurance coverage. Check glucose TID., Normal      topiramate (TOPAMAX) 50 MG tablet Take 50 mg by mouth 2 (two) times a day, Starting u 3/10/2022, Historical Med       !! - Potential duplicate medications found. Please discuss with provider.        No discharge procedures on file.  Prior to Admission Medications   Prescriptions Last Dose Informant Patient Reported? Taking?   Abilify Maintena 400 MG injection   Yes No   Sig: INJECT 2 ML INTRAMUSCUALRLY EVERY 4 WEEKS AS DIRECTED   Alcohol Swabs 70 % PADS   No No   Sig: May substitute brand based on insurance coverage. Check glucose TID.   Blood Glucose Monitoring Suppl (OneTouch Verio Reflect) w/Device KIT   No No   Sig: May substitute brand based on insurance coverage. Check glucose TID.   Blood Glucose Monitoring Suppl (OneTouch Verio) w/Device KIT   No No   Sig: Use 2 (two) times a day Ok to substitute with insurance covered brand   FLUoxetine (PROzac) 10 mg capsule   No No   Sig: Take 1 capsule (10 mg total) by mouth daily   Insulin Glargine Solostar (Lantus SoloStar) 100 UNIT/ML SOPN   No No   Sig: Inject 0.3 mL (30 Units  "total) under the skin daily at bedtime   Insulin Pen Needle (BD Pen Needle Shani 2nd Gen) 32G X 4 MM MISC   No No   Sig: For use with insulin pen. Pharmacy may dispense brand covered by insurance.   Lancets (onetouch ultrasoft) lancets   No No   Sig: Use as instructed   OneTouch Delica Lancets 33G MISC   No No   Sig: May substitute brand based on insurance coverage. Check glucose TID.   albuterol (2.5 mg/3 mL) 0.083 % nebulizer solution   No No   Sig: Take 3 mL (2.5 mg total) by nebulization every 6 (six) hours as needed for wheezing or shortness of breath   glucose blood (OneTouch Verio) test strip   No No   Sig: TEST BLOOD SUGAR TWICE A DAY   glucose blood (OneTouch Verio) test strip   No No   Sig: May substitute brand based on insurance coverage. Check glucose TID.   insulin aspart (NovoLOG FlexPen) 100 UNIT/ML injection pen   No No   Sig: Inject 9 Units under the skin 3 (three) times a day with meals   metFORMIN (GLUCOPHAGE-XR) 500 mg 24 hr tablet   No No   Sig: Take 2 tablets (1,000 mg total) by mouth 2 (two) times a day with meals Start with 1 pill 500mg with breakfast x 1 week, then 1 pill 500mg with breakfast and 1 pill with dinner for 1 week , then 2 pills with breakfast (1000mg) and 1 pill with dinner (500mg) for 1 week, then 1000mg twice a day going forward   topiramate (TOPAMAX) 50 MG tablet   Yes No   Sig: Take 50 mg by mouth 2 (two) times a day      Facility-Administered Medications: None       Portions of the record may have been created with voice recognition software. Occasional wrong word or \"sound a like\" substitutions may have occurred due to the inherent limitations of voice recognition software. Read the chart carefully and recognize, using context, where substitutions have occurred.    Electronically signed by:  MD aKnu Villafuerte MD  06/15/24 [id number]    "

## 2024-06-18 ENCOUNTER — TELEPHONE (OUTPATIENT)
Age: 19
End: 2024-06-18

## 2024-06-18 DIAGNOSIS — E11.65 UNCONTROLLED TYPE 2 DIABETES MELLITUS WITH HYPERGLYCEMIA (HCC): Primary | ICD-10-CM

## 2024-06-18 NOTE — TELEPHONE ENCOUNTER
Hospital put in a referral for patient to see endocrinology. He is a follow up. Would you like any labs done before the appt? He is also on the waitlist as well. Please follow up with patient.

## 2024-06-18 NOTE — TELEPHONE ENCOUNTER
Placed orders for relevant DM labs to be obtained on or after 8/8/24.  If the 8/20 appointment stands, can obtain these labs prior to this appointment.   If has appointment prior to lab due date, no need to obtain labs.    In the meantime recommend he check blood sugars at least twice daily, keep blood sugar log to review at his visit. Please check to see if he is in need of any supplies or medication refills to hold him over until appointment.     Will also place referral for DM education / dietician. Recommend he make appointment ASAP.

## 2024-06-19 LAB
BACTERIA BLD CULT: NORMAL
BACTERIA BLD CULT: NORMAL

## 2024-06-21 ENCOUNTER — TELEPHONE (OUTPATIENT)
Dept: VASCULAR SURGERY | Facility: CLINIC | Age: 19
End: 2024-06-21

## 2024-06-21 ENCOUNTER — HOSPITAL ENCOUNTER (EMERGENCY)
Facility: HOSPITAL | Age: 19
Discharge: HOME | End: 2024-06-21
Attending: EMERGENCY MEDICINE | Admitting: EMERGENCY MEDICINE
Payer: COMMERCIAL

## 2024-06-21 VITALS
SYSTOLIC BLOOD PRESSURE: 132 MMHG | DIASTOLIC BLOOD PRESSURE: 78 MMHG | TEMPERATURE: 97.1 F | RESPIRATION RATE: 20 BRPM | HEART RATE: 85 BPM | OXYGEN SATURATION: 96 %

## 2024-06-21 DIAGNOSIS — F41.9 ANXIETY: Primary | ICD-10-CM

## 2024-06-21 PROCEDURE — 99283 EMERGENCY DEPT VISIT LOW MDM: CPT

## 2024-06-21 PROCEDURE — 99283 EMERGENCY DEPT VISIT LOW MDM: CPT | Performed by: EMERGENCY MEDICINE
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
## 2024-06-21 NOTE — TELEPHONE ENCOUNTER
Called pt and lvm to [facility] 8:30am appt with Zadia Mcneill due to 15 mins veronica period has passed.     Called pt mother as well and lvm for someone to call office to [facility] pt appt [phone] due to 15 mins veronica period.
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
## 2024-06-21 NOTE — ED NOTES
Patient given discharge paperwork, requested Lyft home which was ordered for the patient. Pt instructed to wait in the waiting room and that he would receive a text when his ride was dispatched. Pt verbalizes understanding.      Noemy Mcelroy RN  06/21/24 1924

## 2024-06-23 ENCOUNTER — HOSPITAL ENCOUNTER (EMERGENCY)
Facility: HOSPITAL | Age: 19
Discharge: HOME | End: 2024-06-23
Attending: EMERGENCY MEDICINE
Payer: COMMERCIAL

## 2024-06-23 VITALS
OXYGEN SATURATION: 97 % | DIASTOLIC BLOOD PRESSURE: 84 MMHG | SYSTOLIC BLOOD PRESSURE: 150 MMHG | TEMPERATURE: 98.3 F | HEART RATE: 105 BPM | RESPIRATION RATE: 20 BRPM

## 2024-06-23 DIAGNOSIS — S80.211A ABRASION OF RIGHT KNEE, INITIAL ENCOUNTER: Primary | ICD-10-CM

## 2024-06-23 DIAGNOSIS — S60.511A ABRASION OF RIGHT HAND, INITIAL ENCOUNTER: ICD-10-CM

## 2024-06-23 PROCEDURE — 99283 EMERGENCY DEPT VISIT LOW MDM: CPT

## 2024-06-23 PROCEDURE — 99283 EMERGENCY DEPT VISIT LOW MDM: CPT | Performed by: PHYSICIAN ASSISTANT

## 2024-06-23 RX ORDER — GINSENG 100 MG
1 CAPSULE ORAL ONCE
Status: COMPLETED | OUTPATIENT
Start: 2024-06-23 | End: 2024-06-23

## 2024-06-23 RX ADMIN — BACITRACIN ZINC 1 LARGE APPLICATION: 500 OINTMENT TOPICAL at 17:32

## 2024-06-25 NOTE — ED PROVIDER NOTES
History  Chief Complaint   Patient presents with    Extremity Laceration     Pt tripped over a pipe and fell onto right knee and right hand. Abrasion noted to right knee. Laceration noted below fifth finger of right hand. Bleeding controlled.      Patient is a 19-year-old male with past medical history significant for IDDM 2, ADHD, RON, who presents for evaluation of multiple abrasions after tripping over a pipe and falling onto his right knee and right hand.  He is able to ambulate and move his hand.  He has abrasions on his R knee and a small skin avulsion directly below the fifth finger of his right hand. Bleeding is controlled. Tetanus is UTD. He denies head strike, neck pain or LOC.          Prior to Admission Medications   Prescriptions Last Dose Informant Patient Reported? Taking?   Abilify Maintena 400 MG injection   Yes No   Sig: INJECT 2 ML INTRAMUSCUALRLY EVERY 4 WEEKS AS DIRECTED   Alcohol Swabs 70 % PADS   No No   Sig: May substitute brand based on insurance coverage. Check glucose TID.   Blood Glucose Monitoring Suppl (OneTouch Verio Reflect) w/Device KIT   No No   Sig: May substitute brand based on insurance coverage. Check glucose TID.   Blood Glucose Monitoring Suppl (OneTouch Verio) w/Device KIT   No No   Sig: Use 2 (two) times a day Ok to substitute with insurance covered brand   FLUoxetine (PROzac) 10 mg capsule   No No   Sig: Take 1 capsule (10 mg total) by mouth daily   Insulin Glargine Solostar (Lantus SoloStar) 100 UNIT/ML SOPN   No No   Sig: Inject 0.3 mL (30 Units total) under the skin daily at bedtime   Insulin Pen Needle (BD Pen Needle Shani 2nd Gen) 32G X 4 MM MISC   No No   Sig: For use with insulin pen. Pharmacy may dispense brand covered by insurance.   Lancets (onetouch ultrasoft) lancets   No No   Sig: Use as instructed   OneTouch Delica Lancets 33G MISC   No No   Sig: May substitute brand based on insurance coverage. Check glucose TID.   albuterol (2.5 mg/3 mL) 0.083 % nebulizer  solution   No No   Sig: Take 3 mL (2.5 mg total) by nebulization every 6 (six) hours as needed for wheezing or shortness of breath   glucose blood (OneTouch Verio) test strip   No No   Sig: TEST BLOOD SUGAR TWICE A DAY   glucose blood (OneTouch Verio) test strip   No No   Sig: May substitute brand based on insurance coverage. Check glucose TID.   insulin aspart (NovoLOG FlexPen) 100 UNIT/ML injection pen   No No   Sig: Inject 9 Units under the skin 3 (three) times a day with meals   metFORMIN (GLUCOPHAGE-XR) 500 mg 24 hr tablet   No No   Sig: Take 2 tablets (1,000 mg total) by mouth 2 (two) times a day with meals Start with 1 pill 500mg with breakfast x 1 week, then 1 pill 500mg with breakfast and 1 pill with dinner for 1 week , then 2 pills with breakfast (1000mg) and 1 pill with dinner (500mg) for 1 week, then 1000mg twice a day going forward   topiramate (TOPAMAX) 50 MG tablet   Yes No   Sig: Take 50 mg by mouth 2 (two) times a day      Facility-Administered Medications: None       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        Past Surgical History:   Procedure Laterality Date    INCISION AND DRAINAGE OF WOUND Left 5/13/2024    Procedure: INCISION AND DRAINAGE (I&D) GROIN;  Surgeon: Tim Ch MD;  Location: [facility];  Service: General    INCISION AND DRAINAGE OF WOUND Left 5/15/2024    Procedure: INCISION AND DRAINAGE (I&D) GROIN;  Surgeon: Tim Ch MD;  Location: [facility];  Service: General    AK I&D VULVA/PERINEAL ABSCESS N/A 5/11/2024    Procedure: INCISION AND DRAINAGE (I&D) PERINEAL ABSCESS;  Surgeon: Tim Ch MD;  Location: [facility];  Service: General    TONSILECTOMY AND ADNOIDECTOMY      2020       Family History   Problem Relation  Age of Onset    Hyperlipidemia Mother     Diabetes type II Mother     Obesity Mother     Obesity Father     Drug abuse Father     Diabetes type II Maternal Aunt     Lung disease Maternal Aunt     Rheum arthritis Maternal Aunt     Fibromyalgia Maternal Aunt     Atrial fibrillation Maternal Grandmother     Hyperlipidemia Maternal Grandfather     Diabetes type II Maternal Grandfather     Stroke Maternal Grandfather     Heart disease Maternal Grandfather     Stroke Paternal Grandfather      I have reviewed and agree with the history as documented.    E-Cigarette/Vaping    E-Cigarette Use Current Some Day User     Comments smoke cigg. when he has money      E-Cigarette/Vaping Substances    Nicotine Yes     THC No     CBD No     Flavoring Yes     Other No     Unknown No      Social History     Tobacco Use    Smoking status: Former     Types: Cigarettes     Passive exposure: Yes    Smokeless tobacco: Current   Vaping Use    Vaping status: Some Days    Substances: Nicotine, Flavoring   Substance Use Topics    Alcohol use: Yes     Alcohol/week: 2.0 standard drinks of alcohol     Types: 2 Shots of liquor per week     Comment: when available to him    Drug use: Yes     Types: Marijuana       Review of Systems   Constitutional:  Negative for chills and fever.   HENT:  Negative for ear pain and sore throat.    Eyes:  Negative for pain and visual disturbance.   Respiratory:  Negative for cough and shortness of breath.    Cardiovascular:  Negative for chest pain and palpitations.   Gastrointestinal:  Negative for abdominal pain and vomiting.   Genitourinary:  Negative for dysuria and hematuria.   Musculoskeletal:  Negative for arthralgias and back pain.   Skin:  Positive for color change and wound. Negative for rash.   Neurological:  Negative for seizures and syncope.   Hematological: Negative.    All other systems reviewed and are negative.      Physical Exam  Physical Exam  Vitals and nursing note reviewed.   Constitutional:        General: He is not in acute distress.     Appearance: Normal appearance. He is well-developed. He is obese. He is not ill-appearing.   HENT:      Head: Normocephalic and atraumatic.      Right Ear: External ear normal.      Left Ear: External ear normal.      Nose: Nose normal.   Eyes:      Conjunctiva/sclera: Conjunctivae normal.   Cardiovascular:      Rate and Rhythm: Normal rate and regular rhythm.      Heart sounds: No murmur heard.  Pulmonary:      Effort: Pulmonary effort is normal. No respiratory distress.      Breath sounds: Normal breath sounds.   Abdominal:      General: Bowel sounds are normal.      Palpations: Abdomen is soft.      Tenderness: There is no abdominal tenderness.   Musculoskeletal:         General: Signs of injury present. No swelling.        Hands:       Cervical back: Neck supple.        Legs:    Skin:     General: Skin is warm and dry.      Capillary Refill: Capillary refill takes less than 2 seconds.   Neurological:      General: No focal deficit present.      Mental Status: He is alert and oriented to person, place, and time. Mental status is at baseline.   Psychiatric:         Mood and Affect: Mood normal.         Behavior: Behavior normal.         Vital Signs  ED Triage Vitals [06/23/24 1717]   Temperature Pulse Respirations Blood Pressure SpO2   98.3 °F (36.8 °C) 105 20 150/84 97 %      Temp Source Heart Rate Source Patient Position - Orthostatic VS BP Location FiO2 (%)   Oral Monitor Lying Left arm --      Pain Score       --           Vitals:    06/23/24 1717   BP: 150/84   Pulse: 105   Patient Position - Orthostatic VS: Lying         Visual Acuity      ED Medications  Medications   bacitracin topical ointment 1 large application (1 large application Topical Given 6/23/24 9242)       Diagnostic Studies  Results Reviewed       None                   CT abdomen pelvis wo contrast    (Results Pending)   CT lower extremity wo contrast left    (Results Pending)               Procedures  Procedures         ED Course                                             Medical Decision Making  Abrasions cleaned and dressed  Patient discharged home    Problems Addressed:  Abrasion of right hand, initial encounter: acute illness or injury  Abrasion of right knee, initial encounter: acute illness or injury    Amount and/or Complexity of Data Reviewed  Radiology: ordered.    Risk  OTC drugs.             Disposition  Final diagnoses:   Abrasion of right knee, initial encounter   Abrasion of right hand, initial encounter     Time reflects when diagnosis was documented in both MDM as applicable and the Disposition within this note       Time User Action Codes Description Comment    6/23/2024  6:00 PM Shaila Hernandez Add [S80.211A] Abrasion of right knee, initial encounter     6/23/2024  6:00 PM Shaila Hernandez Add [S60.511A] Abrasion of right hand, initial encounter           ED Disposition       ED Disposition   Discharge    Condition   Stable    Date/Time   Sun Jun 23, 2024  6:00 PM    Comment   Rik Marin discharge to home/self care.                   Follow-up Information       Follow up With Specialties Details Why Contact Info Additional Information    Gustavo Fajardo MD  Go to  As needed [address] [facility] [address]  [facility] [id number] [phone]       [facility] Emergency Department Emergency Medicine Go to  If symptoms worsen [address] [facility] [id number] [phone] [facility] Emergency Department, [address] [place], [place], [address]            Discharge Medication List as of 6/23/2024  6:00 PM        CONTINUE these medications which have NOT CHANGED    Details   Abilify Maintena 400 MG injection INJECT 2 ML INTRAMUSCUALRLY EVERY 4 WEEKS AS DIRECTED, Historical Med      albuterol (2.5 mg/3 mL) 0.083 % nebulizer solution Take 3 mL (2.5 mg total) by nebulization every 6 (six) hours as needed for wheezing or  shortness of breath, Starting Wed 5/8/2024, Normal      Alcohol Swabs 70 % PADS May substitute brand based on insurance coverage. Check glucose TID., Normal      !! Blood Glucose Monitoring Suppl (OneTouch Verio Reflect) w/Device KIT May substitute brand based on insurance coverage. Check glucose TID., Normal      !! Blood Glucose Monitoring Suppl (OneTouch Verio) w/Device KIT Use 2 (two) times a day Ok to substitute with insurance covered brand, Starting Fri 10/27/2023, Normal      FLUoxetine (PROzac) 10 mg capsule Take 1 capsule (10 mg total) by mouth daily, Starting Wed 5/8/2024, Normal      !! glucose blood (OneTouch Verio) test strip TEST BLOOD SUGAR TWICE A DAY, Normal      !! glucose blood (OneTouch Verio) test strip May substitute brand based on insurance coverage. Check glucose TID., Normal      insulin aspart (NovoLOG FlexPen) 100 UNIT/ML injection pen Inject 9 Units under the skin 3 (three) times a day with meals, Starting Wed 5/8/2024, Normal      Insulin Glargine Solostar (Lantus SoloStar) 100 UNIT/ML SOPN Inject 0.3 mL (30 Units total) under the skin daily at bedtime, Starting Tue 5/7/2024, Normal      Insulin Pen Needle (BD Pen Needle Shani 2nd Gen) 32G X 4 MM MISC For use with insulin pen. Pharmacy may dispense brand covered by insurance., Normal      !! Lancets (onetouch ultrasoft) lancets Use as instructed, Normal      metFORMIN (GLUCOPHAGE-XR) 500 mg 24 hr tablet Take 2 tablets (1,000 mg total) by mouth 2 (two) times a day with meals Start with 1 pill 500mg with breakfast x 1 week, then 1 pill 500mg with breakfast and 1 pill with dinner for 1 week , then 2 pills with breakfast (1000mg) and 1 pill with dinner (500 mg) for 1 week, then 1000mg twice a day going forward, Starting Tue 5/7/2024, Normal      !! OneTouch Delica Lancets 33G MISC May substitute brand based on insurance coverage. Check glucose TID., Normal      topiramate (TOPAMAX) 50 MG tablet Take 50 mg by mouth 2 (two) times a day,  Starting Thu 3/10/2022, Historical Med       !! - Potential duplicate medications found. Please discuss with provider.          No discharge procedures on file.    PDMP Review       None            ED Provider  Electronically Signed by             Shaila Hernandez PA-C  06/25/24 0766

## 2024-07-01 ENCOUNTER — PATIENT OUTREACH (OUTPATIENT)
Dept: CASE MANAGEMENT | Facility: OTHER | Age: 19
End: 2024-07-01

## 2024-07-01 ENCOUNTER — HOSPITAL ENCOUNTER (EMERGENCY)
Facility: HOSPITAL | Age: 19
Discharge: HOME | End: 2024-07-01
Attending: STUDENT IN AN ORGANIZED HEALTH CARE EDUCATION/TRAINING PROGRAM
Payer: COMMERCIAL

## 2024-07-01 VITALS
RESPIRATION RATE: 20 BRPM | HEART RATE: 88 BPM | DIASTOLIC BLOOD PRESSURE: 80 MMHG | SYSTOLIC BLOOD PRESSURE: 148 MMHG | TEMPERATURE: 98 F | OXYGEN SATURATION: 97 % | BODY MASS INDEX: 47.63 KG/M2 | WEIGHT: 315 LBS

## 2024-07-01 DIAGNOSIS — Z63.8 FAMILY CONFLICT: Primary | ICD-10-CM

## 2024-07-01 PROCEDURE — 99284 EMERGENCY DEPT VISIT MOD MDM: CPT | Performed by: PHYSICIAN ASSISTANT

## 2024-07-01 PROCEDURE — 99283 EMERGENCY DEPT VISIT LOW MDM: CPT

## 2024-07-01 SDOH — SOCIAL STABILITY - SOCIAL INSECURITY: OTHER SPECIFIED PROBLEMS RELATED TO PRIMARY SUPPORT GROUP: Z63.8

## 2024-07-01 NOTE — ED PROVIDER NOTES
"History  Chief Complaint   Patient presents with    Medication Refill     Per pt. His mother made him come to the er for a medication adjustment and that he should say he is suicidal. He denies suicidal ideation. He states he does get angry  and needs his abilify adjusted. He also states \" I know you guys dont adjust medications.      Patient is a 19-year-old male with past medical history significant for autism, IDDM 2, ADHD, who presents via EMS for evaluation of medication adjustment.  Patient states that he and his mother got into an argument about money, he kicked the bed frame and she called the ambulance telling him that he should say that he is suicidal.  Patient denies suicidal ideation or homicidal ideation.  He does say that he does get angry intermittently, and he does feel that he should have his abilify adjusted, but recognizes that the ED does not manage chronic medications and will follow up with his prescriber.        Medication Refill      Prior to Admission Medications   Prescriptions Last Dose Informant Patient Reported? Taking?   Abilify Maintena 400 MG injection   Yes No   Sig: INJECT 2 ML INTRAMUSCUALRLY EVERY 4 WEEKS AS DIRECTED   Alcohol Swabs 70 % PADS   No No   Sig: May substitute brand based on insurance coverage. Check glucose TID.   Blood Glucose Monitoring Suppl (OneTouch Verio Reflect) w/Device KIT   No No   Sig: May substitute brand based on insurance coverage. Check glucose TID.   Blood Glucose Monitoring Suppl (OneTouch Verio) w/Device KIT   No No   Sig: Use 2 (two) times a day Ok to substitute with insurance covered brand   FLUoxetine (PROzac) 10 mg capsule   No No   Sig: Take 1 capsule (10 mg total) by mouth daily   Insulin Glargine Solostar (Lantus SoloStar) 100 UNIT/ML SOPN   No No   Sig: Inject 0.3 mL (30 Units total) under the skin daily at bedtime   Insulin Pen Needle (BD Pen Needle Shani 2nd Gen) 32G X 4 MM MISC   No No   Sig: For use with insulin pen. Pharmacy may dispense " brand covered by insurance.   Lancets (onetouch ultrasoft) lancets   No No   Sig: Use as instructed   OneTouch Delica Lancets 33G MISC   No No   Sig: May substitute brand based on insurance coverage. Check glucose TID.   albuterol (2.5 mg/3 mL) 0.083 % nebulizer solution   No No   Sig: Take 3 mL (2.5 mg total) by nebulization every 6 (six) hours as needed for wheezing or shortness of breath   glucose blood (OneTouch Verio) test strip   No No   Sig: TEST BLOOD SUGAR TWICE A DAY   glucose blood (OneTouch Verio) test strip   No No   Sig: May substitute brand based on insurance coverage. Check glucose TID.   insulin aspart (NovoLOG FlexPen) 100 UNIT/ML injection pen   No No   Sig: Inject 9 Units under the skin 3 (three) times a day with meals   metFORMIN (GLUCOPHAGE-XR) 500 mg 24 hr tablet   No No   Sig: Take 2 tablets (1,000 mg total) by mouth 2 (two) times a day with meals Start with 1 pill 500mg with breakfast x 1 week, then 1 pill 500mg with breakfast and 1 pill with dinner for 1 week , then 2 pills with breakfast (1000mg) and 1 pill with dinner (500mg) for 1 week, then 1000mg twice a day going forward   topiramate (TOPAMAX) 50 MG tablet   Yes No   Sig: Take 50 mg by mouth 2 (two) times a day      Facility-Administered Medications: None       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        Past Surgical History:   Procedure Laterality Date    INCISION AND DRAINAGE OF WOUND Left 5/13/2024    Procedure: INCISION AND DRAINAGE (I&D) GROIN;  Surgeon: Tim Ch MD;  Location: [facility];  Service: General    INCISION AND DRAINAGE OF WOUND Left 5/15/2024    Procedure: INCISION AND DRAINAGE (I&D) GROIN;  Surgeon: Tim Ch MD;  Location: [facility];  Service:  General    [facility] I&D VULVA/PERINEAL ABSCESS N/A 5/11/2024    Procedure: INCISION AND DRAINAGE (I&D) PERINEAL ABSCESS;  Surgeon: Tim Ch MD;  Location: [facility];  Service: General    TONSILECTOMY AND ADNOIDECTOMY      2020       Family History   Problem Relation Age of Onset    Hyperlipidemia Mother     Diabetes type II Mother     Obesity Mother     Obesity Father     Drug abuse Father     Diabetes type II Maternal Aunt     Lung disease Maternal Aunt     Rheum arthritis Maternal Aunt     Fibromyalgia Maternal Aunt     Atrial fibrillation Maternal Grandmother     Hyperlipidemia Maternal Grandfather     Diabetes type II Maternal Grandfather     Stroke Maternal Grandfather     Heart disease Maternal Grandfather     Stroke Paternal Grandfather      I have reviewed and agree with the history as documented.    E-Cigarette/Vaping    E-Cigarette Use Current Some Day User     Comments smoke cigg. when he has money      E-Cigarette/Vaping Substances    Nicotine Yes     THC No     CBD No     Flavoring Yes     Other No     Unknown No      Social History     Tobacco Use    Smoking status: Former     Types: Cigarettes     Passive exposure: Yes    Smokeless tobacco: Current   Vaping Use    Vaping status: Some Days    Substances: Nicotine, Flavoring   Substance Use Topics    Alcohol use: Yes     Alcohol/week: 2.0 standard drinks of alcohol     Types: 2 Shots of liquor per week     Comment: when available to him    Drug use: Yes     Types: Marijuana       Review of Systems   Constitutional: Negative.  Negative for chills and fever.   HENT: Negative.  Negative for ear pain and sore throat.    Eyes: Negative.  Negative for pain and visual disturbance.   Respiratory: Negative.  Negative for cough and shortness of breath.    Cardiovascular: Negative.  Negative for chest pain and palpitations.   Gastrointestinal: Negative.  Negative for abdominal pain and vomiting.   Endocrine: Negative.    Genitourinary: Negative.  Negative for  dysuria and hematuria.   Musculoskeletal: Negative.  Negative for arthralgias and back pain.   Skin: Negative.  Negative for color change and rash.   Allergic/Immunologic: Negative.    Neurological: Negative.  Negative for seizures and syncope.   Hematological: Negative.    Psychiatric/Behavioral: Negative.     All other systems reviewed and are negative.      Physical Exam  Physical Exam  Vitals and nursing note reviewed.   Constitutional:       General: He is not in acute distress.     Appearance: Normal appearance. He is well-developed. He is obese. He is not ill-appearing, toxic-appearing or diaphoretic.   HENT:      Head: Normocephalic and atraumatic.      Right Ear: External ear normal.      Left Ear: External ear normal.      Nose: Nose normal.      Mouth/Throat:      Mouth: Mucous membranes are moist.   Eyes:      General: No scleral icterus.     Conjunctiva/sclera: Conjunctivae normal.      Pupils: Pupils are equal, round, and reactive to light.   Cardiovascular:      Rate and Rhythm: Normal rate and regular rhythm.      Pulses: Normal pulses.      Heart sounds: Normal heart sounds. No murmur heard.  Pulmonary:      Effort: Pulmonary effort is normal. No respiratory distress.      Breath sounds: Normal breath sounds.   Abdominal:      Palpations: Abdomen is soft.      Tenderness: There is no abdominal tenderness.   Musculoskeletal:         General: No swelling. Normal range of motion.      Cervical back: Neck supple.   Skin:     General: Skin is warm and dry.      Capillary Refill: Capillary refill takes less than 2 seconds.   Neurological:      General: No focal deficit present.      Mental Status: He is alert and oriented to person, place, and time.   Psychiatric:         Mood and Affect: Mood normal.         Vital Signs  ED Triage Vitals [07/01/24 1455]   Temperature Pulse Respirations Blood Pressure SpO2   98 °F (36.7 °C) 88 20 148/80 97 %      Temp src Heart Rate Source Patient Position - Orthostatic  "VS BP Location FiO2 (%)   -- -- -- -- --      Pain Score       No Pain           Vitals:    07/01/24 1455   BP: 148/80   Pulse: 88         Visual Acuity      ED Medications  Medications - No data to display    Diagnostic Studies  Results Reviewed       None                   No orders to display              Procedures  Procedures         ED Course         CRAFFT      Flowsheet Row Most Recent Value   CRAFFT Initial Screen: During the past 12 months, did you:    1. Drink any alcohol (more than a few sips)?  No Filed at: 07/01/2024 1456   2. Smoke any marijuana or hashish No Filed at: 07/01/2024 1456   3. Use anything else to get high? (\"anything else\" includes illegal drugs, over the counter and prescription drugs, and things that you sniff or 'patel')? No Filed at: 07/01/2024 1456                                            Medical Decision Making  Problems Addressed:  Family conflict: acute illness or injury             Disposition  Final diagnoses:   Family conflict     Time reflects when diagnosis was documented in both MDM as applicable and the Disposition within this note       Time User Action Codes Description Comment    7/1/2024  3:50 PM Shaila Hernandez Add [Z63.8] Family conflict           ED Disposition       ED Disposition   Discharge    Condition   Stable    Date/Time   Mon Jul 1, 2024 2499    Comment   Rik Marin discharge to home/self care.                   Follow-up Information       Follow up With Specialties Details Why Contact Info    Gustavo Fajardo MD  Call  As needed [address] [facility] [address]  [facility] [id number] [phone]      [facility] Schedule an appointment as soon as possible for a visit   [address] [facility] [address]  [facility] [id number] [phone]              Discharge Medication List as of 7/1/2024  3:50 PM        CONTINUE these medications which have NOT CHANGED    Details   Abilify Maintena 400 MG injection INJECT 2 ML INTRAMUSCUALRLY " EVERY 4 WEEKS AS DIRECTED, Historical Med      albuterol (2.5 mg/3 mL) 0.083 % nebulizer solution Take 3 mL (2.5 mg total) by nebulization every 6 (six) hours as needed for wheezing or shortness of breath, Starting Wed 5/8/2024, Normal      Alcohol Swabs 70 % PADS May substitute brand based on insurance coverage. Check glucose TID., Normal      !! Blood Glucose Monitoring Suppl (OneTouch Verio Reflect) w/Device KIT May substitute brand based on insurance coverage. Check glucose TID., Normal      !! Blood Glucose Monitoring Suppl (OneTouch Verio) w/Device KIT Use 2 (two) times a day Ok to substitute with insurance covered brand, Starting Fri 10/27/2023, Normal      FLUoxetine (PROzac) 10 mg capsule Take 1 capsule (10 mg total) by mouth daily, Starting Wed 5/8/2024, Normal      !! glucose blood (OneTouch Verio) test strip TEST BLOOD SUGAR TWICE A DAY, Normal      !! glucose blood (OneTouch Verio) test strip May substitute brand based on insurance coverage. Check glucose TID., Normal      insulin aspart (NovoLOG FlexPen) 100 UNIT/ML injection pen Inject 9 Units under the skin 3 (three) times a day with meals, Starting Wed 5/8/2024, Normal      Insulin Glargine Solostar (Lantus SoloStar) 100 UNIT/ML SOPN Inject 0.3 mL (30 Units total) under the skin daily at bedtime, Starting Tue 5/7/2024, Normal      Insulin Pen Needle (BD Pen Needle Shani 2nd Gen) 32G X 4 MM MISC For use with insulin pen. Pharmacy may dispense brand covered by insurance., Normal      !! Lancets (onetouch ultrasoft) lancets Use as instructed, Normal      metFORMIN (GLUCOPHAGE-XR) 500 mg 24 hr tablet Take 2 tablets (1,000 mg total) by mouth 2 (two) times a day with meals Start with 1 pill 500mg with breakfast x 1 week, then 1 pill 500mg with breakfast and 1 pill with dinner for 1 week , then 2 pills with breakfast (1000mg) and 1 pill with dinner (500 mg) for 1 week, then 1000mg twice a day going forward, Starting Tue 5/7/2024, Normal      !! OneTouch  Delica Lancets 33G MISC May substitute brand based on insurance coverage. Check glucose TID., Normal      topiramate (TOPAMAX) 50 MG tablet Take 50 mg by mouth 2 (two) times a day, Starting Thu 3/10/2022, Historical Med       !! - Potential duplicate medications found. Please discuss with provider.          No discharge procedures on file.    PDMP Review       None            ED Provider  Electronically Signed by             Shaila Hernandez PA-C  07/01/24 1543

## 2024-07-01 NOTE — PROGRESS NOTES
"[facility] Follow Up Note:  Followup inbasket reminder received.  Chart review completed.  Outside records through Care Everywhere requested and refreshed.   Patient has had 3 additional ED visits for non life threatening medical concerns in past 3 weeks.  Patient also did not keep his appointment with Vascular surgery on 6/21/24.     Telephone call attempted today for follow up.  No answer.    Left voicemail message with general contact information with name, title, call back phone number office hours when I am available and message encouraging return call to this .        Patient returned my phone call.   Patient confirms he did not keep appointment with vascular surgeon, but is unable to offer a reason for missed appointment.  Offered to assist patient to reschedule.  Patient declines and states he has the phone number and will call to reschedule.    ED Follow-up Assessment    Do you have an appointment with your primary care provider?  Patient states he does not have a PCP.   2.   Do you have a way to get to your appointments? Patient states family members drive him to his appointments.   3.   Do you know medical reasons when you should go the ED vs. an urgent care center vs a PCP office? \"No not really\".  4.   Do you need helping in scheduling appointments?  No    Interventions: (Add to care plan)  Patient education provided on appropriate access to care.  Access to care tool mailed to patient.  Provided patient with phone number to [facility].  Offered to assist patient with calling office to schedule an appointment.  Patient declined assistance.  Information provided on Urgent Care Center in [place].     All above information mailed to patient for teaching reinforcement.   "

## 2024-07-24 ENCOUNTER — HOSPITAL ENCOUNTER (EMERGENCY)
Facility: HOSPITAL | Age: 19
End: 2024-07-26
Attending: EMERGENCY MEDICINE
Payer: COMMERCIAL

## 2024-07-24 DIAGNOSIS — R45.851 THREATENING SUICIDE: Primary | ICD-10-CM

## 2024-07-24 DIAGNOSIS — F32.9 MAJOR DEPRESSION: ICD-10-CM

## 2024-07-24 LAB
ALBUMIN SERPL BCG-MCNC: 4.3 G/DL (ref 3.5–5)
ALP SERPL-CCNC: 98 U/L (ref 34–104)
ALT SERPL W P-5'-P-CCNC: 29 U/L (ref 7–52)
AMPHETAMINES SERPL QL SCN: NEGATIVE
ANION GAP SERPL CALCULATED.3IONS-SCNC: 14 MMOL/L (ref 4–13)
AST SERPL W P-5'-P-CCNC: 28 U/L (ref 13–39)
BACTERIA UR QL AUTO: ABNORMAL /HPF
BARBITURATES UR QL: NEGATIVE
BASOPHILS # BLD AUTO: 0.04 THOUSANDS/ÂΜL (ref 0–0.1)
BASOPHILS NFR BLD AUTO: 0 % (ref 0–1)
BENZODIAZ UR QL: NEGATIVE
BILIRUB SERPL-MCNC: 0.32 MG/DL (ref 0.2–1)
BILIRUB UR QL STRIP: NEGATIVE
BUN SERPL-MCNC: 14 MG/DL (ref 5–25)
CALCIUM SERPL-MCNC: 9.4 MG/DL (ref 8.4–10.2)
CHLORIDE SERPL-SCNC: 100 MMOL/L (ref 96–108)
CLARITY UR: CLEAR
CO2 SERPL-SCNC: 20 MMOL/L (ref 21–32)
COCAINE UR QL: NEGATIVE
COLOR UR: YELLOW
CREAT SERPL-MCNC: 0.71 MG/DL (ref 0.6–1.3)
EOSINOPHIL # BLD AUTO: 0.28 THOUSAND/ÂΜL (ref 0–0.61)
EOSINOPHIL NFR BLD AUTO: 2 % (ref 0–6)
ERYTHROCYTE [DISTWIDTH] IN BLOOD BY AUTOMATED COUNT: 14.7 % (ref 11.6–15.1)
ETHANOL SERPL-MCNC: <10 MG/DL
FENTANYL UR QL SCN: NEGATIVE
GFR SERPL CREATININE-BSD FRML MDRD: 135 ML/MIN/1.73SQ M
GLUCOSE SERPL-MCNC: 162 MG/DL (ref 65–140)
GLUCOSE UR STRIP-MCNC: NEGATIVE MG/DL
HCT VFR BLD AUTO: 41.6 % (ref 36.5–49.3)
HGB BLD-MCNC: 13.2 G/DL (ref 12–17)
HGB UR QL STRIP.AUTO: NEGATIVE
HYALINE CASTS #/AREA URNS LPF: ABNORMAL /LPF
HYDROCODONE UR QL SCN: NEGATIVE
IMM GRANULOCYTES # BLD AUTO: 0.06 THOUSAND/UL (ref 0–0.2)
IMM GRANULOCYTES NFR BLD AUTO: 1 % (ref 0–2)
KETONES UR STRIP-MCNC: ABNORMAL MG/DL
LEUKOCYTE ESTERASE UR QL STRIP: NEGATIVE
LYMPHOCYTES # BLD AUTO: 3.55 THOUSANDS/ÂΜL (ref 0.6–4.47)
LYMPHOCYTES NFR BLD AUTO: 29 % (ref 14–44)
MCH RBC QN AUTO: 27.6 PG (ref 26.8–34.3)
MCHC RBC AUTO-ENTMCNC: 31.7 G/DL (ref 31.4–37.4)
MCV RBC AUTO: 87 FL (ref 82–98)
METHADONE UR QL: NEGATIVE
MONOCYTES # BLD AUTO: 0.63 THOUSAND/ÂΜL (ref 0.17–1.22)
MONOCYTES NFR BLD AUTO: 5 % (ref 4–12)
MUCOUS THREADS UR QL AUTO: ABNORMAL
NEUTROPHILS # BLD AUTO: 7.81 THOUSANDS/ÂΜL (ref 1.85–7.62)
NEUTS SEG NFR BLD AUTO: 63 % (ref 43–75)
NITRITE UR QL STRIP: NEGATIVE
NON-SQ EPI CELLS URNS QL MICRO: ABNORMAL /HPF
NRBC BLD AUTO-RTO: 0 /100 WBCS
OPIATES UR QL SCN: NEGATIVE
OXYCODONE+OXYMORPHONE UR QL SCN: NEGATIVE
PCP UR QL: NEGATIVE
PH UR STRIP.AUTO: 5.5 [PH]
PLATELET # BLD AUTO: 349 THOUSANDS/UL (ref 149–390)
PMV BLD AUTO: 10.8 FL (ref 8.9–12.7)
POTASSIUM SERPL-SCNC: 3.7 MMOL/L (ref 3.5–5.3)
PROT SERPL-MCNC: 8.2 G/DL (ref 6.4–8.4)
PROT UR STRIP-MCNC: ABNORMAL MG/DL
RBC # BLD AUTO: 4.78 MILLION/UL (ref 3.88–5.62)
RBC #/AREA URNS AUTO: ABNORMAL /HPF
SODIUM SERPL-SCNC: 134 MMOL/L (ref 135–147)
SP GR UR STRIP.AUTO: >=1.03 (ref 1–1.03)
THC UR QL: POSITIVE
TSH SERPL DL<=0.05 MIU/L-ACNC: 4.13 UIU/ML (ref 0.45–4.5)
UROBILINOGEN UR STRIP-ACNC: 2 MG/DL
WBC # BLD AUTO: 12.37 THOUSAND/UL (ref 4.31–10.16)
WBC #/AREA URNS AUTO: ABNORMAL /HPF

## 2024-07-24 PROCEDURE — 99285 EMERGENCY DEPT VISIT HI MDM: CPT | Performed by: EMERGENCY MEDICINE

## 2024-07-24 PROCEDURE — 85025 COMPLETE CBC W/AUTO DIFF WBC: CPT | Performed by: EMERGENCY MEDICINE

## 2024-07-24 PROCEDURE — 80307 DRUG TEST PRSMV CHEM ANLYZR: CPT | Performed by: EMERGENCY MEDICINE

## 2024-07-24 PROCEDURE — 36415 COLL VENOUS BLD VENIPUNCTURE: CPT | Performed by: EMERGENCY MEDICINE

## 2024-07-24 PROCEDURE — 99285 EMERGENCY DEPT VISIT HI MDM: CPT

## 2024-07-24 PROCEDURE — 81001 URINALYSIS AUTO W/SCOPE: CPT | Performed by: EMERGENCY MEDICINE

## 2024-07-24 PROCEDURE — 82077 ASSAY SPEC XCP UR&BREATH IA: CPT | Performed by: EMERGENCY MEDICINE

## 2024-07-24 PROCEDURE — 84443 ASSAY THYROID STIM HORMONE: CPT | Performed by: EMERGENCY MEDICINE

## 2024-07-24 PROCEDURE — 80053 COMPREHEN METABOLIC PANEL: CPT | Performed by: EMERGENCY MEDICINE

## 2024-07-25 LAB
GLUCOSE SERPL-MCNC: 181 MG/DL (ref 65–140)
GLUCOSE SERPL-MCNC: 203 MG/DL (ref 65–140)
GLUCOSE SERPL-MCNC: 226 MG/DL (ref 65–140)
SARS-COV-2 RNA RESP QL NAA+PROBE: NEGATIVE

## 2024-07-25 PROCEDURE — 87635 SARS-COV-2 COVID-19 AMP PRB: CPT | Performed by: EMERGENCY MEDICINE

## 2024-07-25 PROCEDURE — 94002 VENT MGMT INPAT INIT DAY: CPT

## 2024-07-25 PROCEDURE — 96372 THER/PROPH/DIAG INJ SC/IM: CPT

## 2024-07-25 PROCEDURE — 82948 REAGENT STRIP/BLOOD GLUCOSE: CPT

## 2024-07-25 PROCEDURE — 94760 N-INVAS EAR/PLS OXIMETRY 1: CPT

## 2024-07-25 RX ORDER — INSULIN GLARGINE 100 [IU]/ML
30 INJECTION, SOLUTION SUBCUTANEOUS
Status: DISCONTINUED | OUTPATIENT
Start: 2024-07-25 | End: 2024-07-26 | Stop reason: HOSPADM

## 2024-07-25 RX ORDER — FLUOXETINE 10 MG/1
10 CAPSULE ORAL DAILY
Status: DISCONTINUED | OUTPATIENT
Start: 2024-07-25 | End: 2024-07-26 | Stop reason: HOSPADM

## 2024-07-25 RX ORDER — INSULIN LISPRO 100 [IU]/ML
9 INJECTION, SOLUTION INTRAVENOUS; SUBCUTANEOUS
Status: DISCONTINUED | OUTPATIENT
Start: 2024-07-25 | End: 2024-07-26 | Stop reason: HOSPADM

## 2024-07-25 RX ORDER — TOPIRAMATE 25 MG/1
50 TABLET ORAL 2 TIMES DAILY
Status: DISCONTINUED | OUTPATIENT
Start: 2024-07-25 | End: 2024-07-26 | Stop reason: HOSPADM

## 2024-07-25 RX ADMIN — METFORMIN HYDROCHLORIDE 1000 MG: 500 TABLET ORAL at 17:05

## 2024-07-25 RX ADMIN — INSULIN LISPRO 9 UNITS: 100 INJECTION, SOLUTION INTRAVENOUS; SUBCUTANEOUS at 17:06

## 2024-07-25 RX ADMIN — TOPIRAMATE 50 MG: 25 TABLET, FILM COATED ORAL at 18:51

## 2024-07-25 RX ADMIN — INSULIN GLARGINE 30 UNITS: 100 INJECTION, SOLUTION SUBCUTANEOUS at 22:34

## 2024-07-25 NOTE — ED NOTES
Pt pockets checked by security, no knife. Pt is pleasant, cooperative. MD at bedside at this time     Shi Abbott, RN  07/24/24 4918

## 2024-07-25 NOTE — ED PROVIDER NOTES
"Final Diagnosis:  1. Threatening suicide        Chief Complaint   Patient presents with    Psychiatric Evaluation     Pt arrives with police escort reported that he has an argument with mom,  \"She pushed my button\".  Reported that he held the knife, mom call police for pt evaluated for SI. Pt denies SI/HI. He said that he held knife and put it back.        HPI  Pt pres w/ police. Mother called because they were arguing he grabbed a knife, which he says he grabbed to \"gain leverage\" (won't elaborate) then went to room to cool off. He said she messed up his attempt to smoke weed. He also wrote a note which is presented to me by the crisis worker on a paper towel that says \"I'm going to kill myself. Hope you're happy\" here he denies SIHI    EMS additionally reports:     - Previous charting underwent limited review with attention to last ED visits, labs, ekgs, and prior imaging.  Chart review reveals :     Admission on 06/14/2024, Discharged on 06/14/2024   Component Date Value Ref Range Status    WBC 06/14/2024 16.54 (H)  4.31 - 10.16 Thousand/uL Final    RBC 06/14/2024 4.29  3.88 - 5.62 Million/uL Final    Hemoglobin 06/14/2024 11.8 (L)  12.0 - 17.0 g/dL Final    Hematocrit 06/14/2024 37.2  36.5 - 49.3 % Final    MCV 06/14/2024 87  82 - 98 fL Final    MCH 06/14/2024 27.5  26.8 - 34.3 pg Final    MCHC 06/14/2024 31.7  31.4 - 37.4 g/dL Final    RDW 06/14/2024 16.0 (H)  11.6 - 15.1 % Final    MPV 06/14/2024 10.6  8.9 - 12.7 fL Final    Platelets 06/14/2024 339  149 - 390 Thousands/uL Final    nRBC 06/14/2024 0  /100 WBCs Final    Segmented % 06/14/2024 74  43 - 75 % Final    Immature Grans % 06/14/2024 0  0 - 2 % Final    Lymphocytes % 06/14/2024 18  14 - 44 % Final    Monocytes % 06/14/2024 6  4 - 12 % Final    Eosinophils Relative 06/14/2024 2  0 - 6 % Final    Basophils Relative 06/14/2024 0  0 - 1 % Final    Absolute Neutrophils 06/14/2024 12.22 (H)  1.85 - 7.62 Thousands/µL Final    Absolute Immature Grans " 06/14/2024 0.07  0.00 - 0.20 Thousand/uL Final    Absolute Lymphocytes 06/14/2024 2.92  0.60 - 4.47 Thousands/µL Final    Absolute Monocytes 06/14/2024 0.97  0.17 - 1.22 Thousand/µL Final    Eosinophils Absolute 06/14/2024 0.32  0.00 - 0.61 Thousand/µL Final    Basophils Absolute 06/14/2024 0.04  0.00 - 0.10 Thousands/µL Final    Sodium 06/14/2024 131 (L)  135 - 147 mmol/L Final    Potassium 06/14/2024 4.0  3.5 - 5.3 mmol/L Final    Chloride 06/14/2024 100  96 - 108 mmol/L Final    CO2 06/14/2024 19 (L)  21 - 32 mmol/L Final    ANION GAP 06/14/2024 12  4 - 13 mmol/L Final    BUN 06/14/2024 16  5 - 25 mg/dL Final    Creatinine 06/14/2024 0.81  0.60 - 1.30 mg/dL Final    Standardized to IDMS reference method    Glucose 06/14/2024 228 (H)  65 - 140 mg/dL Final    If the patient is fasting, the ADA then defines impaired fasting glucose as > 100 mg/dL and diabetes as > or equal to 123 mg/dL.    Calcium 06/14/2024 9.0  8.4 - 10.2 mg/dL Final    AST 06/14/2024 24  13 - 39 U/L Final    ALT 06/14/2024 25  7 - 52 U/L Final    Specimen collection should occur prior to Sulfasalazine administration due to the potential for falsely depressed results.     Alkaline Phosphatase 06/14/2024 85  34 - 104 U/L Final    Total Protein 06/14/2024 8.0  6.4 - 8.4 g/dL Final    Albumin 06/14/2024 3.8  3.5 - 5.0 g/dL Final    Total Bilirubin 06/14/2024 0.48  0.20 - 1.00 mg/dL Final    Use of this assay is not recommended for patients undergoing treatment with eltrombopag due to the potential for falsely elevated results.  N-acetyl-p-benzoquinone imine (metabolite of Acetaminophen) will generate erroneously low results in samples for patients that have taken an overdose of Acetaminophen.    eGFR 06/14/2024 127  ml/min/1.73sq m Final    Total CK 06/14/2024 44  39 - 308 U/L Final    POC Glucose 06/14/2024 226 (H)  65 - 140 mg/dl Final    Blood Culture 06/14/2024 No Growth After 5 Days.   Final    Blood Culture 06/14/2024 No Growth After 5 Days.    Final    LACTIC ACID 06/14/2024 1.8  0.5 - 2.0 mmol/L Final    Ventricular Rate 06/14/2024 108  BPM Final    Atrial Rate 06/14/2024 108  BPM Final    CO Interval 06/14/2024 140  ms Final    QRSD Interval 06/14/2024 92  ms Final    QT Interval 06/14/2024 330  ms Final    QTC Interval 06/14/2024 442  ms Final    P Axis 06/14/2024 48  degrees Final    QRS Axis 06/14/2024 70  degrees Final    T Wave Axis 06/14/2024 52  degrees Final       - No language barrier.   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    PSH:   has a past surgical history that includes TONSILECTOMY AND ADNOIDECTOMY; pr i&d vulva/perineal abscess (N/A, 5/11/2024); Incision and drainage of wound (Left, 5/13/2024); and Incision and drainage of wound (Left, 5/15/2024).     Social History:  Tobacco Use: High Risk (7/24/2024)    Patient History     Smoking Tobacco Use: Every Day     Smokeless Tobacco Use: Current     Passive Exposure: Yes     Alcohol Use: Unknown (1/18/2023)    Received from [facility]    AUDIT-C     Frequency of Alcohol Consumption: Patient declined     Average Number of Drinks: Patient declined     Frequency of Binge Drinking: Patient declined     No illicit use       ROS:  Pertinent positives/negatives: .     Some ROS may be present in the HPI and would take precedent over these standard questions asked below.   Review of Systems   Psychiatric/Behavioral:  Positive for behavioral problems and dysphoric mood.         CONSTITUTIONAL:  No lethargy. No unexpected weight loss. No change in behavior.  EYES:  No pain, redness, or discharge. No loss of vision. No orbital trauma or pain.   ENT:  No  tinnitus or decreased hearing. No epistaxis/purulent rhinorrhea. No voice change, airway closing, trismus.   CARDIOVASCULAR:  No chest pain. No skin mottling or pallor. No change in exertional capacity  RESPIRATORY:  No hemoptysis. No paroxysmal nocturnal dyspnea. No stridor. No apnea or bluing.   GASTROINTESTINAL:  No vomiting, diarrhea. No distension. No melena. No hematochezia.   GENITOURINARY:  No nocturia. No hematuria or foul smelling or cloudy urine. No discharge. No sores/adenopathy.   MUSCULOSKELETAL:  No contracture.  No new deformity.   INTEGUMENTARY:  No swelling. No unexpected contusions. No abrasions. No lymphangitis.  NEUROLOGIC:  No meningismus. No new numbness of the extremities. No new focal weakness. No postural instability  PSYCHIATRIC:  No SI HI AVH  HEMATOLOGICAL:  No bleeding. No petechiae. No bruising.  ALLERGIES:  No urticaria. No sudden abd cramping. No stridor.    PE:     Physical exam highlights:   Physical Exam       Vitals:    07/24/24 2155 07/25/24 0500   BP: 138/78    BP Location: Right arm    Pulse: 100 98   Resp: 18 (!) 24   Temp: 98 °F (36.7 °C)    TempSrc: Oral    SpO2: 96% 98%   Weight: (!) 171 kg (378 lb)      Vitals reviewed by me.   Nursing note reviewed  Chaperone present for all sensitive exam.  Const: No acute distress. Alert. Nontoxic. Not diaphoretic.    HEENT: External ears normal. No protrusion drainage swelling. Nose normal. No drainage/traumatic deformity. MM. Mouth with baseline/symmetric movement. No trismus.   Eyes: No squinting. No icterus. No tearing/swelling/drainage. Tracks through the room with normal EOM.   Neck: ROM normal. No rigidity. No meningismus.  Cards: Rate as per vitals Compared to monitor sinus unless documented. Regular Well perfused.  Pulm: Effort and excursion normal. No distress. No audible wheezing/no stridor. Normal resp rate without retraction or change in work of breathing.  Abd: No distension beyond baseline. No fluctuant wave. Patient  without peritoneal pain with shifting/bumping the bed.   MSK: ROM normal baseline. No deformity. No contractures from baseline.   Skin: No new rashes visible. Well perfused. No wounds visualized on exposed skin  Neuro: Nonfocal. Baseline. CN grossly intact. Moving all four with coordination.   Psych: Normal behavior and affect.        A:  - Nursing note reviewed.    Ddx and MDM  Considered diagnoses    Pt is medically clear for crisis evaluation        Dispo decision       My conversation with consultant reveals:       Decision rules:                           My read of the XR/CT scan reveals:     No orders to display       Orders Placed This Encounter   Procedures    Rapid drug screen, urine    CBC and differential    Comprehensive metabolic panel    TSH    Ethanol    Urinalysis with microscopic    Diet Regular; Finger Foods    Nursing communication Prepare room for behavioral health patient    Virtual Patient Observation- Low Suicidal Risk    Cpap     Labs Reviewed   RAPID DRUG SCREEN, URINE - Abnormal       Result Value Ref Range Status    Amph/Meth UR Negative  Negative Final    Barbiturate Ur Negative  Negative Final    Benzodiazepine Urine Negative  Negative Final    Cocaine Urine Negative  Negative Final    Methadone Urine Negative  Negative Final    Opiate Urine Negative  Negative Final    PCP Ur Negative  Negative Final    THC Urine Positive (*) Negative Final    Oxycodone Urine Negative  Negative Final    Fentanyl Urine Negative  Negative Final    HYDROCODONE URINE Negative  Negative Final    Narrative:     Presumptive report. If requested, specimen will be sent to reference lab for confirmation.  FOR MEDICAL PURPOSES ONLY.   IF CONFIRMATION NEEDED PLEASE CONTACT THE LAB WITHIN 5 DAYS.    Drug Screen Cutoff Levels:  AMPHETAMINE/METHAMPHETAMINES  1000 ng/mL  BARBITURATES     200 ng/mL  BENZODIAZEPINES     200 ng/mL  COCAINE      300 ng/mL  METHADONE      300 ng/mL  OPIATES      300  ng/mL  PHENCYCLIDINE     25 ng/mL  THC       50 ng/mL  OXYCODONE      100 ng/mL  FENTANYL      5 ng/mL  HYDROCODONE     300 ng/mL   CBC AND DIFFERENTIAL - Abnormal    WBC 12.37 (*) 4.31 - 10.16 Thousand/uL Final    RBC 4.78  3.88 - 5.62 Million/uL Final    Hemoglobin 13.2  12.0 - 17.0 g/dL Final    Hematocrit 41.6  36.5 - 49.3 % Final    MCV 87  82 - 98 fL Final    MCH 27.6  26.8 - 34.3 pg Final    MCHC 31.7  31.4 - 37.4 g/dL Final    RDW 14.7  11.6 - 15.1 % Final    MPV 10.8  8.9 - 12.7 fL Final    Platelets 349  149 - 390 Thousands/uL Final    nRBC 0  /100 WBCs Final    Segmented % 63  43 - 75 % Final    Immature Grans % 1  0 - 2 % Final    Lymphocytes % 29  14 - 44 % Final    Monocytes % 5  4 - 12 % Final    Eosinophils Relative 2  0 - 6 % Final    Basophils Relative 0  0 - 1 % Final    Absolute Neutrophils 7.81 (*) 1.85 - 7.62 Thousands/µL Final    Absolute Immature Grans 0.06  0.00 - 0.20 Thousand/uL Final    Absolute Lymphocytes 3.55  0.60 - 4.47 Thousands/µL Final    Absolute Monocytes 0.63  0.17 - 1.22 Thousand/µL Final    Eosinophils Absolute 0.28  0.00 - 0.61 Thousand/µL Final    Basophils Absolute 0.04  0.00 - 0.10 Thousands/µL Final   COMPREHENSIVE METABOLIC PANEL - Abnormal    Sodium 134 (*) 135 - 147 mmol/L Final    Potassium 3.7  3.5 - 5.3 mmol/L Final    Chloride 100  96 - 108 mmol/L Final    CO2 20 (*) 21 - 32 mmol/L Final    ANION GAP 14 (*) 4 - 13 mmol/L Final    BUN 14  5 - 25 mg/dL Final    Creatinine 0.71  0.60 - 1.30 mg/dL Final    Comment: Standardized to IDMS reference method    Glucose 162 (*) 65 - 140 mg/dL Final    Comment: If the patient is fasting, the ADA then defines impaired fasting glucose as > 100 mg/dL and diabetes as > or equal to 123 mg/dL.    Calcium 9.4  8.4 - 10.2 mg/dL Final    AST 28  13 - 39 U/L Final    ALT 29  7 - 52 U/L Final    Comment: Specimen collection should occur prior to Sulfasalazine administration due to the potential for falsely depressed results.      Alkaline Phosphatase 98  34 - 104 U/L Final    Total Protein 8.2  6.4 - 8.4 g/dL Final    Albumin 4.3  3.5 - 5.0 g/dL Final    Total Bilirubin 0.32  0.20 - 1.00 mg/dL Final    Comment: Use of this assay is not recommended for patients undergoing treatment with eltrombopag due to the potential for falsely elevated results.  N-acetyl-p-benzoquinone imine (metabolite of Acetaminophen) will generate erroneously low results in samples for patients that have taken an overdose of Acetaminophen.    eGFR 135  ml/min/1.73sq m Final    Narrative:     National Kidney Disease Foundation guidelines for Chronic Kidney Disease (CKD):     Stage 1 with normal or high GFR (GFR > 90 mL/min/1.73 square meters)    Stage 2 Mild CKD (GFR = 60-89 mL/min/1.73 square meters)    Stage 3A Moderate CKD (GFR = 45-59 mL/min/1.73 square meters)    Stage 3B Moderate CKD (GFR = 30-44 mL/min/1.73 square meters)    Stage 4 Severe CKD (GFR = 15-29 mL/min/1.73 square meters)    Stage 5 End Stage CKD (GFR <15 mL/min/1.73 square meters)  Note: GFR calculation is accurate only with a steady state creatinine   URINALYSIS WITH MICROSCOPIC - Abnormal    Color, UA Yellow   Final    Clarity, UA Clear   Final    Specific Gravity, UA >=1.030  1.005 - 1.030 Final    pH, UA 5.5  4.5, 5.0, 5.5, 6.0, 6.5, 7.0, 7.5, 8.0 Final    Leukocytes, UA Negative  Negative Final    Nitrite, UA Negative  Negative Final    Protein, UA 30 (1+) (*) Negative mg/dl Final    Glucose, UA Negative  Negative mg/dl Final    Ketones, UA Trace (*) Negative mg/dl Final    Urobilinogen, UA 2.0 (*) <2.0 mg/dl mg/dl Final    Bilirubin, UA Negative  Negative Final    Occult Blood, UA Negative  Negative Final    RBC, UA 0-1 (*) None Seen, 2-4 /hpf Final    WBC, UA 0-1 (*) None Seen, 2-4, 5-60 /hpf Final    Epithelial Cells Occasional  None Seen, Occasional /hpf Final    Bacteria, UA Occasional  None Seen, Occasional /hpf Final    MUCUS THREADS Innumerable (*) None Seen Final    Hyaline Casts, UA  0-1 (*) (none) /lpf Final   TSH, 3RD GENERATION - Normal    TSH 3RD GENERATON 4.135  0.450 - 4.500 uIU/mL Final    Comment: Adult TSH (3rd generation) reference range follows the recommended guidelines of the American Thyroid Association, January, 2020.   MEDICAL ALCOHOL - Normal    Ethanol Lvl <10  <10 mg/dL Final       *Each of these labs was reviewed. Particular standout labs will be noted in the ED Course above     Final Diagnosis:  1. Threatening suicide          P:  - hospital tx includes   Medications - No data to display      - disposition  Time reflects when diagnosis was documented in both MDM as applicable and the Disposition within this note       Time User Action Codes Description Comment    7/25/2024  6:05 AM Kanu Carter Add [R45.851] Threatening suicide           ED Disposition       None          Follow-up Information    None         - patient will call their PCP to let them know they were in the emergency department. We discuss return precautions and patient is agreeable with plan and aformentioned disposition.       - additional treatment intended, if consistent with primary provider:  - patient to follow with :      Patient's Medications   Discharge Prescriptions    No medications on file     No discharge procedures on file.  Prior to Admission Medications   Prescriptions Last Dose Informant Patient Reported? Taking?   Abilify Maintena 400 MG injection   Yes No   Sig: INJECT 2 ML INTRAMUSCUALRLY EVERY 4 WEEKS AS DIRECTED   Alcohol Swabs 70 % PADS   No No   Sig: May substitute brand based on insurance coverage. Check glucose TID.   Blood Glucose Monitoring Suppl (OneTouch Verio Reflect) w/Device KIT   No No   Sig: May substitute brand based on insurance coverage. Check glucose TID.   Blood Glucose Monitoring Suppl (OneTouch Verio) w/Device KIT   No No   Sig: Use 2 (two) times a day Ok to substitute with insurance covered brand   FLUoxetine (PROzac) 10 mg capsule   No No   Sig: Take 1 capsule  "(10 mg total) by mouth daily   Insulin Glargine Solostar (Lantus SoloStar) 100 UNIT/ML SOPN   No No   Sig: Inject 0.3 mL (30 Units total) under the skin daily at bedtime   Insulin Pen Needle (BD Pen Needle Shani 2nd Gen) 32G X 4 MM MISC   No No   Sig: For use with insulin pen. Pharmacy may dispense brand covered by insurance.   Lancets (onetouch ultrasoft) lancets   No No   Sig: Use as instructed   OneTouch Delica Lancets 33G MISC   No No   Sig: May substitute brand based on insurance coverage. Check glucose TID.   albuterol (2.5 mg/3 mL) 0.083 % nebulizer solution   No No   Sig: Take 3 mL (2.5 mg total) by nebulization every 6 (six) hours as needed for wheezing or shortness of breath   glucose blood (OneTouch Verio) test strip   No No   Sig: TEST BLOOD SUGAR TWICE A DAY   glucose blood (OneTouch Verio) test strip   No No   Sig: May substitute brand based on insurance coverage. Check glucose TID.   insulin aspart (NovoLOG FlexPen) 100 UNIT/ML injection pen   No No   Sig: Inject 9 Units under the skin 3 (three) times a day with meals   metFORMIN (GLUCOPHAGE-XR) 500 mg 24 hr tablet   No No   Sig: Take 2 tablets (1,000 mg total) by mouth 2 (two) times a day with meals Start with 1 pill 500mg with breakfast x 1 week, then 1 pill 500mg with breakfast and 1 pill with dinner for 1 week , then 2 pills with breakfast (1000mg) and 1 pill with dinner (500mg) for 1 week, then 1000mg twice a day going forward   topiramate (TOPAMAX) 50 MG tablet   Yes No   Sig: Take 50 mg by mouth 2 (two) times a day      Facility-Administered Medications: None       Portions of the record may have been created with voice recognition software. Occasional wrong word or \"sound a like\" substitutions may have occurred due to the inherent limitations of voice recognition software. Read the chart carefully and recognize, using context, where substitutions have occurred.    Electronically signed by:  MD Kanu Villafuerte, " MD  07/25/24 0608

## 2024-07-25 NOTE — ED NOTES
"7/25/24 @ 1009:  PES spoke to Mine at [facility] and requested copy of crisis assessment that was completed overnight.  Hanna MS    1055: PES received copy of [facility] crisis assessment.  Hanna MS    1105: PES forwarded clinical to [facility].  Hanna MS    1234: PES called [facility] for update:  Patient declined due to \"needing long term care.\"  Hanna MS    1241: PES faxed clinical to [facility].  Hanna MS    1425: Paulina from [facility] reports that Psychiatrist wanted \"more information on the H&P.\"  PES and Paulina reviewed H&P and couldn't determine what \"more\" they wanted.  Paulina is attending a meeting at 1430 with the staff and will inquire about what information is needed.  Hanna MS  "

## 2024-07-25 NOTE — ED CARE HANDOFF
Emergency Department Sign Out Note        Sign out and transfer of care from Dr. Carter. See Separate Emergency Department note.     The patient, Rik Marin, was evaluated by the previous provider for psychiatric evaluation.    Workup Completed:  Medically cleared    ED Course / Workup Pending (followup):  Awaiting placement on a voluntary basis                                     Procedures  Medical Decision Making  Amount and/or Complexity of Data Reviewed  Labs: ordered.            Disposition  Final diagnoses:   Threatening suicide     Time reflects when diagnosis was documented in both MDM as applicable and the Disposition within this note       Time User Action Codes Description Comment    7/25/2024  6:05 AM Kanu Carter Add [R45.851] Threatening suicide           ED Disposition       None          Follow-up Information    None       Patient's Medications   Discharge Prescriptions    No medications on file     No discharge procedures on file.       ED Provider  Electronically Signed by     Mino Bhatt MD  07/25/24 0777

## 2024-07-25 NOTE — ED NOTES
RN spoke to Dr. Bhatt regarding the patient's medication with regards to his insulin coverage.  Patient gets covered during meals with novolog and coverage of lantus at bedside. Now using portable phone with POA monitoring.     Amada Barrow RN  07/25/24 4258

## 2024-07-25 NOTE — ED NOTES
SLIME spoke with the patient's mother, Lissa,  to find out what is going on. Lissa stated the patient was saying all day how he was giving up and wants to hurt himself. The patient was shoving his mom. The patient all day has been saying he wanted to kill himself. Lissa stated the patient has not been taking his meds. The last time he took his meds was on 7/4. The patient is not checking his blood sugar. The patient is not caring for himself ( not showering etc ) The patient is stealing and selling things to people for money. Lissa is afraid the patient is participating in risky behaviors. The patient wrote a suicide note which mom provided to SLIME. The patient wrote the note and then grabbed a knife and went in to the room. Lissa then called PD. Lissa said her son comes here and says there is nothing wrong but at home has fits of rage threatening to kill himself. Mom wants him admitted to inpatient [facility]E TAMEZ

## 2024-07-26 ENCOUNTER — PATIENT OUTREACH (OUTPATIENT)
Dept: CASE MANAGEMENT | Facility: OTHER | Age: 19
End: 2024-07-26

## 2024-07-26 VITALS
TEMPERATURE: 97.7 F | SYSTOLIC BLOOD PRESSURE: 122 MMHG | BODY MASS INDEX: 48.53 KG/M2 | OXYGEN SATURATION: 100 % | HEART RATE: 90 BPM | RESPIRATION RATE: 20 BRPM | DIASTOLIC BLOOD PRESSURE: 58 MMHG | WEIGHT: 315 LBS

## 2024-07-26 LAB
GLUCOSE SERPL-MCNC: 201 MG/DL (ref 65–140)
GLUCOSE SERPL-MCNC: 245 MG/DL (ref 65–140)

## 2024-07-26 PROCEDURE — 94660 CPAP INITIATION&MGMT: CPT

## 2024-07-26 PROCEDURE — 94760 N-INVAS EAR/PLS OXIMETRY 1: CPT

## 2024-07-26 PROCEDURE — 82948 REAGENT STRIP/BLOOD GLUCOSE: CPT

## 2024-07-26 PROCEDURE — 96372 THER/PROPH/DIAG INJ SC/IM: CPT

## 2024-07-26 RX ADMIN — INSULIN LISPRO 9 UNITS: 100 INJECTION, SOLUTION INTRAVENOUS; SUBCUTANEOUS at 12:07

## 2024-07-26 RX ADMIN — FLUOXETINE 10 MG: 10 CAPSULE ORAL at 09:48

## 2024-07-26 RX ADMIN — TOPIRAMATE 50 MG: 25 TABLET, FILM COATED ORAL at 09:53

## 2024-07-26 RX ADMIN — METFORMIN HYDROCHLORIDE 1000 MG: 500 TABLET ORAL at 09:17

## 2024-07-26 RX ADMIN — INSULIN LISPRO 9 UNITS: 100 INJECTION, SOLUTION INTRAVENOUS; SUBCUTANEOUS at 10:15

## 2024-07-26 NOTE — ED NOTES
Transport here for pickup, all personal belonging are sent with transport team.      Shi Abbott, ALICE  07/26/24 1421

## 2024-07-26 NOTE — ED NOTES
Patient is accepted at [facility]   Patient is accepted by Dr. Velázquez per admissions      Transportation is arranged with [facility].     Transportation is scheduled for TBD  Patient may go to the floor at D         The RN from [facility] will call here to complete the nurse report       Deana TAMEZ

## 2024-07-26 NOTE — ED NOTES
PES called over to [facility] to check on the status of the patient being transported. No one answered and PES had to leave a message       Deana TAMEZ

## 2024-07-26 NOTE — ED NOTES
Shima Suarez at the facility made aware eta. Including that pt was on Cpap while in ED.      Shi Abbott RN  07/26/24 3218

## 2024-07-26 NOTE — ED NOTES
Patient brought to ED room 3 for virtual observation while he sleeps with his CPAP     Noemy Mcelroy RN  07/26/24 0109

## 2024-07-26 NOTE — ED NOTES
Consents, pre cert and lab work requested all sent to [facility]     Deana REYES [facility]

## 2024-07-26 NOTE — ED NOTES
"7/26/24 @ 0717:  PES received call from Martina at [facility] reporting that they \"never received all the requested paperwork.\"  PES states that notes indicate everything was sent, but that no one was answering for Nurse to Nurse.  Martina again stated that they never received paperwork, therefore, PES resent lab results, COVID, and Precert information.  MS Hanna    0750: After 6 attempts to fax, PES contacted Martina @ [facility] and she suggested email to:  [email or web address], which was completed.  MS Hanna    0940: PES called [facility] for update and spoke to Paulina who reports forwarding all paperwork to unit; will await call back for nurse to nurse.  Hanna MS    1201: PES called [facility] for update and Paulina suggested that RN call for nurse to nurse @ [phone].  PES provided RN with number and she will call.  Following call, PES will set up transport.  MS Hanna    1230: Nurse to Nurse completed; will set up transport.  Hanna MS    1231: Transportation is arranged with [facility]. . PES requested 1400 pickup time.  MS Hanna    Transportation is scheduled for 1400 [facility]  PES notified ED staff and Paulina @ [facility].    HAROONmaura MS                 "

## 2024-07-26 NOTE — PROGRESS NOTES
Outpatient Care Management Note:  Inbasket  ADT ER alert received. Chart review completed.  Patient currently in the ED at [facility].  Patient presented to ED on 7/24/24 with police escort for psychiatric evaluation as patient was threatening suicide after having an argument with his mother. Per chart review patient was accepted to [facility] and is awaiting transfer.    Will plan for future outreach once discharged from facility. IB reminder set.

## 2024-07-26 NOTE — ED NOTES
Insurance Authorization for admission:   Phone call placed to [employer]   Phone number: [phone]   Spoke to Tyler GIBBS     5 days approved.  Level of care: inpatient mental health   Review on 7/29/24  Authorization # [id number]       Deana TAMEZ

## 2024-07-26 NOTE — EMTALA/ACUTE CARE TRANSFER
[facility] EMERGENCY DEPARTMENT  [address] [address]  Dept: [phone]      EMTALA TRANSFER CONSENT    NAME Rik Marin                                         2005                              MRN [id number]    I have been informed of my rights regarding examination, treatment, and transfer   by Dr. Kee Mendez DO    Benefits: Specialized equipment and/or services available at the receiving facility (Include comment)________________________ ()    Risks: Potential for delay in receiving treatment, Potential deterioration of medical condition, Increased discomfort during transfer, Possible worsening of condition or death during transfer      Consent for Transfer:  I acknowledge that my medical condition has been evaluated and explained to me by the emergency department physician or other qualified medical person and/or my attending physician, who has recommended that I be transferred to the service of  Accepting Physician: Dr Velázquez at Accepting Facility Name, City & State : [facility]. The above potential benefits of such transfer, the potential risks associated with such transfer, and the probable risks of not being transferred have been explained to me, and I fully understand them.  The doctor has explained that, in my case, the benefits of transfer outweigh the risks.  I agree to be transferred.    I authorize the performance of emergency medical procedures and treatments upon me in both transit and upon arrival at the receiving facility.  Additionally, I authorize the release of any and all medical records to the receiving facility and request they be transported with me, if possible.  I understand that the safest mode of transportation during a medical emergency is an ambulance and that the Hospital advocates the use of this mode of transport. Risks of traveling to the receiving facility by car, including absence of medical control, life sustaining  equipment, such as oxygen, and medical personnel has been explained to me and I fully understand them.    (FRANSICO CORRECT BOX BELOW)  [  ]  I consent to the stated transfer and to be transported by ambulance/helicopter.  [  ]  I consent to the stated transfer, but refuse transportation by ambulance and accept full responsibility for my transportation by car.  I understand the risks of non-ambulance transfers and I exonerate the Hospital and its staff from any deterioration in my condition that results from this refusal.    X___________________________________________    DATE  24  TIME________  Signature of patient or legally responsible individual signing on patient behalf           RELATIONSHIP TO PATIENT_________________________          Provider Certification    NAME Rik Marin                                         2005                              MRN [id number]    A medical screening exam was performed on the above named patient.  Based on the examination:    Condition Necessitating Transfer The primary encounter diagnosis was Threatening suicide. A diagnosis of Major depression was also pertinent to this visit.    Patient Condition: The patient has been stabilized such that within reasonable medical probability, no material deterioration of the patient condition or the condition of the unborn child(keren) is likely to result from the transfer    Reason for Transfer: Level of Care needed not available at this facility ()    Transfer Requirements: Facility [facility]   Space available and qualified personnel available for treatment as acknowledged by    Agreed to accept transfer and to provide appropriate medical treatment as acknowledged by       Dr Velázquez  Appropriate medical records of the examination and treatment of the patient are provided at the time of transfer   STAFF INITIAL WHEN COMPLETED _______  Transfer will be performed by qualified personnel from    and appropriate transfer  equipment as required, including the use of necessary and appropriate life support measures.    Provider Certification: I have examined the patient and explained the following risks and benefits of being transferred/refusing transfer to the patient/family:  General risk, such as traffic hazards, adverse weather conditions, rough terrain or turbulence, possible failure of equipment (including vehicle or aircraft), or consequences of actions of persons outside the control of the transport personnel, Unanticipated needs of medical equipment and personnel during transport, Risk of worsening condition, The possibility of a transport vehicle being unavailable      Based on these reasonable risks and benefits to the patient and/or the unborn child(keren), and based upon the information available at the time of the patient’s examination, I certify that the medical benefits reasonably to be expected from the provision of appropriate medical treatments at another medical facility outweigh the increasing risks, if any, to the individual’s medical condition, and in the case of labor to the unborn child, from effecting the transfer.    X____________________________________________ DATE 07/26/24        TIME_______      ORIGINAL - SEND TO MEDICAL RECORDS   COPY - SEND WITH PATIENT DURING TRANSFER

## 2024-08-18 ENCOUNTER — HOSPITAL ENCOUNTER (EMERGENCY)
Facility: HOSPITAL | Age: 19
Discharge: HOME | End: 2024-08-19
Attending: EMERGENCY MEDICINE
Payer: COMMERCIAL

## 2024-08-18 ENCOUNTER — APPOINTMENT (EMERGENCY)
Dept: RADIOLOGY | Facility: HOSPITAL | Age: 19
End: 2024-08-18
Payer: COMMERCIAL

## 2024-08-18 VITALS
TEMPERATURE: 98.8 F | OXYGEN SATURATION: 98 % | RESPIRATION RATE: 20 BRPM | SYSTOLIC BLOOD PRESSURE: 140 MMHG | DIASTOLIC BLOOD PRESSURE: 77 MMHG | HEART RATE: 105 BPM

## 2024-08-18 DIAGNOSIS — S83.91XA RIGHT KNEE SPRAIN: Primary | ICD-10-CM

## 2024-08-18 PROCEDURE — 73564 X-RAY EXAM KNEE 4 OR MORE: CPT

## 2024-08-18 PROCEDURE — 99283 EMERGENCY DEPT VISIT LOW MDM: CPT

## 2024-08-18 PROCEDURE — 99284 EMERGENCY DEPT VISIT MOD MDM: CPT | Performed by: EMERGENCY MEDICINE

## 2024-08-18 RX ORDER — ACETAMINOPHEN 325 MG/1
650 TABLET ORAL ONCE
Status: COMPLETED | OUTPATIENT
Start: 2024-08-18 | End: 2024-08-18

## 2024-08-18 RX ADMIN — ACETAMINOPHEN 650 MG: 325 TABLET ORAL at 21:30

## 2024-08-19 RX ORDER — IBUPROFEN 600 MG/1
600 TABLET, FILM COATED ORAL ONCE
Status: COMPLETED | OUTPATIENT
Start: 2024-08-19 | End: 2024-08-19

## 2024-08-19 RX ADMIN — IBUPROFEN 600 MG: 600 TABLET, FILM COATED ORAL at 01:10

## 2024-08-19 NOTE — ED PROVIDER NOTES
"History  Chief Complaint   Patient presents with    Knee Pain     Pt fell , no LOC, did not hit head, not on thinners. Reports hearing a \"pop\" and now has pain at right knee.      Patient is a 19-year-old male with a history of ADHD, diabetes that presents emergency department with complaint of right knee pain.  Patient states that he slipped and fell prior to arrival onto his left knee.  Patient states that he heard a pop in his knee at that time.  He has not taken any medication for relief.      History provided by:  Patient   used: No    Knee Pain  Associated symptoms: no back pain and no fever        Prior to Admission Medications   Prescriptions Last Dose Informant Patient Reported? Taking?   Abilify Maintena 400 MG injection   Yes No   Sig: INJECT 2 ML INTRAMUSCUALRLY EVERY 4 WEEKS AS DIRECTED   Alcohol Swabs 70 % PADS   No No   Sig: May substitute brand based on insurance coverage. Check glucose TID.   Blood Glucose Monitoring Suppl (OneTouch Verio Reflect) w/Device KIT   No No   Sig: May substitute brand based on insurance coverage. Check glucose TID.   Blood Glucose Monitoring Suppl (OneTouch Verio) w/Device KIT   No No   Sig: Use 2 (two) times a day Ok to substitute with insurance covered brand   FLUoxetine (PROzac) 10 mg capsule   No No   Sig: Take 1 capsule (10 mg total) by mouth daily   Insulin Glargine Solostar (Lantus SoloStar) 100 UNIT/ML SOPN   No No   Sig: Inject 0.3 mL (30 Units total) under the skin daily at bedtime   Insulin Pen Needle (BD Pen Needle Shani 2nd Gen) 32G X 4 MM MISC   No No   Sig: For use with insulin pen. Pharmacy may dispense brand covered by insurance.   Lancets (onetouch ultrasoft) lancets   No No   Sig: Use as instructed   OneTouch Delica Lancets 33G MISC   No No   Sig: May substitute brand based on insurance coverage. Check glucose TID.   albuterol (2.5 mg/3 mL) 0.083 % nebulizer solution   No No   Sig: Take 3 mL (2.5 mg total) by nebulization every 6 " (six) hours as needed for wheezing or shortness of breath   glucose blood (OneTouch Verio) test strip   No No   Sig: TEST BLOOD SUGAR TWICE A DAY   glucose blood (OneTouch Verio) test strip   No No   Sig: May substitute brand based on insurance coverage. Check glucose TID.   insulin aspart (NovoLOG FlexPen) 100 UNIT/ML injection pen   No No   Sig: Inject 9 Units under the skin 3 (three) times a day with meals   metFORMIN (GLUCOPHAGE-XR) 500 mg 24 hr tablet   No No   Sig: Take 2 tablets (1,000 mg total) by mouth 2 (two) times a day with meals Start with 1 pill 500mg with breakfast x 1 week, then 1 pill 500mg with breakfast and 1 pill with dinner for 1 week , then 2 pills with breakfast (1000mg) and 1 pill with dinner (500mg) for 1 week, then 1000mg twice a day going forward   topiramate (TOPAMAX) 50 MG tablet   Yes No   Sig: Take 50 mg by mouth 2 (two) times a day      Facility-Administered Medications: None       Past Medical History:   Diagnosis Date    ADHD (attention deficit hyperactivity disorder) 05/11/2024    Diabetes mellitus ([facility])     5/2023    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        Past Surgical History:   Procedure Laterality Date    INCISION AND DRAINAGE OF WOUND Left 5/13/2024    Procedure: INCISION AND DRAINAGE (I&D) GROIN;  Surgeon: Tim Ch MD;  Location: [facility];  Service: General    INCISION AND DRAINAGE OF WOUND Left 5/15/2024    Procedure: INCISION AND DRAINAGE (I&D) GROIN;  Surgeon: Tim Ch MD;  Location: [facility];  Service: General    DE I&D VULVA/PERINEAL ABSCESS N/A 5/11/2024    Procedure: INCISION AND DRAINAGE (I&D) PERINEAL ABSCESS;  Surgeon: Tim Ch MD;  Location: [facility];  Service: General    TONSILECTOMY AND ADNOIDECTOMY      2020       Family History   Problem Relation Age of Onset    Hyperlipidemia Mother     Diabetes type II Mother      Obesity Mother     Obesity Father     Drug abuse Father     Diabetes type II Maternal Aunt     Lung disease Maternal Aunt     Rheum arthritis Maternal Aunt     Fibromyalgia Maternal Aunt     Atrial fibrillation Maternal Grandmother     Hyperlipidemia Maternal Grandfather     Diabetes type II Maternal Grandfather     Stroke Maternal Grandfather     Heart disease Maternal Grandfather     Stroke Paternal Grandfather      I have reviewed and agree with the history as documented.    E-Cigarette/Vaping    E-Cigarette Use Current Some Day User     Comments smoke cigg. when he has money      E-Cigarette/Vaping Substances    Nicotine Yes     THC No     CBD No     Flavoring Yes     Other No     Unknown No      Social History     Tobacco Use    Smoking status: Every Day     Types: Cigarettes     Passive exposure: Yes    Smokeless tobacco: Current   Vaping Use    Vaping status: Some Days    Substances: Nicotine, Flavoring   Substance Use Topics    Alcohol use: Yes     Alcohol/week: 2.0 standard drinks of alcohol     Types: 2 Shots of liquor per week     Comment: when available to him    Drug use: Yes     Types: Marijuana       Review of Systems   Constitutional:  Negative for chills and fever.   Respiratory:  Negative for cough, shortness of breath and wheezing.    Cardiovascular:  Negative for chest pain and palpitations.   Gastrointestinal:  Negative for abdominal pain, constipation, diarrhea, nausea and vomiting.   Genitourinary:  Negative for dysuria, flank pain, hematuria and urgency.   Musculoskeletal:  Positive for gait problem and joint swelling. Negative for back pain.   Skin:  Negative for color change and rash.   All other systems reviewed and are negative.      Physical Exam  Physical Exam  Vitals and nursing note reviewed.   Constitutional:       Appearance: He is well-developed.   HENT:      Head: Normocephalic and atraumatic.   Eyes:      Extraocular Movements: Extraocular movements intact.      Pupils: Pupils  are equal, round, and reactive to light.   Pulmonary:      Effort: Pulmonary effort is normal. No respiratory distress.   Abdominal:      Tenderness: There is no abdominal tenderness.   Musculoskeletal:         General: Swelling, tenderness and signs of injury present. No deformity.      Cervical back: Normal range of motion and neck supple.      Right hip: Normal.      Left hip: Normal.      Left upper leg: Normal.      Right knee: Bony tenderness present. Decreased range of motion. Tenderness present over the medial joint line and lateral joint line.      Left knee: Normal.      Right lower leg: Normal.      Left lower leg: Normal.      Right ankle: Normal.      Left ankle: Normal.   Skin:     General: Skin is warm and dry.      Capillary Refill: Capillary refill takes less than 2 seconds.   Neurological:      General: No focal deficit present.      Mental Status: He is alert and oriented to person, place, and time.   Psychiatric:         Behavior: Behavior normal.         Thought Content: Thought content normal.         Judgment: Judgment normal.         Vital Signs  ED Triage Vitals [08/18/24 2117]   Temperature Pulse Respirations Blood Pressure SpO2   98.8 °F (37.1 °C) 105 20 140/77 98 %      Temp Source Heart Rate Source Patient Position - Orthostatic VS BP Location FiO2 (%)   Tympanic -- -- -- --      Pain Score       10 - Worst Possible Pain           Vitals:    08/18/24 2117   BP: 140/77   Pulse: 105         Visual Acuity      ED Medications  Medications   acetaminophen (TYLENOL) tablet 650 mg (650 mg Oral Given 8/18/24 2130)       Diagnostic Studies  Results Reviewed       None                   XR knee 4+ vw right injury    (Results Pending)              Procedures  Orthopedic injury treatment    Date/Time: 8/18/2024 9:00 PM    Performed by: Bernie Woods DO  Authorized by: Bernie Woods DO    Patient Location:  ED  [facility] Protocol:  Procedure performed by: (Mila JENKINS)  Consent: Verbal  "consent obtained.  Risks and benefits: risks, benefits and alternatives were discussed  Consent given by: patient  Time out: Immediately prior to procedure a \"time out\" was called to verify the correct patient, procedure, equipment, support staff and site/side marked as required.  Timeout called at: 8/18/2024 9:00 PM.  Patient understanding: patient states understanding of the procedure being performed  Patient consent: the patient's understanding of the procedure matches consent given  Required items: required blood products, implants, devices, and special equipment available  Patient identity confirmed: verbally with patient    Injury location:  Knee  Location details:  Right knee  Injury type:  Soft tissue  Neurovascular status: Neurovascularly intact    Distal perfusion: normal    Neurological function: normal    Range of motion: reduced    Local anesthesia used?: No    Skeletal traction used?: No    Immobilization:  Splint and knee immobilizer  Supplies used:  Aluminum splint  Neurovascular status: Neurovascularly intact    Distal perfusion: normal    Neurological function: normal    Range of motion: unchanged    Patient tolerance:  Patient tolerated the procedure well with no immediate complications           ED Course         CRAFFT      Flowsheet Row Most Recent Value   CRAFFT Initial Screen: During the past 12 months, did you:    1. Drink any alcohol (more than a few sips)?  No Filed at: 08/18/2024 2120   2. Smoke any marijuana or hashish Yes Filed at: 08/18/2024 2120   3. Use anything else to get high? (\"anything else\" includes illegal drugs, over the counter and prescription drugs, and things that you sniff or 'patel')? No Filed at: 08/18/2024 2120   CRAFFT Full Screen: During the past 12 months:    1. Have you ever ridden in a car driven by someone (including yourself) who was \"high\" or had been using alcohol or drugs? 0 Filed at: 08/18/2024 2120   2. Do you ever use alcohol or drugs to relax, feel better " about yourself, or fit in? 0 Filed at: 08/18/2024 2120   3. Do you ever use alcohol/drugs while you are by yourself, alone? 0 Filed at: 08/18/2024 2120   4. Do you ever forget things you did while using alcohol or drugs? 0 Filed at: 08/18/2024 2120   5. Do your family or friends ever tell you that you should cut down on your drinking or drug use? 0 Filed at: 08/18/2024 2120   6. Have you gotten into trouble while you were using alcohol or drugs? 0 Filed at: 08/18/2024 2120   CRAFFT Score 0 Filed at: 08/18/2024 2120                                              Medical Decision Making  19-year-old male in the ED with complaint of right knee pain after mechanical fall.  X-ray reviewed and negative for acute osseous abnormality, however given patient's significant discomfort we will place patient in knee immobilizer and provide crutches for ambulation.  Patient's pain treated with Tylenol.  Recommended that patient continue Tylenol or ibuprofen as needed for pain relief and to follow-up with orthopedics as an outpatient.  Patient agrees with plan.    Amount and/or Complexity of Data Reviewed  Radiology: ordered.    Risk  OTC drugs.                 Disposition  Final diagnoses:   Right knee sprain     Time reflects when diagnosis was documented in both MDM as applicable and the Disposition within this note       Time User Action Codes Description Comment    8/18/2024  9:41 PM Bernie Woods Add [S83.91XA] Right knee sprain           ED Disposition       ED Disposition   Discharge    Condition   Stable    Date/Time   Sun Aug 18, 2024 2141    Comment   Rik Marin discharge to home/self care.                   Follow-up Information       Follow up With Specialties Details Why Contact Info Additional Information    Gustavo Fajardo MD  Schedule an appointment as soon as possible for a visit in 1 day for follow up [address] [address]  [phone]       [facility]  [facility] [place] [facility] Schedule an appointment as soon as possible for a visit in 1 day for follow up [address]  [facility] [address] [phone] [facility] [place], [address], [place], [place], [address] [phone]            Patient's Medications   Discharge Prescriptions    No medications on file       No discharge procedures on file.    PDMP Review       None            ED Provider  Electronically Signed by             Bernie Woods DO  08/18/24 7656

## 2024-08-19 NOTE — DISCHARGE INSTRUCTIONS
Return to the ER for further concerns or worsening symptoms  Follow up with your primary care physician and orthopedics in 1-2 days  Keep knee immobilizer in place while awake  Use crutches when walking until cleared by physician  Continue Tylenol and ibuprofen as needed for pain

## 2024-08-20 ENCOUNTER — OFFICE VISIT (OUTPATIENT)
Dept: OBGYN CLINIC | Facility: CLINIC | Age: 19
End: 2024-08-20
Payer: COMMERCIAL

## 2024-08-20 ENCOUNTER — TELEPHONE (OUTPATIENT)
Age: 19
End: 2024-08-20

## 2024-08-20 VITALS — WEIGHT: 315 LBS | BODY MASS INDEX: 40.43 KG/M2 | HEIGHT: 74 IN

## 2024-08-20 DIAGNOSIS — R73.09 BLOOD SUGAR INCREASED: ICD-10-CM

## 2024-08-20 DIAGNOSIS — M23.91 INTERNAL DERANGEMENT OF RIGHT KNEE: Primary | ICD-10-CM

## 2024-08-20 DIAGNOSIS — M25.461 EFFUSION OF RIGHT KNEE: ICD-10-CM

## 2024-08-20 PROCEDURE — 99214 OFFICE O/P EST MOD 30 MIN: CPT | Performed by: ORTHOPAEDIC SURGERY

## 2024-08-20 RX ORDER — IBUPROFEN 800 MG/1
800 TABLET, FILM COATED ORAL EVERY 8 HOURS PRN
Qty: 30 TABLET | Refills: 0 | Status: SHIPPED | OUTPATIENT
Start: 2024-08-20

## 2024-08-20 NOTE — TELEPHONE ENCOUNTER
Caller: Rik     Doctor: Ann     Reason for call: was just in and wanted to ask if you could call in a RX for Ibuprofen 800 mg to [facility] [place]- [phone] for his knee pain     Call back#: [phone]

## 2024-08-20 NOTE — PROGRESS NOTES
"Assessment/Plan:  1. Internal derangement of right knee  ibuprofen (MOTRIN) 800 mg tablet      2. Effusion of right knee  MRI knee right  wo contrast    T-Rom  Post Op Knee Brace      3. Blood sugar increased  Hemoglobin A1C    Hemoglobin A1C        The patient has a large knee effusion with traumatic episode 2 days ago. I can not full assess his patellar tendon due to his body habitus and knee effusion today. I would like an MRI to rule out a patellar tendon injury and also to assess for progressive of his prior chondral injury and loose body from patellar instability episode in the past. He was fit with a TROM brace today. He can continue to use his walker for assistance. He will FU after his MRI to discuss the results and how to proceed.     Subjective:   Rik Marin is a 19 y.o. male who presents today for follow-up of his left knee. He has history of prior patellar instability. He had been doing relatively well up until 2 days ago when he slipped walking to his room and fell. He called EMS after this episode and was taken to the ED. Xrays showed a joint effusion but no acute osseous abnormality. He notes anterior and anteromedial knee pain, which is worse with activity and better with use of his walker. He notes swelling about the knee. He denies any patellar instability at the time of his injury. He notes his sugars have been \"well controlled\". Last Hgb A1c we have on file from 5/7/24 was 11.6.      Review of Systems   Constitutional: Negative.  Negative for chills and fever.   HENT: Negative.  Negative for ear pain and sore throat.    Eyes: Negative.  Negative for pain and redness.   Respiratory: Negative.  Negative for shortness of breath and wheezing.    Cardiovascular:  Negative for chest pain and palpitations.   Gastrointestinal: Negative.  Negative for abdominal pain and blood in stool.   Endocrine: Negative.  Negative for polydipsia and polyuria.   Genitourinary: Negative.  Negative for difficulty " urinating and dysuria.   Musculoskeletal:         As noted in HPI   Skin: Negative.  Negative for pallor and rash.   Neurological: Negative.  Negative for dizziness and numbness.   Hematological: Negative.  Negative for adenopathy. Does not bruise/bleed easily.   Psychiatric/Behavioral: Negative.  Negative for confusion and suicidal ideas.          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   • Gram-negative bacteremia 05/13/2024   • Lung collapse    • Sepsis ([facility]) 05/06/2024   • Sleep apnea    • Viral meningitis        Past Surgical History:   Procedure Laterality Date   • INCISION AND DRAINAGE OF WOUND Left 5/13/2024    Procedure: INCISION AND DRAINAGE (I&D) GROIN;  Surgeon: Tim Ch MD;  Location: [facility];  Service: General   • INCISION AND DRAINAGE OF WOUND Left 5/15/2024    Procedure: INCISION AND DRAINAGE (I&D) GROIN;  Surgeon: Tim Ch MD;  Location: [facility];  Service: General   • NV I&D VULVA/PERINEAL ABSCESS N/A 5/11/2024    Procedure: INCISION AND DRAINAGE (I&D) PERINEAL ABSCESS;  Surgeon: Tim Ch MD;  Location: [facility];  Service: General   • TONSILECTOMY AND ADNOIDECTOMY      2020       Family History   Problem Relation Age of Onset   • Hyperlipidemia Mother    • Diabetes type II Mother    • Obesity Mother    • Obesity Father    • Drug abuse Father    • Diabetes type II Maternal Aunt    • Lung disease Maternal Aunt    • Rheum arthritis Maternal Aunt    • Fibromyalgia Maternal Aunt    • Atrial fibrillation Maternal Grandmother    • Hyperlipidemia Maternal Grandfather    • Diabetes type II Maternal Grandfather    • Stroke Maternal Grandfather    • Heart disease Maternal Grandfather    • Stroke Paternal Grandfather        Social History     Occupational History   • Not on file   Tobacco Use   • Smoking status: Every Day     Types: Cigarettes      Passive exposure: Yes   • Smokeless tobacco: Current   Vaping Use   • Vaping status: Some Days   • Substances: Nicotine, Flavoring   Substance and Sexual Activity   • Alcohol use: Yes     Alcohol/week: 2.0 standard drinks of alcohol     Types: 2 Shots of liquor per week     Comment: when available to him   • Drug use: Yes     Types: Marijuana   • Sexual activity: Never         Current Outpatient Medications:   •  ibuprofen (MOTRIN) 800 mg tablet, Take 1 tablet (800 mg total) by mouth every 8 (eight) hours as needed for mild pain, Disp: 30 tablet, Rfl: 0  •  Abilify Maintena 400 MG injection, INJECT 2 ML INTRAMUSCUALRLY EVERY 4 WEEKS AS DIRECTED, Disp: , Rfl:   •  albuterol (2.5 mg/3 mL) 0.083 % nebulizer solution, Take 3 mL (2.5 mg total) by nebulization every 6 (six) hours as needed for wheezing or shortness of breath, Disp: 75 mL, Rfl: 0  •  Alcohol Swabs 70 % PADS, May substitute brand based on insurance coverage. Check glucose TID., Disp: 100 each, Rfl: 0  •  Blood Glucose Monitoring Suppl (OneTouch Verio Reflect) w/Device KIT, May substitute brand based on insurance coverage. Check glucose TID., Disp: 1 kit, Rfl: 1  •  Blood Glucose Monitoring Suppl (OneTouch Verio) w/Device KIT, Use 2 (two) times a day Ok to substitute with insurance covered brand, Disp: 1 kit, Rfl: 0  •  FLUoxetine (PROzac) 10 mg capsule, Take 1 capsule (10 mg total) by mouth daily, Disp: 30 capsule, Rfl: 0  •  glucose blood (OneTouch Verio) test strip, TEST BLOOD SUGAR TWICE A DAY, Disp: 100 strip, Rfl: 3  •  glucose blood (OneTouch Verio) test strip, May substitute brand based on insurance coverage. Check glucose TID., Disp: 100 each, Rfl: 1  •  insulin aspart (NovoLOG FlexPen) 100 UNIT/ML injection pen, Inject 9 Units under the skin 3 (three) times a day with meals, Disp: 15 mL, Rfl: 0  •  Insulin Glargine Solostar (Lantus SoloStar) 100 UNIT/ML SOPN, Inject 0.3 mL (30 Units total) under the skin daily at bedtime, Disp: 10 mL, Rfl: 0  •   Insulin Pen Needle (BD Pen Needle Shani 2nd Gen) 32G X 4 MM MISC, For use with insulin pen. Pharmacy may dispense brand covered by insurance., Disp: 100 each, Rfl: 0  •  Lancets (onetouch ultrasoft) lancets, Use as instructed, Disp: 100 each, Rfl: 3  •  metFORMIN (GLUCOPHAGE-XR) 500 mg 24 hr tablet, Take 2 tablets (1,000 mg total) by mouth 2 (two) times a day with meals Start with 1 pill 500mg with breakfast x 1 week, then 1 pill 500mg with breakfast and 1 pill with dinner for 1 week , then 2 pills with breakfast (1000mg) and 1 pill with dinner (500mg) for 1 week, then 1000mg twice a day going forward, Disp: 60 tablet, Rfl: 0  •  OneTouch Delica Lancets 33G MISC, May substitute brand based on insurance coverage. Check glucose TID., Disp: 100 each, Rfl: 0  •  topiramate (TOPAMAX) 50 MG tablet, Take 50 mg by mouth 2 (two) times a day, Disp: , Rfl:     No Known Allergies    Objective:  Vitals:     Pain Score: 10-Worst pain ever      Right Knee Exam     Tenderness   The patient is experiencing tenderness in the medial retinaculum and patellar tendon (No quadriceps tendon defect appreciated. Difficult to assess for patellar tendon defect due to body habitus adn knee swelling.).    Range of Motion   Right knee extension: 0 passive.   Flexion:  90     Tests   Varus: negative Valgus: negative    Other   Erythema: absent  Sensation: normal  Pulse: present  Effusion: effusion present    Comments:  Patient unable to straight leg raise lying down. Patient able to extend knee some from seated position.           Observations     Right Knee   Positive for effusion.       Physical Exam  Constitutional:       General: He is not in acute distress.     Appearance: He is well-developed. He is obese.   HENT:      Head: Normocephalic and atraumatic.   Eyes:      General: No scleral icterus.     Conjunctiva/sclera: Conjunctivae normal.   Neck:      Vascular: No JVD.   Cardiovascular:      Rate and Rhythm: Normal rate.   Pulmonary:       Effort: Pulmonary effort is normal. No respiratory distress.   Musculoskeletal:      Right knee: Effusion present.      Comments: As per HPI   Skin:     General: Skin is warm.   Neurological:      Mental Status: He is alert and oriented to person, place, and time.      Coordination: Coordination normal.         I have personally reviewed pertinent films in PACS and my interpretation is as follows:  Xrays right knee from 8/18/24: Effusion. No acute osseous abnormality.     This document was created using speech voice recognition software.   Grammatical errors, random word insertions, pronoun errors, and incomplete sentences are an occasional consequence of this system due to software limitations, ambient noise, and hardware issues.   Any formal questions or concerns about content, text, or information contained within the body of this dictation should be directly addressed to the provider for clarification.

## 2024-08-22 ENCOUNTER — TELEPHONE (OUTPATIENT)
Dept: ENDOCRINOLOGY | Facility: CLINIC | Age: 19
End: 2024-08-22

## 2024-08-22 NOTE — TELEPHONE ENCOUNTER
Called patient and LM to reschedule his apt. He missed today 8/22/2024 with Norma Turner.  Will send No Show Letter.

## 2024-08-27 ENCOUNTER — PATIENT OUTREACH (OUTPATIENT)
Dept: CASE MANAGEMENT | Facility: OTHER | Age: 19
End: 2024-08-27

## 2024-08-27 NOTE — PROGRESS NOTES
Outpatient Care Management Note:  Inbasket reminder to complete 30 day chart review received.  Inbasket  ADT ER alert received. Chart review completed.  Patient was evaluated at [facility] ED department on 8/18/24 for complaints of right knee pain stemming from a fall. X ray of right knee negative for acute findings.  Patient was provided with knee immobilizer and crutches.  Patient discharged to home and Instructed to follow up with Ortho.    Patient followed up with  Ortho on 8/20/24.  OV notes reviewed. MRI scheduled for 9/11/24.  Patient has follow up appointment with Ortho scheduled for 9/18/24.    Patient discharged from [facility] on 8/6/24.  Patient may need psych follow up.    Patient missed his appointment with Endo on 8/22/24.     Future follow up outreach scheduled.

## 2024-08-28 NOTE — TELEPHONE ENCOUNTER
"Caller: Patient     Doctor: Ann    Reason for call: Rik is asking if Dr. Juarez can prescribe something else for his knee pain. Stated he would like a \"medical card\".    Call back#: [phone]  "

## 2024-09-11 ENCOUNTER — HOSPITAL ENCOUNTER (OUTPATIENT)
Dept: RADIOLOGY | Facility: HOSPITAL | Age: 19
Discharge: HOME | End: 2024-09-11
Payer: COMMERCIAL

## 2024-09-11 ENCOUNTER — APPOINTMENT (EMERGENCY)
Dept: RADIOLOGY | Facility: HOSPITAL | Age: 19
End: 2024-09-11
Payer: COMMERCIAL

## 2024-09-11 ENCOUNTER — HOSPITAL ENCOUNTER (EMERGENCY)
Facility: HOSPITAL | Age: 19
Discharge: HOME | End: 2024-09-11
Attending: STUDENT IN AN ORGANIZED HEALTH CARE EDUCATION/TRAINING PROGRAM
Payer: COMMERCIAL

## 2024-09-11 VITALS
TEMPERATURE: 97.5 F | HEART RATE: 94 BPM | SYSTOLIC BLOOD PRESSURE: 145 MMHG | RESPIRATION RATE: 20 BRPM | BODY MASS INDEX: 40.43 KG/M2 | HEIGHT: 74 IN | WEIGHT: 315 LBS | OXYGEN SATURATION: 97 % | DIASTOLIC BLOOD PRESSURE: 93 MMHG

## 2024-09-11 DIAGNOSIS — M25.461 EFFUSION OF RIGHT KNEE: ICD-10-CM

## 2024-09-11 DIAGNOSIS — M79.89 LEG SWELLING: Primary | ICD-10-CM

## 2024-09-11 PROCEDURE — 93970 EXTREMITY STUDY: CPT

## 2024-09-11 PROCEDURE — 99283 EMERGENCY DEPT VISIT LOW MDM: CPT | Performed by: STUDENT IN AN ORGANIZED HEALTH CARE EDUCATION/TRAINING PROGRAM

## 2024-09-11 PROCEDURE — 73721 MRI JNT OF LWR EXTRE W/O DYE: CPT

## 2024-09-11 PROCEDURE — NC001 PR NO CHARGE: Performed by: INTERNAL MEDICINE

## 2024-09-11 PROCEDURE — 99284 EMERGENCY DEPT VISIT MOD MDM: CPT

## 2024-09-11 NOTE — DISCHARGE INSTRUCTIONS
There were no blood clots found.  You have venous insufficiency.  Please follow-up with vascular surgery.  Please call schedule appoint.  Return to emergency room for shortness of breath, worsening swelling, pain, or for any other new or concerning symptoms.

## 2024-09-11 NOTE — ED PROVIDER NOTES
1. Leg swelling      ED Disposition       ED Disposition   Discharge    Condition   Stable    Date/Time   Wed Sep 11, 2024  9:14 AM    Comment   Rik Marin discharge to home/self care.                   Assessment & Plan       Medical Decision Making    Patient is a 19 y.o. male who presents to the ED for leg swelling, rule out DVT..  Patient is nontoxic, well-appearing.  Vitals are stable.    Differential includes but is not limited to: DVT, Baker's cyst, superficial thrombophlebitis.  Presentation not consistent with NSTI    Plan: Given family concern for DVT will evaluate with duplex                         ED Course as of 09/11/24 0951   Wed Sep 11, 2024   0915 Duplex is negative.  Discussed with patient.  Will discharge.  Return precautions discussed.  Patient verbalized understanding and agreed to plan of care.       Medications - No data to display    History of Present Illness         Leg Pain      Rik Marin is a 19 y.o. male who identifies as a male presenting to the Emergency Department for leg swelling, concern for DVT.  Patient states he was sent in by his mom as she was concerned that one of his legs was more swollen than normal.  He is unsure exactly which leg she was concerned about.  Patient himself denies any increased swelling or pain.  No reported fevers or chills.  No other complaints or concerns.    Review of Systems   Musculoskeletal:         Leg swelling     All other systems reviewed and are negative.          Objective     ED Triage Vitals [09/11/24 0753]   Temperature Pulse Blood Pressure Respirations SpO2 Patient Position - Orthostatic VS   97.5 °F (36.4 °C) 94 145/93 20 97 % Lying      Temp Source Heart Rate Source BP Location FiO2 (%) Pain Score    Oral Monitor Right arm -- No Pain        Physical Exam  Vitals and nursing note reviewed.   Constitutional:       General: He is not in acute distress.     Appearance: He is well-developed. He is not ill-appearing, toxic-appearing  or diaphoretic.   HENT:      Head: Normocephalic and atraumatic.      Right Ear: External ear normal.      Left Ear: External ear normal.      Nose: Nose normal.   Eyes:      General: Lids are normal. No scleral icterus.  Cardiovascular:      Rate and Rhythm: Normal rate and regular rhythm.   Pulmonary:      Effort: Pulmonary effort is normal. No respiratory distress.   Musculoskeletal:         General: No deformity. Normal range of motion.      Cervical back: Normal range of motion and neck supple.      Comments: The bilateral lower extremities are symmetric.  There is no increased warmth, redness.  No tenderness.  No crepitus.   Skin:     General: Skin is warm and dry.   Neurological:      General: No focal deficit present.      Mental Status: He is alert.   Psychiatric:         Mood and Affect: Mood normal.         Behavior: Behavior normal.         Labs Reviewed - No data to display  No orders to display       Procedures       Kee Mendez DO  09/11/24 0951

## 2024-09-17 ENCOUNTER — HOSPITAL ENCOUNTER (EMERGENCY)
Facility: HOSPITAL | Age: 19
Discharge: HOME | End: 2024-09-17
Attending: EMERGENCY MEDICINE
Payer: COMMERCIAL

## 2024-09-17 ENCOUNTER — APPOINTMENT (EMERGENCY)
Dept: RADIOLOGY | Facility: HOSPITAL | Age: 19
End: 2024-09-17
Payer: COMMERCIAL

## 2024-09-17 VITALS
SYSTOLIC BLOOD PRESSURE: 143 MMHG | RESPIRATION RATE: 14 BRPM | TEMPERATURE: 97 F | HEIGHT: 74 IN | BODY MASS INDEX: 40.43 KG/M2 | DIASTOLIC BLOOD PRESSURE: 90 MMHG | WEIGHT: 315 LBS | HEART RATE: 88 BPM | OXYGEN SATURATION: 97 %

## 2024-09-17 DIAGNOSIS — M25.561 RIGHT KNEE PAIN, UNSPECIFIED CHRONICITY: Primary | ICD-10-CM

## 2024-09-17 PROCEDURE — 73564 X-RAY EXAM KNEE 4 OR MORE: CPT

## 2024-09-17 PROCEDURE — 99284 EMERGENCY DEPT VISIT MOD MDM: CPT | Performed by: EMERGENCY MEDICINE

## 2024-09-17 PROCEDURE — 99283 EMERGENCY DEPT VISIT LOW MDM: CPT

## 2024-09-17 RX ORDER — IBUPROFEN 600 MG/1
600 TABLET, FILM COATED ORAL ONCE
Status: COMPLETED | OUTPATIENT
Start: 2024-09-17 | End: 2024-09-17

## 2024-09-17 RX ORDER — ACETAMINOPHEN 325 MG/1
975 TABLET ORAL ONCE
Status: COMPLETED | OUTPATIENT
Start: 2024-09-17 | End: 2024-09-17

## 2024-09-17 RX ADMIN — DICLOFENAC SODIUM 2 G: 10 GEL TOPICAL at 12:15

## 2024-09-17 RX ADMIN — ACETAMINOPHEN 975 MG: 325 TABLET, FILM COATED ORAL at 12:14

## 2024-09-17 RX ADMIN — IBUPROFEN 600 MG: 600 TABLET, FILM COATED ORAL at 12:14

## 2024-09-17 NOTE — DISCHARGE INSTRUCTIONS
Follow-up with your orthopedic surgeon.  Use the provided brace as discussed.  Please return to the emergency department if you develop worsening symptoms, severe pain, or anything else concerning to you.

## 2024-09-17 NOTE — ED PROVIDER NOTES
"1. Right knee pain, unspecified chronicity      ED Disposition       ED Disposition   Discharge    Condition   Stable    Date/Time   Tue Sep 17, 2024 12:45 PM    Comment   Rik Marin discharge to home/self care.                   Assessment & Plan       Medical Decision Making  19-year-old male presenting for evaluation of right knee pain.  Ongoing and following with orthopedics.  No acute injury.  The extremity is neurovascularly intact.  Repeat x-ray unremarkable, no fracture or dislocation.  Patient provided with knee brace for comfort.  Advised follow-up with orthopedics.  Return precautions discussed.    Problems Addressed:  Right knee pain, unspecified chronicity: acute illness or injury    Amount and/or Complexity of Data Reviewed  Radiology: ordered and independent interpretation performed.    Risk  OTC drugs.  Prescription drug management.                       Medications   acetaminophen (TYLENOL) tablet 975 mg (975 mg Oral Given 9/17/24 1214)   ibuprofen (MOTRIN) tablet 600 mg (600 mg Oral Given 9/17/24 1214)   Diclofenac Sodium (VOLTAREN) 1 % topical gel 2 g (2 g Topical Given 9/17/24 1215)       History of Present Illness       19-year-old male presenting for evaluation of right knee pain.  Patient reports ongoing pain in the right knee since an injury on 8/18/2024.  He has been following with orthopedics and was placed in a T ROM brace at his most recent visit.  He subsequently had an MRI which showed \" Evidence of recent transient lateral patellar dislocation with the MPFL again noted to insert on a chronic medial patellar avulsion fracture, unchanged. Chronic anterolateral femoral condyle osteochondral lesion also again demonstrated\".  Also evidence of a moderate hemarthrosis.  Patient reports that these results have not yet been discussed with orthopedics.  He presents today because of continued pain in the right knee.  He states that it is slightly worse today than previous days.  At times he " feels like the knee gives out on him.  He denies any new injury to the knee.  He feels as though walking to work every day exacerbates his pain.  No medications for pain today.            Review of Systems   Constitutional:  Negative for chills and fever.   Respiratory:  Negative for shortness of breath.    Cardiovascular:  Negative for chest pain.   Gastrointestinal:  Negative for abdominal pain.   Genitourinary:  Negative for flank pain.   Musculoskeletal:  Positive for arthralgias. Negative for gait problem.   Skin:  Negative for rash.   Neurological:  Negative for weakness and numbness.   All other systems reviewed and are negative.          Objective     ED Triage Vitals [09/17/24 1124]   Temperature Pulse Blood Pressure Respirations SpO2 Patient Position - Orthostatic VS   (!) 97 °F (36.1 °C) 88 143/90 14 97 % --      Temp src Heart Rate Source BP Location FiO2 (%) Pain Score    -- -- -- -- 3        Physical Exam  Vitals reviewed.   Constitutional:       General: He is not in acute distress.     Appearance: He is not ill-appearing.   HENT:      Head: Normocephalic and atraumatic.      Nose: Nose normal.      Mouth/Throat:      Mouth: Mucous membranes are moist.   Eyes:      Conjunctiva/sclera: Conjunctivae normal.   Cardiovascular:      Rate and Rhythm: Normal rate.      Comments: 2+ right DP pulse.  Pulmonary:      Effort: Pulmonary effort is normal.   Abdominal:      General: There is no distension.   Musculoskeletal:         General: Normal range of motion.      Cervical back: Normal range of motion and neck supple.      Comments: Range of motion of the right knee is intact with no significant pain.  Mild tenderness along the anteromedial aspect of the knee.  Mild swelling noted.   Skin:     General: Skin is warm and dry.      Findings: No rash.   Neurological:      Mental Status: He is alert.      Sensory: No sensory deficit.      Motor: No weakness.         Labs Reviewed - No data to display  XR knee 4+  views Right injury   ED Interpretation by Lanny Rosenberg MD (09/17 4175)   No acute fracture or dislocation. Independently interpreted by me.      Final Interpretation by Pardeep Driscoll MD (09/17 8015)      Subacute moderate-size joint effusion appearing similar to priors. No fracture or malalignment.         Computerized Assisted Algorithm (CAA) may have been used to analyze all applicable images.         Resident: Amado German I, the attending radiologist, have reviewed the images and agree with the final report above.      Workstation performed: [id number]             Procedures       Lanny Rosenberg MD  09/2004

## 2024-09-17 NOTE — Clinical Note
Rik Marin was seen and treated in our emergency department on 9/17/2024.                Diagnosis:     Rik  may return to work on return date.    He may return on this date: 09/20/2024         If you have any questions or concerns, please don't hesitate to call.      Lanny Rosenberg MD    ______________________________           _______________          _______________  Hospital Representative                              Date                                Time

## 2024-09-20 ENCOUNTER — PATIENT OUTREACH (OUTPATIENT)
Dept: CASE MANAGEMENT | Facility: OTHER | Age: 19
End: 2024-09-20

## 2024-09-20 NOTE — PROGRESS NOTES
Outpatient Care Management Note:  Inbasket  ADT ER alert received. Chart review completed.    Patient was evaluated at [facility] ED department on 9/11/24 for complaints of leg swelling and was concerned for DVT.  Duplex study was negative.      Patient returned to [facility] ED on 9/17/24 with complaints of right knee pain.  Was provided with a knee brace and instructed to continue follow up with Ortho.  Patient had an appointment with Ortho on 9/18/24 which he did not keep.     Patient has a history on non adherence to outpatient follow up:  9/18/24 No show with Ortho  8/22/24 No show with Endo  6/21/24 No show with Vascular  6/3/24 No show with General Surgery  5/31/24 No show with weight management    It is unclear to me at this time whether or not patient follows closely with PCP.    Telephone outreach attempt made to assist with care coordination.  No answer at either phone number attempted.   Voice mail reached was generic with no identifying factors.  Left voicemail message with general contact information with name, title, call back phone number office hours when I am available and message encouraging return call to this .    Will continue to monitor and attempt future outreach.

## 2024-09-23 ENCOUNTER — HOSPITAL ENCOUNTER (EMERGENCY)
Facility: HOSPITAL | Age: 19
Discharge: HOME | End: 2024-09-23
Attending: EMERGENCY MEDICINE
Payer: COMMERCIAL

## 2024-09-23 VITALS
SYSTOLIC BLOOD PRESSURE: 131 MMHG | TEMPERATURE: 98 F | RESPIRATION RATE: 20 BRPM | HEART RATE: 98 BPM | DIASTOLIC BLOOD PRESSURE: 72 MMHG | OXYGEN SATURATION: 97 %

## 2024-09-23 DIAGNOSIS — F32.A DEPRESSION, UNSPECIFIED DEPRESSION TYPE: Primary | ICD-10-CM

## 2024-09-23 PROCEDURE — 99284 EMERGENCY DEPT VISIT MOD MDM: CPT

## 2024-09-23 PROCEDURE — 99283 EMERGENCY DEPT VISIT LOW MDM: CPT | Performed by: EMERGENCY MEDICINE

## 2024-09-23 NOTE — Clinical Note
Rik Marin was seen and treated in our emergency department on 9/23/2024.                Diagnosis:     Rik  may return to work on return date.    He may return on this date: 09/24/2024         If you have any questions or concerns, please don't hesitate to call.      Richard Green MD    ______________________________           _______________          _______________  Hospital Representative                              Date                                Time

## 2024-09-24 NOTE — ED NOTES
20 y/o male presented to the ED by the Tabatha LOGAN. Pt arrives from home with reports of increased depression over his gambling habits and how he has been acting. Per police, told dispatch he had SI but has denied for triage, denies SI/HI, plans to hurt himself, just wants help with his gambling problem and depression. The patient is well known tot he ED. PES went in to speak with the patient and complete the crisis and safety assessment. The patient stated he was upset over how he has been acting lately so he called [facility] for a mobile. The patient stated he has anger issues and not acting like his self. PES asked what has been going on. The patient responded with I have been gambling addiction. PES asked what kind of gambling is he doing online or in person. The patient stated he is gambling ( playing poker) with his friends for money and items. The patient is upset that he is doing it. The patient says he has depression and sometimes is looking for someone to talk to. The patient denies SI/HI/AVH/Paranoia. The patient states he has some issues with anxiety as well. The patient reports having good sleep and a good appetite. The patient has hx of inpatient treatment can't not remember his last stay. The patient has been forgetting to take his medications sometimes, The patient stated he just started a new job at the [employer] and really enjoys it. The patient sees psych with [facility] and has an appointment next week on Friday. The patient is looking for some outpatient resources. Outpatient resources were provided for the patient     Deana TAMEZ

## 2024-09-24 NOTE — ED PROVIDER NOTES
1. Depression, unspecified depression type      ED Disposition       ED Disposition   Discharge    Condition   Stable    Date/Time   Mon Sep 23, 2024  8:56 PM    Comment   Rik Marin discharge to home/self care.                   Assessment & Plan       Medical Decision Making  19-year-old male, presents with depression.  Patient denies any suicidal or homicidal ideation.  Seen by ED crisis worker, given resources for outpatient follow-up.  Patient feels safe to be discharged, does not require inpatient psychiatric evaluation or treatment at this time.                     Medications - No data to display    History of Present Illness       19-year-old male, presents with depression.  Patient states he has been gambling on poker recently and he feels depressed about this and is looking for help.  Patient denying any thoughts about harming himself or others, has not attempted to harm himself.      History provided by:  Patient   used: No    Depression  Addiction Problem      Review of Systems   Constitutional: Negative.    Respiratory: Negative.     Cardiovascular: Negative.    Gastrointestinal: Negative.    Neurological: Negative.    Psychiatric/Behavioral:  Positive for depression.            Objective     ED Triage Vitals   Temperature Pulse Blood Pressure Respirations SpO2 Patient Position - Orthostatic VS   09/23/24 2059 09/23/24 2013 09/23/24 2013 09/23/24 2013 09/23/24 2013 --   98 °F (36.7 °C) 98 131/72 20 97 %       Temp Source Heart Rate Source BP Location FiO2 (%) Pain Score    09/23/24 2059 09/23/24 2013 -- -- --    Tympanic Monitor           Physical Exam  Vitals and nursing note reviewed.   Constitutional:       General: He is not in acute distress.  HENT:      Head: Normocephalic and atraumatic.   Eyes:      Extraocular Movements: Extraocular movements intact.      Pupils: Pupils are equal, round, and reactive to light.   Cardiovascular:      Rate and Rhythm: Normal rate.    Pulmonary:      Effort: Pulmonary effort is normal.   Neurological:      General: No focal deficit present.      Mental Status: He is alert and oriented to person, place, and time.      Motor: No weakness.      Gait: Gait normal.         Labs Reviewed - No data to display  No orders to display       Procedures    ED Medication and Procedure Management   Prior to Admission Medications   Prescriptions Last Dose Informant Patient Reported? Taking?   Abilify Maintena 400 MG injection   Yes No   Sig: INJECT 2 ML INTRAMUSCUALRLY EVERY 4 WEEKS AS DIRECTED   Alcohol Swabs 70 % PADS   No No   Sig: May substitute brand based on insurance coverage. Check glucose TID.   Blood Glucose Monitoring Suppl (OneTouch Verio Reflect) w/Device KIT   No No   Sig: May substitute brand based on insurance coverage. Check glucose TID.   Blood Glucose Monitoring Suppl (OneTouch Verio) w/Device KIT   No No   Sig: Use 2 (two) times a day Ok to substitute with insurance covered brand   FLUoxetine (PROzac) 10 mg capsule   No No   Sig: Take 1 capsule (10 mg total) by mouth daily   Insulin Glargine Solostar (Lantus SoloStar) 100 UNIT/ML SOPN   No No   Sig: Inject 0.3 mL (30 Units total) under the skin daily at bedtime   Insulin Pen Needle (BD Pen Needle Shani 2nd Gen) 32G X 4 MM MISC   No No   Sig: For use with insulin pen. Pharmacy may dispense brand covered by insurance.   Lancets (onetouch ultrasoft) lancets   No No   Sig: Use as instructed   OneTouch Delica Lancets 33G MISC   No No   Sig: May substitute brand based on insurance coverage. Check glucose TID.   albuterol (2.5 mg/3 mL) 0.083 % nebulizer solution   No No   Sig: Take 3 mL (2.5 mg total) by nebulization every 6 (six) hours as needed for wheezing or shortness of breath   glucose blood (OneTouch Verio) test strip   No No   Sig: TEST BLOOD SUGAR TWICE A DAY   glucose blood (OneTouch Verio) test strip   No No   Sig: May substitute brand based on insurance coverage. Check glucose TID.    ibuprofen (MOTRIN) 800 mg tablet   No No   Sig: Take 1 tablet (800 mg total) by mouth every 8 (eight) hours as needed for mild pain   insulin aspart (NovoLOG FlexPen) 100 UNIT/ML injection pen   No No   Sig: Inject 9 Units under the skin 3 (three) times a day with meals   metFORMIN (GLUCOPHAGE-XR) 500 mg 24 hr tablet   No No   Sig: Take 2 tablets (1,000 mg total) by mouth 2 (two) times a day with meals Start with 1 pill 500mg with breakfast x 1 week, then 1 pill 500mg with breakfast and 1 pill with dinner for 1 week , then 2 pills with breakfast (1000mg) and 1 pill with dinner (500mg) for 1 week, then 1000mg twice a day going forward   topiramate (TOPAMAX) 50 MG tablet   Yes No   Sig: Take 50 mg by mouth 2 (two) times a day      Facility-Administered Medications: None     Discharge Medication List as of 9/23/2024  8:57 PM        CONTINUE these medications which have NOT CHANGED    Details   Abilify Maintena 400 MG injection INJECT 2 ML INTRAMUSCUALRLY EVERY 4 WEEKS AS DIRECTED, Historical Med      albuterol (2.5 mg/3 mL) 0.083 % nebulizer solution Take 3 mL (2.5 mg total) by nebulization every 6 (six) hours as needed for wheezing or shortness of breath, Starting Wed 5/8/2024, Normal      Alcohol Swabs 70 % PADS May substitute brand based on insurance coverage. Check glucose TID., Normal      !! Blood Glucose Monitoring Suppl (OneTouch Verio Reflect) w/Device KIT May substitute brand based on insurance coverage. Check glucose TID., Normal      !! Blood Glucose Monitoring Suppl (OneTouch Verio) w/Device KIT Use 2 (two) times a day Ok to substitute with insurance covered brand, Starting Fri 10/27/2023, Normal      FLUoxetine (PROzac) 10 mg capsule Take 1 capsule (10 mg total) by mouth daily, Starting Wed 5/8/2024, Normal      !! glucose blood (OneTouch Verio) test strip TEST BLOOD SUGAR TWICE A DAY, Normal      !! glucose blood (OneTouch Verio) test strip May substitute brand based on insurance coverage. Check  glucose TID., Normal      ibuprofen (MOTRIN) 800 mg tablet Take 1 tablet (800 mg total) by mouth every 8 (eight) hours as needed for mild pain, Starting Tue 8/20/2024, Normal      insulin aspart (NovoLOG FlexPen) 100 UNIT/ML injection pen Inject 9 Units under the skin 3 (three) times a day with meals, Starting Wed 5/8/2024, Normal      Insulin Glargine Solostar (Lantus SoloStar) 100 UNIT/ML SOPN Inject 0.3 mL (30 Units total) under the skin daily at bedtime, Starting Tue 5/7/2024, Normal      Insulin Pen Needle (BD Pen Needle Shani 2nd Gen) 32G X 4 MM MISC For use with insulin pen. Pharmacy may dispense brand covered by insurance., Normal      !! Lancets (onetouch ultrasoft) lancets Use as instructed, Normal      metFORMIN (GLUCOPHAGE-XR) 500 mg 24 hr tablet Take 2 tablets (1,000 mg total) by mouth 2 (two) times a day with meals Start with 1 pill 500mg with breakfast x 1 week, then 1 pill 500mg with breakfast and 1 pill with dinner for 1 week , then 2 pills with breakfast (1000mg) and 1 pill with dinner (500 mg) for 1 week, then 1000mg twice a day going forward, Starting Tue 5/7/2024, Normal      !! OneTouch Delica Lancets 33G MISC May substitute brand based on insurance coverage. Check glucose TID., Normal      topiramate (TOPAMAX) 50 MG tablet Take 50 mg by mouth 2 (two) times a day, Starting u 3/10/2022, Historical Med       !! - Potential duplicate medications found. Please discuss with provider.        No discharge procedures on file.     Richard Green MD  09/23/24 [id number]

## 2024-09-27 ENCOUNTER — HOSPITAL ENCOUNTER (EMERGENCY)
Facility: HOSPITAL | Age: 19
Discharge: HOME | End: 2024-09-28
Attending: EMERGENCY MEDICINE
Payer: COMMERCIAL

## 2024-09-27 VITALS
RESPIRATION RATE: 20 BRPM | TEMPERATURE: 97.9 F | SYSTOLIC BLOOD PRESSURE: 139 MMHG | OXYGEN SATURATION: 97 % | HEART RATE: 108 BPM | DIASTOLIC BLOOD PRESSURE: 87 MMHG

## 2024-09-27 DIAGNOSIS — F32.A DEPRESSION: Primary | ICD-10-CM

## 2024-09-27 LAB
ALBUMIN SERPL BCG-MCNC: 4.5 G/DL (ref 3.5–5)
ALP SERPL-CCNC: 98 U/L (ref 34–104)
ALT SERPL W P-5'-P-CCNC: 25 U/L (ref 7–52)
AMPHETAMINES SERPL QL SCN: NEGATIVE
ANION GAP SERPL CALCULATED.3IONS-SCNC: 12 MMOL/L (ref 4–13)
AST SERPL W P-5'-P-CCNC: 22 U/L (ref 13–39)
BACTERIA UR QL AUTO: ABNORMAL /HPF
BARBITURATES UR QL: NEGATIVE
BASOPHILS # BLD AUTO: 0.04 THOUSANDS/ΜL (ref 0–0.1)
BASOPHILS NFR BLD AUTO: 0 % (ref 0–1)
BENZODIAZ UR QL: NEGATIVE
BILIRUB SERPL-MCNC: 0.39 MG/DL (ref 0.2–1)
BILIRUB UR QL STRIP: NEGATIVE
BUN SERPL-MCNC: 16 MG/DL (ref 5–25)
CALCIUM SERPL-MCNC: 9.5 MG/DL (ref 8.4–10.2)
CHLORIDE SERPL-SCNC: 101 MMOL/L (ref 96–108)
CLARITY UR: CLEAR
CO2 SERPL-SCNC: 22 MMOL/L (ref 21–32)
COCAINE UR QL: NEGATIVE
COLOR UR: YELLOW
CREAT SERPL-MCNC: 0.75 MG/DL (ref 0.6–1.3)
EOSINOPHIL # BLD AUTO: 0.27 THOUSAND/ΜL (ref 0–0.61)
EOSINOPHIL NFR BLD AUTO: 2 % (ref 0–6)
ERYTHROCYTE [DISTWIDTH] IN BLOOD BY AUTOMATED COUNT: 13.8 % (ref 11.6–15.1)
ETHANOL SERPL-MCNC: <10 MG/DL
FENTANYL UR QL SCN: NEGATIVE
GFR SERPL CREATININE-BSD FRML MDRD: 132 ML/MIN/1.73SQ M
GLUCOSE SERPL-MCNC: 175 MG/DL (ref 65–140)
GLUCOSE UR STRIP-MCNC: NEGATIVE MG/DL
HCT VFR BLD AUTO: 43.7 % (ref 36.5–49.3)
HGB BLD-MCNC: 13.9 G/DL (ref 12–17)
HGB UR QL STRIP.AUTO: NEGATIVE
HYDROCODONE UR QL SCN: NEGATIVE
IMM GRANULOCYTES # BLD AUTO: 0.04 THOUSAND/UL (ref 0–0.2)
IMM GRANULOCYTES NFR BLD AUTO: 0 % (ref 0–2)
KETONES UR STRIP-MCNC: NEGATIVE MG/DL
LEUKOCYTE ESTERASE UR QL STRIP: NEGATIVE
LYMPHOCYTES # BLD AUTO: 3.53 THOUSANDS/ΜL (ref 0.6–4.47)
LYMPHOCYTES NFR BLD AUTO: 27 % (ref 14–44)
MCH RBC QN AUTO: 26.9 PG (ref 26.8–34.3)
MCHC RBC AUTO-ENTMCNC: 31.8 G/DL (ref 31.4–37.4)
MCV RBC AUTO: 85 FL (ref 82–98)
METHADONE UR QL: NEGATIVE
MONOCYTES # BLD AUTO: 0.67 THOUSAND/ΜL (ref 0.17–1.22)
MONOCYTES NFR BLD AUTO: 5 % (ref 4–12)
MUCOUS THREADS UR QL AUTO: ABNORMAL
NEUTROPHILS # BLD AUTO: 8.64 THOUSANDS/ΜL (ref 1.85–7.62)
NEUTS SEG NFR BLD AUTO: 66 % (ref 43–75)
NITRITE UR QL STRIP: NEGATIVE
NON-SQ EPI CELLS URNS QL MICRO: ABNORMAL /HPF
NRBC BLD AUTO-RTO: 0 /100 WBCS
OPIATES UR QL SCN: NEGATIVE
OXYCODONE+OXYMORPHONE UR QL SCN: NEGATIVE
PCP UR QL: NEGATIVE
PH UR STRIP.AUTO: 5.5 [PH]
PLATELET # BLD AUTO: 385 THOUSANDS/UL (ref 149–390)
PMV BLD AUTO: 10.8 FL (ref 8.9–12.7)
POTASSIUM SERPL-SCNC: 3.8 MMOL/L (ref 3.5–5.3)
PROT SERPL-MCNC: 8.4 G/DL (ref 6.4–8.4)
PROT UR STRIP-MCNC: ABNORMAL MG/DL
RBC # BLD AUTO: 5.17 MILLION/UL (ref 3.88–5.62)
RBC #/AREA URNS AUTO: ABNORMAL /HPF
SODIUM SERPL-SCNC: 135 MMOL/L (ref 135–147)
SP GR UR STRIP.AUTO: >=1.03 (ref 1–1.03)
THC UR QL: POSITIVE
UROBILINOGEN UR STRIP-ACNC: 2 MG/DL
WBC # BLD AUTO: 13.19 THOUSAND/UL (ref 4.31–10.16)
WBC #/AREA URNS AUTO: ABNORMAL /HPF

## 2024-09-27 PROCEDURE — 80053 COMPREHEN METABOLIC PANEL: CPT | Performed by: EMERGENCY MEDICINE

## 2024-09-27 PROCEDURE — 99284 EMERGENCY DEPT VISIT MOD MDM: CPT | Performed by: EMERGENCY MEDICINE

## 2024-09-27 PROCEDURE — 82077 ASSAY SPEC XCP UR&BREATH IA: CPT | Performed by: EMERGENCY MEDICINE

## 2024-09-27 PROCEDURE — 36415 COLL VENOUS BLD VENIPUNCTURE: CPT | Performed by: EMERGENCY MEDICINE

## 2024-09-27 PROCEDURE — 80307 DRUG TEST PRSMV CHEM ANLYZR: CPT | Performed by: EMERGENCY MEDICINE

## 2024-09-27 PROCEDURE — 81001 URINALYSIS AUTO W/SCOPE: CPT | Performed by: EMERGENCY MEDICINE

## 2024-09-27 PROCEDURE — 85025 COMPLETE CBC W/AUTO DIFF WBC: CPT | Performed by: EMERGENCY MEDICINE

## 2024-09-27 PROCEDURE — 99283 EMERGENCY DEPT VISIT LOW MDM: CPT

## 2024-09-28 NOTE — ED NOTES
[facility] called [facility] and spoke with Rayna. SLIME made the request for the patient to be screened. Rayna stated the screening would happen on the overnight with Lakia. Rayna stated she will have Lakia call and see if all his labs are in and he is ready to be screened.       Deana TAMEZ
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
## 2024-09-28 NOTE — ED NOTES
Per family guidance assessment the patient can be discharged, provider made aware. Pt to be discharged.     Noemy Mcelroy RN  09/28/24 0005
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
## 2024-09-28 NOTE — DISCHARGE INSTRUCTIONS
If you have any thoughts of hurting yourself or hurting anyone else, return to the emergency department

## 2024-10-14 ENCOUNTER — HOSPITAL ENCOUNTER (EMERGENCY)
Facility: HOSPITAL | Age: 19
Discharge: HOME | End: 2024-10-14
Attending: EMERGENCY MEDICINE
Payer: COMMERCIAL

## 2024-10-14 ENCOUNTER — APPOINTMENT (EMERGENCY)
Dept: RADIOLOGY | Facility: HOSPITAL | Age: 19
End: 2024-10-14
Payer: COMMERCIAL

## 2024-10-14 VITALS
RESPIRATION RATE: 18 BRPM | SYSTOLIC BLOOD PRESSURE: 144 MMHG | OXYGEN SATURATION: 95 % | TEMPERATURE: 97.8 F | HEART RATE: 93 BPM | DIASTOLIC BLOOD PRESSURE: 67 MMHG

## 2024-10-14 DIAGNOSIS — S93.409A ANKLE SPRAIN: ICD-10-CM

## 2024-10-14 DIAGNOSIS — M25.561 RIGHT KNEE PAIN: Primary | ICD-10-CM

## 2024-10-14 PROCEDURE — 73564 X-RAY EXAM KNEE 4 OR MORE: CPT

## 2024-10-14 PROCEDURE — 99284 EMERGENCY DEPT VISIT MOD MDM: CPT | Performed by: EMERGENCY MEDICINE

## 2024-10-14 PROCEDURE — 73610 X-RAY EXAM OF ANKLE: CPT

## 2024-10-14 PROCEDURE — 99283 EMERGENCY DEPT VISIT LOW MDM: CPT

## 2024-10-14 RX ORDER — ACETAMINOPHEN 325 MG/1
975 TABLET ORAL ONCE
Status: COMPLETED | OUTPATIENT
Start: 2024-10-14 | End: 2024-10-14

## 2024-10-14 RX ORDER — IBUPROFEN 600 MG/1
600 TABLET, FILM COATED ORAL ONCE
Status: COMPLETED | OUTPATIENT
Start: 2024-10-14 | End: 2024-10-14

## 2024-10-14 RX ORDER — NAPROXEN 500 MG/1
500 TABLET ORAL 2 TIMES DAILY WITH MEALS
Qty: 10 TABLET | Refills: 0 | Status: SHIPPED | OUTPATIENT
Start: 2024-10-14 | End: 2024-10-19

## 2024-10-14 RX ADMIN — IBUPROFEN 600 MG: 600 TABLET, FILM COATED ORAL at 07:56

## 2024-10-14 RX ADMIN — ACETAMINOPHEN 975 MG: 325 TABLET ORAL at 07:56

## 2024-10-14 NOTE — Clinical Note
Rik Marin was seen and treated in our emergency department on 10/14/2024.                Diagnosis:     Rik  may return to work on return date.    He may return on this date: 10/16/2024         If you have any questions or concerns, please don't hesitate to call.      Alexia August, DO    ______________________________           _______________          _______________  Hospital Representative                              Date                                Time

## 2024-10-14 NOTE — ED PROVIDER NOTES
"Time reflects when diagnosis was documented in both MDM as applicable and the Disposition within this note       Time User Action Codes Description Comment    10/14/2024  8:34 AM Alexia August [M25.561] Right knee pain     10/14/2024  8:34 AM Mata Alexia Meng [S93.409A] Ankle sprain           ED Disposition       ED Disposition   Discharge    Condition   Stable    Date/Time   Mon Oct 14, 2024  8:34 AM    Comment   Rki Marin discharge to home/self care.                   Assessment & Plan       Medical Decision Making  Patient evaluated with imaging.  I reviewed the results and discussed them with the patient.  Patient discharged with appropriate instructions medications and follow-up.  Patient verbalized understanding had no further questions at the time of discharge.  Patient had stable vital signs and well-appearing at the time of discharge.ace wrap for knee and ankle brace provided    Problems Addressed:  Ankle sprain: acute illness or injury  Right knee pain: acute illness or injury    Amount and/or Complexity of Data Reviewed  External Data Reviewed: notes.  Radiology: ordered and independent interpretation performed. Decision-making details documented in ED Course.     Details: No acute fracture    Risk  OTC drugs.  Prescription drug management.              Medications   acetaminophen (TYLENOL) tablet 975 mg (975 mg Oral Given 10/14/24 0756)   ibuprofen (MOTRIN) tablet 600 mg (600 mg Oral Given 10/14/24 0756)       ED Risk Strat Scores             CRAFFT      Flowsheet Row Most Recent Value   CRAFFT Initial Screen: During the past 12 months, did you:    1. Drink any alcohol (more than a few sips)?  No Filed at: 10/14/2024 0739   2. Smoke any marijuana or hashish No Filed at: 10/14/2024 0739   3. Use anything else to get high? (\"anything else\" includes illegal drugs, over the counter and prescription drugs, and things that you sniff or 'patel')? No Filed at: 10/14/2024 0739      "                                     History of Present Illness       Chief Complaint   Patient presents with    Knee Pain     Patient states he was talking to a dog when his R. knee suddenly gave out and he fell on his bottom. No head strike or LOC. Right knee and ankle pain.       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    Gram-negative bacteremia 05/13/2024    Lung collapse     Sepsis (HCC) 05/06/2024    Sleep apnea     Viral meningitis       Past Surgical History:   Procedure Laterality Date    INCISION AND DRAINAGE OF WOUND Left 5/13/2024    Procedure: INCISION AND DRAINAGE (I&D) GROIN;  Surgeon: Tim Ch MD;  Location: [facility];  Service: General    INCISION AND DRAINAGE OF WOUND Left 5/15/2024    Procedure: INCISION AND DRAINAGE (I&D) GROIN;  Surgeon: Tim Ch MD;  Location: [facility];  Service: General    AK I&D VULVA/PERINEAL ABSCESS N/A 5/11/2024    Procedure: INCISION AND DRAINAGE (I&D) PERINEAL ABSCESS;  Surgeon: Tim Ch MD;  Location: [facility];  Service: General    TONSILECTOMY AND ADNOIDECTOMY      2020      Family History   Problem Relation Age of Onset    Hyperlipidemia Mother     Diabetes type II Mother     Obesity Mother     Obesity Father     Drug abuse Father     Diabetes type II Maternal Aunt     Lung disease Maternal Aunt     Rheum arthritis Maternal Aunt     Fibromyalgia Maternal Aunt     Atrial fibrillation Maternal Grandmother     Hyperlipidemia Maternal Grandfather     Diabetes type II Maternal Grandfather     Stroke Maternal Grandfather     Heart disease Maternal Grandfather     Stroke Paternal Grandfather       Social History     Tobacco Use    Smoking status: Every Day     Types: Cigarettes     Passive exposure: Yes    Smokeless tobacco: Current   Vaping Use    Vaping status: Some Days    Substances: Nicotine, Flavoring   Substance  Use Topics    Alcohol use: Yes     Alcohol/week: 2.0 standard drinks of alcohol     Types: 2 Shots of liquor per week     Comment: when available to him    Drug use: Yes     Types: Marijuana      E-Cigarette/Vaping    E-Cigarette Use Current Some Day User     Comments smoke cigg. when he has money       E-Cigarette/Vaping Substances    Nicotine Yes     THC No     CBD No     Flavoring Yes     Other No     Unknown No       I have reviewed and agree with the history as documented.     19-year-old male presents with right knee pain and swelling after he fell on it today.  Also complaining of right ankle swelling.  Denies any other injuries or complaints.      History provided by:  Patient   used: No        Review of Systems   Constitutional: Negative.    HENT: Negative.     Eyes: Negative.    Respiratory: Negative.     Cardiovascular: Negative.    Gastrointestinal: Negative.    Endocrine: Negative.    Genitourinary: Negative.    Musculoskeletal:  Positive for arthralgias, joint swelling and myalgias.   Skin: Negative.    Allergic/Immunologic: Negative.    Neurological: Negative.    Hematological: Negative.    Psychiatric/Behavioral: Negative.     All other systems reviewed and are negative.          Objective       ED Triage Vitals   Temperature Pulse Blood Pressure Respirations SpO2 Patient Position - Orthostatic VS   10/14/24 0736 10/14/24 0736 10/14/24 0736 10/14/24 0736 10/14/24 0736 10/14/24 0736   97.8 °F (36.6 °C) 93 144/67 18 95 % Lying      Temp Source Heart Rate Source BP Location FiO2 (%) Pain Score    10/14/24 0736 10/14/24 0736 10/14/24 0736 -- 10/14/24 0756    Oral Monitor Left arm  6      Vitals      Date and Time Temp Pulse SpO2 Resp BP Pain Score FACES Pain Rating User   10/14/24 0756 -- -- -- -- -- 6 -- OE   10/14/24 0736 97.8 °F (36.6 °C) 93 95 % 18 144/67 -- -- OE            Physical Exam  Vitals and nursing note reviewed.   Constitutional:       Appearance: Normal appearance.    HENT:      Head: Normocephalic and atraumatic.      Nose: Nose normal.      Mouth/Throat:      Mouth: Mucous membranes are moist.   Eyes:      Extraocular Movements: Extraocular movements intact.      Pupils: Pupils are equal, round, and reactive to light.   Cardiovascular:      Rate and Rhythm: Normal rate and regular rhythm.   Pulmonary:      Effort: Pulmonary effort is normal.      Breath sounds: Normal breath sounds.   Abdominal:      General: Abdomen is flat. Bowel sounds are normal.      Palpations: Abdomen is soft.   Musculoskeletal:         General: Normal range of motion.      Cervical back: Normal range of motion and neck supple.      Comments: Right lower extremity palpable popliteal pulses.  Knee joint is intact lying erythema noted.  Peroneal nerve is intact.  Right ankle tender to palpation lateral malleolus with slight edema range of motion intact positive DP and PT pulses intact.  No open wounds noted.  Hip is stable pelvis is stable   Skin:     General: Skin is warm.      Capillary Refill: Capillary refill takes less than 2 seconds.   Neurological:      General: No focal deficit present.      Mental Status: He is alert and oriented to person, place, and time. Mental status is at baseline.   Psychiatric:         Mood and Affect: Mood normal.         Thought Content: Thought content normal.         Results Reviewed       None            XR knee 4+ vw right injury   Final Interpretation by Juve Ram MD (10/14 5764)      Osteochondral fracture again noted along the lateral femoral condyle as well as chronic medial patellar avulsion fracture, both of which are better assessed on prior MRI. No definite new/acute fracture.      Moderate joint effusion, unchanged.         Computerized Assisted Algorithm (CAA) may have been used to analyze all applicable images.         Workstation performed: [id number]         XR ankle 3+ views RIGHT   Final Interpretation by Juve Ram MD (10/14 9681)      No acute  osseous abnormality.         Computerized Assisted Algorithm (CAA) may have been used to analyze all applicable images.               Workstation performed: [id number]             Procedures    ED Medication and Procedure Management   Prior to Admission Medications   Prescriptions Last Dose Informant Patient Reported? Taking?   Abilify Maintena 400 MG injection   Yes No   Sig: INJECT 2 ML INTRAMUSCUALRLY EVERY 4 WEEKS AS DIRECTED   Alcohol Swabs 70 % PADS   No No   Sig: May substitute brand based on insurance coverage. Check glucose TID.   Blood Glucose Monitoring Suppl (OneTouch Verio Reflect) w/Device KIT   No No   Sig: May substitute brand based on insurance coverage. Check glucose TID.   Blood Glucose Monitoring Suppl (OneTouch Verio) w/Device KIT   No No   Sig: Use 2 (two) times a day Ok to substitute with insurance covered brand   FLUoxetine (PROzac) 10 mg capsule   No No   Sig: Take 1 capsule (10 mg total) by mouth daily   Insulin Glargine Solostar (Lantus SoloStar) 100 UNIT/ML SOPN   No No   Sig: Inject 0.3 mL (30 Units total) under the skin daily at bedtime   Insulin Pen Needle (BD Pen Needle Shani 2nd Gen) 32G X 4 MM MISC   No No   Sig: For use with insulin pen. Pharmacy may dispense brand covered by insurance.   Lancets (onetouch ultrasoft) lancets   No No   Sig: Use as instructed   OneTouch Delica Lancets 33G MISC   No No   Sig: May substitute brand based on insurance coverage. Check glucose TID.   albuterol (2.5 mg/3 mL) 0.083 % nebulizer solution   No No   Sig: Take 3 mL (2.5 mg total) by nebulization every 6 (six) hours as needed for wheezing or shortness of breath   glucose blood (OneTouch Verio) test strip   No No   Sig: TEST BLOOD SUGAR TWICE A DAY   glucose blood (OneTouch Verio) test strip   No No   Sig: May substitute brand based on insurance coverage. Check glucose TID.   ibuprofen (MOTRIN) 800 mg tablet   No No   Sig: Take 1 tablet (800 mg total) by mouth every 8 (eight) hours as needed for mild  pain   insulin aspart (NovoLOG FlexPen) 100 UNIT/ML injection pen   No No   Sig: Inject 9 Units under the skin 3 (three) times a day with meals   metFORMIN (GLUCOPHAGE-XR) 500 mg 24 hr tablet   No No   Sig: Take 2 tablets (1,000 mg total) by mouth 2 (two) times a day with meals Start with 1 pill 500mg with breakfast x 1 week, then 1 pill 500mg with breakfast and 1 pill with dinner for 1 week , then 2 pills with breakfast (1000mg) and 1 pill with dinner (500mg) for 1 week, then 1000mg twice a day going forward   topiramate (TOPAMAX) 50 MG tablet   Yes No   Sig: Take 50 mg by mouth 2 (two) times a day      Facility-Administered Medications: None     Discharge Medication List as of 10/14/2024  8:35 AM        START taking these medications    Details   naproxen (Naprosyn) 500 mg tablet Take 1 tablet (500 mg total) by mouth 2 (two) times a day with meals for 5 days, Starting Mon 10/14/2024, Until Sat 10/19/2024, Normal           CONTINUE these medications which have NOT CHANGED    Details   Abilify Maintena 400 MG injection INJECT 2 ML INTRAMUSCUALRLY EVERY 4 WEEKS AS DIRECTED, Historical Med      albuterol (2.5 mg/3 mL) 0.083 % nebulizer solution Take 3 mL (2.5 mg total) by nebulization every 6 (six) hours as needed for wheezing or shortness of breath, Starting Wed 5/8/2024, Normal      Alcohol Swabs 70 % PADS May substitute brand based on insurance coverage. Check glucose TID., Normal      !! Blood Glucose Monitoring Suppl (OneTouch Verio Reflect) w/Device KIT May substitute brand based on insurance coverage. Check glucose TID., Normal      !! Blood Glucose Monitoring Suppl (OneTouch Verio) w/Device KIT Use 2 (two) times a day Ok to substitute with insurance covered brand, Starting Fri 10/27/2023, Normal      FLUoxetine (PROzac) 10 mg capsule Take 1 capsule (10 mg total) by mouth daily, Starting Wed 5/8/2024, Normal      !! glucose blood (OneTouch Verio) test strip TEST BLOOD SUGAR TWICE A DAY, Normal      !! glucose  blood (OneTouch Verio) test strip May substitute brand based on insurance coverage. Check glucose TID., Normal      ibuprofen (MOTRIN) 800 mg tablet Take 1 tablet (800 mg total) by mouth every 8 (eight) hours as needed for mild pain, Starting Tue 8/20/2024, Normal      insulin aspart (NovoLOG FlexPen) 100 UNIT/ML injection pen Inject 9 Units under the skin 3 (three) times a day with meals, Starting Wed 5/8/2024, Normal      Insulin Glargine Solostar (Lantus SoloStar) 100 UNIT/ML SOPN Inject 0.3 mL (30 Units total) under the skin daily at bedtime, Starting Tue 5/7/2024, Normal      Insulin Pen Needle (BD Pen Needle Shani 2nd Gen) 32G X 4 MM MISC For use with insulin pen. Pharmacy may dispense brand covered by insurance., Normal      !! Lancets (onetouch ultrasoft) lancets Use as instructed, Normal      metFORMIN (GLUCOPHAGE-XR) 500 mg 24 hr tablet Take 2 tablets (1,000 mg total) by mouth 2 (two) times a day with meals Start with 1 pill 500mg with breakfast x 1 week, then 1 pill 500mg with breakfast and 1 pill with dinner for 1 week , then 2 pills with breakfast (1000mg) and 1 pill with dinner (500 mg) for 1 week, then 1000mg twice a day going forward, Starting Tue 5/7/2024, Normal      !! OneTouch Delica Lancets 33G MISC May substitute brand based on insurance coverage. Check glucose TID., Normal      topiramate (TOPAMAX) 50 MG tablet Take 50 mg by mouth 2 (two) times a day, Starting u 3/10/2022, Historical Med       !! - Potential duplicate medications found. Please discuss with provider.        No discharge procedures on file.  ED SEPSIS DOCUMENTATION   Time reflects when diagnosis was documented in both MDM as applicable and the Disposition within this note       Time User Action Codes Description Comment    10/14/2024  8:34 AM Alexia August [M25.561] Right knee pain     10/14/2024  8:34 AM Alexia August [S93.409A] Ankle sprain                  Alexia August, DO  10/14/24 0492

## 2024-10-18 ENCOUNTER — HOSPITAL ENCOUNTER (EMERGENCY)
Facility: HOSPITAL | Age: 19
Discharge: HOME | End: 2024-10-18
Attending: EMERGENCY MEDICINE
Payer: COMMERCIAL

## 2024-10-18 VITALS
OXYGEN SATURATION: 98 % | TEMPERATURE: 97.7 F | BODY MASS INDEX: 54.57 KG/M2 | HEART RATE: 92 BPM | RESPIRATION RATE: 20 BRPM | HEIGHT: 63 IN | WEIGHT: 308 LBS | DIASTOLIC BLOOD PRESSURE: 72 MMHG | SYSTOLIC BLOOD PRESSURE: 126 MMHG

## 2024-10-18 DIAGNOSIS — F90.9 ADHD: Primary | ICD-10-CM

## 2024-10-18 DIAGNOSIS — F84.0 AUTISM: ICD-10-CM

## 2024-10-18 PROCEDURE — 99284 EMERGENCY DEPT VISIT MOD MDM: CPT | Performed by: EMERGENCY MEDICINE

## 2024-10-18 PROCEDURE — 99284 EMERGENCY DEPT VISIT MOD MDM: CPT

## 2024-10-18 NOTE — ED NOTES
"10/18/24 @ 1000: PES had received call at 0954 that crisis had just spoke to patient and he was upset, hung up on them and wouldn't answer their repeated callbacks.  Just as PES hung up with CFS, patient arrived with Police reporting that he \"was about to lose it; I was really angry.\"  Patient has history of temper outbursts and at one point, tried to buy a handgun.  Patient has history of suicidal ideations and threats, but no actual attempts.  Patient says he \"needs a break from home; I'm tired of all the arguing with my mother; I need to be away from her.\"  Patient didn't present depressed, his affect was bright and was smiling and laughing.  Patient doesn't meet any current need for inpatient treatment.  PES will consult with CFS as he is in the \"peer program,\" or maybe some other program to address issues of lonliness and boredom at home.   Hanna MS    1035: PES spoke to Becka from [facility] and inquired about a \"day program,\" particulary [facility].  Becka will call and check back.  Hanna, MS    1053: Becka called back and reported that [facility] is a program run by [facility] that operates Tuesday thru Saturday and provides transportation.  It's not a strictly mental health program, but it is for people who fit this patinet's needs.  Patient would be able to go today.  Becka reports that the program provides meals every day and has \"all kinks of activities; today is Karaoke.\"  PES discussed with patient who was interested.  PES called [facility] @ [phone] and spoke to Itzel who asked PES to call back in 15 minutes and have patient speak to her to make arrangements.  Hanna MS    1130: Patient spoke to Itzel from [employer], completed intake, and will begin program today @ 1400.  Program provides transportation and they will  around 1300.  Patient discharged home.  MS Hanna  "

## 2024-10-20 ENCOUNTER — APPOINTMENT (EMERGENCY)
Dept: RADIOLOGY | Facility: HOSPITAL | Age: 19
End: 2024-10-20
Payer: COMMERCIAL

## 2024-10-20 ENCOUNTER — HOSPITAL ENCOUNTER (EMERGENCY)
Facility: HOSPITAL | Age: 19
Discharge: HOME | End: 2024-10-20
Attending: EMERGENCY MEDICINE | Admitting: EMERGENCY MEDICINE
Payer: COMMERCIAL

## 2024-10-20 VITALS
DIASTOLIC BLOOD PRESSURE: 69 MMHG | TEMPERATURE: 98 F | BODY MASS INDEX: 54.57 KG/M2 | WEIGHT: 308 LBS | SYSTOLIC BLOOD PRESSURE: 131 MMHG | RESPIRATION RATE: 20 BRPM | HEIGHT: 63 IN | HEART RATE: 103 BPM | OXYGEN SATURATION: 96 %

## 2024-10-20 DIAGNOSIS — N50.819 TESTICLE PAIN: ICD-10-CM

## 2024-10-20 DIAGNOSIS — L03.90 CELLULITIS: Primary | ICD-10-CM

## 2024-10-20 LAB
ALBUMIN SERPL BCG-MCNC: 3.8 G/DL (ref 3.5–5)
ALP SERPL-CCNC: 103 U/L (ref 34–104)
ALT SERPL W P-5'-P-CCNC: 13 U/L (ref 7–52)
ANION GAP SERPL CALCULATED.3IONS-SCNC: 12 MMOL/L (ref 4–13)
APTT PPP: 30 SECONDS (ref 23–34)
AST SERPL W P-5'-P-CCNC: 30 U/L (ref 13–39)
BACTERIA UR QL AUTO: ABNORMAL /HPF
BASOPHILS # BLD AUTO: 0.04 THOUSANDS/ΜL (ref 0–0.1)
BASOPHILS NFR BLD AUTO: 0 % (ref 0–1)
BILIRUB SERPL-MCNC: 0.31 MG/DL (ref 0.2–1)
BILIRUB UR QL STRIP: NEGATIVE
BUN SERPL-MCNC: 13 MG/DL (ref 5–25)
CALCIUM SERPL-MCNC: 9.3 MG/DL (ref 8.4–10.2)
CHLORIDE SERPL-SCNC: 101 MMOL/L (ref 96–108)
CLARITY UR: CLEAR
CO2 SERPL-SCNC: 19 MMOL/L (ref 21–32)
COLOR UR: YELLOW
CREAT SERPL-MCNC: 0.59 MG/DL (ref 0.6–1.3)
EOSINOPHIL # BLD AUTO: 0.25 THOUSAND/ΜL (ref 0–0.61)
EOSINOPHIL NFR BLD AUTO: 2 % (ref 0–6)
ERYTHROCYTE [DISTWIDTH] IN BLOOD BY AUTOMATED COUNT: 14 % (ref 11.6–15.1)
GFR SERPL CREATININE-BSD FRML MDRD: 145 ML/MIN/1.73SQ M
GLUCOSE SERPL-MCNC: 144 MG/DL (ref 65–140)
GLUCOSE UR STRIP-MCNC: NEGATIVE MG/DL
HCT VFR BLD AUTO: 41.3 % (ref 36.5–49.3)
HGB BLD-MCNC: 12.8 G/DL (ref 12–17)
HGB UR QL STRIP.AUTO: NEGATIVE
HYALINE CASTS #/AREA URNS LPF: ABNORMAL /LPF
IMM GRANULOCYTES # BLD AUTO: 0.07 THOUSAND/UL (ref 0–0.2)
IMM GRANULOCYTES NFR BLD AUTO: 1 % (ref 0–2)
INR PPP: 0.96 (ref 0.85–1.19)
KETONES UR STRIP-MCNC: NEGATIVE MG/DL
LACTATE SERPL-SCNC: 1.3 MMOL/L (ref 0.5–2)
LEUKOCYTE ESTERASE UR QL STRIP: NEGATIVE
LYMPHOCYTES # BLD AUTO: 2.93 THOUSANDS/ΜL (ref 0.6–4.47)
LYMPHOCYTES NFR BLD AUTO: 23 % (ref 14–44)
MCH RBC QN AUTO: 26.4 PG (ref 26.8–34.3)
MCHC RBC AUTO-ENTMCNC: 31 G/DL (ref 31.4–37.4)
MCV RBC AUTO: 85 FL (ref 82–98)
MONOCYTES # BLD AUTO: 0.62 THOUSAND/ΜL (ref 0.17–1.22)
MONOCYTES NFR BLD AUTO: 5 % (ref 4–12)
MUCOUS THREADS UR QL AUTO: ABNORMAL
NEUTROPHILS # BLD AUTO: 8.88 THOUSANDS/ΜL (ref 1.85–7.62)
NEUTS SEG NFR BLD AUTO: 69 % (ref 43–75)
NITRITE UR QL STRIP: NEGATIVE
NON-SQ EPI CELLS URNS QL MICRO: ABNORMAL /HPF
NRBC BLD AUTO-RTO: 0 /100 WBCS
PH UR STRIP.AUTO: 5.5 [PH]
PLATELET # BLD AUTO: 327 THOUSANDS/UL (ref 149–390)
PMV BLD AUTO: 10.5 FL (ref 8.9–12.7)
POTASSIUM SERPL-SCNC: 4.8 MMOL/L (ref 3.5–5.3)
PROCALCITONIN SERPL-MCNC: <0.05 NG/ML
PROT SERPL-MCNC: 8 G/DL (ref 6.4–8.4)
PROT UR STRIP-MCNC: ABNORMAL MG/DL
PROTHROMBIN TIME: 13.3 SECONDS (ref 12.3–15)
RBC # BLD AUTO: 4.85 MILLION/UL (ref 3.88–5.62)
RBC #/AREA URNS AUTO: ABNORMAL /HPF
SODIUM SERPL-SCNC: 132 MMOL/L (ref 135–147)
SP GR UR STRIP.AUTO: >=1.03 (ref 1–1.03)
UROBILINOGEN UR STRIP-ACNC: <2 MG/DL
WBC # BLD AUTO: 12.79 THOUSAND/UL (ref 4.31–10.16)
WBC #/AREA URNS AUTO: ABNORMAL /HPF

## 2024-10-20 PROCEDURE — 87040 BLOOD CULTURE FOR BACTERIA: CPT | Performed by: EMERGENCY MEDICINE

## 2024-10-20 PROCEDURE — 93005 ELECTROCARDIOGRAM TRACING: CPT

## 2024-10-20 PROCEDURE — 83605 ASSAY OF LACTIC ACID: CPT | Performed by: EMERGENCY MEDICINE

## 2024-10-20 PROCEDURE — 76870 US EXAM SCROTUM: CPT

## 2024-10-20 PROCEDURE — 85610 PROTHROMBIN TIME: CPT | Performed by: EMERGENCY MEDICINE

## 2024-10-20 PROCEDURE — 74177 CT ABD & PELVIS W/CONTRAST: CPT

## 2024-10-20 PROCEDURE — 96374 THER/PROPH/DIAG INJ IV PUSH: CPT

## 2024-10-20 PROCEDURE — 99284 EMERGENCY DEPT VISIT MOD MDM: CPT

## 2024-10-20 PROCEDURE — 96375 TX/PRO/DX INJ NEW DRUG ADDON: CPT

## 2024-10-20 PROCEDURE — 85025 COMPLETE CBC W/AUTO DIFF WBC: CPT | Performed by: EMERGENCY MEDICINE

## 2024-10-20 PROCEDURE — 99285 EMERGENCY DEPT VISIT HI MDM: CPT | Performed by: EMERGENCY MEDICINE

## 2024-10-20 PROCEDURE — 96361 HYDRATE IV INFUSION ADD-ON: CPT

## 2024-10-20 PROCEDURE — 84145 PROCALCITONIN (PCT): CPT | Performed by: EMERGENCY MEDICINE

## 2024-10-20 PROCEDURE — 36415 COLL VENOUS BLD VENIPUNCTURE: CPT | Performed by: EMERGENCY MEDICINE

## 2024-10-20 PROCEDURE — 81001 URINALYSIS AUTO W/SCOPE: CPT | Performed by: EMERGENCY MEDICINE

## 2024-10-20 PROCEDURE — 85730 THROMBOPLASTIN TIME PARTIAL: CPT | Performed by: EMERGENCY MEDICINE

## 2024-10-20 PROCEDURE — 80053 COMPREHEN METABOLIC PANEL: CPT | Performed by: EMERGENCY MEDICINE

## 2024-10-20 RX ORDER — ACETAMINOPHEN 10 MG/ML
1000 INJECTION, SOLUTION INTRAVENOUS ONCE
Status: COMPLETED | OUTPATIENT
Start: 2024-10-20 | End: 2024-10-20

## 2024-10-20 RX ORDER — ONDANSETRON 2 MG/ML
4 INJECTION INTRAMUSCULAR; INTRAVENOUS ONCE
Status: COMPLETED | OUTPATIENT
Start: 2024-10-20 | End: 2024-10-20

## 2024-10-20 RX ADMIN — ONDANSETRON 4 MG: 2 INJECTION INTRAMUSCULAR; INTRAVENOUS at 15:51

## 2024-10-20 RX ADMIN — AMOXICILLIN AND CLAVULANATE POTASSIUM 1 TABLET: 875; 125 TABLET, FILM COATED ORAL at 19:30

## 2024-10-20 RX ADMIN — IOHEXOL 100 ML: 350 INJECTION, SOLUTION INTRAVENOUS at 16:22

## 2024-10-20 RX ADMIN — SODIUM CHLORIDE 1000 ML: 0.9 INJECTION, SOLUTION INTRAVENOUS at 15:52

## 2024-10-20 RX ADMIN — ACETAMINOPHEN 1000 MG: 10 INJECTION INTRAVENOUS at 15:54

## 2024-10-20 NOTE — DISCHARGE INSTRUCTIONS
Follow-up with primary care for further care. Contact info provided below if needed.  Use over the counter medications as stated on the bottle as needed for pain control.  Take your new medications as prescribed and to completion.  Return to the ED with new or worsening symptoms.

## 2024-10-20 NOTE — ED PROVIDER NOTES
Time reflects when diagnosis was documented in both MDM as applicable and the Disposition within this note       Time User Action Codes Description Comment    10/20/2024  7:17 PM Echo Snyder Add [L03.90] Cellulitis     10/20/2024  7:17 PM Ehco Snyder Add [N50.819] Testicle pain           ED Disposition       ED Disposition   Discharge    Condition   Stable    Date/Time   Sun Oct 20, 2024  7:17 PM    Comment   Rik Marin discharge to home/self care.                   Assessment & Plan       Medical Decision Making  Pt is a 18yo M who presents with testicle pain.     Differential diagnosis to include but not limited to Humberto's, dehydration, electrolyte abnormality, UTI, cellulitis.  Based on patient's history, will obtain blood work and imaging.  Will treat symptomatically.  See ED course for results and details.    Plan to discharge pt with f/u to PCP. Discussed returning the ED with new or worsening of symptoms. Discussed use of over the counter medications as stated on the bottle as needed for pain. Discussed taking new medication as prescribed and to completion. Pt expressed understanding of discharge instructions, return precautions, and medication instructions and is stable for discharge at this time. All questions were answered and pt was discharged without incident.       Amount and/or Complexity of Data Reviewed  Labs: ordered. Decision-making details documented in ED Course.  Radiology: ordered. Decision-making details documented in ED Course.    Risk  Prescription drug management.        ED Course as of 10/20/24 2005   Sun Oct 20, 2024   1436 Procedure Note: EKG  Date/Time: 10/20/24 2:36 PM   Interpreted by: Echo Snyder MD  Indications / Diagnosis: Tachycardia  ECG reviewed by me, the ED Physician: yes   The EKG demonstrates:  Rhythm: normal sinus  Intervals: normal intervals  Axis: normal axis  QRS/Blocks: normal QRS  ST Changes: No acute ST Changes, no STD/WING.   1449 Blood work  "delayed 2/2 difficulty obtaining access.    1552 WBC(!): 12.79  Elevated. Similar to priors. W/u in process.    1552 CBC and differential(!)  Reviewed and without actionable derangement.    1555 Leukocytes, UA: Negative   1555 Nitrite, UA: Negative   1555 Blood, UA: Negative   1555 Comprehensive metabolic panel(!)  Reviewed and without actionable derangement.    1604 PTT: 30  WNL   1604 POCT INR: 0.96  WNL   1615 LACTIC ACID: 1.3  WNL   1619 Procalcitonin: <0.05  Negative.    1650 CT abdomen pelvis with contrast  Poorly distended bladder with diffuse wall thickening similar to prior study.  Previous subcutaneous gas and scrotal wall edema is resolved.  Hepatosplenomegaly with fatty infiltration of liver.   1653 W/u unremarkable. Will obtain testicular US to r/o additional pathology and DC if negative.    1915 US scrotum and testicles  No acute findings.  3 mm left varicocele and 2.4 mm right varicocele.   1916 Pt made aware of all results and plan for DC with abx for cellulitis. Pt agreeable to plan.        Medications   sodium chloride 0.9 % bolus 1,000 mL (0 mL Intravenous Stopped 10/20/24 1652)   acetaminophen (Ofirmev) injection 1,000 mg (0 mg Intravenous Stopped 10/20/24 1609)   ondansetron (ZOFRAN) injection 4 mg (4 mg Intravenous Given 10/20/24 1551)   iohexol (OMNIPAQUE) 350 MG/ML injection (MULTI-DOSE) 100 mL (100 mL Intravenous Given 10/20/24 1622)   amoxicillin-clavulanate (AUGMENTIN) 875-125 mg per tablet 1 tablet (1 tablet Oral Given 10/20/24 1930)       ED Risk Strat Scores             CRAFFT      Flowsheet Row Most Recent Value   CRAFFT Initial Screen: During the past 12 months, did you:    1. Drink any alcohol (more than a few sips)?  No Filed at: 10/20/2024 1413   2. Smoke any marijuana or hashish No Filed at: 10/20/2024 1413   3. Use anything else to get high? (\"anything else\" includes illegal drugs, over the counter and prescription drugs, and things that you sniff or 'patel')? No Filed at: " 10/20/2024 1413                                          History of Present Illness       Chief Complaint   Patient presents with    Testicle Pain    Nausea     Pt. States he has nausea headache and thinks his left   testicle may have staff infection back   didn't finish his  antibiotics . Pt. 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       Past Surgical History:   Procedure Laterality Date    INCISION AND DRAINAGE OF WOUND Left 5/13/2024    Procedure: INCISION AND DRAINAGE (I&D) GROIN;  Surgeon: Tim Ch MD;  Location: [facility];  Service: General    INCISION AND DRAINAGE OF WOUND Left 5/15/2024    Procedure: INCISION AND DRAINAGE (I&D) GROIN;  Surgeon: Tim Ch MD;  Location: [facility];  Service: General    [facility] I&D VULVA/PERINEAL ABSCESS N/A 5/11/2024    Procedure: INCISION AND DRAINAGE (I&D) PERINEAL ABSCESS;  Surgeon: Tim Ch MD;  Location: [facility];  Service: General    TONSILECTOMY AND ADNOIDECTOMY      2020      Family History   Problem Relation Age of Onset    Hyperlipidemia Mother     Diabetes type II Mother     Obesity Mother     Obesity Father     Drug abuse Father     Diabetes type II Maternal Aunt     Lung disease Maternal Aunt     Rheum arthritis Maternal Aunt     Fibromyalgia Maternal Aunt     Atrial fibrillation Maternal Grandmother     Hyperlipidemia Maternal Grandfather     Diabetes type II Maternal Grandfather     Stroke Maternal Grandfather     Heart disease Maternal Grandfather     Stroke Paternal Grandfather       Social History     Tobacco Use    Smoking status: Every Day     Types: Cigarettes     Passive exposure: Yes    Smokeless tobacco: Current    Vaping Use    Vaping status: Some Days    Substances: Nicotine, Flavoring   Substance Use Topics    Alcohol use: Yes     Alcohol/week: 2.0 standard drinks of alcohol     Types: 2 Shots of liquor per week     Comment: when available to him    Drug use: Yes     Types: Marijuana      E-Cigarette/Vaping    E-Cigarette Use Current Some Day User     Comments smoke cigg. when he has money       E-Cigarette/Vaping Substances    Nicotine Yes     THC No     CBD No     Flavoring Yes     Other No     Unknown No       I have reviewed and agree with the history as documented.     Pt is a 20yo M who presents for left testicle pain.  Patient reports 2 days ago he began with left testicle pain.  Patient reports it feels similar to when he previously had an infection of the area.  Patient with history of Humberto's requiring OR washout in May of this year.  Patient reports he has also had associated nausea and subjective fevers.  Patient states he has been feeling generally weak over this timeframe.          Objective       ED Triage Vitals   Temperature Pulse Blood Pressure Respirations SpO2 Patient Position - Orthostatic VS   10/20/24 1410 10/20/24 1410 10/20/24 1410 10/20/24 1410 10/20/24 1410 10/20/24 1900   99.2 °F (37.3 °C) 104 159/76 20 97 % Lying      Temp Source Heart Rate Source BP Location FiO2 (%) Pain Score    10/20/24 1410 10/20/24 1626 10/20/24 1626 -- 10/20/24 1410    Oral Monitor Right arm  3      Vitals      Date and Time Temp Pulse SpO2 Resp BP Pain Score FACES Pain Rating User   10/20/24 1915 -- 103 96 % -- -- -- -- CS   10/20/24 1900 98 °F (36.7 °C) -- 97 % 20 131/69 -- -- SF   10/20/24 1900 -- 100 -- -- -- -- -- CS   10/20/24 1845 -- 104 96 % -- -- -- -- CS   10/20/24 1830 -- 104 97 % -- -- -- -- CS   10/20/24 1815 -- 106 97 % -- -- -- -- CS   10/20/24 1700 -- 103 98 % -- -- -- -- CS   10/20/24 1645 -- 101 98 % -- -- -- -- CS   10/20/24 1630 -- 103 99 % -- 147/86 -- -- CS   10/20/24 1626 98.5 °F (36.9 °C) 97  100 % 24 147/86 2 -- CS   10/20/24 1415 -- 104 97 % -- 159/76 -- -- CS   10/20/24 1410 99.2 °F (37.3 °C) 104 97 % 20 159/76 3 -- HD            Physical Exam  Vitals reviewed. Exam conducted with a chaperone present.   Constitutional:       Appearance: He is well-developed. He is obese. He is ill-appearing. He is not diaphoretic.   HENT:      Head: Normocephalic and atraumatic.      Right Ear: External ear normal.      Left Ear: External ear normal.      Nose: Nose normal.      Mouth/Throat:      Pharynx: Oropharynx is clear.   Eyes:      Extraocular Movements: Extraocular movements intact.      Pupils: Pupils are equal, round, and reactive to light.   Cardiovascular:      Rate and Rhythm: Normal rate and regular rhythm.      Heart sounds: Normal heart sounds.   Pulmonary:      Effort: Pulmonary effort is normal. No respiratory distress.      Breath sounds: Normal breath sounds.   Abdominal:      General: Abdomen is protuberant. Bowel sounds are normal. There is no distension.      Palpations: Abdomen is soft.      Tenderness: There is no abdominal tenderness.   Genitourinary:     Testes:         Left: Tenderness and swelling present.      Comments: Erythema present overlying testicles as well as into inguinal area  Musculoskeletal:         General: No tenderness or deformity. Normal range of motion.      Cervical back: Neck supple.   Skin:     General: Skin is warm and dry.      Capillary Refill: Capillary refill takes less than 2 seconds.      Coloration: Skin is not pale.      Findings: No erythema or rash.   Neurological:      General: No focal deficit present.      Mental Status: He is alert and oriented to person, place, and time.   Psychiatric:         Speech: Speech normal.         Behavior: Behavior is cooperative.         Results Reviewed       Procedure Component Value Units Date/Time    Urine Microscopic [[id number]]  (Abnormal) Collected: 10/20/24 1547    Lab Status: Final result Specimen: Urine, Clean  Catch Updated: 10/20/24 1623     RBC, UA 0-1 /hpf      WBC, UA 0-1 /hpf      Epithelial Cells Occasional /hpf      Bacteria, UA Occasional /hpf      MUCUS THREADS Innumerable     Hyaline Casts, UA 0-1 /lpf     Procalcitonin [[id number]]  (Normal) Collected: 10/20/24 1547    Lab Status: Final result Specimen: Blood from Arm, Right Updated: 10/20/24 1618     Procalcitonin <0.05 ng/ml     Lactic acid [[id number]]  (Normal) Collected: 10/20/24 1547    Lab Status: Final result Specimen: Blood from Arm, Right Updated: 10/20/24 1614     LACTIC ACID 1.3 mmol/L     Narrative:      Result may be elevated if tourniquet was used during collection.    Protime-INR [[id number]]  (Normal) Collected: 10/20/24 1547    Lab Status: Final result Specimen: Blood from Arm, Right Updated: 10/20/24 1603     Protime 13.3 seconds      INR 0.96    Narrative:      INR Therapeutic Range    Indication                                             INR Range      Atrial Fibrillation                                               2.0-3.0  Hypercoagulable State                                    2.0.2.3  Left Ventricular Asist Device                            2.0-3.0  Mechanical Heart Valve                                  -    Aortic(with afib, MI, embolism, HF, LA enlargement,    and/or coagulopathy)                                     2.0-3.0 (2.5-3.5)     Mitral                                                             2.5-3.5  Prosthetic/Bioprosthetic Heart Valve               2.0-3.0  Venous thromboembolism (VTE: VT, PE        2.0-3.0    APTT [[id number]]  (Normal) Collected: 10/20/24 1547    Lab Status: Final result Specimen: Blood from Arm, Right Updated: 10/20/24 1603     PTT 30 seconds     UA w Reflex to Microscopic w Reflex to Culture [[id number]]  (Abnormal) Collected: 10/20/24 1547    Lab Status: Final result Specimen: Urine, Clean Catch Updated: 10/20/24 1554     Color, UA Yellow     Clarity, UA Clear     Specific Gravity, UA >=1.030     pH,  UA 5.5     Leukocytes, UA Negative     Nitrite, UA Negative     Protein, UA 30 (1+) mg/dl      Glucose, UA Negative mg/dl      Ketones, UA Negative mg/dl      Urobilinogen, UA <2.0 mg/dl      Bilirubin, UA Negative     Occult Blood, UA Negative    Comprehensive metabolic panel [[id number]]  (Abnormal) Collected: 10/20/24 1524    Lab Status: Final result Specimen: Blood from Arm, Right Updated: 10/20/24 1554     Sodium 132 mmol/L      Potassium 4.8 mmol/L      Chloride 101 mmol/L      CO2 19 mmol/L      ANION GAP 12 mmol/L      BUN 13 mg/dL      Creatinine 0.59 mg/dL      Glucose 144 mg/dL      Calcium 9.3 mg/dL      AST 30 U/L      ALT 13 U/L      Alkaline Phosphatase 103 U/L      Total Protein 8.0 g/dL      Albumin 3.8 g/dL      Total Bilirubin 0.31 mg/dL      eGFR 145 ml/min/1.73sq m     Narrative:      National Kidney Disease Foundation guidelines for Chronic Kidney Disease (CKD):     Stage 1 with normal or high GFR (GFR > 90 mL/min/1.73 square meters)    Stage 2 Mild CKD (GFR = 60-89 mL/min/1.73 square meters)    Stage 3A Moderate CKD (GFR = 45-59 mL/min/1.73 square meters)    Stage 3B Moderate CKD (GFR = 30-44 mL/min/1.73 square meters)    Stage 4 Severe CKD (GFR = 15-29 mL/min/1.73 square meters)    Stage 5 End Stage CKD (GFR <15 mL/min/1.73 square meters)  Note: GFR calculation is accurate only with a steady state creatinine    CBC and differential [[id number]]  (Abnormal) Collected: 10/20/24 1547    Lab Status: Final result Specimen: Blood from Arm, Right Updated: 10/20/24 1552     WBC 12.79 Thousand/uL      RBC 4.85 Million/uL      Hemoglobin 12.8 g/dL      Hematocrit 41.3 %      MCV 85 fL      MCH 26.4 pg      MCHC 31.0 g/dL      RDW 14.0 %      MPV 10.5 fL      Platelets 327 Thousands/uL      nRBC 0 /100 WBCs      Segmented % 69 %      Immature Grans % 1 %      Lymphocytes % 23 %      Monocytes % 5 %      Eosinophils Relative 2 %      Basophils Relative 0 %      Absolute Neutrophils 8.88 Thousands/µL       Absolute Immature Grans 0.07 Thousand/uL      Absolute Lymphocytes 2.93 Thousands/µL      Absolute Monocytes 0.62 Thousand/µL      Eosinophils Absolute 0.25 Thousand/µL      Basophils Absolute 0.04 Thousands/µL     Blood culture #1 [[id number]] Collected: 10/20/24 1524    Lab Status: In process Specimen: Blood from Arm, Right Updated: 10/20/24 1531            US scrotum and testicles   Final Interpretation by Paresh Regalado MD (10/20 1911)      No acute findings.   3 mm left varicocele and 2.4 mm right varicocele.         Workstation performed: [id number]         CT abdomen pelvis with contrast   Final Interpretation by Kojo Sanchez MD (10/20 1645)   Poorly distended bladder with diffuse wall thickening similar to prior study.      Previous subcutaneous gas and scrotal wall edema is resolved..      Hepatosplenomegaly with fatty infiltration of liver.      Workstation performed: [id number]             Procedures    ED Medication and Procedure Management   Prior to Admission Medications   Prescriptions Last Dose Informant Patient Reported? Taking?   Abilify Maintena 400 MG injection   Yes No   Sig: INJECT 2 ML INTRAMUSCUALRLY EVERY 4 WEEKS AS DIRECTED   Alcohol Swabs 70 % PADS   No No   Sig: May substitute brand based on insurance coverage. Check glucose TID.   Blood Glucose Monitoring Suppl (OneTouch Verio Reflect) w/Device KIT   No No   Sig: May substitute brand based on insurance coverage. Check glucose TID.   Blood Glucose Monitoring Suppl (OneTouch Verio) w/Device KIT   No No   Sig: Use 2 (two) times a day Ok to substitute with insurance covered brand   FLUoxetine (PROzac) 10 mg capsule   No No   Sig: Take 1 capsule (10 mg total) by mouth daily   Insulin Glargine Solostar (Lantus SoloStar) 100 UNIT/ML SOPN   No No   Sig: Inject 0.3 mL (30 Units total) under the skin daily at bedtime   Insulin Pen Needle (BD Pen Needle Shani 2nd Gen) 32G X 4 MM MISC   No No   Sig: For use with insulin pen. Pharmacy may  dispense brand covered by insurance.   Lancets (onetouch ultrasoft) lancets   No No   Sig: Use as instructed   OneTouch Delica Lancets 33G MISC   No No   Sig: May substitute brand based on insurance coverage. Check glucose TID.   albuterol (2.5 mg/3 mL) 0.083 % nebulizer solution   No No   Sig: Take 3 mL (2.5 mg total) by nebulization every 6 (six) hours as needed for wheezing or shortness of breath   glucose blood (OneTouch Verio) test strip   No No   Sig: TEST BLOOD SUGAR TWICE A DAY   glucose blood (OneTouch Verio) test strip   No No   Sig: May substitute brand based on insurance coverage. Check glucose TID.   ibuprofen (MOTRIN) 800 mg tablet   No No   Sig: Take 1 tablet (800 mg total) by mouth every 8 (eight) hours as needed for mild pain   insulin aspart (NovoLOG FlexPen) 100 UNIT/ML injection pen   No No   Sig: Inject 9 Units under the skin 3 (three) times a day with meals   metFORMIN (GLUCOPHAGE-XR) 500 mg 24 hr tablet   No No   Sig: Take 2 tablets (1,000 mg total) by mouth 2 (two) times a day with meals Start with 1 pill 500mg with breakfast x 1 week, then 1 pill 500mg with breakfast and 1 pill with dinner for 1 week , then 2 pills with breakfast (1000mg) and 1 pill with dinner (500mg) for 1 week, then 1000mg twice a day going forward   naproxen (Naprosyn) 500 mg tablet   No No   Sig: Take 1 tablet (500 mg total) by mouth 2 (two) times a day with meals for 5 days   topiramate (TOPAMAX) 50 MG tablet   Yes No   Sig: Take 50 mg by mouth 2 (two) times a day      Facility-Administered Medications: None     Discharge Medication List as of 10/20/2024  7:18 PM        START taking these medications    Details   amoxicillin-clavulanate (AUGMENTIN) 875-125 mg per tablet Take 1 tablet by mouth every 12 (twelve) hours for 7 days, Starting Sun 10/20/2024, Until Sun 10/27/2024, Normal           CONTINUE these medications which have NOT CHANGED    Details   Abilify Maintena 400 MG injection INJECT 2 ML INTRAMUSCUALRLY EVERY  4 WEEKS AS DIRECTED, Historical Med      albuterol (2.5 mg/3 mL) 0.083 % nebulizer solution Take 3 mL (2.5 mg total) by nebulization every 6 (six) hours as needed for wheezing or shortness of breath, Starting Wed 5/8/2024, Normal      Alcohol Swabs 70 % PADS May substitute brand based on insurance coverage. Check glucose TID., Normal      !! Blood Glucose Monitoring Suppl (OneTouch Verio Reflect) w/Device KIT May substitute brand based on insurance coverage. Check glucose TID., Normal      !! Blood Glucose Monitoring Suppl (OneTouch Verio) w/Device KIT Use 2 (two) times a day Ok to substitute with insurance covered brand, Starting Fri 10/27/2023, Normal      FLUoxetine (PROzac) 10 mg capsule Take 1 capsule (10 mg total) by mouth daily, Starting Wed 5/8/2024, Normal      !! glucose blood (OneTouch Verio) test strip TEST BLOOD SUGAR TWICE A DAY, Normal      !! glucose blood (OneTouch Verio) test strip May substitute brand based on insurance coverage. Check glucose TID., Normal      ibuprofen (MOTRIN) 800 mg tablet Take 1 tablet (800 mg total) by mouth every 8 (eight) hours as needed for mild pain, Starting Tue 8/20/2024, Normal      insulin aspart (NovoLOG FlexPen) 100 UNIT/ML injection pen Inject 9 Units under the skin 3 (three) times a day with meals, Starting Wed 5/8/2024, Normal      Insulin Glargine Solostar (Lantus SoloStar) 100 UNIT/ML SOPN Inject 0.3 mL (30 Units total) under the skin daily at bedtime, Starting Tue 5/7/2024, Normal      Insulin Pen Needle (BD Pen Needle Shani 2nd Gen) 32G X 4 MM MISC For use with insulin pen. Pharmacy may dispense brand covered by insurance., Normal      !! Lancets (onetouch ultrasoft) lancets Use as instructed, Normal      metFORMIN (GLUCOPHAGE-XR) 500 mg 24 hr tablet Take 2 tablets (1,000 mg total) by mouth 2 (two) times a day with meals Start with 1 pill 500mg with breakfast x 1 week, then 1 pill 500mg with breakfast and 1 pill with dinner for 1 week , then 2 pills with  breakfast (1000mg) and 1 pill with dinner (500 mg) for 1 week, then 1000mg twice a day going forward, Starting Tue 5/7/2024, Normal      naproxen (Naprosyn) 500 mg tablet Take 1 tablet (500 mg total) by mouth 2 (two) times a day with meals for 5 days, Starting Mon 10/14/2024, Until Sat 10/19/2024, Normal      !! OneTouch Delica Lancets 33G MISC May substitute brand based on insurance coverage. Check glucose TID., Normal      topiramate (TOPAMAX) 50 MG tablet Take 50 mg by mouth 2 (two) times a day, Starting Thu 3/10/2022, Historical Med       !! - Potential duplicate medications found. Please discuss with provider.        No discharge procedures on file.  ED SEPSIS DOCUMENTATION   Time reflects when diagnosis was documented in both MDM as applicable and the Disposition within this note       Time User Action Codes Description Comment    10/20/2024  7:17 PM Echo Snyder [L03.90] Cellulitis     10/20/2024  7:17 PM Echo Snyder [N50.819] Testicle pain                  Echo Snyder MD  10/20/24 2005

## 2024-10-20 NOTE — Clinical Note
Rik Marin was seen and treated in our emergency department on 10/20/2024.                Diagnosis: Testicle pain    Rik  may return to work on return date.    He may return on this date: 10/23/2024         If you have any questions or concerns, please don't hesitate to call.      Echo Snyder MD    ______________________________           _______________          _______________  Hospital Representative                              Date                                Time

## 2024-10-21 LAB
ATRIAL RATE: 100 BPM
P AXIS: 41 DEGREES
PR INTERVAL: 142 MS
QRS AXIS: 63 DEGREES
QRSD INTERVAL: 94 MS
QT INTERVAL: 340 MS
QTC INTERVAL: 438 MS
T WAVE AXIS: 30 DEGREES
VENTRICULAR RATE: 100 BPM

## 2024-10-21 PROCEDURE — 93010 ELECTROCARDIOGRAM REPORT: CPT | Performed by: INTERNAL MEDICINE

## 2024-10-22 ENCOUNTER — PATIENT OUTREACH (OUTPATIENT)
Dept: CASE MANAGEMENT | Facility: OTHER | Age: 19
End: 2024-10-22

## 2024-10-22 NOTE — LETTER
Date: 10/22/24  Dear Rik Marin,   My name is Whitney Vega and I am a registered nurse care manager working with  [facility].     Regrettably, I have not been able to reach you since our last conversation on 7/1/2024 and would like to set a time that I can talk with you over the phone.    My work is to help patients that have complex medical conditions get the care they need. This includes patients who may have been in the hospital or emergency room.  I work to get to know you, and help you in your care, your recovery, or assist you reach your personal health goals.  I also help identify barriers keeping you from getting the medical attention you deserve and I assist to connect you with community resources as needed.     Please call me at [phone] with any questions you may have. I look forward to speaking with you.    Sincerely,    Whitney Vega, RN    Whitney Vega, RN  Outpatient Care Manager    [phone]

## 2024-10-22 NOTE — PROGRESS NOTES
Outpatient Care Management Note:  Multiple Inbasket  ADT ER alerts received. Chart review completed.    Patient was seen at [facility] ED on:  10/14/24 for right knee pain.  Was instructed to follow up with Ortho.   10/18/24  for BH /MH issues.  Patient admitted he stopped taking his medications X 1 month.  Patient was set up with [email or web address] day program   10/20/24 for groin pain  / cellulitis.  Was prescribed augmentin and instructed to follow up with PCP.      Patient had an appointment scheduled for today with Ortho which if appears he did not keep.     Telephone outreach attempt made.  No answer.  Left voicemail message with general contact information with name, title, call back phone number office hours when I am available and message encouraging return call to this CM.      Outreach attempts by phone have been unsuccessful.  An unable to reach letter is being sent to address listed in chart.

## 2024-10-25 LAB — BACTERIA BLD CULT: NORMAL

## 2024-10-29 ENCOUNTER — PATIENT OUTREACH (OUTPATIENT)
Dept: CASE MANAGEMENT | Facility: OTHER | Age: 19
End: 2024-10-29

## 2024-10-29 NOTE — PROGRESS NOTES
OutPatient Care Management Note:  Attempts to outreach patient by phone and via mail have been unsuccessful.   RU CM episode will be closed. I have removed myself from the care team.

## 2024-10-30 ENCOUNTER — HOSPITAL ENCOUNTER (EMERGENCY)
Facility: HOSPITAL | Age: 19
Discharge: HOME | End: 2024-10-31
Attending: EMERGENCY MEDICINE
Payer: COMMERCIAL

## 2024-10-30 DIAGNOSIS — Z00.8 ENCOUNTER FOR PSYCHOLOGICAL EVALUATION: Primary | ICD-10-CM

## 2024-10-30 PROCEDURE — 99283 EMERGENCY DEPT VISIT LOW MDM: CPT

## 2024-10-30 PROCEDURE — 99285 EMERGENCY DEPT VISIT HI MDM: CPT | Performed by: EMERGENCY MEDICINE

## 2024-10-31 VITALS
TEMPERATURE: 97.6 F | HEART RATE: 88 BPM | DIASTOLIC BLOOD PRESSURE: 88 MMHG | OXYGEN SATURATION: 97 % | SYSTOLIC BLOOD PRESSURE: 146 MMHG | RESPIRATION RATE: 18 BRPM

## 2024-10-31 LAB
ALBUMIN SERPL BCG-MCNC: 4.3 G/DL (ref 3.5–5)
ALP SERPL-CCNC: 103 U/L (ref 34–104)
ALT SERPL W P-5'-P-CCNC: 22 U/L (ref 7–52)
AMPHETAMINES SERPL QL SCN: NEGATIVE
ANION GAP SERPL CALCULATED.3IONS-SCNC: 15 MMOL/L (ref 4–13)
AST SERPL W P-5'-P-CCNC: 23 U/L (ref 13–39)
BARBITURATES UR QL: NEGATIVE
BASOPHILS # BLD AUTO: 0.06 THOUSANDS/ΜL (ref 0–0.1)
BASOPHILS NFR BLD AUTO: 0 % (ref 0–1)
BENZODIAZ UR QL: NEGATIVE
BILIRUB SERPL-MCNC: 0.35 MG/DL (ref 0.2–1)
BUN SERPL-MCNC: 18 MG/DL (ref 5–25)
CALCIUM SERPL-MCNC: 9.7 MG/DL (ref 8.4–10.2)
CHLORIDE SERPL-SCNC: 100 MMOL/L (ref 96–108)
CO2 SERPL-SCNC: 19 MMOL/L (ref 21–32)
COCAINE UR QL: NEGATIVE
CREAT SERPL-MCNC: 0.66 MG/DL (ref 0.6–1.3)
EOSINOPHIL # BLD AUTO: 0.29 THOUSAND/ΜL (ref 0–0.61)
EOSINOPHIL NFR BLD AUTO: 2 % (ref 0–6)
ERYTHROCYTE [DISTWIDTH] IN BLOOD BY AUTOMATED COUNT: 14 % (ref 11.6–15.1)
ETHANOL SERPL-MCNC: <10 MG/DL
FENTANYL UR QL SCN: NEGATIVE
GFR SERPL CREATININE-BSD FRML MDRD: 139 ML/MIN/1.73SQ M
GLUCOSE SERPL-MCNC: 182 MG/DL (ref 65–140)
HCT VFR BLD AUTO: 44.1 % (ref 36.5–49.3)
HGB BLD-MCNC: 13.9 G/DL (ref 12–17)
HYDROCODONE UR QL SCN: NEGATIVE
IMM GRANULOCYTES # BLD AUTO: 0.07 THOUSAND/UL (ref 0–0.2)
IMM GRANULOCYTES NFR BLD AUTO: 1 % (ref 0–2)
LYMPHOCYTES # BLD AUTO: 4.55 THOUSANDS/ΜL (ref 0.6–4.47)
LYMPHOCYTES NFR BLD AUTO: 31 % (ref 14–44)
MCH RBC QN AUTO: 26.7 PG (ref 26.8–34.3)
MCHC RBC AUTO-ENTMCNC: 31.5 G/DL (ref 31.4–37.4)
MCV RBC AUTO: 85 FL (ref 82–98)
METHADONE UR QL: NEGATIVE
MONOCYTES # BLD AUTO: 0.93 THOUSAND/ΜL (ref 0.17–1.22)
MONOCYTES NFR BLD AUTO: 6 % (ref 4–12)
NEUTROPHILS # BLD AUTO: 8.65 THOUSANDS/ΜL (ref 1.85–7.62)
NEUTS SEG NFR BLD AUTO: 60 % (ref 43–75)
NRBC BLD AUTO-RTO: 0 /100 WBCS
OPIATES UR QL SCN: NEGATIVE
OXYCODONE+OXYMORPHONE UR QL SCN: NEGATIVE
PCP UR QL: NEGATIVE
PLATELET # BLD AUTO: 435 THOUSANDS/UL (ref 149–390)
PMV BLD AUTO: 10.7 FL (ref 8.9–12.7)
POTASSIUM SERPL-SCNC: 3.9 MMOL/L (ref 3.5–5.3)
PROT SERPL-MCNC: 8 G/DL (ref 6.4–8.4)
RBC # BLD AUTO: 5.2 MILLION/UL (ref 3.88–5.62)
SODIUM SERPL-SCNC: 134 MMOL/L (ref 135–147)
THC UR QL: POSITIVE
WBC # BLD AUTO: 14.55 THOUSAND/UL (ref 4.31–10.16)

## 2024-10-31 PROCEDURE — 36415 COLL VENOUS BLD VENIPUNCTURE: CPT | Performed by: EMERGENCY MEDICINE

## 2024-10-31 PROCEDURE — 80307 DRUG TEST PRSMV CHEM ANLYZR: CPT | Performed by: EMERGENCY MEDICINE

## 2024-10-31 PROCEDURE — 82077 ASSAY SPEC XCP UR&BREATH IA: CPT | Performed by: EMERGENCY MEDICINE

## 2024-10-31 PROCEDURE — 80053 COMPREHEN METABOLIC PANEL: CPT | Performed by: EMERGENCY MEDICINE

## 2024-10-31 PROCEDURE — NC001 PR NO CHARGE: Performed by: EMERGENCY MEDICINE

## 2024-10-31 PROCEDURE — 85025 COMPLETE CBC W/AUTO DIFF WBC: CPT | Performed by: EMERGENCY MEDICINE

## 2024-10-31 NOTE — DISCHARGE INSTRUCTIONS
Follow-up with primary care for further care. Contact info provided below if needed.  Continue home medications as prescribed.   Return to the ED with new or worsening symptoms.

## 2024-10-31 NOTE — ED NOTES
Pt cleared for discharge, belongings including phone returned to pt, ESTHELA called for pt     Jaclyn Wan RN  10/31/24 [id number]

## 2024-10-31 NOTE — ED PROVIDER NOTES
Daily Progress Note  Subjective  19 y.o. male presenting for SI. No acute overnight events.    Objective  /88 (BP Location: Right arm)   Pulse 88   Temp 97.6 °F (36.4 °C) (Temporal)   Resp 18   SpO2 97%   Physical Exam:   GENERAL APPEARANCE: NAD, resting comfortably in bed  HEENT: NC/AT, no obvious signs of trauma  CV: RRR  LUNGS: Chest rise equal B/L  ABD: Non-distended  MSK: No signs of edema  SKIN: No obvious signs of skin breakdown noted, warm/dry     Assessment/Plan:    19 y.o. male presenting for SI.  - Medically cleared  - Crisis following     ED Course as of 10/31/24 1204   u Oct 31, 2024   1121 Pt evaluated by crisis and deemed appropriate for DC. Pt denying SI. Pt not a risk to himself or others at this time.             Echo Snyder MD  10/31/24 1208

## 2024-10-31 NOTE — ED NOTES
"10/31/24 @ 0715:  PES notified that patient was screened overnight by [facility] and is awaiting telepsych.  MS Hanna    0830: PES met with patient who was seen last night by [facility] and was referred back to PES for \"voluntary psychiatric admission.\"  First of all, patient is not voluntary and wants to go home because, \"today is Halloween and I want to go to the program you sent me to because they're having a party; also, I want to give my grandmother something I made for her.\"  Patient was laughing and joking, talking about his beard and how he was going to keep the \"goat-vee.\"  This is not the actions of a person who is suicidal.  Patient adamantly denies suicidal ideations, plan or intent.    PES called [facility] and spoke to Mine who will reach out to Zeyad, his peer counselor, but she had to hang up because she was getting another call.      PES discussed with ED MD who is agreeable with discharge home.  PES will await return call from Mine regarding Zeyad's visit.      Hanna MS    1027: Zeyad from peer program at [facility] called and reports that he will come to the ED in the next 30-45 minutes to talk with patient, then will be discharged home.  Hanna MS    1119: Zeyad from [facility] peer program arrived, met with patient, and patient was discharged home.  PES notified Mine from [facility] of discharge and plan for him to attend [employer].  MS Hanna      "

## 2024-10-31 NOTE — ED PROVIDER NOTES
Final Diagnosis:  No diagnosis found.    Chief Complaint   Patient presents with    Psychiatric Evaluation     Pt arrived by PD who stated mom states he made suicidal comments due to not having money to pay his friend back for poker money.  Pt denies all SI comments that were made and states he was just sad and wanted to talk to someone.       HPI  Pt brought by police  Arguing w/ someone over phone about poker debt  Then yelling at mother and might have said some SI    Patient denies most here, but then admits, as he has done historically.     No attempt made.     EMS additionally reports:     - Previous charting underwent limited review with attention to last ED visits, labs, ekgs, and prior imaging.  Chart review reveals :     Admission on 10/20/2024, Discharged on 10/20/2024   Component Date Value Ref Range Status    WBC 10/20/2024 12.79 (H)  4.31 - 10.16 Thousand/uL Final    RBC 10/20/2024 4.85  3.88 - 5.62 Million/uL Final    Hemoglobin 10/20/2024 12.8  12.0 - 17.0 g/dL Final    Hematocrit 10/20/2024 41.3  36.5 - 49.3 % Final    MCV 10/20/2024 85  82 - 98 fL Final    MCH 10/20/2024 26.4 (L)  26.8 - 34.3 pg Final    MCHC 10/20/2024 31.0 (L)  31.4 - 37.4 g/dL Final    RDW 10/20/2024 14.0  11.6 - 15.1 % Final    MPV 10/20/2024 10.5  8.9 - 12.7 fL Final    Platelets 10/20/2024 327  149 - 390 Thousands/uL Final    nRBC 10/20/2024 0  /100 WBCs Final    Segmented % 10/20/2024 69  43 - 75 % Final    Immature Grans % 10/20/2024 1  0 - 2 % Final    Lymphocytes % 10/20/2024 23  14 - 44 % Final    Monocytes % 10/20/2024 5  4 - 12 % Final    Eosinophils Relative 10/20/2024 2  0 - 6 % Final    Basophils Relative 10/20/2024 0  0 - 1 % Final    Absolute Neutrophils 10/20/2024 8.88 (H)  1.85 - 7.62 Thousands/µL Final    Absolute Immature Grans 10/20/2024 0.07  0.00 - 0.20 Thousand/uL Final    Absolute Lymphocytes 10/20/2024 2.93  0.60 - 4.47 Thousands/µL Final    Absolute Monocytes 10/20/2024 0.62  0.17 - 1.22 Thousand/µL  Final    Eosinophils Absolute 10/20/2024 0.25  0.00 - 0.61 Thousand/µL Final    Basophils Absolute 10/20/2024 0.04  0.00 - 0.10 Thousands/µL Final    Sodium 10/20/2024 132 (L)  135 - 147 mmol/L Final    Potassium 10/20/2024 4.8  3.5 - 5.3 mmol/L Final    Moderately Hemolyzed:Results may be affected.    Chloride 10/20/2024 101  96 - 108 mmol/L Final    CO2 10/20/2024 19 (L)  21 - 32 mmol/L Final    ANION GAP 10/20/2024 12  4 - 13 mmol/L Final    BUN 10/20/2024 13  5 - 25 mg/dL Final    Creatinine 10/20/2024 0.59 (L)  0.60 - 1.30 mg/dL Final    Standardized to IDMS reference method    Glucose 10/20/2024 144 (H)  65 - 140 mg/dL Final    If the patient is fasting, the ADA then defines impaired fasting glucose as > 100 mg/dL and diabetes as > or equal to 123 mg/dL.    Calcium 10/20/2024 9.3  8.4 - 10.2 mg/dL Final    AST 10/20/2024 30  13 - 39 U/L Final    Moderately Hemolyzed:Results may be affected.    ALT 10/20/2024 13  7 - 52 U/L Final    Specimen collection should occur prior to Sulfasalazine administration due to the potential for falsely depressed results.     Alkaline Phosphatase 10/20/2024 103  34 - 104 U/L Final    Total Protein 10/20/2024 8.0  6.4 - 8.4 g/dL Final    Moderately Hemolyzed:Results may be affected.    Albumin 10/20/2024 3.8  3.5 - 5.0 g/dL Final    Moderately Hemolyzed:Results may be affected.    Total Bilirubin 10/20/2024 0.31  0.20 - 1.00 mg/dL Final    Use of this assay is not recommended for patients undergoing treatment with eltrombopag due to the potential for falsely elevated results.  N-acetyl-p-benzoquinone imine (metabolite of Acetaminophen) will generate erroneously low results in samples for patients that have taken an overdose of Acetaminophen.    eGFR 10/20/2024 145  ml/min/1.73sq m Final    LACTIC ACID 10/20/2024 1.3  0.5 - 2.0 mmol/L Final    Procalcitonin 10/20/2024 <0.05  <=0.25 ng/ml Final    Comment: Suspected Lower Respiratory Tract Infection (LRTI):  - LESS than or EQUAL to  0.25 ng/mL:   low likelihood for bacterial LRTI; antibiotics DISCOURAGED.  - GREATER than 0.25 ng/mL:   increased likelihood for bacterial LRTI; antibiotics ENCOURAGED.    Suspected Sepsis:  - Strongly consider initiating antibiotics in ALL UNSTABLE patients.  - LESS than or EQUAL to 0.5 ng/mL:   low likelihood for bacterial sepsis; antibiotics DISCOURAGED.  - GREATER than 0.5 ng/mL:   increased likelihood for bacterial sepsis; antibiotics ENCOURAGED.  - GREATER than 2 ng/mL:   high risk for severe sepsis / septic shock; antibiotics strongly ENCOURAGED.    Decisions on antibiotic use should not be based solely on Procalcitonin (PCT) levels. If PCT is low but uncertainty exists with stopping antibiotics, repeat PCT in 6-24 hours to confirm the low level. If antibiotics are administered (regardless if initial PCT was high or low), repeat PCT every 1-2 days to consider early antibiotic cessation (when GREATER                            than 80% decrease from the peak OR when PCT drops below designated cutoffs, whichever comes first), so long as the infection is NOT one that typically requires prolonged treatment durations (e.g., bone/joint infections, endocarditis, Staph. aureus bacteremia).    Situations of FALSE-POSITIVE Procalcitonin values:  1) Newborns < 72 hours old  2) Massive stress from severe trauma / burns, major surgery, acute pancreatitis, cardiogenic / hemorrhagic shock, sickle cell crisis, or other organ perfusion abnormalities  3) Malaria and some Candidal infections  4) Treatment with agents that stimulate cytokines (e.g., OKT3, anti-lymphocyte globulins, alemtuzumab, IL-2, granulocyte transfusion [NOT GCSFs])  5) Chronic renal disease causes elevated baseline levels (consider GREATER than 0.75 ng/mL as an abnormal cut-off); initiating HD/CRRT may cause transient decreases  6) Paraneoplastic syndromes from medullary thyroid or SCLC, some forms of vasculitis, and acute [id number]                             disease    Situations of FALSE-NEGATIVE Procalcitonin values:  1) Too early in clinical course for PCT to have reached its peak (may repeat in 6-24 hours to confirm low level)  2) Localized infection WITHOUT systemic (SIRS / sepsis) response (e.g., an abscess, osteomyelitis, cystitis)  3) Mycobacteria (e.g., Tuberculosis, MAC)  4) Cystic fibrosis exacerbations      Protime 10/20/2024 13.3  12.3 - 15.0 seconds Final    INR 10/20/2024 0.96  0.85 - 1.19 Final    PTT 10/20/2024 30  23 - 34 seconds Final    Therapeutic Heparin Range =  60-90 seconds    Blood Culture 10/20/2024 No Growth After 5 Days.   Final    Color, UA 10/20/2024 Yellow   Final    Clarity, UA 10/20/2024 Clear   Final    Specific Gravity, UA 10/20/2024 >=1.030  1.005 - 1.030 Final    pH, UA 10/20/2024 5.5  4.5, 5.0, 5.5, 6.0, 6.5, 7.0, 7.5, 8.0 Final    Leukocytes, UA 10/20/2024 Negative  Negative Final    Nitrite, UA 10/20/2024 Negative  Negative Final    Protein, UA 10/20/2024 30 (1+) (A)  Negative mg/dl Final    Glucose, UA 10/20/2024 Negative  Negative mg/dl Final    Ketones, UA 10/20/2024 Negative  Negative mg/dl Final    Urobilinogen, UA 10/20/2024 <2.0  <2.0 mg/dl mg/dl Final    Bilirubin, UA 10/20/2024 Negative  Negative Final    Occult Blood, UA 10/20/2024 Negative  Negative Final    Ventricular Rate 10/20/2024 100  BPM Final    Atrial Rate 10/20/2024 100  BPM Final    IA Interval 10/20/2024 142  ms Final    QRSD Interval 10/20/2024 94  ms Final    QT Interval 10/20/2024 340  ms Final    QTC Interval 10/20/2024 438  ms Final    P Axis 10/20/2024 41  degrees Final    QRS Axis 10/20/2024 63  degrees Final    T Wave Axis 10/20/2024 30  degrees Final    RBC, UA 10/20/2024 0-1  None Seen, 0-1, 1-2, 2-4, 0-5 /hpf Final    WBC, UA 10/20/2024 0-1  None Seen, 0-1, 1-2, 0-5, 2-4 /hpf Final    Epithelial Cells 10/20/2024 Occasional  None Seen, Occasional /hpf Final    Bacteria, UA 10/20/2024 Occasional  None Seen, Occasional /hpf Final    MUCUS  THREADS 10/20/2024 Innumerable (A)  None Seen Final    Hyaline Casts, UA 10/20/2024 0-1 (A)  (none) /lpf Final       - No language barrier.   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    PSH:   has a past surgical history that includes TONSILECTOMY AND ADNOIDECTOMY; pr i&d vulva/perineal abscess (N/A, 5/11/2024); Incision and drainage of wound (Left, 5/13/2024); and Incision and drainage of wound (Left, 5/15/2024).     Social History:  Tobacco Use: High Risk (10/31/2024)    Patient History     Smoking Tobacco Use: Every Day     Smokeless Tobacco Use: Current     Passive Exposure: Yes     Alcohol Use: Not At Risk (7/26/2024)    Received from [facility]    AUDIT-C     Frequency of Alcohol Consumption: Monthly or less     Average Number of Drinks: 1 or 2     Frequency of Binge Drinking: Never     No illicit use       ROS:  Pertinent positives/negatives: .     Some ROS may be present in the HPI and would take precedent over these standard questions asked below.   Review of Systems   Psychiatric/Behavioral:  Positive for suicidal ideas.         CONSTITUTIONAL:  No lethargy. No unexpected weight loss. No change in behavior.  EYES:  No pain, redness, or discharge. No loss of vision. No orbital trauma or pain.   ENT:  No tinnitus or decreased hearing. No epistaxis/purulent rhinorrhea. No voice change, airway closing, trismus.   CARDIOVASCULAR:  No chest pain. No skin mottling or pallor. No change in exertional capacity  RESPIRATORY:  No hemoptysis. No paroxysmal nocturnal dyspnea. No stridor. No apnea or bluing.   GASTROINTESTINAL:  No vomiting, diarrhea. No distension. No  melena. No hematochezia.   GENITOURINARY:  No nocturia. No hematuria or foul smelling or cloudy urine. No discharge. No sores/adenopathy.   MUSCULOSKELETAL:  No contracture.  No new deformity.   INTEGUMENTARY:  No swelling. No unexpected contusions. No abrasions. No lymphangitis.  NEUROLOGIC:  No meningismus. No new numbness of the extremities. No new focal weakness. No postural instability  PSYCHIATRIC:  No SI HI AVH  HEMATOLOGICAL:  No bleeding. No petechiae. No bruising.  ALLERGIES:  No urticaria. No sudden abd cramping. No stridor.    PE:     Physical exam highlights:   Physical Exam       Vitals:    10/31/24 0042   BP: 139/94   BP Location: Right arm   Pulse: 94   Resp: 16   Temp: 98.7 °F (37.1 °C)   TempSrc: Temporal   SpO2: 98%     Vitals reviewed by me.   Nursing note reviewed  Chaperone present for all sensitive exam.  Const: No acute distress. Alert. Nontoxic. Not diaphoretic.    HEENT: External ears normal. No protrusion drainage swelling. Nose normal. No drainage/traumatic deformity. MM. Mouth with baseline/symmetric movement. No trismus.   Eyes: No squinting. No icterus. No tearing/swelling/drainage. Tracks through the room with normal EOM.   Neck: ROM normal. No rigidity. No meningismus.  Cards: Rate as per vitals Compared to monitor sinus unless documented. Regular Well perfused.  Pulm: Effort and excursion normal. No distress. No audible wheezing/no stridor. Normal resp rate without retraction or change in work of breathing.  Abd: No distension beyond baseline. No fluctuant wave. Patient without peritoneal pain with shifting/bumping the bed.   MSK: ROM normal baseline. No deformity. No contractures from baseline.   Skin: No new rashes visible. Well perfused. No wounds visualized on exposed skin  Neuro: Nonfocal. Baseline. CN grossly intact. Moving all four with coordination.   Psych: Normal behavior and affect.        A:  - Nursing note reviewed.    Ddx and MDM  Considered diagnoses    Medically  clear for pyschiatric evaluation        Dispo decision       My conversation with consultant reveals:        Decision rules:                           My read of the XR/CT scan reveals:     No orders to display       Orders Placed This Encounter   Procedures    Rapid drug screen, urine    CBC and differential    Comprehensive metabolic panel    Ethanol    Diet Regular; [employer]    Nursing communication Prepare room for behavioral health patient    Virtual Patient Observation- Low Suicidal Risk     Labs Reviewed   RAPID DRUG SCREEN, URINE - Abnormal       Result Value Ref Range Status    Amph/Meth UR Negative  Negative Final    Barbiturate Ur Negative  Negative Final    Benzodiazepine Urine Negative  Negative Final    Cocaine Urine Negative  Negative Final    Methadone Urine Negative  Negative Final    Opiate Urine Negative  Negative Final    PCP Ur Negative  Negative Final    THC Urine Positive (*) Negative Final    Oxycodone Urine Negative  Negative Final    Fentanyl Urine Negative  Negative Final    HYDROCODONE URINE Negative  Negative Final    Narrative:     Presumptive report. If requested, specimen will be sent to reference lab for confirmation.  FOR MEDICAL PURPOSES ONLY.   IF CONFIRMATION NEEDED PLEASE CONTACT THE LAB WITHIN 5 DAYS.    Drug Screen Cutoff Levels:  AMPHETAMINE/METHAMPHETAMINES  1000 ng/mL  BARBITURATES     200 ng/mL  BENZODIAZEPINES     200 ng/mL  COCAINE      300 ng/mL  METHADONE      300 ng/mL  OPIATES      300 ng/mL  PHENCYCLIDINE     25 ng/mL  THC       50 ng/mL  OXYCODONE      100 ng/mL  FENTANYL      5 ng/mL  HYDROCODONE     300 ng/mL   CBC AND DIFFERENTIAL - Abnormal    WBC 14.55 (*) 4.31 - 10.16 Thousand/uL Final    RBC 5.20  3.88 - 5.62 Million/uL Final    Hemoglobin 13.9  12.0 - 17.0 g/dL Final    Hematocrit 44.1  36.5 - 49.3 % Final    MCV 85  82 - 98 fL Final    MCH 26.7 (*) 26.8 - 34.3 pg Final    MCHC 31.5  31.4 - 37.4 g/dL Final    RDW 14.0  11.6 - 15.1 % Final    MPV 10.7   8.9 - 12.7 fL Final    Platelets 435 (*) 149 - 390 Thousands/uL Final    nRBC 0  /100 WBCs Final    Segmented % 60  43 - 75 % Final    Immature Grans % 1  0 - 2 % Final    Lymphocytes % 31  14 - 44 % Final    Monocytes % 6  4 - 12 % Final    Eosinophils Relative 2  0 - 6 % Final    Basophils Relative 0  0 - 1 % Final    Absolute Neutrophils 8.65 (*) 1.85 - 7.62 Thousands/µL Final    Absolute Immature Grans 0.07  0.00 - 0.20 Thousand/uL Final    Absolute Lymphocytes 4.55 (*) 0.60 - 4.47 Thousands/µL Final    Absolute Monocytes 0.93  0.17 - 1.22 Thousand/µL Final    Eosinophils Absolute 0.29  0.00 - 0.61 Thousand/µL Final    Basophils Absolute 0.06  0.00 - 0.10 Thousands/µL Final   COMPREHENSIVE METABOLIC PANEL - Abnormal    Sodium 134 (*) 135 - 147 mmol/L Final    Potassium 3.9  3.5 - 5.3 mmol/L Final    Chloride 100  96 - 108 mmol/L Final    CO2 19 (*) 21 - 32 mmol/L Final    ANION GAP 15 (*) 4 - 13 mmol/L Final    BUN 18  5 - 25 mg/dL Final    Creatinine 0.66  0.60 - 1.30 mg/dL Final    Comment: Standardized to IDMS reference method    Glucose 182 (*) 65 - 140 mg/dL Final    Comment: If the patient is fasting, the ADA then defines impaired fasting glucose as > 100 mg/dL and diabetes as > or equal to 123 mg/dL.    Calcium 9.7  8.4 - 10.2 mg/dL Final    AST 23  13 - 39 U/L Final    ALT 22  7 - 52 U/L Final    Comment: Specimen collection should occur prior to Sulfasalazine administration due to the potential for falsely depressed results.     Alkaline Phosphatase 103  34 - 104 U/L Final    Total Protein 8.0  6.4 - 8.4 g/dL Final    Albumin 4.3  3.5 - 5.0 g/dL Final    Total Bilirubin 0.35  0.20 - 1.00 mg/dL Final    Comment: Use of this assay is not recommended for patients undergoing treatment with eltrombopag due to the potential for falsely elevated results.  N-acetyl-p-benzoquinone imine (metabolite of Acetaminophen) will generate erroneously low results in samples for patients that have taken an overdose of  Acetaminophen.    eGFR 139  ml/min/1.73sq m Final    Narrative:     National Kidney Disease Foundation guidelines for Chronic Kidney Disease (CKD):     Stage 1 with normal or high GFR (GFR > 90 mL/min/1.73 square meters)    Stage 2 Mild CKD (GFR = 60-89 mL/min/1.73 square meters)    Stage 3A Moderate CKD (GFR = 45-59 mL/min/1.73 square meters)    Stage 3B Moderate CKD (GFR = 30-44 mL/min/1.73 square meters)    Stage 4 Severe CKD (GFR = 15-29 mL/min/1.73 square meters)    Stage 5 End Stage CKD (GFR <15 mL/min/1.73 square meters)  Note: GFR calculation is accurate only with a steady state creatinine   MEDICAL ALCOHOL - Normal    Ethanol Lvl <10  <10 mg/dL Final       *Each of these labs was reviewed. Particular standout labs will be noted in the ED Course above     Final Diagnosis:  No diagnosis found.      P:  - hospital tx includes   Medications - No data to display      - disposition  ED Disposition       None          Follow-up Information    None         - patient will call their PCP to let them know they were in the emergency department. We discuss return precautions and patient is agreeable with plan and aformentioned disposition.       - additional treatment intended, if consistent with primary provider:  - patient to follow with :      Patient's Medications   Discharge Prescriptions    No medications on file     No discharge procedures on file.  Prior to Admission Medications   Prescriptions Last Dose Informant Patient Reported? Taking?   Abilify Maintena 400 MG injection   Yes No   Sig: INJECT 2 ML INTRAMUSCUALRLY EVERY 4 WEEKS AS DIRECTED   Alcohol Swabs 70 % PADS   No No   Sig: May substitute brand based on insurance coverage. Check glucose TID.   Blood Glucose Monitoring Suppl (OneTouch Verio Reflect) w/Device KIT   No No   Sig: May substitute brand based on insurance coverage. Check glucose TID.   Blood Glucose Monitoring Suppl (OneTouch Verio) w/Device KIT   No No   Sig: Use 2 (two) times a day Ok to  substitute with insurance covered brand   FLUoxetine (PROzac) 10 mg capsule   No No   Sig: Take 1 capsule (10 mg total) by mouth daily   Insulin Glargine Solostar (Lantus SoloStar) 100 UNIT/ML SOPN   No No   Sig: Inject 0.3 mL (30 Units total) under the skin daily at bedtime   Insulin Pen Needle (BD Pen Needle Shani 2nd Gen) 32G X 4 MM MISC   No No   Sig: For use with insulin pen. Pharmacy may dispense brand covered by insurance.   Lancets (onetouch ultrasoft) lancets   No No   Sig: Use as instructed   OneTouch Delica Lancets 33G MISC   No No   Sig: May substitute brand based on insurance coverage. Check glucose TID.   albuterol (2.5 mg/3 mL) 0.083 % nebulizer solution   No No   Sig: Take 3 mL (2.5 mg total) by nebulization every 6 (six) hours as needed for wheezing or shortness of breath   glucose blood (OneTouch Verio) test strip   No No   Sig: TEST BLOOD SUGAR TWICE A DAY   glucose blood (OneTouch Verio) test strip   No No   Sig: May substitute brand based on insurance coverage. Check glucose TID.   ibuprofen (MOTRIN) 800 mg tablet   No No   Sig: Take 1 tablet (800 mg total) by mouth every 8 (eight) hours as needed for mild pain   insulin aspart (NovoLOG FlexPen) 100 UNIT/ML injection pen   No No   Sig: Inject 9 Units under the skin 3 (three) times a day with meals   metFORMIN (GLUCOPHAGE-XR) 500 mg 24 hr tablet   No No   Sig: Take 2 tablets (1,000 mg total) by mouth 2 (two) times a day with meals Start with 1 pill 500mg with breakfast x 1 week, then 1 pill 500mg with breakfast and 1 pill with dinner for 1 week , then 2 pills with breakfast (1000mg) and 1 pill with dinner (500mg) for 1 week, then 1000mg twice a day going forward   naproxen (Naprosyn) 500 mg tablet   No No   Sig: Take 1 tablet (500 mg total) by mouth 2 (two) times a day with meals for 5 days   topiramate (TOPAMAX) 50 MG tablet   Yes No   Sig: Take 50 mg by mouth 2 (two) times a day      Facility-Administered Medications: None       Portions of the  "record may have been created with voice recognition software. Occasional wrong word or \"sound a like\" substitutions may have occurred due to the inherent limitations of voice recognition software. Read the chart carefully and recognize, using context, where substitutions have occurred.    Electronically signed by:  MD Kanu Villafuerte MD  10/31/24 0648    "

## 2024-10-31 NOTE — ED NOTES
Writer spoke with patient, mom with D/C plans. Patient transport arranged via round trip for a lift. Patient ambulated out of the ED resp even and unlabored and in no distress at the moment. Patient also fed prior to D/C     Richy Moses RN  10/31/24 4993

## 2024-11-07 ENCOUNTER — HOSPITAL ENCOUNTER (EMERGENCY)
Facility: HOSPITAL | Age: 19
Discharge: HOME | End: 2024-11-07
Attending: EMERGENCY MEDICINE
Payer: COMMERCIAL

## 2024-11-07 VITALS
OXYGEN SATURATION: 96 % | BODY MASS INDEX: 54.56 KG/M2 | DIASTOLIC BLOOD PRESSURE: 59 MMHG | RESPIRATION RATE: 20 BRPM | SYSTOLIC BLOOD PRESSURE: 115 MMHG | HEART RATE: 108 BPM | TEMPERATURE: 98.3 F | WEIGHT: 308 LBS

## 2024-11-07 DIAGNOSIS — E11.65 UNCONTROLLED TYPE 2 DIABETES MELLITUS WITH HYPERGLYCEMIA (HCC): ICD-10-CM

## 2024-11-07 DIAGNOSIS — E11.9 TYPE 2 DIABETES MELLITUS WITHOUT COMPLICATION, WITHOUT LONG-TERM CURRENT USE OF INSULIN (HCC): ICD-10-CM

## 2024-11-07 DIAGNOSIS — R53.81 MALAISE: Primary | ICD-10-CM

## 2024-11-07 LAB
ALBUMIN SERPL BCG-MCNC: 4 G/DL (ref 3.5–5)
ALP SERPL-CCNC: 98 U/L (ref 34–104)
ALT SERPL W P-5'-P-CCNC: 23 U/L (ref 7–52)
ANION GAP SERPL CALCULATED.3IONS-SCNC: 13 MMOL/L (ref 4–13)
AST SERPL W P-5'-P-CCNC: 26 U/L (ref 13–39)
BASOPHILS # BLD AUTO: 0.04 THOUSANDS/ÂΜL (ref 0–0.1)
BASOPHILS NFR BLD AUTO: 0 % (ref 0–1)
BILIRUB SERPL-MCNC: 0.38 MG/DL (ref 0.2–1)
BUN SERPL-MCNC: 16 MG/DL (ref 5–25)
CALCIUM SERPL-MCNC: 9.4 MG/DL (ref 8.4–10.2)
CHLORIDE SERPL-SCNC: 99 MMOL/L (ref 96–108)
CO2 SERPL-SCNC: 21 MMOL/L (ref 21–32)
CREAT SERPL-MCNC: 0.76 MG/DL (ref 0.6–1.3)
EOSINOPHIL # BLD AUTO: 0.17 THOUSAND/ÂΜL (ref 0–0.61)
EOSINOPHIL NFR BLD AUTO: 1 % (ref 0–6)
ERYTHROCYTE [DISTWIDTH] IN BLOOD BY AUTOMATED COUNT: 14 % (ref 11.6–15.1)
FLUAV AG UPPER RESP QL IA.RAPID: NEGATIVE
FLUBV AG UPPER RESP QL IA.RAPID: NEGATIVE
GFR SERPL CREATININE-BSD FRML MDRD: 131 ML/MIN/1.73SQ M
GLUCOSE SERPL-MCNC: 220 MG/DL (ref 65–140)
GLUCOSE SERPL-MCNC: 244 MG/DL (ref 65–140)
HCT VFR BLD AUTO: 41.1 % (ref 36.5–49.3)
HGB BLD-MCNC: 13 G/DL (ref 12–17)
IMM GRANULOCYTES # BLD AUTO: 0.06 THOUSAND/UL (ref 0–0.2)
IMM GRANULOCYTES NFR BLD AUTO: 1 % (ref 0–2)
LYMPHOCYTES # BLD AUTO: 2.46 THOUSANDS/ÂΜL (ref 0.6–4.47)
LYMPHOCYTES NFR BLD AUTO: 21 % (ref 14–44)
MCH RBC QN AUTO: 26.4 PG (ref 26.8–34.3)
MCHC RBC AUTO-ENTMCNC: 31.6 G/DL (ref 31.4–37.4)
MCV RBC AUTO: 84 FL (ref 82–98)
MONOCYTES # BLD AUTO: 0.68 THOUSAND/ÂΜL (ref 0.17–1.22)
MONOCYTES NFR BLD AUTO: 6 % (ref 4–12)
NEUTROPHILS # BLD AUTO: 8.43 THOUSANDS/ÂΜL (ref 1.85–7.62)
NEUTS SEG NFR BLD AUTO: 71 % (ref 43–75)
NRBC BLD AUTO-RTO: 0 /100 WBCS
PLATELET # BLD AUTO: 281 THOUSANDS/UL (ref 149–390)
PMV BLD AUTO: 10.7 FL (ref 8.9–12.7)
POTASSIUM SERPL-SCNC: 3.9 MMOL/L (ref 3.5–5.3)
PROT SERPL-MCNC: 7.7 G/DL (ref 6.4–8.4)
RBC # BLD AUTO: 4.92 MILLION/UL (ref 3.88–5.62)
SARS-COV+SARS-COV-2 AG RESP QL IA.RAPID: NEGATIVE
SODIUM SERPL-SCNC: 133 MMOL/L (ref 135–147)
WBC # BLD AUTO: 11.84 THOUSAND/UL (ref 4.31–10.16)

## 2024-11-07 PROCEDURE — 96360 HYDRATION IV INFUSION INIT: CPT

## 2024-11-07 PROCEDURE — 82948 REAGENT STRIP/BLOOD GLUCOSE: CPT

## 2024-11-07 PROCEDURE — 96361 HYDRATE IV INFUSION ADD-ON: CPT

## 2024-11-07 PROCEDURE — 93005 ELECTROCARDIOGRAM TRACING: CPT

## 2024-11-07 PROCEDURE — 99285 EMERGENCY DEPT VISIT HI MDM: CPT | Performed by: EMERGENCY MEDICINE

## 2024-11-07 PROCEDURE — 36415 COLL VENOUS BLD VENIPUNCTURE: CPT | Performed by: EMERGENCY MEDICINE

## 2024-11-07 PROCEDURE — 99285 EMERGENCY DEPT VISIT HI MDM: CPT

## 2024-11-07 PROCEDURE — 80053 COMPREHEN METABOLIC PANEL: CPT | Performed by: EMERGENCY MEDICINE

## 2024-11-07 PROCEDURE — 85025 COMPLETE CBC W/AUTO DIFF WBC: CPT | Performed by: EMERGENCY MEDICINE

## 2024-11-07 PROCEDURE — 87811 SARS-COV-2 COVID19 W/OPTIC: CPT | Performed by: EMERGENCY MEDICINE

## 2024-11-07 PROCEDURE — 87804 INFLUENZA ASSAY W/OPTIC: CPT | Performed by: EMERGENCY MEDICINE

## 2024-11-07 RX ORDER — METFORMIN HYDROCHLORIDE 500 MG/1
1000 TABLET, EXTENDED RELEASE ORAL 2 TIMES DAILY WITH MEALS
Qty: 60 TABLET | Refills: 0 | Status: SHIPPED | OUTPATIENT
Start: 2024-11-07

## 2024-11-07 RX ORDER — INSULIN ASPART 100 [IU]/ML
9 INJECTION, SOLUTION INTRAVENOUS; SUBCUTANEOUS
Qty: 15 ML | Refills: 0 | Status: SHIPPED | OUTPATIENT
Start: 2024-11-07

## 2024-11-07 RX ORDER — INSULIN GLARGINE 100 [IU]/ML
30 INJECTION, SOLUTION SUBCUTANEOUS
Qty: 10 ML | Refills: 0 | Status: SHIPPED | OUTPATIENT
Start: 2024-11-07

## 2024-11-07 RX ORDER — BLOOD SUGAR DIAGNOSTIC
STRIP MISCELLANEOUS
Qty: 100 EACH | Refills: 0 | Status: SHIPPED | OUTPATIENT
Start: 2024-11-07

## 2024-11-07 RX ORDER — PEN NEEDLE, DIABETIC 32GX 5/32"
NEEDLE, DISPOSABLE MISCELLANEOUS
Qty: 100 EACH | Refills: 0 | Status: SHIPPED | OUTPATIENT
Start: 2024-11-07

## 2024-11-07 RX ORDER — LANCETS
EACH MISCELLANEOUS
Qty: 100 EACH | Refills: 0 | Status: SHIPPED | OUTPATIENT
Start: 2024-11-07

## 2024-11-07 RX ADMIN — SODIUM CHLORIDE 1000 ML: 0.9 INJECTION, SOLUTION INTRAVENOUS at 15:58

## 2024-11-07 NOTE — ED PROVIDER NOTES
Time reflects when diagnosis was documented in both MDM as applicable and the Disposition within this note       Time User Action Codes Description Comment    11/7/2024  4:41 PM Dylan Peres [E11.65] Uncontrolled type 2 diabetes mellitus with hyperglycemia (HCC)     11/7/2024  4:42 PM Dylan Peres [E11.9] Type 2 diabetes mellitus without complication, without long-term current use of insulin (HCC)     11/7/2024  6:37 PM Dylan Peres [R53.81] Malaise           ED Disposition       ED Disposition   Discharge    Condition   Stable    Date/Time   Thu Nov 7, 2024  4:40 PM    Comment   Rik Tomas discharge to home/self care.                   Assessment & Plan       Medical Decision Making  Patient mildly tachycardic but otherwise well-appearing.  I ordered and reviewed lab work including CBC, CMP to evaluate for electrolyte or renal derangement, leukocytosis, anemia.  Patient with a point-of-care glucose of 244.  I ordered a COVID and flu swab.  I ordered and independently interpreted an EKG which demonstrates sinus tachycardia rate of 108 without ST or T wave abnormalities.  I treated patient with IV fluids.  I offered patient Tylenol but patient not desiring.  Patient mildly hyperglycemic but lab work otherwise normal.  Heart rate improving with IV fluids and states he feels better.  Able to eat a sandwich in the emergency department.  Provided with reassurance, information to establish care with a primary care provider, discharged with return precautions    Amount and/or Complexity of Data Reviewed  Labs: ordered.    Risk  OTC drugs.  Prescription drug management.             Medications   sodium chloride 0.9 % bolus 1,000 mL (0 mL Intravenous Stopped 11/7/24 1730)       ED Risk Strat Scores             CRAFFT      Flowsheet Row Most Recent Value   CRAFFT Initial Screen: During the past 12 months, did you:    1. Drink any alcohol (more than a few sips)?  Yes Filed at:  "11/07/2024 1503   2. Smoke any marijuana or hashish Yes  [uses 3x a day states to make him relax] Filed at: 11/07/2024 1503   3. Use anything else to get high? (\"anything else\" includes illegal drugs, over the counter and prescription drugs, and things that you sniff or 'patel')? No Filed at: 11/07/2024 1503   DIAZ Full Screen: During the past 12 months:    1. Have you ever ridden in a car driven by someone (including yourself) who was \"high\" or had been using alcohol or drugs? 0 Filed at: 11/07/2024 1503   2. Do you ever use alcohol or drugs to relax, feel better about yourself, or fit in? 1 Filed at: 11/07/2024 1503   3. Do you ever use alcohol/drugs while you are by yourself, alone? 0 Filed at: 11/07/2024 1503   4. Do you ever forget things you did while using alcohol or drugs? 0 Filed at: 11/07/2024 1503   5. Do your family or friends ever tell you that you should cut down on your drinking or drug use? 0 Filed at: 11/07/2024 1503   6. Have you gotten into trouble while you were using alcohol or drugs? 0 Filed at: 11/07/2024 1503   CRAFFT Score 1 Filed at: 11/07/2024 1503                                          History of Present Illness       Chief Complaint   Patient presents with    Fatigue     Patient called the squ [place] was in the park complaining of weakness, Hx of diabetes, found by [facility] to be cool and clammy but was not able to get a blood sugar.   Oral glucose given to him by [facility].       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       Past Surgical History:   Procedure Laterality Date    INCISION AND DRAINAGE OF WOUND Left 5/13/2024    Procedure: INCISION AND DRAINAGE (I&D) GROIN;  Surgeon: Tim Ch MD;  Location: [facility];  Service: General "    INCISION AND DRAINAGE OF WOUND Left 5/15/2024    Procedure: INCISION AND DRAINAGE (I&D) GROIN;  Surgeon: Tim Ch MD;  Location: [facility];  Service: General    TX I&D VULVA/PERINEAL ABSCESS N/A 5/11/2024    Procedure: INCISION AND DRAINAGE (I&D) PERINEAL ABSCESS;  Surgeon: Tim Ch MD;  Location: [facility];  Service: General    TONSILECTOMY AND ADNOIDECTOMY      2020      Family History   Problem Relation Age of Onset    Hyperlipidemia Mother     Diabetes type II Mother     Obesity Mother     Obesity Father     Drug abuse Father     Diabetes type II Maternal Aunt     Lung disease Maternal Aunt     Rheum arthritis Maternal Aunt     Fibromyalgia Maternal Aunt     Atrial fibrillation Maternal Grandmother     Hyperlipidemia Maternal Grandfather     Diabetes type II Maternal Grandfather     Stroke Maternal Grandfather     Heart disease Maternal Grandfather     Stroke Paternal Grandfather       Social History     Tobacco Use    Smoking status: Every Day     Types: Cigarettes     Passive exposure: Yes    Smokeless tobacco: Current   Vaping Use    Vaping status: Some Days    Substances: Nicotine, Flavoring   Substance Use Topics    Alcohol use: Yes     Alcohol/week: 2.0 standard drinks of alcohol     Types: 2 Shots of liquor per week     Comment: when available to him    Drug use: Yes     Types: Marijuana      E-Cigarette/Vaping    E-Cigarette Use Current Some Day User     Comments smoke cigg. when he has money       E-Cigarette/Vaping Substances    Nicotine Yes     THC No     CBD No     Flavoring Yes     Other No     Unknown No       I have reviewed and agree with the history as documented.     Patient is a 19-year-old male history of diabetes, medication noncompliance presenting for evaluation of 1 day of general malaise, lightheadedness, mild nausea, headache.  Patient states he has been feeling out of it today.  Patient denies any vomiting, diarrhea, chest pain, shortness of breath, abdominal pain.   Patient states some polydipsia, denies polyuria.  Patient states he has not been checking his glucose at home.  Patient denies fevers, chills, constitutional symptoms.  Patient states that he most recently used alcohol and marijuana about 3 days ago but denies any more recent illicit drug or alcohol use.  Patient states he has not been taking any of his diabetic medications due to them being  and states that he had previously been seeing a pediatrician and needs to establish care with a family doc.        Review of Systems   Constitutional:  Positive for fatigue. Negative for chills and fever.   Gastrointestinal:  Positive for nausea. Negative for abdominal pain, diarrhea and vomiting.   Endocrine: Positive for polydipsia. Negative for polyuria.   Neurological:  Positive for headaches.           Objective       ED Triage Vitals   Temperature Pulse Blood Pressure Respirations SpO2 Patient Position - Orthostatic VS   24 1500 24 1505 24 1505 24 1505 24 1505 --   97.6 °F (36.4 °C) (!) 111 127/70 22 98 %       Temp Source Heart Rate Source BP Location FiO2 (%) Pain Score    24 1500 24 1508 24 1508 -- 24 1500    Oral Monitor Right arm  No Pain      Vitals      Date and Time Temp Pulse SpO2 Resp BP Pain Score FACES Pain Rating User   24 1730 98.3 °F (36.8 °C) 108 96 % 20 115/59 -- -- MDD   24 1508 -- 111 97 % 20 127/70 -- -- LG   24 1505 -- 111 98 % 22 127/70 -- -- MDD   24 1500 97.6 °F (36.4 °C) -- -- -- -- No Pain -- MDD            Physical Exam  Vitals and nursing note reviewed.   Constitutional:       General: He is not in acute distress.     Appearance: Normal appearance. He is not ill-appearing, toxic-appearing or diaphoretic.      Comments: Well-appearing, nontoxic with nondistressed   HENT:      Head: Normocephalic and atraumatic.      Comments: Moist mucous membranes     Right Ear: External ear normal.      Left Ear: External ear  normal.   Eyes:      General:         Right eye: No discharge.         Left eye: No discharge.   Cardiovascular:      Comments: Sinus tachycardia rate of 110.  No murmurs rubs or gallops.  Extremities warm and well-perfused without mottling  Pulmonary:      Effort: No respiratory distress.      Comments: No increased work of breathing.  Speaking in complete sentences.  Lungs clear to auscultation bilaterally without wheezes, rales, rhonchi.  Satting 97% on room air indicating adequate oxygenation  Abdominal:      General: There is no distension.   Genitourinary:     Comments: Abdomen nondistended with normal bowel sounds.  No rigidity, rebound, guarding  Musculoskeletal:         General: No deformity.      Cervical back: Normal range of motion.   Skin:     Findings: No lesion or rash.   Neurological:      Mental Status: He is alert and oriented to person, place, and time. Mental status is at baseline.      Comments: Awake, alert, pleasant, interactive   Psychiatric:         Mood and Affect: Mood and affect normal.         Results Reviewed       Procedure Component Value Units Date/Time    Comprehensive metabolic panel [[id number]]  (Abnormal) Collected: 11/07/24 1537    Lab Status: Final result Specimen: Blood from Arm, Right Updated: 11/07/24 1611     Sodium 133 mmol/L      Potassium 3.9 mmol/L      Chloride 99 mmol/L      CO2 21 mmol/L      ANION GAP 13 mmol/L      BUN 16 mg/dL      Creatinine 0.76 mg/dL      Glucose 220 mg/dL      Calcium 9.4 mg/dL      AST 26 U/L      ALT 23 U/L      Alkaline Phosphatase 98 U/L      Total Protein 7.7 g/dL      Albumin 4.0 g/dL      Total Bilirubin 0.38 mg/dL      eGFR 131 ml/min/1.73sq m     Narrative:      National Kidney Disease Foundation guidelines for Chronic Kidney Disease (CKD):     Stage 1 with normal or high GFR (GFR > 90 mL/min/1.73 square meters)    Stage 2 Mild CKD (GFR = 60-89 mL/min/1.73 square meters)    Stage 3A Moderate CKD (GFR = 45-59 mL/min/1.73 square  meters)    Stage 3B Moderate CKD (GFR = 30-44 mL/min/1.73 square meters)    Stage 4 Severe CKD (GFR = 15-29 mL/min/1.73 square meters)    Stage 5 End Stage CKD (GFR <15 mL/min/1.73 square meters)  Note: GFR calculation is accurate only with a steady state creatinine    FLU/COVID Rapid Antigen (30 min. TAT) - Preferred screening test in ED [[id number]]  (Normal) Collected: 11/07/24 1519    Lab Status: Final result Specimen: Nares from Nose Updated: 11/07/24 1556     SARS COV Rapid Antigen Negative     Influenza A Rapid Antigen Negative     Influenza B Rapid Antigen Negative    Narrative:      This test has been performed using the TimeCast Fani 2 FLU+SARS Antigen test under the Emergency Use Authorization (EUA). This test has been validated by the  and verified by the performing laboratory. The Fani uses lateral flow immunofluorescent sandwich assay to detect SARS-COV, Influenza A and Influenza B Antigen.     The Quidel Fani 2 SARS Antigen test does not differentiate between SARS-CoV and SARS-CoV-2.     Negative results are presumptive and may be confirmed with a molecular assay, if necessary, for patient management. Negative results do not rule out SARS-CoV-2 or influenza infection and should not be used as the sole basis for treatment or patient management decisions. A negative test result may occur if the level of antigen in a sample is below the limit of detection of this test.     Positive results are indicative of the presence of viral antigens, but do not rule out bacterial infection or co-infection with other viruses.     All test results should be used as an adjunct to clinical observations and other information available to the provider.    FOR PEDIATRIC PATIENTS - copy/paste COVID Guidelines URL to browser: [email or web address]    CBC and differential [[id number]]  (Abnormal) Collected: 11/07/24 1537    Lab Status: Final result Specimen:  Blood from Arm, Right Updated: 11/07/24 1543     WBC 11.84 Thousand/uL      RBC 4.92 Million/uL      Hemoglobin 13.0 g/dL      Hematocrit 41.1 %      MCV 84 fL      MCH 26.4 pg      MCHC 31.6 g/dL      RDW 14.0 %      MPV 10.7 fL      Platelets 281 Thousands/uL      nRBC 0 /100 WBCs      Segmented % 71 %      Immature Grans % 1 %      Lymphocytes % 21 %      Monocytes % 6 %      Eosinophils Relative 1 %      Basophils Relative 0 %      Absolute Neutrophils 8.43 Thousands/µL      Absolute Immature Grans 0.06 Thousand/uL      Absolute Lymphocytes 2.46 Thousands/µL      Absolute Monocytes 0.68 Thousand/µL      Eosinophils Absolute 0.17 Thousand/µL      Basophils Absolute 0.04 Thousands/µL     Fingerstick Glucose (POCT) [[id number]]  (Abnormal) Collected: 11/07/24 1459    Lab Status: Final result Specimen: Blood Updated: 11/07/24 1500     POC Glucose 244 mg/dl             No orders to display       Procedures    ED Medication and Procedure Management   Prior to Admission Medications   Prescriptions Last Dose Informant Patient Reported? Taking?   Abilify Maintena 400 MG injection   Yes No   Sig: INJECT 2 ML INTRAMUSCUALRLY EVERY 4 WEEKS AS DIRECTED   Alcohol Swabs 70 % PADS   No No   Sig: May substitute brand based on insurance coverage. Check glucose TID.   Blood Glucose Monitoring Suppl (OneTouch Verio Reflect) w/Device KIT   No No   Sig: May substitute brand based on insurance coverage. Check glucose TID.   Blood Glucose Monitoring Suppl (OneTouch Verio) w/Device KIT   No No   Sig: Use 2 (two) times a day Ok to substitute with insurance covered brand   FLUoxetine (PROzac) 10 mg capsule   No No   Sig: Take 1 capsule (10 mg total) by mouth daily   Insulin Glargine Solostar (Lantus SoloStar) 100 UNIT/ML SOPN   No No   Sig: Inject 0.3 mL (30 Units total) under the skin daily at bedtime   Insulin Glargine Solostar (Lantus SoloStar) 100 UNIT/ML SOPN   No Yes   Sig: Inject 0.3 mL (30 Units total) under the skin daily at  bedtime   Insulin Pen Needle (BD Pen Needle Shani 2nd Gen) 32G X 4 MM MISC   No No   Sig: For use with insulin pen. Pharmacy may dispense brand covered by insurance.   Insulin Pen Needle (BD Pen Needle Shani 2nd Gen) 32G X 4 MM MISC   No Yes   Sig: For use with insulin pen. Pharmacy may dispense brand covered by insurance.   Lancets (onetouch ultrasoft) lancets   No No   Sig: Use as instructed   Lancets (onetouch ultrasoft) lancets   No Yes   Sig: Use as instructed   OneTouch Delica Lancets 33G MISC   No No   Sig: May substitute brand based on insurance coverage. Check glucose TID.   albuterol (2.5 mg/3 mL) 0.083 % nebulizer solution   No No   Sig: Take 3 mL (2.5 mg total) by nebulization every 6 (six) hours as needed for wheezing or shortness of breath   glucose blood (OneTouch Verio) test strip   No No   Sig: TEST BLOOD SUGAR TWICE A DAY   glucose blood (OneTouch Verio) test strip   No No   Sig: May substitute brand based on insurance coverage. Check glucose TID.   glucose blood (OneTouch Verio) test strip   No Yes   Sig: May substitute brand based on insurance coverage. Check glucose TID.   ibuprofen (MOTRIN) 800 mg tablet   No No   Sig: Take 1 tablet (800 mg total) by mouth every 8 (eight) hours as needed for mild pain   insulin aspart (NovoLOG FlexPen) 100 UNIT/ML injection pen   No No   Sig: Inject 9 Units under the skin 3 (three) times a day with meals   insulin aspart (NovoLOG FlexPen) 100 UNIT/ML injection pen   No Yes   Sig: Inject 9 Units under the skin 3 (three) times a day with meals   metFORMIN (GLUCOPHAGE-XR) 500 mg 24 hr tablet   No No   Sig: Take 2 tablets (1,000 mg total) by mouth 2 (two) times a day with meals Start with 1 pill 500mg with breakfast x 1 week, then 1 pill 500mg with breakfast and 1 pill with dinner for 1 week , then 2 pills with breakfast (1000mg) and 1 pill with dinner (500mg) for 1 week, then 1000mg twice a day going forward   metFORMIN (GLUCOPHAGE-XR) 500 mg 24 hr tablet   No Yes    Sig: Take 2 tablets (1,000 mg total) by mouth 2 (two) times a day with meals Start with 1 pill 500mg with breakfast x 1 week, then 1 pill 500mg with breakfast and 1 pill with dinner for 1 week , then 2 pills with breakfast (1000mg) and 1 pill with dinner (500mg) for 1 week, then 1000mg twice a day going forward   naproxen (Naprosyn) 500 mg tablet   No No   Sig: Take 1 tablet (500 mg total) by mouth 2 (two) times a day with meals for 5 days   topiramate (TOPAMAX) 50 MG tablet   Yes No   Sig: Take 50 mg by mouth 2 (two) times a day      Facility-Administered Medications: None     Discharge Medication List as of 11/7/2024  4:44 PM        CONTINUE these medications which have CHANGED    Details   !! glucose blood (OneTouch Verio) test strip May substitute brand based on insurance coverage. Check glucose TID., Normal      insulin aspart (NovoLOG FlexPen) 100 UNIT/ML injection pen Inject 9 Units under the skin 3 (three) times a day with meals, Starting u 11/7/2024, Normal      Insulin Glargine Solostar (Lantus SoloStar) 100 UNIT/ML SOPN Inject 0.3 mL (30 Units total) under the skin daily at bedtime, Starting Thu 11/7/2024, Normal      Insulin Pen Needle (BD Pen Needle Shani 2nd Gen) 32G X 4 MM MISC For use with insulin pen. Pharmacy may dispense brand covered by insurance., Normal      !! Lancets (onetouch ultrasoft) lancets Use as instructed, Normal      metFORMIN (GLUCOPHAGE-XR) 500 mg 24 hr tablet Take 2 tablets (1,000 mg total) by mouth 2 (two) times a day with meals Start with 1 pill 500mg with breakfast x 1 week, then 1 pill 500mg with breakfast and 1 pill with dinner for 1 week , then 2 pills with breakfast (1000mg) and 1 pill with dinner (500 mg) for 1 week, then 1000mg twice a day going forward, Starting Thu 11/7/2024, Normal       !! - Potential duplicate medications found. Please discuss with provider.        CONTINUE these medications which have NOT CHANGED    Details   Abilify Maintena 400 MG injection  INJECT 2 ML INTRAMUSCUALRLY EVERY 4 WEEKS AS DIRECTED, Historical Med      albuterol (2.5 mg/3 mL) 0.083 % nebulizer solution Take 3 mL (2.5 mg total) by nebulization every 6 (six) hours as needed for wheezing or shortness of breath, Starting Wed 5/8/2024, Normal      Alcohol Swabs 70 % PADS May substitute brand based on insurance coverage. Check glucose TID., Normal      !! Blood Glucose Monitoring Suppl (OneTouch Verio Reflect) w/Device KIT May substitute brand based on insurance coverage. Check glucose TID., Normal      !! Blood Glucose Monitoring Suppl (OneTouch Verio) w/Device KIT Use 2 (two) times a day Ok to substitute with insurance covered brand, Starting Fri 10/27/2023, Normal      FLUoxetine (PROzac) 10 mg capsule Take 1 capsule (10 mg total) by mouth daily, Starting Wed 5/8/2024, Normal      !! glucose blood (OneTouch Verio) test strip TEST BLOOD SUGAR TWICE A DAY, Normal      ibuprofen (MOTRIN) 800 mg tablet Take 1 tablet (800 mg total) by mouth every 8 (eight) hours as needed for mild pain, Starting Tue 8/20/2024, Normal      naproxen (Naprosyn) 500 mg tablet Take 1 tablet (500 mg total) by mouth 2 (two) times a day with meals for 5 days, Starting Mon 10/14/2024, Until Sat 10/19/2024, Normal      !! OneTouch Delica Lancets 33G MISC May substitute brand based on insurance coverage. Check glucose TID., Normal      topiramate (TOPAMAX) 50 MG tablet Take 50 mg by mouth 2 (two) times a day, Starting Thu 3/10/2022, Historical Med       !! - Potential duplicate medications found. Please discuss with provider.        No discharge procedures on file.  ED SEPSIS DOCUMENTATION   Time reflects when diagnosis was documented in both MDM as applicable and the Disposition within this note       Time User Action Codes Description Comment    11/7/2024  4:41 PM Dylan Peres Add [E11.65] Uncontrolled type 2 diabetes mellitus with hyperglycemia (HCC)     11/7/2024  4:42 PM Dylan Peres Add [E11.9] Type 2  diabetes mellitus without complication, without long-term current use of insulin ([facility])     11/7/2024  6:37 PM Dylan Peres Add [R53.81] [facility]                  Dylan Peres MD  11/07/24 1837

## 2024-11-07 NOTE — DISCHARGE INSTRUCTIONS
Your lab work today was normal.  Make sure you are drinking enough fluids.  We have provided prescriptions for your diabetic medications.  It is important that you establish care with a primary care provider.  For additional prescriptions.    We have provided information above for [facility]. Call the number above to speak to an . They will assist you with establishing care with a primary care provider.  You can also call [facility] to establish care with a primary care provider.

## 2024-11-08 LAB
ATRIAL RATE: 108 BPM
P AXIS: 48 DEGREES
PR INTERVAL: 150 MS
QRS AXIS: 69 DEGREES
QRSD INTERVAL: 94 MS
QT INTERVAL: 342 MS
QTC INTERVAL: 458 MS
T WAVE AXIS: 44 DEGREES
VENTRICULAR RATE: 108 BPM

## 2024-11-08 PROCEDURE — 93010 ELECTROCARDIOGRAM REPORT: CPT | Performed by: INTERNAL MEDICINE

## 2024-11-18 ENCOUNTER — VBI (OUTPATIENT)
Dept: ADMINISTRATIVE | Facility: OTHER | Age: 19
End: 2024-11-18

## 2024-11-18 NOTE — TELEPHONE ENCOUNTER
11/18/24 5:22 PM     Chart reviewed for Diabetic Eye Exam ; nothing is submitted to the patient's insurance at this time.     Vinicio Zavala MA   [facility]

## 2024-12-16 ENCOUNTER — HOSPITAL ENCOUNTER (EMERGENCY)
Facility: HOSPITAL | Age: 19
Discharge: HOME | End: 2024-12-16
Attending: EMERGENCY MEDICINE
Payer: COMMERCIAL

## 2024-12-16 VITALS
OXYGEN SATURATION: 97 % | RESPIRATION RATE: 20 BRPM | TEMPERATURE: 98.3 F | BODY MASS INDEX: 65.97 KG/M2 | DIASTOLIC BLOOD PRESSURE: 61 MMHG | WEIGHT: 315 LBS | HEART RATE: 93 BPM | SYSTOLIC BLOOD PRESSURE: 118 MMHG

## 2024-12-16 DIAGNOSIS — E11.9 DIABETES (HCC): Primary | ICD-10-CM

## 2024-12-16 LAB
ALBUMIN SERPL BCG-MCNC: 4 G/DL (ref 3.5–5)
ALP SERPL-CCNC: 107 U/L (ref 34–104)
ALT SERPL W P-5'-P-CCNC: 28 U/L (ref 7–52)
ANION GAP SERPL CALCULATED.3IONS-SCNC: 11 MMOL/L (ref 4–13)
AST SERPL W P-5'-P-CCNC: 22 U/L (ref 13–39)
B-OH-BUTYR SERPL-MCNC: <0.05 MMOL/L (ref 0.02–0.27)
BASE EX.OXY STD BLDV CALC-SCNC: 78.9 % (ref 60–80)
BASE EXCESS BLDV CALC-SCNC: 0.6 MMOL/L
BASOPHILS # BLD AUTO: 0.04 THOUSANDS/ΜL (ref 0–0.1)
BASOPHILS NFR BLD AUTO: 0 % (ref 0–1)
BILIRUB SERPL-MCNC: 0.3 MG/DL (ref 0.2–1)
BILIRUB UR QL STRIP: NEGATIVE
BUN SERPL-MCNC: 11 MG/DL (ref 5–25)
CALCIUM SERPL-MCNC: 9.2 MG/DL (ref 8.4–10.2)
CHLORIDE SERPL-SCNC: 99 MMOL/L (ref 96–108)
CLARITY UR: CLEAR
CO2 SERPL-SCNC: 23 MMOL/L (ref 21–32)
COLOR UR: YELLOW
CREAT SERPL-MCNC: 0.54 MG/DL (ref 0.6–1.3)
EOSINOPHIL # BLD AUTO: 0.2 THOUSAND/ΜL (ref 0–0.61)
EOSINOPHIL NFR BLD AUTO: 2 % (ref 0–6)
ERYTHROCYTE [DISTWIDTH] IN BLOOD BY AUTOMATED COUNT: 13.7 % (ref 11.6–15.1)
GFR SERPL CREATININE-BSD FRML MDRD: 151 ML/MIN/1.73SQ M
GLUCOSE SERPL-MCNC: 239 MG/DL (ref 65–140)
GLUCOSE SERPL-MCNC: 251 MG/DL (ref 65–140)
GLUCOSE UR STRIP-MCNC: ABNORMAL MG/DL
HCO3 BLDV-SCNC: 26 MMOL/L (ref 24–30)
HCT VFR BLD AUTO: 42.3 % (ref 36.5–49.3)
HGB BLD-MCNC: 13.9 G/DL (ref 12–17)
HGB UR QL STRIP.AUTO: NEGATIVE
IMM GRANULOCYTES # BLD AUTO: 0.02 THOUSAND/UL (ref 0–0.2)
IMM GRANULOCYTES NFR BLD AUTO: 0 % (ref 0–2)
KETONES UR STRIP-MCNC: NEGATIVE MG/DL
LEUKOCYTE ESTERASE UR QL STRIP: NEGATIVE
LYMPHOCYTES # BLD AUTO: 2.81 THOUSANDS/ΜL (ref 0.6–4.47)
LYMPHOCYTES NFR BLD AUTO: 31 % (ref 14–44)
MCH RBC QN AUTO: 27.6 PG (ref 26.8–34.3)
MCHC RBC AUTO-ENTMCNC: 32.9 G/DL (ref 31.4–37.4)
MCV RBC AUTO: 84 FL (ref 82–98)
MONOCYTES # BLD AUTO: 0.64 THOUSAND/ΜL (ref 0.17–1.22)
MONOCYTES NFR BLD AUTO: 7 % (ref 4–12)
NEUTROPHILS # BLD AUTO: 5.43 THOUSANDS/ΜL (ref 1.85–7.62)
NEUTS SEG NFR BLD AUTO: 60 % (ref 43–75)
NITRITE UR QL STRIP: NEGATIVE
NRBC BLD AUTO-RTO: 0 /100 WBCS
O2 CT BLDV-SCNC: 16.6 ML/DL
PCO2 BLDV: 44.4 MM HG (ref 42–50)
PH BLDV: 7.38 [PH] (ref 7.3–7.4)
PH UR STRIP.AUTO: 6 [PH]
PLATELET # BLD AUTO: 268 THOUSANDS/UL (ref 149–390)
PMV BLD AUTO: 10.8 FL (ref 8.9–12.7)
PO2 BLDV: 43 MM HG (ref 35–45)
POTASSIUM SERPL-SCNC: 4.2 MMOL/L (ref 3.5–5.3)
PROT SERPL-MCNC: 7.5 G/DL (ref 6.4–8.4)
PROT UR STRIP-MCNC: NEGATIVE MG/DL
RBC # BLD AUTO: 5.04 MILLION/UL (ref 3.88–5.62)
SODIUM SERPL-SCNC: 133 MMOL/L (ref 135–147)
SP GR UR STRIP.AUTO: 1.02 (ref 1–1.03)
UROBILINOGEN UR STRIP-ACNC: <2 MG/DL
WBC # BLD AUTO: 9.14 THOUSAND/UL (ref 4.31–10.16)

## 2024-12-16 PROCEDURE — 99283 EMERGENCY DEPT VISIT LOW MDM: CPT

## 2024-12-16 PROCEDURE — 85025 COMPLETE CBC W/AUTO DIFF WBC: CPT | Performed by: EMERGENCY MEDICINE

## 2024-12-16 PROCEDURE — 36415 COLL VENOUS BLD VENIPUNCTURE: CPT | Performed by: EMERGENCY MEDICINE

## 2024-12-16 PROCEDURE — 82805 BLOOD GASES W/O2 SATURATION: CPT | Performed by: EMERGENCY MEDICINE

## 2024-12-16 PROCEDURE — 82010 KETONE BODYS QUAN: CPT | Performed by: EMERGENCY MEDICINE

## 2024-12-16 PROCEDURE — 99284 EMERGENCY DEPT VISIT MOD MDM: CPT | Performed by: EMERGENCY MEDICINE

## 2024-12-16 PROCEDURE — 82948 REAGENT STRIP/BLOOD GLUCOSE: CPT

## 2024-12-16 PROCEDURE — 80053 COMPREHEN METABOLIC PANEL: CPT | Performed by: EMERGENCY MEDICINE

## 2024-12-16 NOTE — ED NOTES
Ride requested via round trip. Pt waiting in waiting room w/ caregiver. No ETA at this time.      Aby Rangel RN  12/16/24 [id number]

## 2024-12-16 NOTE — ED NOTES
Pt arrived w/ care workers who are concerned about patient health. Per care workers, patient no longer has insurance, has no working glucometer, and has  insulin at home. Patient reports the only food he eats at home is the food his mother supplies which contains carbohydrates. Care worker requesting  to be involved in attempt to find options for guardianship since patient is unable to care for self.      Aby Rangel RN  [phone]

## 2024-12-16 NOTE — ED PROVIDER NOTES
"Time reflects when diagnosis was documented in both MDM as applicable and the Disposition within this note       Time User Action Codes Description Comment    12/16/2024  1:49 PM Mino Bhatt Add [E11.9] Diabetes (HCC)           ED Disposition       ED Disposition   Discharge    Condition   Stable    Date/Time   Mon Dec 16, 2024  1:49 PM    Comment   Rik Marin discharge to home/self care.                   Assessment & Plan   {Hyperlinks  Risk Stratification - NIHSS - HEART SCORE - Fill out sepsis note and make sure you call [phone] if severe or septic shock:[id number]}    Medical Decision Making  Patient is a poorly controlled diabetic with poor understanding of his disease.  States he got the munchies last night and ate multiple donuts.  Has uncontrolled diabetes with hyperglycemia without acidosis or ketosis    Amount and/or Complexity of Data Reviewed  Labs: ordered.             Medications - No data to display    ED Risk Strat Scores            CRAFFT      Flowsheet Row Most Recent Value   CRAFFT Initial Screen: During the past 12 months, did you:    1. Drink any alcohol (more than a few sips)?  No Filed at: 12/16/2024 1207   2. Smoke any marijuana or hashish No Filed at: 12/16/2024 1207   3. Use anything else to get high? (\"anything else\" includes illegal drugs, over the counter and prescription drugs, and things that you sniff or 'patel')? No Filed at: 12/16/2024 1207                                          History of Present Illness   {Hyperlinks  History (Med, Surg, Fam, Social) - Current Medications - Allergies  :[id number]}    Chief Complaint   Patient presents with    Leg Swelling     Pt brought in by , concerned with leg swelling, was seen in ED a few months ago but did not follow up     Hyperglycemia - no symptoms     Also being brought in for unmanaged diabetes, pt has no complaints        Past Medical History:   Diagnosis Date    ADHD (attention deficit hyperactivity disorder) " 05/11/2024    Diabetes mellitus (HCC)     5/2023    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       Past Surgical History:   Procedure Laterality Date    INCISION AND DRAINAGE OF WOUND Left 5/13/2024    Procedure: INCISION AND DRAINAGE (I&D) GROIN;  Surgeon: Tim Ch MD;  Location: [facility];  Service: General    INCISION AND DRAINAGE OF WOUND Left 5/15/2024    Procedure: INCISION AND DRAINAGE (I&D) GROIN;  Surgeon: Tim Ch MD;  Location: [facility];  Service: General    PA I&D VULVA/PERINEAL ABSCESS N/A 5/11/2024    Procedure: INCISION AND DRAINAGE (I&D) PERINEAL ABSCESS;  Surgeon: Tim Ch MD;  Location: [facility];  Service: General    TONSILECTOMY AND ADNOIDECTOMY      2020      Family History   Problem Relation Age of Onset    Hyperlipidemia Mother     Diabetes type II Mother     Obesity Mother     Obesity Father     Drug abuse Father     Diabetes type II Maternal Aunt     Lung disease Maternal Aunt     Rheum arthritis Maternal Aunt     Fibromyalgia Maternal Aunt     Atrial fibrillation Maternal Grandmother     Hyperlipidemia Maternal Grandfather     Diabetes type II Maternal Grandfather     Stroke Maternal Grandfather     Heart disease Maternal Grandfather     Stroke Paternal Grandfather       Social History     Tobacco Use    Smoking status: Every Day     Types: Cigarettes     Passive exposure: Yes    Smokeless tobacco: Current   Vaping Use    Vaping status: Some Days    Substances: Nicotine, Flavoring   Substance Use Topics    Alcohol use: Yes     Alcohol/week: 2.0 standard drinks of alcohol     Types: 2 Shots of liquor per week     Comment: when available to him    Drug use: Yes     Types: Marijuana      E-Cigarette/Vaping    E-Cigarette Use Current Some Day User     Comments smoke cigg. when he has money       E-Cigarette/Vaping Substances    Nicotine Yes     THC No     CBD No      Flavoring Yes     Other No     Unknown No       I have reviewed and agree with the history as documented.     HPI    Review of Systems        Objective   {Hyperlinks  Historical Vitals - Historical Labs - Chart Review/Microbiology - Last Echo - Code Status  :[id number]}    ED Triage Vitals [12/16/24 1122]   Temperature Pulse Blood Pressure Respirations SpO2 Patient Position - Orthostatic VS   98.3 °F (36.8 °C) 93 118/61 20 97 % Sitting      Temp Source Heart Rate Source BP Location FiO2 (%) Pain Score    Temporal Monitor Left arm -- --      Vitals      Date and Time Temp Pulse SpO2 Resp BP Pain Score FACES Pain Rating User   12/16/24 1122 98.3 °F (36.8 °C) 93 97 % 20 118/61 -- -- MK            Physical Exam    Results Reviewed       Procedure Component Value Units Date/Time    UA (URINE) with reflex to Scope [[id number]]  (Abnormal) Collected: 12/16/24 1301    Lab Status: Final result Specimen: Urine, Clean Catch Updated: 12/16/24 1314     Color, UA Yellow     Clarity, UA Clear     Specific Gravity, UA 1.025     pH, UA 6.0     Leukocytes, UA Negative     Nitrite, UA Negative     Protein, UA Negative mg/dl      Glucose,  (3/10%) mg/dl      Ketones, UA Negative mg/dl      Urobilinogen, UA <2.0 mg/dl      Bilirubin, UA Negative     Occult Blood, UA Negative    Comprehensive metabolic panel [[id number]]  (Abnormal) Collected: 12/16/24 1219    Lab Status: Final result Specimen: Blood from Arm, Left Updated: 12/16/24 1243     Sodium 133 mmol/L      Potassium 4.2 mmol/L      Chloride 99 mmol/L      CO2 23 mmol/L      ANION GAP 11 mmol/L      BUN 11 mg/dL      Creatinine 0.54 mg/dL      Glucose 239 mg/dL      Calcium 9.2 mg/dL      AST 22 U/L      ALT 28 U/L      Alkaline Phosphatase 107 U/L      Total Protein 7.5 g/dL      Albumin 4.0 g/dL      Total Bilirubin 0.30 mg/dL      eGFR 151 ml/min/1.73sq m     Narrative:      National Kidney Disease Foundation guidelines for Chronic Kidney Disease (CKD):     Stage 1  with normal or high GFR (GFR > 90 mL/min/1.73 square meters)    Stage 2 Mild CKD (GFR = 60-89 mL/min/1.73 square meters)    Stage 3A Moderate CKD (GFR = 45-59 mL/min/1.73 square meters)    Stage 3B Moderate CKD (GFR = 30-44 mL/min/1.73 square meters)    Stage 4 Severe CKD (GFR = 15-29 mL/min/1.73 square meters)    Stage 5 End Stage CKD (GFR <15 mL/min/1.73 square meters)  Note: GFR calculation is accurate only with a steady state creatinine    Beta Hydroxybutyrate [[id number]]  (Normal) Collected: 12/16/24 1219    Lab Status: Final result Specimen: Blood from Arm, Left Updated: 12/16/24 1243     Beta- Hydroxybutyrate <0.05 mmol/L     Blood gas, venous [[id number]] Collected: 12/16/24 1219    Lab Status: Final result Specimen: Blood from Arm, Left Updated: 12/16/24 1226     pH, Cristi 7.385     pCO2, Cristi 44.4 mm Hg      pO2, Cristi 43.0 mm Hg      HCO3, Cristi 26.0 mmol/L      Base Excess, Cristi 0.6 mmol/L      O2 Content, Cristi 16.6 ml/dL      O2 HGB, VENOUS 78.9 %     CBC and differential [[id number]] Collected: 12/16/24 1219    Lab Status: Final result Specimen: Blood from Arm, Left Updated: 12/16/24 1226     WBC 9.14 Thousand/uL      RBC 5.04 Million/uL      Hemoglobin 13.9 g/dL      Hematocrit 42.3 %      MCV 84 fL      MCH 27.6 pg      MCHC 32.9 g/dL      RDW 13.7 %      MPV 10.8 fL      Platelets 268 Thousands/uL      nRBC 0 /100 WBCs      Segmented % 60 %      Immature Grans % 0 %      Lymphocytes % 31 %      Monocytes % 7 %      Eosinophils Relative 2 %      Basophils Relative 0 %      Absolute Neutrophils 5.43 Thousands/µL      Absolute Immature Grans 0.02 Thousand/uL      Absolute Lymphocytes 2.81 Thousands/µL      Absolute Monocytes 0.64 Thousand/µL      Eosinophils Absolute 0.20 Thousand/µL      Basophils Absolute 0.04 Thousands/µL     Fingerstick Glucose (POCT) [[id number]]  (Abnormal) Collected: 12/16/24 1153    Lab Status: Final result Specimen: Blood Updated: 12/16/24 1155     POC Glucose 251 mg/dl             No  orders to display       Procedures    ED Medication and Procedure Management   Prior to Admission Medications   Prescriptions Last Dose Informant Patient Reported? Taking?   Abilify Maintena 400 MG injection   Yes No   Sig: INJECT 2 ML INTRAMUSCUALRLY EVERY 4 WEEKS AS DIRECTED   Alcohol Swabs 70 % PADS   No No   Sig: May substitute brand based on insurance coverage. Check glucose TID.   Blood Glucose Monitoring Suppl (OneTouch Verio Reflect) w/Device KIT   No No   Sig: May substitute brand based on insurance coverage. Check glucose TID.   Blood Glucose Monitoring Suppl (OneTouch Verio) w/Device KIT   No No   Sig: Use 2 (two) times a day Ok to substitute with insurance covered brand   FLUoxetine (PROzac) 10 mg capsule   No No   Sig: Take 1 capsule (10 mg total) by mouth daily   Insulin Glargine Solostar (Lantus SoloStar) 100 UNIT/ML SOPN   No No   Sig: Inject 0.3 mL (30 Units total) under the skin daily at bedtime   Insulin Pen Needle (BD Pen Needle Shani 2nd Gen) 32G X 4 MM MISC   No No   Sig: For use with insulin pen. Pharmacy may dispense brand covered by insurance.   Lancets (onetouch ultrasoft) lancets   No No   Sig: Use as instructed   OneTouch Delica Lancets 33G MISC   No No   Sig: May substitute brand based on insurance coverage. Check glucose TID.   albuterol (2.5 mg/3 mL) 0.083 % nebulizer solution   No No   Sig: Take 3 mL (2.5 mg total) by nebulization every 6 (six) hours as needed for wheezing or shortness of breath   glucose blood (OneTouch Verio) test strip   No No   Sig: TEST BLOOD SUGAR TWICE A DAY   glucose blood (OneTouch Verio) test strip   No No   Sig: May substitute brand based on insurance coverage. Check glucose TID.   ibuprofen (MOTRIN) 800 mg tablet   No No   Sig: Take 1 tablet (800 mg total) by mouth every 8 (eight) hours as needed for mild pain   insulin aspart (NovoLOG FlexPen) 100 UNIT/ML injection pen   No No   Sig: Inject 9 Units under the skin 3 (three) times a day with meals    metFORMIN (GLUCOPHAGE-XR) 500 mg 24 hr tablet   No No   Sig: Take 2 tablets (1,000 mg total) by mouth 2 (two) times a day with meals Start with 1 pill 500mg with breakfast x 1 week, then 1 pill 500mg with breakfast and 1 pill with dinner for 1 week , then 2 pills with breakfast (1000mg) and 1 pill with dinner (500mg) for 1 week, then 1000mg twice a day going forward   naproxen (Naprosyn) 500 mg tablet   No No   Sig: Take 1 tablet (500 mg total) by mouth 2 (two) times a day with meals for 5 days   topiramate (TOPAMAX) 50 MG tablet   Yes No   Sig: Take 50 mg by mouth 2 (two) times a day      Facility-Administered Medications: None     Discharge Medication List as of 12/16/2024  1:50 PM        CONTINUE these medications which have NOT CHANGED    Details   Abilify Maintena 400 MG injection INJECT 2 ML INTRAMUSCUALRLY EVERY 4 WEEKS AS DIRECTED, Historical Med      albuterol (2.5 mg/3 mL) 0.083 % nebulizer solution Take 3 mL (2.5 mg total) by nebulization every 6 (six) hours as needed for wheezing or shortness of breath, Starting Wed 5/8/2024, Normal      Alcohol Swabs 70 % PADS May substitute brand based on insurance coverage. Check glucose TID., Normal      !! Blood Glucose Monitoring Suppl (OneTouch Verio Reflect) w/Device KIT May substitute brand based on insurance coverage. Check glucose TID., Normal      !! Blood Glucose Monitoring Suppl (OneTouch Verio) w/Device KIT Use 2 (two) times a day Ok to substitute with insurance covered brand, Starting Fri 10/27/2023, Normal      FLUoxetine (PROzac) 10 mg capsule Take 1 capsule (10 mg total) by mouth daily, Starting Wed 5/8/2024, Normal      !! glucose blood (OneTouch Verio) test strip TEST BLOOD SUGAR TWICE A DAY, Normal      !! glucose blood (OneTouch Verio) test strip May substitute brand based on insurance coverage. Check glucose TID., Normal      ibuprofen (MOTRIN) 800 mg tablet Take 1 tablet (800 mg total) by mouth every 8 (eight) hours as needed for mild pain,  Starting Tue 8/20/2024, Normal      insulin aspart (NovoLOG FlexPen) 100 UNIT/ML injection pen Inject 9 Units under the skin 3 (three) times a day with meals, Starting Thu 11/7/2024, Normal      Insulin Glargine Solostar (Lantus SoloStar) 100 UNIT/ML SOPN Inject 0.3 mL (30 Units total) under the skin daily at bedtime, Starting Thu 11/7/2024, Normal      Insulin Pen Needle (BD Pen Needle Shani 2nd Gen) 32G X 4 MM MISC For use with insulin pen. Pharmacy may dispense brand covered by insurance., Normal      !! Lancets (onetouch ultrasoft) lancets Use as instructed, Normal      metFORMIN (GLUCOPHAGE-XR) 500 mg 24 hr tablet Take 2 tablets (1,000 mg total) by mouth 2 (two) times a day with meals Start with 1 pill 500mg with breakfast x 1 week, then 1 pill 500mg with breakfast and 1 pill with dinner for 1 week , then 2 pills with breakfast (1000mg) and 1 pill with dinner (500 mg) for 1 week, then 1000mg twice a day going forward, Starting Thu 11/7/2024, Normal      naproxen (Naprosyn) 500 mg tablet Take 1 tablet (500 mg total) by mouth 2 (two) times a day with meals for 5 days, Starting Mon 10/14/2024, Until Sat 10/19/2024, Normal      !! OneTouch Delica Lancets 33G MISC May substitute brand based on insurance coverage. Check glucose TID., Normal      topiramate (TOPAMAX) 50 MG tablet Take 50 mg by mouth 2 (two) times a day, Starting Thu 3/10/2022, Historical Med       !! - Potential duplicate medications found. Please discuss with provider.        No discharge procedures on file.  ED SEPSIS DOCUMENTATION   Time reflects when diagnosis was documented in both MDM as applicable and the Disposition within this note       Time User Action Codes Description Comment    12/16/2024  1:49 PM Mino Bhatt Add [E11.9] Diabetes (HCC)                times a day with meals, Starting Thu 11/7/2024, Normal      Insulin Glargine Solostar (Lantus SoloStar) 100 UNIT/ML SOPN Inject 0.3 mL (30 Units total) under the skin daily at bedtime, Starting Thu 11/7/2024, Normal      Insulin Pen Needle (BD Pen Needle Shani 2nd Gen) 32G X 4 MM MISC For use with insulin pen. Pharmacy may dispense brand covered by insurance., Normal      !! Lancets (onetouch ultrasoft) lancets Use as instructed, Normal      metFORMIN (GLUCOPHAGE-XR) 500 mg 24 hr tablet Take 2 tablets (1,000 mg total) by mouth 2 (two) times a day with meals Start with 1 pill 500mg with breakfast x 1 week, then 1 pill 500mg with breakfast and 1 pill with dinner for 1 week , then 2 pills with breakfast (1000mg) and 1 pill with dinner (500 mg) for 1 week, then 1000mg twice a day going forward, Starting Thu 11/7/2024, Normal      naproxen (Naprosyn) 500 mg tablet Take 1 tablet (500 mg total) by mouth 2 (two) times a day with meals for 5 days, Starting Mon 10/14/2024, Until Sat 10/19/2024, Normal      !! OneTouch Delica Lancets 33G MISC May substitute brand based on insurance coverage. Check glucose TID., Normal      topiramate (TOPAMAX) 50 MG tablet Take 50 mg by mouth 2 (two) times a day, Starting Thu 3/10/2022, Historical Med       !! - Potential duplicate medications found. Please discuss with provider.        No discharge procedures on file.  ED SEPSIS DOCUMENTATION   Time reflects when diagnosis was documented in both MDM as applicable and the Disposition within this note       Time User Action Codes Description Comment    12/16/2024  1:49 PM Mino Bhatt Add [E11.9] Diabetes (HCC)                  Mino Bhatt MD  12/24/24 9048

## 2024-12-16 NOTE — CASE MANAGEMENT
"   Case Management ED Assessment & Discharge Planning Note    Patient name Rik Marin  Location ED 13/ED 13 MRN [id number]  : 2005 Date 2024        OBJECTIVE:  Predictive Model Details          86%  Factor Value    Risk of Hospital Admission or ED Visit Model 49% Number of ED Visits 5+     15% Number of Hospitalizations 2     10% Is in Relationship No     8% Has Peripheral Vascular Disease Yes     7% Has Anemia Yes     6% Has Depression Yes     5% Has Diabetes Yes     1% Has PCP Yes            Chief Complaint: Leg swelling, Hyperglycemia .  Patient Class: Emergency  Preferred Pharmacy:   [id number] - [place], [place] - [address]  [address]  [facility] [address]  Phone: [phone] Fax: [phone]    Primary Care Provider: Gustavo Fajardo MD    Primary Insurance: HEALTHCARE ASSISTANCE PENDING  Secondary Insurance: [id number]      ED Assessment:  Active Health Care Proxies    There are no active Health Care Proxies on file.          ED Discharge Details:    Discharge planning discussed with:: Mother Lissa and CMO worker Amada  Freedom of Choice: Yes    Comments - Freedom of Choice: SW was notified of ED admission by ED provider and spoke with patient and [facility] CMO worker Amada Ibrahim and [facility] behaviorist Maycol French at bedside.  Amada expressed concern about patient's inability to care for his medical and social needs by himself and reported that patient's mother has not been cooperative with efforts to get her involved.      Patient had Medicaid but his coverage lapsed due to non-completion of renewal paperwork.  CMO assisted with temporary Medicaid but that has also since lapsed.  Patient lives at home with his mother and sister and said that his mother typically takes him to appointments.  Per patient she works \"8 AM to midnight\" for the [employer].      Per CMO patient is in need of guardianship but mother does not " want to be guardian and there are no recent medical evaluations.  SW explained that competency evaluation cannot be done in the ED and per provider, patient does not have a medical reason for admission.  CMO has notified APS several times of concerns regarding patient's medical care, particularly diabetes management, and APS has visited the home.  SW left a message for Madisyn at [facility] to notify her of ED visit.      Per CMO patient does not have a working glucose monitor or appropriate nutrition at home.  Current PCP of record is a pediatrician who no longer sees patient.  ALEXANDER contacted [facility] and spoke with Macie to schedule a new PCP appointment for Tuesday 12/24 at 1000.      ALEXANDER spoke by phone with mother Lissa to notify her of appointment and the need to speak with a financial counselor at the time of the appointment.  ALEXANDER sent Popt[employer] message to OP CM Octavio Schneider.  ALEXANDER spoke with CMO worker Amada at bedside to update her on plan.      CM contacted family/caregiver?: Yes  Were Treatment Team discharge recommendations reviewed with patient/caregiver?: Yes  Did patient/caregiver verbalize understanding of patient care needs?: Yes  Were patient/caregiver advised of the risks associated with not following Treatment Team discharge recommendations?: Yes  Contacts  Patient Contacts: Lissa Lang (mother)  Relationship to Patient:: Family  Contact Method: Phone  Phone Number: [phone]  Reason/Outcome: Emergency Contact  Requested Home Health Care         Is the patient interested in HHC at discharge?: No  DME Referral Provided  Referral made for DME?: No      Other Referral/Resources/Interventions Provided:  Interventions: PCP  Would you like to participate in our [facility] service program?  : No - Declined  Treatment Team Recommendation: Home  Discharge Destination Plan:: Home     Transport at Discharge : Other (Comment) (CMO worker)

## 2024-12-17 ENCOUNTER — PATIENT OUTREACH (OUTPATIENT)
Age: 19
End: 2024-12-17

## 2024-12-17 NOTE — PROGRESS NOTES
Received Josiane message from inpatient CM Alanis Marshall. Pt needs for follow up care discussed.     Josiane message followed up with in basket message.     RN CM will continue to follow.

## 2024-12-24 ENCOUNTER — PATIENT OUTREACH (OUTPATIENT)
Age: 19
End: 2024-12-24

## 2024-12-24 NOTE — PROGRESS NOTES
ALICE GREENBERG met with Dr. Fajardo to discuss patient needs. RN CM has glucometer to provide patient so he can check his BS.     Pt was a no show for todays appointment.Attempted to reach mother on both phone numbers listed. Unable to reach mother, unable to leave VM on either line.     RN CM will attempt one more outreach.

## 2024-12-31 ENCOUNTER — PATIENT OUTREACH (OUTPATIENT)
Age: 19
End: 2024-12-31

## 2024-12-31 NOTE — LETTER
Date: 12/31/24    To the parent of Rik Marin,   My name is Octavio. I am registered nurse  at [employer] [facility].   I have been trying to reach you regarding your son Rik's follow up care, including his diabetes management.   Please reach out to our office to schedule an appointment as soon as possible to schedule an appointment. Our office can be reached at [phone]. You can also call me directly at [phone].   I look forward to hearing from you.   RegardsOctavio RN Case Manager  [facility]

## 2025-01-11 ENCOUNTER — HOSPITAL ENCOUNTER (EMERGENCY)
Facility: HOSPITAL | Age: 20
Discharge: HOME | End: 2025-01-11
Attending: EMERGENCY MEDICINE
Payer: COMMERCIAL

## 2025-01-11 VITALS
HEART RATE: 94 BPM | RESPIRATION RATE: 17 BRPM | SYSTOLIC BLOOD PRESSURE: 156 MMHG | TEMPERATURE: 97.3 F | DIASTOLIC BLOOD PRESSURE: 99 MMHG | OXYGEN SATURATION: 96 %

## 2025-01-11 DIAGNOSIS — F32.A DEPRESSION, UNSPECIFIED DEPRESSION TYPE: Primary | ICD-10-CM

## 2025-01-11 LAB
ALBUMIN SERPL BCG-MCNC: 4.6 G/DL (ref 3.5–5)
ALP SERPL-CCNC: 115 U/L (ref 34–104)
ALT SERPL W P-5'-P-CCNC: 45 U/L (ref 7–52)
AMPHETAMINES SERPL QL SCN: NEGATIVE
ANION GAP SERPL CALCULATED.3IONS-SCNC: 14 MMOL/L (ref 4–13)
AST SERPL W P-5'-P-CCNC: 43 U/L (ref 13–39)
BARBITURATES UR QL: NEGATIVE
BASOPHILS # BLD AUTO: 0.04 THOUSANDS/ΜL (ref 0–0.1)
BASOPHILS NFR BLD AUTO: 1 % (ref 0–1)
BENZODIAZ UR QL: NEGATIVE
BILIRUB SERPL-MCNC: 0.42 MG/DL (ref 0.2–1)
BUN SERPL-MCNC: 11 MG/DL (ref 5–25)
CALCIUM SERPL-MCNC: 10 MG/DL (ref 8.4–10.2)
CHLORIDE SERPL-SCNC: 98 MMOL/L (ref 96–108)
CO2 SERPL-SCNC: 17 MMOL/L (ref 21–32)
COCAINE UR QL: NEGATIVE
CREAT SERPL-MCNC: 0.59 MG/DL (ref 0.6–1.3)
EOSINOPHIL # BLD AUTO: 0.16 THOUSAND/ΜL (ref 0–0.61)
EOSINOPHIL NFR BLD AUTO: 2 % (ref 0–6)
ERYTHROCYTE [DISTWIDTH] IN BLOOD BY AUTOMATED COUNT: 13.8 % (ref 11.6–15.1)
ETHANOL SERPL-MCNC: <10 MG/DL
FENTANYL UR QL SCN: NEGATIVE
GFR SERPL CREATININE-BSD FRML MDRD: 145 ML/MIN/1.73SQ M
GLUCOSE SERPL-MCNC: 273 MG/DL (ref 65–140)
HCT VFR BLD AUTO: 45.3 % (ref 36.5–49.3)
HGB BLD-MCNC: 15.1 G/DL (ref 12–17)
HYDROCODONE UR QL SCN: NEGATIVE
IMM GRANULOCYTES # BLD AUTO: 0.04 THOUSAND/UL (ref 0–0.2)
IMM GRANULOCYTES NFR BLD AUTO: 1 % (ref 0–2)
LYMPHOCYTES # BLD AUTO: 2.76 THOUSANDS/ΜL (ref 0.6–4.47)
LYMPHOCYTES NFR BLD AUTO: 34 % (ref 14–44)
MCH RBC QN AUTO: 28 PG (ref 26.8–34.3)
MCHC RBC AUTO-ENTMCNC: 33.3 G/DL (ref 31.4–37.4)
MCV RBC AUTO: 84 FL (ref 82–98)
METHADONE UR QL: NEGATIVE
MONOCYTES # BLD AUTO: 0.55 THOUSAND/ΜL (ref 0.17–1.22)
MONOCYTES NFR BLD AUTO: 7 % (ref 4–12)
NEUTROPHILS # BLD AUTO: 4.61 THOUSANDS/ΜL (ref 1.85–7.62)
NEUTS SEG NFR BLD AUTO: 55 % (ref 43–75)
NRBC BLD AUTO-RTO: 0 /100 WBCS
OPIATES UR QL SCN: NEGATIVE
OXYCODONE+OXYMORPHONE UR QL SCN: NEGATIVE
PCP UR QL: NEGATIVE
PLATELET # BLD AUTO: 315 THOUSANDS/UL (ref 149–390)
PMV BLD AUTO: 11.2 FL (ref 8.9–12.7)
POTASSIUM SERPL-SCNC: 4.1 MMOL/L (ref 3.5–5.3)
PROT SERPL-MCNC: 8.1 G/DL (ref 6.4–8.4)
RBC # BLD AUTO: 5.39 MILLION/UL (ref 3.88–5.62)
SODIUM SERPL-SCNC: 129 MMOL/L (ref 135–147)
THC UR QL: POSITIVE
TSH SERPL DL<=0.05 MIU/L-ACNC: 2.49 UIU/ML (ref 0.45–4.5)
WBC # BLD AUTO: 8.16 THOUSAND/UL (ref 4.31–10.16)

## 2025-01-11 PROCEDURE — 85025 COMPLETE CBC W/AUTO DIFF WBC: CPT | Performed by: EMERGENCY MEDICINE

## 2025-01-11 PROCEDURE — 36415 COLL VENOUS BLD VENIPUNCTURE: CPT | Performed by: EMERGENCY MEDICINE

## 2025-01-11 PROCEDURE — 82077 ASSAY SPEC XCP UR&BREATH IA: CPT | Performed by: EMERGENCY MEDICINE

## 2025-01-11 PROCEDURE — 80053 COMPREHEN METABOLIC PANEL: CPT | Performed by: EMERGENCY MEDICINE

## 2025-01-11 PROCEDURE — 84443 ASSAY THYROID STIM HORMONE: CPT | Performed by: EMERGENCY MEDICINE

## 2025-01-11 PROCEDURE — 99284 EMERGENCY DEPT VISIT MOD MDM: CPT | Performed by: EMERGENCY MEDICINE

## 2025-01-11 PROCEDURE — 80307 DRUG TEST PRSMV CHEM ANLYZR: CPT | Performed by: EMERGENCY MEDICINE

## 2025-01-11 PROCEDURE — 99283 EMERGENCY DEPT VISIT LOW MDM: CPT

## 2025-01-11 NOTE — ED PROVIDER NOTES
Time reflects when diagnosis was documented in both MDM as applicable and the Disposition within this note       Time User Action Codes Description Comment    1/11/2025  3:49 PM Richard Green Add [F32.A] Depression, unspecified depression type           ED Disposition       ED Disposition   Discharge    Condition   Stable    Date/Time   Sat Jan 11, 2025  3:49 PM    Comment   Rik Marin discharge to home/self care.                   Assessment & Plan       Medical Decision Making  19-year-old male, history of psychiatric illness, presents from home by police after patient called requesting help.  Differential diagnosis includes depression, psychosis, intoxication among other diagnoses.  Patient denies any thoughts about harming himself and states he just needs to talk to someone.  Screening test ordered, will consult ED crisis worker, continue to monitor in ED.    Evaluated by ED crisis worker, given outpatient follow-up resources.  Patient has no thoughts about harming himself or others, no indication for inpatient psychiatric treatment.    Amount and/or Complexity of Data Reviewed  Labs: ordered.             Medications - No data to display    ED Risk Strat Scores                                              History of Present Illness       Chief Complaint   Patient presents with    Psychiatric Evaluation     Patient brought to ED by PD for crisis eval - PD state patient called and requested to be transported to the ED and while they were enroute he stated he wanted to cut himself.  Patient denies wanting to kill himself, but states he was saying that because he thought it might help him get admitted to get the help he needs.         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    Gram-negative bacteremia 05/13/2024    Lung collapse     Sepsis (HCC) 05/06/2024    Sleep apnea      Viral meningitis       Past Surgical History:   Procedure Laterality Date    INCISION AND DRAINAGE OF WOUND Left 5/13/2024    Procedure: INCISION AND DRAINAGE (I&D) GROIN;  Surgeon: Tim Ch MD;  Location: [facility];  Service: General    INCISION AND DRAINAGE OF WOUND Left 5/15/2024    Procedure: INCISION AND DRAINAGE (I&D) GROIN;  Surgeon: Tim Ch MD;  Location: [facility];  Service: General    [place] I&D VULVA/PERINEAL ABSCESS N/A 5/11/2024    Procedure: INCISION AND DRAINAGE (I&D) PERINEAL ABSCESS;  Surgeon: Tim Ch MD;  Location: [facility];  Service: General    TONSILECTOMY AND ADNOIDECTOMY      2020      Family History   Problem Relation Age of Onset    Hyperlipidemia Mother     Diabetes type II Mother     Obesity Mother     Obesity Father     Drug abuse Father     Diabetes type II Maternal Aunt     Lung disease Maternal Aunt     Rheum arthritis Maternal Aunt     Fibromyalgia Maternal Aunt     Atrial fibrillation Maternal Grandmother     Hyperlipidemia Maternal Grandfather     Diabetes type II Maternal Grandfather     Stroke Maternal Grandfather     Heart disease Maternal Grandfather     Stroke Paternal Grandfather       Social History     Tobacco Use    Smoking status: Every Day     Types: Cigarettes     Passive exposure: Yes    Smokeless tobacco: Current   Vaping Use    Vaping status: Some Days    Substances: Nicotine, Flavoring   Substance Use Topics    Alcohol use: Yes     Alcohol/week: 2.0 standard drinks of alcohol     Types: 2 Shots of liquor per week     Comment: when available to him    Drug use: Yes     Types: Marijuana      E-Cigarette/Vaping    E-Cigarette Use Current Some Day User     Comments smoke cigg. when he has money       E-Cigarette/Vaping Substances    Nicotine Yes     THC No     CBD No     Flavoring Yes     Other No     Unknown No       I have reviewed and agree with the history as documented.     19-year-old male, presents by police from home.  Patient states he is  not feeling well mentally and would like someone to help him out.  Patient states he has not been on his psych meds for about a year.  Patient denies any thoughts about harming himself or others.  Denies any drug or alcohol use.      History provided by:  Patient   used: No    Psychiatric Evaluation  Presenting symptoms: no self-mutilation and no suicidal thoughts        Review of Systems   Constitutional: Negative.    Cardiovascular: Negative.    Gastrointestinal: Negative.    Psychiatric/Behavioral:  Negative for self-injury and suicidal ideas.            Objective       ED Triage Vitals [01/11/25 1406]   Temperature Pulse Blood Pressure Respirations SpO2 Patient Position - Orthostatic VS   (!) 97.3 °F (36.3 °C) 94 156/99 17 96 % Sitting      Temp Source Heart Rate Source BP Location FiO2 (%) Pain Score    Tympanic Monitor Left arm -- No Pain      Vitals      Date and Time Temp Pulse SpO2 Resp BP Pain Score FACES Pain Rating User   01/11/25 1406 97.3 °F (36.3 °C) 94 96 % 17 156/99 No Pain -- AC            Physical Exam  Vitals and nursing note reviewed.   Constitutional:       General: He is not in acute distress.  HENT:      Head: Normocephalic and atraumatic.   Cardiovascular:      Rate and Rhythm: Normal rate.   Pulmonary:      Effort: Pulmonary effort is normal.   Neurological:      General: No focal deficit present.      Mental Status: He is alert and oriented to person, place, and time.      Motor: No weakness.      Gait: Gait normal.   Psychiatric:      Comments: Calm and cooperative, answering questions appropriately         Results Reviewed       Procedure Component Value Units Date/Time    TSH [[id number]]  (Normal) Collected: 01/11/25 1430    Lab Status: Final result Specimen: Blood from Arm, Left Updated: 01/11/25 1532     TSH 3RD GENERATON 2.487 uIU/mL     Rapid drug screen, urine [[id number]]  (Abnormal) Collected: 01/11/25 1447    Lab Status: Final result Specimen: Urine Updated:  01/11/25 1531     Amph/Meth UR Negative     Barbiturate Ur Negative     Benzodiazepine Urine Negative     Cocaine Urine Negative     Methadone Urine Negative     Opiate Urine Negative     PCP Ur Negative     THC Urine Positive     Oxycodone Urine Negative     Fentanyl Urine Negative     HYDROCODONE URINE Negative    Narrative:      Presumptive report. If requested, specimen will be sent to reference lab for confirmation.  FOR MEDICAL PURPOSES ONLY.   IF CONFIRMATION NEEDED PLEASE CONTACT THE LAB WITHIN 5 DAYS.    Drug Screen Cutoff Levels:  AMPHETAMINE/METHAMPHETAMINES  1000 ng/mL  BARBITURATES     200 ng/mL  BENZODIAZEPINES     200 ng/mL  COCAINE      300 ng/mL  METHADONE      300 ng/mL  OPIATES      300 ng/mL  PHENCYCLIDINE     25 ng/mL  THC       50 ng/mL  OXYCODONE      100 ng/mL  FENTANYL      5 ng/mL  HYDROCODONE     300 ng/mL    Comprehensive metabolic panel [[id number]]  (Abnormal) Collected: 01/11/25 1430    Lab Status: Final result Specimen: Blood from Arm, Left Updated: 01/11/25 1522     Sodium 129 mmol/L      Potassium 4.1 mmol/L      Chloride 98 mmol/L      CO2 17 mmol/L      ANION GAP 14 mmol/L      BUN 11 mg/dL      Creatinine 0.59 mg/dL      Glucose 273 mg/dL      Calcium 10.0 mg/dL      AST 43 U/L      ALT 45 U/L      Alkaline Phosphatase 115 U/L      Total Protein 8.1 g/dL      Albumin 4.6 g/dL      Total Bilirubin 0.42 mg/dL      eGFR 145 ml/min/1.73sq m     Narrative:      Specimen Lipemic.  National Kidney Disease Foundation guidelines for Chronic Kidney Disease (CKD):     Stage 1 with normal or high GFR (GFR > 90 mL/min/1.73 square meters)    Stage 2 Mild CKD (GFR = 60-89 mL/min/1.73 square meters)    Stage 3A Moderate CKD (GFR = 45-59 mL/min/1.73 square meters)    Stage 3B Moderate CKD (GFR = 30-44 mL/min/1.73 square meters)    Stage 4 Severe CKD (GFR = 15-29 mL/min/1.73 square meters)    Stage 5 End Stage CKD (GFR <15 mL/min/1.73 square meters)  Note: GFR calculation is accurate only with a  steady state creatinine    Ethanol [[id number]]  (Normal) Collected: 01/11/25 1430    Lab Status: Final result Specimen: Blood from Arm, Left Updated: 01/11/25 1520     Ethanol Lvl <10 mg/dL     CBC and differential [[id number]] Collected: 01/11/25 1430    Lab Status: Final result Specimen: Blood from Arm, Left Updated: 01/11/25 1452     WBC 8.16 Thousand/uL      RBC 5.39 Million/uL      Hemoglobin 15.1 g/dL      Hematocrit 45.3 %      MCV 84 fL      MCH 28.0 pg      MCHC 33.3 g/dL      RDW 13.8 %      MPV 11.2 fL      Platelets 315 Thousands/uL      nRBC 0 /100 WBCs      Segmented % 55 %      Immature Grans % 1 %      Lymphocytes % 34 %      Monocytes % 7 %      Eosinophils Relative 2 %      Basophils Relative 1 %      Absolute Neutrophils 4.61 Thousands/µL      Absolute Immature Grans 0.04 Thousand/uL      Absolute Lymphocytes 2.76 Thousands/µL      Absolute Monocytes 0.55 Thousand/µL      Eosinophils Absolute 0.16 Thousand/µL      Basophils Absolute 0.04 Thousands/µL     POCT alcohol breath test [[id number]]     Lab Status: No result             No orders to display       Procedures    ED Medication and Procedure Management   Prior to Admission Medications   Prescriptions Last Dose Informant Patient Reported? Taking?   Abilify Maintena 400 MG injection   Yes No   Sig: INJECT 2 ML INTRAMUSCUALRLY EVERY 4 WEEKS AS DIRECTED   Alcohol Swabs 70 % PADS   No No   Sig: May substitute brand based on insurance coverage. Check glucose TID.   Blood Glucose Monitoring Suppl (OneTouch Verio Reflect) w/Device KIT   No No   Sig: May substitute brand based on insurance coverage. Check glucose TID.   Blood Glucose Monitoring Suppl (OneTouch Verio) w/Device KIT   No No   Sig: Use 2 (two) times a day Ok to substitute with insurance covered brand   FLUoxetine (PROzac) 10 mg capsule   No No   Sig: Take 1 capsule (10 mg total) by mouth daily   Insulin Glargine Solostar (Lantus SoloStar) 100 UNIT/ML SOPN   No No   Sig: Inject 0.3 mL (30  Units total) under the skin daily at bedtime   Insulin Pen Needle (BD Pen Needle Shani 2nd Gen) 32G X 4 MM MISC   No No   Sig: For use with insulin pen. Pharmacy may dispense brand covered by insurance.   Lancets (onetouch ultrasoft) lancets   No No   Sig: Use as instructed   OneTouch Delica Lancets 33G MISC   No No   Sig: May substitute brand based on insurance coverage. Check glucose TID.   albuterol (2.5 mg/3 mL) 0.083 % nebulizer solution   No No   Sig: Take 3 mL (2.5 mg total) by nebulization every 6 (six) hours as needed for wheezing or shortness of breath   glucose blood (OneTouch Verio) test strip   No No   Sig: TEST BLOOD SUGAR TWICE A DAY   glucose blood (OneTouch Verio) test strip   No No   Sig: May substitute brand based on insurance coverage. Check glucose TID.   ibuprofen (MOTRIN) 800 mg tablet   No No   Sig: Take 1 tablet (800 mg total) by mouth every 8 (eight) hours as needed for mild pain   insulin aspart (NovoLOG FlexPen) 100 UNIT/ML injection pen   No No   Sig: Inject 9 Units under the skin 3 (three) times a day with meals   metFORMIN (GLUCOPHAGE-XR) 500 mg 24 hr tablet   No No   Sig: Take 2 tablets (1,000 mg total) by mouth 2 (two) times a day with meals Start with 1 pill 500mg with breakfast x 1 week, then 1 pill 500mg with breakfast and 1 pill with dinner for 1 week , then 2 pills with breakfast (1000mg) and 1 pill with dinner (500mg) for 1 week, then 1000mg twice a day going forward   naproxen (Naprosyn) 500 mg tablet   No No   Sig: Take 1 tablet (500 mg total) by mouth 2 (two) times a day with meals for 5 days   topiramate (TOPAMAX) 50 MG tablet   Yes No   Sig: Take 50 mg by mouth 2 (two) times a day      Facility-Administered Medications: None     Patient's Medications   Discharge Prescriptions    No medications on file     No discharge procedures on file.  ED SEPSIS DOCUMENTATION   Time reflects when diagnosis was documented in both MDM as applicable and the Disposition within this note        Time User Action Codes Description Comment    1/11/2025  3:49 PM Richard Green Add [F32.A] Depression, unspecified depression type                  Richard Green MD  01/11/25 6391

## 2025-01-11 NOTE — ED NOTES
SLIME gave the patient the information for the [facility]. SLIME explained tot he patient he can go their and get his medication started and does not need to have any appointment. Patient was receptive to the information and will go there on Monday morning       Deana TAMEZ

## 2025-01-11 NOTE — ED NOTES
SLIME received a call from [place] at [facility]. [place] stated she had to call a couple different people to get the answer. [place] stated she spoke with Rayna the director at [facility] and Rayna stated the patient has been terminated from the [facility] program due to non compliance. China stated the best thing would be for the patient to go to [facility] to get started back on his medication since he will not need a appointment       Deana TAMEZ

## 2025-01-11 NOTE — ED NOTES
18 y/o male presented to the ED. Patient brought to ED by PD for crisis eval - PD state patient called and requested to be transported to the ED and while they were enroute he stated he wanted to cut himself.  Patient denies wanting to kill himself, but states he was saying that because he thought it might help him get admitted to get the help he needs. PES went in to speak with the patient. The patient is very well known to PES. The patient stated he wants help with getting back on his medication. The patient stated his phone has been turned off since December and he has not been able to get in contact with [facility] or his day program. PES asked about the patient wanting to cut himself. The patient stated his mom told him to say he wanted to cut himself. The patient proceeded to explain how he does not have the greatest living situation with his mom. The patient denies any self harm. The patient denies SI/HI/AVH/Paranoia. The patient has been going to the day program PES had recommended for him. The patient really just wants to get a new appointment with [facility] to get his meds started back up. SLIME called over to [facility] and spoke with [place] to see if the patient could get an emergency appointment. China is going to call Zeyad the patient's old therapist to see if she can get him an appointment. China will call SLIME back with the appointment information       Deana TAMEZ

## 2025-01-12 ENCOUNTER — HOSPITAL ENCOUNTER (EMERGENCY)
Facility: HOSPITAL | Age: 20
Discharge: HOME | End: 2025-01-12
Attending: EMERGENCY MEDICINE
Payer: COMMERCIAL

## 2025-01-12 VITALS
TEMPERATURE: 97.4 F | DIASTOLIC BLOOD PRESSURE: 88 MMHG | OXYGEN SATURATION: 96 % | SYSTOLIC BLOOD PRESSURE: 145 MMHG | HEART RATE: 95 BPM | RESPIRATION RATE: 18 BRPM

## 2025-01-12 DIAGNOSIS — L02.91 ABSCESS: Primary | ICD-10-CM

## 2025-01-12 PROCEDURE — 99283 EMERGENCY DEPT VISIT LOW MDM: CPT

## 2025-01-12 PROCEDURE — 99284 EMERGENCY DEPT VISIT MOD MDM: CPT | Performed by: EMERGENCY MEDICINE

## 2025-01-12 RX ORDER — SULFAMETHOXAZOLE AND TRIMETHOPRIM 800; 160 MG/1; MG/1
1 TABLET ORAL 2 TIMES DAILY
Qty: 10 TABLET | Refills: 0 | Status: SHIPPED | OUTPATIENT
Start: 2025-01-12 | End: 2025-01-17

## 2025-01-12 RX ORDER — SULFAMETHOXAZOLE AND TRIMETHOPRIM 800; 160 MG/1; MG/1
1 TABLET ORAL ONCE
Status: COMPLETED | OUTPATIENT
Start: 2025-01-12 | End: 2025-01-12

## 2025-01-12 RX ADMIN — SULFAMETHOXAZOLE AND TRIMETHOPRIM 1 TABLET: 800; 160 TABLET ORAL at 12:16

## 2025-01-12 NOTE — ED PROVIDER NOTES
"Time reflects when diagnosis was documented in both MDM as applicable and the Disposition within this note       Time User Action Codes Description Comment    1/12/2025 12:04 PM Richard Green Add [L02.91] Abscess           ED Disposition       ED Disposition   Discharge    Condition   Stable    Date/Time   Sun Jan 12, 2025 12:04 PM    Comment   Rik Marin discharge to home/self care.                   Assessment & Plan       Medical Decision Making  19-year-old male, presenting with a small bump on penis.  Differential diagnosis includes STD, abscess among other diagnoses.  On exam, patient has small bump on penis that is likely a hair follicle, does not appear to be any type of STD.  Patient also has a superficial, draining abscess to lower abdomen.  Will place on oral Bactrim antibiotic, instructed to keep area clean and apply warm compresses.  Follow-up with primary doctor.    Risk  Prescription drug management.             Medications   sulfamethoxazole-trimethoprim (BACTRIM DS) 800-160 mg per tablet 1 tablet (has no administration in time range)       ED Risk Strat Scores            CRAFFT      Flowsheet Row Most Recent Value   CRAFFT Initial Screen: During the past 12 months, did you:    1. Drink any alcohol (more than a few sips)?  No Filed at: 01/12/2025 1151   2. Smoke any marijuana or hashish No Filed at: 01/12/2025 1151   3. Use anything else to get high? (\"anything else\" includes illegal drugs, over the counter and prescription drugs, and things that you sniff or 'patel')? No Filed at: 01/12/2025 1151                                          History of Present Illness       Chief Complaint   Patient presents with    Medical Problem     Patient states he has a wart on his penis, worried he has STD.        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    Gram-negative " bacteremia 05/13/2024    Lung collapse     Sepsis (HCC) 05/06/2024    Sleep apnea     Viral meningitis       Past Surgical History:   Procedure Laterality Date    INCISION AND DRAINAGE OF WOUND Left 5/13/2024    Procedure: INCISION AND DRAINAGE (I&D) GROIN;  Surgeon: Tim Ch MD;  Location: [facility];  Service: General    INCISION AND DRAINAGE OF WOUND Left 5/15/2024    Procedure: INCISION AND DRAINAGE (I&D) GROIN;  Surgeon: Tim Ch MD;  Location: [facility];  Service: General    FL I&D VULVA/PERINEAL ABSCESS N/A 5/11/2024    Procedure: INCISION AND DRAINAGE (I&D) PERINEAL ABSCESS;  Surgeon: Tim Ch MD;  Location: [facility];  Service: General    TONSILECTOMY AND ADNOIDECTOMY      2020      Family History   Problem Relation Age of Onset    Hyperlipidemia Mother     Diabetes type II Mother     Obesity Mother     Obesity Father     Drug abuse Father     Diabetes type II Maternal Aunt     Lung disease Maternal Aunt     Rheum arthritis Maternal Aunt     Fibromyalgia Maternal Aunt     Atrial fibrillation Maternal Grandmother     Hyperlipidemia Maternal Grandfather     Diabetes type II Maternal Grandfather     Stroke Maternal Grandfather     Heart disease Maternal Grandfather     Stroke Paternal Grandfather       Social History     Tobacco Use    Smoking status: Every Day     Types: Cigarettes     Passive exposure: Yes    Smokeless tobacco: Current   Vaping Use    Vaping status: Some Days    Substances: Nicotine, Flavoring   Substance Use Topics    Alcohol use: Yes     Alcohol/week: 2.0 standard drinks of alcohol     Types: 2 Shots of liquor per week     Comment: when available to him    Drug use: Yes     Types: Marijuana      E-Cigarette/Vaping    E-Cigarette Use Current Some Day User     Comments smoke cigg. when he has money       E-Cigarette/Vaping Substances    Nicotine Yes     THC No     CBD No     Flavoring Yes     Other No     Unknown No       I have reviewed and agree with the history as  documented.     19-year-old male, presents by EMS from home for evaluation of small bump on his penis that he noted, concerned about an STD.  Denies any pain to area.  No difficulty or symptoms with urination.  Patient also reports he has an abscess under his pannus on his abdomen.      History provided by:  Patient   used: No    Medical Problem  Associated symptoms: no fever        Review of Systems   Constitutional: Negative.  Negative for fever.   Respiratory: Negative.     Cardiovascular: Negative.    Gastrointestinal: Negative.    Genitourinary:  Negative for dysuria and penile discharge.           Objective       ED Triage Vitals [01/12/25 1149]   Temperature Pulse Blood Pressure Respirations SpO2 Patient Position - Orthostatic VS   (!) 97.4 °F (36.3 °C) 95 145/88 18 96 % Sitting      Temp Source Heart Rate Source BP Location FiO2 (%) Pain Score    Tympanic Monitor Right arm -- --      Vitals      Date and Time Temp Pulse SpO2 Resp BP Pain Score FACES Pain Rating User   01/12/25 1149 97.4 °F (36.3 °C) 95 96 % 18 145/88 -- -- OE            Physical Exam  Vitals and nursing note reviewed.   Constitutional:       General: He is not in acute distress.  Cardiovascular:      Rate and Rhythm: Normal rate.   Pulmonary:      Effort: Pulmonary effort is normal.   Abdominal:      Palpations: Abdomen is soft.      Tenderness: There is no abdominal tenderness.   Genitourinary:     Comments: Small bump noted to distal underside of penis, no erythema, ulceration, or drainage.  Skin:     General: Skin is warm and dry.      Comments: Small, draining superficial abscess noted under pannus to right lower abdomen.  No surrounding erythema.   Neurological:      General: No focal deficit present.      Mental Status: He is alert and oriented to person, place, and time.      Motor: No weakness.      Gait: Gait normal.         Results Reviewed       None            No orders to display       Procedures    ED  Medication and Procedure Management   Prior to Admission Medications   Prescriptions Last Dose Informant Patient Reported? Taking?   Abilify Maintena 400 MG injection   Yes No   Sig: INJECT 2 ML INTRAMUSCUALRLY EVERY 4 WEEKS AS DIRECTED   Alcohol Swabs 70 % PADS   No No   Sig: May substitute brand based on insurance coverage. Check glucose TID.   Blood Glucose Monitoring Suppl (OneTouch Verio Reflect) w/Device KIT   No No   Sig: May substitute brand based on insurance coverage. Check glucose TID.   Blood Glucose Monitoring Suppl (OneTouch Verio) w/Device KIT   No No   Sig: Use 2 (two) times a day Ok to substitute with insurance covered brand   FLUoxetine (PROzac) 10 mg capsule   No No   Sig: Take 1 capsule (10 mg total) by mouth daily   Insulin Glargine Solostar (Lantus SoloStar) 100 UNIT/ML SOPN   No No   Sig: Inject 0.3 mL (30 Units total) under the skin daily at bedtime   Insulin Pen Needle (BD Pen Needle Shani 2nd Gen) 32G X 4 MM MISC   No No   Sig: For use with insulin pen. Pharmacy may dispense brand covered by insurance.   Lancets (onetouch ultrasoft) lancets   No No   Sig: Use as instructed   OneTouch Delica Lancets 33G MISC   No No   Sig: May substitute brand based on insurance coverage. Check glucose TID.   albuterol (2.5 mg/3 mL) 0.083 % nebulizer solution   No No   Sig: Take 3 mL (2.5 mg total) by nebulization every 6 (six) hours as needed for wheezing or shortness of breath   glucose blood (OneTouch Verio) test strip   No No   Sig: TEST BLOOD SUGAR TWICE A DAY   glucose blood (OneTouch Verio) test strip   No No   Sig: May substitute brand based on insurance coverage. Check glucose TID.   ibuprofen (MOTRIN) 800 mg tablet   No No   Sig: Take 1 tablet (800 mg total) by mouth every 8 (eight) hours as needed for mild pain   insulin aspart (NovoLOG FlexPen) 100 UNIT/ML injection pen   No No   Sig: Inject 9 Units under the skin 3 (three) times a day with meals   metFORMIN (GLUCOPHAGE-XR) 500 mg 24 hr tablet    No No   Sig: Take 2 tablets (1,000 mg total) by mouth 2 (two) times a day with meals Start with 1 pill 500mg with breakfast x 1 week, then 1 pill 500mg with breakfast and 1 pill with dinner for 1 week , then 2 pills with breakfast (1000mg) and 1 pill with dinner (500mg) for 1 week, then 1000mg twice a day going forward   naproxen (Naprosyn) 500 mg tablet   No No   Sig: Take 1 tablet (500 mg total) by mouth 2 (two) times a day with meals for 5 days   topiramate (TOPAMAX) 50 MG tablet   Yes No   Sig: Take 50 mg by mouth 2 (two) times a day      Facility-Administered Medications: None     Current Discharge Medication List        START taking these medications    Details   sulfamethoxazole-trimethoprim (BACTRIM DS) 800-160 mg per tablet Take 1 tablet by mouth 2 (two) times a day for 5 days smx-tmp DS (BACTRIM) 800-160 mg tabs (1tab q12 D10)  Qty: 10 tablet, Refills: 0    Associated Diagnoses: Abscess           CONTINUE these medications which have NOT CHANGED    Details   Abilify Maintena 400 MG injection INJECT 2 ML INTRAMUSCUALRLY EVERY 4 WEEKS AS DIRECTED      albuterol (2.5 mg/3 mL) 0.083 % nebulizer solution Take 3 mL (2.5 mg total) by nebulization every 6 (six) hours as needed for wheezing or shortness of breath  Qty: 75 mL, Refills: 0    Associated Diagnoses: Pneumonia      Alcohol Swabs 70 % PADS May substitute brand based on insurance coverage. Check glucose TID.  Qty: 100 each, Refills: 0    Associated Diagnoses: Uncontrolled type 2 diabetes mellitus with hyperglycemia (HCC)      !! Blood Glucose Monitoring Suppl (OneTouch Verio Reflect) w/Device KIT May substitute brand based on insurance coverage. Check glucose TID.  Qty: 1 kit, Refills: 1    Associated Diagnoses: Uncontrolled type 2 diabetes mellitus with hyperglycemia (HCC)      !! Blood Glucose Monitoring Suppl (OneTouch Verio) w/Device KIT Use 2 (two) times a day Ok to substitute with insurance covered brand  Qty: 1 kit, Refills: 0    Associated  Diagnoses: Uncontrolled type 2 diabetes mellitus with hyperglycemia (HCC)      FLUoxetine (PROzac) 10 mg capsule Take 1 capsule (10 mg total) by mouth daily  Qty: 30 capsule, Refills: 0    Associated Diagnoses: MDD (major depressive disorder)      !! glucose blood (OneTouch Verio) test strip TEST BLOOD SUGAR TWICE A DAY  Qty: 100 strip, Refills: 3    Associated Diagnoses: Uncontrolled type 2 diabetes mellitus with hyperglycemia (HCC)      !! glucose blood (OneTouch Verio) test strip May substitute brand based on insurance coverage. Check glucose TID.  Qty: 100 each, Refills: 0    Associated Diagnoses: Uncontrolled type 2 diabetes mellitus with hyperglycemia (HCC)      ibuprofen (MOTRIN) 800 mg tablet Take 1 tablet (800 mg total) by mouth every 8 (eight) hours as needed for mild pain  Qty: 30 tablet, Refills: 0    Associated Diagnoses: Internal derangement of right knee      insulin aspart (NovoLOG FlexPen) 100 UNIT/ML injection pen Inject 9 Units under the skin 3 (three) times a day with meals  Qty: 15 mL, Refills: 0    Associated Diagnoses: Type 2 diabetes mellitus without complication, without long-term current use of insulin (HCC)      Insulin Glargine Solostar (Lantus SoloStar) 100 UNIT/ML SOPN Inject 0.3 mL (30 Units total) under the skin daily at bedtime  Qty: 10 mL, Refills: 0    Associated Diagnoses: Uncontrolled type 2 diabetes mellitus with hyperglycemia (HCC)      Insulin Pen Needle (BD Pen Needle Shani 2nd Gen) 32G X 4 MM MISC For use with insulin pen. Pharmacy may dispense brand covered by insurance.  Qty: 100 each, Refills: 0    Associated Diagnoses: Uncontrolled type 2 diabetes mellitus with hyperglycemia (HCC)      !! Lancets (onetouch ultrasoft) lancets Use as instructed  Qty: 100 each, Refills: 0    Comments: Ok to substitute with insurance covered brand  Associated Diagnoses: Uncontrolled type 2 diabetes mellitus with hyperglycemia (HCC)      metFORMIN (GLUCOPHAGE-XR) 500 mg 24 hr tablet Take 2  tablets (1,000 mg total) by mouth 2 (two) times a day with meals Start with 1 pill 500mg with breakfast x 1 week, then 1 pill 500mg with breakfast and 1 pill with dinner for 1 week , then 2 pills with breakfast (1000mg) and 1 pill with dinner (500mg) for 1 week, then 1000mg twice a day going forward  Qty: 60 tablet, Refills: 0    Associated Diagnoses: Uncontrolled type 2 diabetes mellitus with hyperglycemia (HCC)      naproxen (Naprosyn) 500 mg tablet Take 1 tablet (500 mg total) by mouth 2 (two) times a day with meals for 5 days  Qty: 10 tablet, Refills: 0    Associated Diagnoses: Right knee pain; Ankle sprain      !! OneTouch Delica Lancets 33G MISC May substitute brand based on insurance coverage. Check glucose TID.  Qty: 100 each, Refills: 0    Associated Diagnoses: Uncontrolled type 2 diabetes mellitus with hyperglycemia (HCC)      topiramate (TOPAMAX) 50 MG tablet Take 50 mg by mouth 2 (two) times a day       !! - Potential duplicate medications found. Please discuss with provider.        No discharge procedures on file.  ED SEPSIS DOCUMENTATION   Time reflects when diagnosis was documented in both MDM as applicable and the Disposition within this note       Time User Action Codes Description Comment    1/12/2025 12:04 PM Richard Green Add [L02.91] Keiry Green MD  01/12/25 3370

## 2025-02-06 ENCOUNTER — HOSPITAL ENCOUNTER (EMERGENCY)
Facility: HOSPITAL | Age: 20
Discharge: HOME | End: 2025-02-06
Attending: EMERGENCY MEDICINE
Payer: COMMERCIAL

## 2025-02-06 VITALS
TEMPERATURE: 98 F | OXYGEN SATURATION: 94 % | RESPIRATION RATE: 22 BRPM | SYSTOLIC BLOOD PRESSURE: 133 MMHG | HEART RATE: 88 BPM | DIASTOLIC BLOOD PRESSURE: 60 MMHG

## 2025-02-06 DIAGNOSIS — E11.65 UNCONTROLLED DIABETES MELLITUS WITH HYPERGLYCEMIA (HCC): Primary | ICD-10-CM

## 2025-02-06 DIAGNOSIS — R79.89 PSEUDOHYPONATREMIA: ICD-10-CM

## 2025-02-06 DIAGNOSIS — R10.9 ABDOMINAL CRAMPING: ICD-10-CM

## 2025-02-06 LAB
ALBUMIN SERPL BCG-MCNC: 4 G/DL (ref 3.5–5)
ALP SERPL-CCNC: 102 U/L (ref 34–104)
ALT SERPL W P-5'-P-CCNC: 25 U/L (ref 7–52)
ANION GAP SERPL CALCULATED.3IONS-SCNC: 15 MMOL/L (ref 4–13)
ANION GAP SERPL CALCULATED.3IONS-SCNC: 18 MMOL/L (ref 4–13)
AST SERPL W P-5'-P-CCNC: 25 U/L (ref 13–39)
B-OH-BUTYR SERPL-MCNC: 0.06 MMOL/L (ref 0.02–0.27)
BASE EX.OXY STD BLDV CALC-SCNC: 91.7 % (ref 60–80)
BASE EXCESS BLDV CALC-SCNC: -1.2 MMOL/L
BASOPHILS # BLD AUTO: 0.03 THOUSANDS/ΜL (ref 0–0.1)
BASOPHILS NFR BLD AUTO: 0 % (ref 0–1)
BILIRUB SERPL-MCNC: 0.34 MG/DL (ref 0.2–1)
BUN SERPL-MCNC: 13 MG/DL (ref 5–25)
BUN SERPL-MCNC: 13 MG/DL (ref 5–25)
CALCIUM SERPL-MCNC: 8.5 MG/DL (ref 8.4–10.2)
CALCIUM SERPL-MCNC: 8.9 MG/DL (ref 8.4–10.2)
CHLORIDE SERPL-SCNC: 102 MMOL/L (ref 96–108)
CHLORIDE SERPL-SCNC: 96 MMOL/L (ref 96–108)
CO2 SERPL-SCNC: 15 MMOL/L (ref 21–32)
CO2 SERPL-SCNC: 16 MMOL/L (ref 21–32)
CREAT SERPL-MCNC: 0.65 MG/DL (ref 0.6–1.3)
CREAT SERPL-MCNC: 0.66 MG/DL (ref 0.6–1.3)
EOSINOPHIL # BLD AUTO: 0.25 THOUSAND/ΜL (ref 0–0.61)
EOSINOPHIL NFR BLD AUTO: 2 % (ref 0–6)
ERYTHROCYTE [DISTWIDTH] IN BLOOD BY AUTOMATED COUNT: 13.6 % (ref 11.6–15.1)
GFR SERPL CREATININE-BSD FRML MDRD: 139 ML/MIN/1.73SQ M
GFR SERPL CREATININE-BSD FRML MDRD: 140 ML/MIN/1.73SQ M
GLUCOSE SERPL-MCNC: 315 MG/DL (ref 65–140)
GLUCOSE SERPL-MCNC: 356 MG/DL (ref 65–140)
GLUCOSE SERPL-MCNC: 378 MG/DL (ref 65–140)
HCO3 BLDV-SCNC: 23.5 MMOL/L (ref 24–30)
HCT VFR BLD AUTO: 38.8 % (ref 36.5–49.3)
HGB BLD-MCNC: 13.2 G/DL (ref 12–17)
IMM GRANULOCYTES # BLD AUTO: 0.05 THOUSAND/UL (ref 0–0.2)
IMM GRANULOCYTES NFR BLD AUTO: 0 % (ref 0–2)
LIPASE SERPL-CCNC: 36 U/L (ref 11–82)
LYMPHOCYTES # BLD AUTO: 3.84 THOUSANDS/ΜL (ref 0.6–4.47)
LYMPHOCYTES NFR BLD AUTO: 33 % (ref 14–44)
MCH RBC QN AUTO: 28.5 PG (ref 26.8–34.3)
MCHC RBC AUTO-ENTMCNC: 34 G/DL (ref 31.4–37.4)
MCV RBC AUTO: 84 FL (ref 82–98)
MONOCYTES # BLD AUTO: 0.73 THOUSAND/ΜL (ref 0.17–1.22)
MONOCYTES NFR BLD AUTO: 6 % (ref 4–12)
NEUTROPHILS # BLD AUTO: 6.63 THOUSANDS/ΜL (ref 1.85–7.62)
NEUTS SEG NFR BLD AUTO: 59 % (ref 43–75)
NRBC BLD AUTO-RTO: 0 /100 WBCS
O2 CT BLDV-SCNC: 17.8 ML/DL
PCO2 BLDV: 39.2 MM HG (ref 42–50)
PH BLDV: 7.39 [PH] (ref 7.3–7.4)
PLATELET # BLD AUTO: 290 THOUSANDS/UL (ref 149–390)
PMV BLD AUTO: 11.1 FL (ref 8.9–12.7)
PO2 BLDV: 65.8 MM HG (ref 35–45)
POTASSIUM SERPL-SCNC: 3.7 MMOL/L (ref 3.5–5.3)
POTASSIUM SERPL-SCNC: 4 MMOL/L (ref 3.5–5.3)
PROT SERPL-MCNC: 7.6 G/DL (ref 6.4–8.4)
RBC # BLD AUTO: 4.63 MILLION/UL (ref 3.88–5.62)
SODIUM SERPL-SCNC: 130 MMOL/L (ref 135–147)
SODIUM SERPL-SCNC: 132 MMOL/L (ref 135–147)
WBC # BLD AUTO: 11.53 THOUSAND/UL (ref 4.31–10.16)

## 2025-02-06 PROCEDURE — 99284 EMERGENCY DEPT VISIT MOD MDM: CPT | Performed by: EMERGENCY MEDICINE

## 2025-02-06 PROCEDURE — 80053 COMPREHEN METABOLIC PANEL: CPT | Performed by: EMERGENCY MEDICINE

## 2025-02-06 PROCEDURE — 82948 REAGENT STRIP/BLOOD GLUCOSE: CPT

## 2025-02-06 PROCEDURE — 36415 COLL VENOUS BLD VENIPUNCTURE: CPT | Performed by: EMERGENCY MEDICINE

## 2025-02-06 PROCEDURE — 80048 BASIC METABOLIC PNL TOTAL CA: CPT | Performed by: EMERGENCY MEDICINE

## 2025-02-06 PROCEDURE — 82010 KETONE BODYS QUAN: CPT | Performed by: EMERGENCY MEDICINE

## 2025-02-06 PROCEDURE — 96372 THER/PROPH/DIAG INJ SC/IM: CPT

## 2025-02-06 PROCEDURE — 83690 ASSAY OF LIPASE: CPT | Performed by: EMERGENCY MEDICINE

## 2025-02-06 PROCEDURE — 82805 BLOOD GASES W/O2 SATURATION: CPT | Performed by: EMERGENCY MEDICINE

## 2025-02-06 PROCEDURE — 85025 COMPLETE CBC W/AUTO DIFF WBC: CPT | Performed by: EMERGENCY MEDICINE

## 2025-02-06 PROCEDURE — 99284 EMERGENCY DEPT VISIT MOD MDM: CPT

## 2025-02-06 PROCEDURE — 96361 HYDRATE IV INFUSION ADD-ON: CPT

## 2025-02-06 PROCEDURE — 96365 THER/PROPH/DIAG IV INF INIT: CPT

## 2025-02-06 RX ORDER — ACETAMINOPHEN 10 MG/ML
1000 INJECTION, SOLUTION INTRAVENOUS ONCE
Status: COMPLETED | OUTPATIENT
Start: 2025-02-06 | End: 2025-02-06

## 2025-02-06 RX ORDER — ACETAMINOPHEN 500 MG
1000 TABLET ORAL EVERY 8 HOURS
Qty: 55 TABLET | Refills: 0 | Status: SHIPPED | OUTPATIENT
Start: 2025-02-06

## 2025-02-06 RX ORDER — ONDANSETRON 4 MG/1
4 TABLET, ORALLY DISINTEGRATING ORAL EVERY 8 HOURS PRN
Qty: 20 TABLET | Refills: 0 | Status: SHIPPED | OUTPATIENT
Start: 2025-02-06

## 2025-02-06 RX ORDER — DICYCLOMINE HCL 20 MG
20 TABLET ORAL 2 TIMES DAILY
Qty: 20 TABLET | Refills: 0 | Status: SHIPPED | OUTPATIENT
Start: 2025-02-06

## 2025-02-06 RX ADMIN — SODIUM CHLORIDE 1000 ML: 0.9 INJECTION, SOLUTION INTRAVENOUS at 03:43

## 2025-02-06 RX ADMIN — INSULIN HUMAN 10 UNITS: 100 INJECTION, SOLUTION PARENTERAL at 04:55

## 2025-02-06 RX ADMIN — ACETAMINOPHEN 1000 MG: 1000 INJECTION, SOLUTION INTRAVENOUS at 03:52

## 2025-02-06 NOTE — ED PROVIDER NOTES
Final Diagnosis:  1. Uncontrolled diabetes mellitus with hyperglycemia (HCC)    2. Pseudohyponatremia    3. Abdominal cramping        Chief Complaint   Patient presents with    Abdominal Pain     Pt has been eating ramen for days and saw a video about the ramen parasites and is afraid he could have that. Started with abd pain earlier today where pain is all over. Pt has  nausea  vomit X 5       HPI  Pt pres feeling bad.   Nerves about his diet    Here very freq  Completely noncompliant w/ meds  Component of depression makes f/u tough for him    Currently no SI  Is sad when talking to him though    Has some abd pain all over worried about parasites. I reassure him.     Nausea. No vomiting here  Normal stools  No change in urination    After some basic meds for symptoms patient sleeping soundly.     EMS additionally reports:     - Previous charting underwent limited review with attention to last ED visits, labs, ekgs, and prior imaging.  Chart review reveals :     Admission on 01/11/2025, Discharged on 01/11/2025   Component Date Value Ref Range Status    Amph/Meth UR 01/11/2025 Negative  Negative Final    Barbiturate Ur 01/11/2025 Negative  Negative Final    Benzodiazepine Urine 01/11/2025 Negative  Negative Final    Cocaine Urine 01/11/2025 Negative  Negative Final    Methadone Urine 01/11/2025 Negative  Negative Final    Opiate Urine 01/11/2025 Negative  Negative Final    PCP Ur 01/11/2025 Negative  Negative Final    THC Urine 01/11/2025 Positive (A)  Negative Final    Oxycodone Urine 01/11/2025 Negative  Negative Final    Fentanyl Urine 01/11/2025 Negative  Negative Final    HYDROCODONE URINE 01/11/2025 Negative  Negative Final    WBC 01/11/2025 8.16  4.31 - 10.16 Thousand/uL Final    RBC 01/11/2025 5.39  3.88 - 5.62 Million/uL Final    Hemoglobin 01/11/2025 15.1  12.0 - 17.0 g/dL Final    Hematocrit 01/11/2025 45.3  36.5 - 49.3 % Final    MCV 01/11/2025 84  82 - 98 fL Final    MCH 01/11/2025 28.0  26.8 - 34.3 pg  Final    MCHC 01/11/2025 33.3  31.4 - 37.4 g/dL Final    RDW 01/11/2025 13.8  11.6 - 15.1 % Final    MPV 01/11/2025 11.2  8.9 - 12.7 fL Final    Platelets 01/11/2025 315  149 - 390 Thousands/uL Final    nRBC 01/11/2025 0  /100 WBCs Final    Segmented % 01/11/2025 55  43 - 75 % Final    Immature Grans % 01/11/2025 1  0 - 2 % Final    Lymphocytes % 01/11/2025 34  14 - 44 % Final    Monocytes % 01/11/2025 7  4 - 12 % Final    Eosinophils Relative 01/11/2025 2  0 - 6 % Final    Basophils Relative 01/11/2025 1  0 - 1 % Final    Absolute Neutrophils 01/11/2025 4.61  1.85 - 7.62 Thousands/µL Final    Absolute Immature Grans 01/11/2025 0.04  0.00 - 0.20 Thousand/uL Final    Absolute Lymphocytes 01/11/2025 2.76  0.60 - 4.47 Thousands/µL Final    Absolute Monocytes 01/11/2025 0.55  0.17 - 1.22 Thousand/µL Final    Eosinophils Absolute 01/11/2025 0.16  0.00 - 0.61 Thousand/µL Final    Basophils Absolute 01/11/2025 0.04  0.00 - 0.10 Thousands/µL Final    Sodium 01/11/2025 129 (L)  135 - 147 mmol/L Final    Potassium 01/11/2025 4.1  3.5 - 5.3 mmol/L Final    Chloride 01/11/2025 98  96 - 108 mmol/L Final    CO2 01/11/2025 17 (L)  21 - 32 mmol/L Final    ANION GAP 01/11/2025 14 (H)  4 - 13 mmol/L Final    BUN 01/11/2025 11  5 - 25 mg/dL Final    Creatinine 01/11/2025 0.59 (L)  0.60 - 1.30 mg/dL Final    Standardized to IDMS reference method    Glucose 01/11/2025 273 (H)  65 - 140 mg/dL Final    Specimen Lipemic; Results May be Affected.  If the patient is fasting, the ADA then defines impaired fasting glucose as > 100 mg/dL and diabetes as > or equal to 123 mg/dL.    Calcium 01/11/2025 10.0  8.4 - 10.2 mg/dL Final    AST 01/11/2025 43 (H)  13 - 39 U/L Final    ALT 01/11/2025 45  7 - 52 U/L Final    Specimen collection should occur prior to Sulfasalazine administration due to the potential for falsely depressed results.     Alkaline Phosphatase 01/11/2025 115 (H)  34 - 104 U/L Final    Total Protein 01/11/2025 8.1  6.4 - 8.4 g/dL  Final    Albumin 01/11/2025 4.6  3.5 - 5.0 g/dL Final    Total Bilirubin 01/11/2025 0.42  0.20 - 1.00 mg/dL Final    Use of this assay is not recommended for patients undergoing treatment with eltrombopag due to the potential for falsely elevated results.  N-acetyl-p-benzoquinone imine (metabolite of Acetaminophen) will generate erroneously low results in samples for patients that have taken an overdose of Acetaminophen.    eGFR 01/11/2025 145  ml/min/1.73sq m Final    TSH 3RD GENERATON 01/11/2025 2.487  0.450 - 4.500 uIU/mL Final    Adult TSH (3rd generation) reference range follows the recommended guidelines of the [employer], January, 2020.    Ethanol Lvl 01/11/2025 <10  <10 mg/dL Final       - No language barrier.   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    PSH:   has a past surgical history that includes TONSILECTOMY AND ADNOIDECTOMY; pr i&d vulva/perineal abscess (N/A, 5/11/2024); Incision and drainage of wound (Left, 5/13/2024); and Incision and drainage of wound (Left, 5/15/2024).     Social History:  Tobacco Use: High Risk (2/6/2025)    Patient History     Smoking Tobacco Use: Every Day     Smokeless Tobacco Use: Current     Passive Exposure: Yes     Alcohol Use: Not At Risk (7/26/2024)    Received from [facility]    AUDIT-C     Frequency of Alcohol Consumption: Monthly or less     Average Number of Drinks: 1 or 2     Frequency of Binge Drinking: Never     No illicit use       ROS:  Pertinent positives/negatives: .     Some ROS may be present in the HPI and would take precedent over these standard questions asked below.   Review of Systems    Constitutional:  Negative for chills and fever.   Respiratory:  Negative for chest tightness, shortness of breath, wheezing and stridor.    Cardiovascular:  Negative for chest pain, palpitations and leg swelling.   Gastrointestinal:  Positive for abdominal pain and nausea. Negative for constipation and diarrhea.   Genitourinary:  Negative for difficulty urinating, dysuria, flank pain and frequency.   Psychiatric/Behavioral:  Positive for decreased concentration and dysphoric mood. Negative for self-injury. The patient is not nervous/anxious.         CONSTITUTIONAL:  No lethargy. No unexpected weight loss. No change in behavior.  EYES:  No pain, redness, or discharge. No loss of vision. No orbital trauma or pain.   ENT:  No tinnitus or decreased hearing. No epistaxis/purulent rhinorrhea. No voice change, airway closing, trismus.   CARDIOVASCULAR:  No chest pain. No skin mottling or pallor. No change in exertional capacity  RESPIRATORY:  No hemoptysis. No paroxysmal nocturnal dyspnea. No stridor. No apnea or bluing.   GASTROINTESTINAL:  No vomiting, diarrhea. No distension. No melena. No hematochezia.   GENITOURINARY:  No nocturia. No hematuria or foul smelling or cloudy urine. No discharge. No sores/adenopathy.   MUSCULOSKELETAL:  No contracture.  No new deformity.   INTEGUMENTARY:  No swelling. No unexpected contusions. No abrasions. No lymphangitis.  NEUROLOGIC:  No meningismus. No new numbness of the extremities. No new focal weakness. No postural instability  PSYCHIATRIC:  No SI HI AVH  HEMATOLOGICAL:  No bleeding. No petechiae. No bruising.  ALLERGIES:  No urticaria. No sudden abd cramping. No stridor.    PE:     Physical exam highlights:   Physical Exam       Vitals:    02/06/25 0200 02/06/25 0430 02/06/25 0530 02/06/25 0600   BP: 145/75 125/66 131/62 133/60   Pulse: 102 96 94 88   Resp: 18 20 20 22   Temp:       TempSrc:       SpO2: 95% 92% 94% 94%     Vitals reviewed by me.   Nursing note  reviewed  Chaperone present for all sensitive exam.  Const: No acute distress. Alert. Nontoxic. Not diaphoretic.    HEENT: External ears normal. No protrusion drainage swelling. Nose normal. No drainage/traumatic deformity. MM. Mouth with baseline/symmetric movement. No trismus.   Eyes: No squinting. No icterus. No tearing/swelling/drainage. Tracks through the room with normal EOM.   Neck: ROM normal. No rigidity. No meningismus.  Cards: Rate as per vitals Compared to monitor sinus unless documented. Regular Well perfused.  Pulm: Effort and excursion normal. No distress. No audible wheezing/no stridor. Normal resp rate without retraction or change in work of breathing.  Abd: No distension beyond baseline. No fluctuant wave. Patient without peritoneal pain with shifting/bumping the bed. Soft to palp  MSK: ROM normal baseline. No deformity. No contractures from baseline.   Skin: No new rashes visible. Well perfused. No wounds visualized on exposed skin  Neuro: Nonfocal. Baseline. CN grossly intact. Moving all four with coordination.   Psych: Normal behavior and affect.        A:  - Nursing note reviewed.    Ddx and MDM  Considered diagnoses    No groin pain, testicular penile pain    Abd benign to exam  No prior surgeries    Thus appendicitis, cholecystitis, pancreatitis all on table    Check labs  Re eval   Defer CT at this time pt well appearing sleeping soundly.       Also fingerstick high  DKA on ddx  Check vbg  Betahydrox  Lytes    Saline    Insulin  No acidosis  Only minimal resp compensation for any AG acidosis on cmp but pH normal.   Beta hyroxy neg      Dispo decision       My conversation with consultant reveals:        Decision rules:                           My read of the XR/CT scan reveals:     No orders to display       Orders Placed This Encounter   Procedures    CBC and differential    Comprehensive metabolic panel    Lipase    Beta Hydroxybutyrate    Blood gas, venous    Basic metabolic panel     Fingerstick Glucose (POCT)     Labs Reviewed   CBC AND DIFFERENTIAL - Abnormal       Result Value Ref Range Status    WBC 11.53 (*) 4.31 - 10.16 Thousand/uL Final    RBC 4.63  3.88 - 5.62 Million/uL Final    Hemoglobin 13.2  12.0 - 17.0 g/dL Final    Hematocrit 38.8  36.5 - 49.3 % Final    MCV 84  82 - 98 fL Final    MCH 28.5  26.8 - 34.3 pg Final    MCHC 34.0  31.4 - 37.4 g/dL Final    RDW 13.6  11.6 - 15.1 % Final    MPV 11.1  8.9 - 12.7 fL Final    Platelets 290  149 - 390 Thousands/uL Final    nRBC 0  /100 WBCs Final    Segmented % 59  43 - 75 % Final    Immature Grans % 0  0 - 2 % Final    Lymphocytes % 33  14 - 44 % Final    Monocytes % 6  4 - 12 % Final    Eosinophils Relative 2  0 - 6 % Final    Basophils Relative 0  0 - 1 % Final    Absolute Neutrophils 6.63  1.85 - 7.62 Thousands/µL Final    Absolute Immature Grans 0.05  0.00 - 0.20 Thousand/uL Final    Absolute Lymphocytes 3.84  0.60 - 4.47 Thousands/µL Final    Absolute Monocytes 0.73  0.17 - 1.22 Thousand/µL Final    Eosinophils Absolute 0.25  0.00 - 0.61 Thousand/µL Final    Basophils Absolute 0.03  0.00 - 0.10 Thousands/µL Final   COMPREHENSIVE METABOLIC PANEL - Abnormal    Sodium 130 (*) 135 - 147 mmol/L Final    Potassium 4.0  3.5 - 5.3 mmol/L Final    Comment: Slightly Hemolyzed:Results may be affected.    Chloride 96  96 - 108 mmol/L Final    CO2 16 (*) 21 - 32 mmol/L Final    ANION GAP 18 (*) 4 - 13 mmol/L Final    BUN 13  5 - 25 mg/dL Final    Creatinine 0.66  0.60 - 1.30 mg/dL Final    Comment: Standardized to IDMS reference method    Glucose 378 (*) 65 - 140 mg/dL Final    Comment: If the patient is fasting, the ADA then defines impaired fasting glucose as > 100 mg/dL and diabetes as > or equal to 123 mg/dL.    Calcium 8.9  8.4 - 10.2 mg/dL Final    AST 25  13 - 39 U/L Final    Comment: Slightly Hemolyzed:Results may be affected.    ALT 25  7 - 52 U/L Final    Comment: Specimen collection should occur prior to Sulfasalazine administration due  to the potential for falsely depressed results.     Alkaline Phosphatase 102  34 - 104 U/L Final    Total Protein 7.6  6.4 - 8.4 g/dL Final    Albumin 4.0  3.5 - 5.0 g/dL Final    Total Bilirubin 0.34  0.20 - 1.00 mg/dL Final    Comment: Use of this assay is not recommended for patients undergoing treatment with eltrombopag due to the potential for falsely elevated results.  N-acetyl-p-benzoquinone imine (metabolite of Acetaminophen) will generate erroneously low results in samples for patients that have taken an overdose of Acetaminophen.    eGFR 139  ml/min/1.73sq m Final    Narrative:     Specimen Lipemic.  National Kidney Disease Foundation guidelines for Chronic Kidney Disease (CKD):     Stage 1 with normal or high GFR (GFR > 90 mL/min/1.73 square meters)    Stage 2 Mild CKD (GFR = 60-89 mL/min/1.73 square meters)    Stage 3A Moderate CKD (GFR = 45-59 mL/min/1.73 square meters)    Stage 3B Moderate CKD (GFR = 30-44 mL/min/1.73 square meters)    Stage 4 Severe CKD (GFR = 15-29 mL/min/1.73 square meters)    Stage 5 End Stage CKD (GFR <15 mL/min/1.73 square meters)  Note: GFR calculation is accurate only with a steady state creatinine   BLOOD GAS, VENOUS - Abnormal    pH, Cristi 7.395  7.300 - 7.400 Final    pCO2, Cristi 39.2 (*) 42.0 - 50.0 mm Hg Final    pO2, Cristi 65.8 (*) 35.0 - 45.0 mm Hg Final    HCO3, Cristi 23.5 (*) 24 - 30 mmol/L Final    Base Excess, Cristi -1.2  mmol/L Final    O2 Content, Cristi 17.8  ml/dL Final    O2 HGB, VENOUS 91.7 (*) 60.0 - 80.0 % Final   BASIC METABOLIC PANEL - Abnormal    Sodium 132 (*) 135 - 147 mmol/L Final    Potassium 3.7  3.5 - 5.3 mmol/L Final    Chloride 102  96 - 108 mmol/L Final    CO2 15 (*) 21 - 32 mmol/L Final    ANION GAP 15 (*) 4 - 13 mmol/L Final    BUN 13  5 - 25 mg/dL Final    Creatinine 0.65  0.60 - 1.30 mg/dL Final    Comment: Standardized to IDMS reference method    Glucose 356 (*) 65 - 140 mg/dL Final    Comment: If the patient is fasting, the ADA then defines impaired  fasting glucose as > 100 mg/dL and diabetes as > or equal to 123 mg/dL.    Calcium 8.5  8.4 - 10.2 mg/dL Final    eGFR 140  ml/min/1.73sq m Final    Narrative:     Specimen Lipemic.  National Kidney Disease Foundation guidelines for Chronic Kidney Disease (CKD):     Stage 1 with normal or high GFR (GFR > 90 mL/min/1.73 square meters)    Stage 2 Mild CKD (GFR = 60-89 mL/min/1.73 square meters)    Stage 3A Moderate CKD (GFR = 45-59 mL/min/1.73 square meters)    Stage 3B Moderate CKD (GFR = 30-44 mL/min/1.73 square meters)    Stage 4 Severe CKD (GFR = 15-29 mL/min/1.73 square meters)    Stage 5 End Stage CKD (GFR <15 mL/min/1.73 square meters)  Note: GFR calculation is accurate only with a steady state creatinine   POCT GLUCOSE - Abnormal    POC Glucose 315 (*) 65 - 140 mg/dl Final    Comment: CRITICAL VALUE NOTED-   LIPASE - Normal    Lipase 36  11 - 82 u/L Final    Narrative:     Specimen Lipemic.   BETA HYDROXYBUTYRATE - Normal    Beta- Hydroxybutyrate 0.06  0.02 - 0.27 mmol/L Final    Narrative:     Specimen Lipemic.       *Each of these labs was reviewed. Particular standout labs will be noted in the ED Course above     Final Diagnosis:  1. Uncontrolled diabetes mellitus with hyperglycemia (HCC)    2. Pseudohyponatremia    3. Abdominal cramping          P:  - hospital tx includes   Medications   sodium chloride 0.9 % bolus 1,000 mL (0 mL Intravenous Stopped 2/6/25 0609)   acetaminophen (Ofirmev) injection 1,000 mg (0 mg Intravenous Stopped 2/6/25 0334)   insulin regular (HumuLIN R,NovoLIN R) injection 10 Units (10 Units Subcutaneous Given 2/6/25 5112)         - disposition  Time reflects when diagnosis was documented in both MDM as applicable and the Disposition within this note       Time User Action Codes Description Comment    2/6/2025  6:30 AM Kanu Carter Add [E11.65] Uncontrolled diabetes mellitus with hyperglycemia (HCC)     2/6/2025  6:30 AM Kanu Carter Add [R79.89] Pseudohyponatremia     2/6/2025   6:30 AM Kanu Carter Add [R10.9] Abdominal cramping           ED Disposition       ED Disposition   Discharge    Condition   Stable    Date/Time   Thu Feb 6, 2025  6:29 AM    Comment   Rik Marin discharge to home/self care.                   Follow-up Information       Follow up With Specialties Details Why Contact Info    Gustavo Fajardo MD    [address] [address]  [phone]              - patient will call their PCP to let them know they were in the emergency department. We discuss return precautions and patient is agreeable with plan and aformentioned disposition.       - additional treatment intended, if consistent with primary provider:  - patient to follow with :      Patient's Medications   Discharge Prescriptions    ACETAMINOPHEN (TYLENOL) 500 MG TABLET    Take 2 tablets (1,000 mg total) by mouth every 8 (eight) hours       Start Date: 2/6/2025  End Date: --       Order Dose: 1,000 mg       Quantity: 55 tablet    Refills: 0    DICYCLOMINE (BENTYL) 20 MG TABLET    Take 1 tablet (20 mg total) by mouth 2 (two) times a day       Start Date: 2/6/2025  End Date: --       Order Dose: 20 mg       Quantity: 20 tablet    Refills: 0    ONDANSETRON (ZOFRAN-ODT) 4 MG DISINTEGRATING TABLET    Take 1 tablet (4 mg total) by mouth every 8 (eight) hours as needed for nausea or vomiting       Start Date: 2/6/2025  End Date: --       Order Dose: 4 mg       Quantity: 20 tablet    Refills: 0     No discharge procedures on file.  Prior to Admission Medications   Prescriptions Last Dose Informant Patient Reported? Taking?   Abilify Maintena 400 MG injection   Yes No   Sig: INJECT 2 ML INTRAMUSCUALRLY EVERY 4 WEEKS AS DIRECTED   Alcohol Swabs 70 % PADS   No No   Sig: May substitute brand based on insurance coverage. Check glucose TID.   Blood Glucose Monitoring Suppl (OneTouch Verio Reflect) w/Device KIT   No No   Sig: May substitute brand based on insurance coverage. Check glucose TID.    Blood Glucose Monitoring Suppl (OneTouch Verio) w/Device KIT   No No   Sig: Use 2 (two) times a day Ok to substitute with insurance covered brand   FLUoxetine (PROzac) 10 mg capsule   No No   Sig: Take 1 capsule (10 mg total) by mouth daily   Insulin Glargine Solostar (Lantus SoloStar) 100 UNIT/ML SOPN   No No   Sig: Inject 0.3 mL (30 Units total) under the skin daily at bedtime   Insulin Pen Needle (BD Pen Needle Shani 2nd Gen) 32G X 4 MM MISC   No No   Sig: For use with insulin pen. Pharmacy may dispense brand covered by insurance.   Lancets (onetouch ultrasoft) lancets   No No   Sig: Use as instructed   OneTouch Delica Lancets 33G MISC   No No   Sig: May substitute brand based on insurance coverage. Check glucose TID.   albuterol (2.5 mg/3 mL) 0.083 % nebulizer solution   No No   Sig: Take 3 mL (2.5 mg total) by nebulization every 6 (six) hours as needed for wheezing or shortness of breath   glucose blood (OneTouch Verio) test strip   No No   Sig: TEST BLOOD SUGAR TWICE A DAY   glucose blood (OneTouch Verio) test strip   No No   Sig: May substitute brand based on insurance coverage. Check glucose TID.   ibuprofen (MOTRIN) 800 mg tablet   No No   Sig: Take 1 tablet (800 mg total) by mouth every 8 (eight) hours as needed for mild pain   insulin aspart (NovoLOG FlexPen) 100 UNIT/ML injection pen   No No   Sig: Inject 9 Units under the skin 3 (three) times a day with meals   metFORMIN (GLUCOPHAGE-XR) 500 mg 24 hr tablet   No No   Sig: Take 2 tablets (1,000 mg total) by mouth 2 (two) times a day with meals Start with 1 pill 500mg with breakfast x 1 week, then 1 pill 500mg with breakfast and 1 pill with dinner for 1 week , then 2 pills with breakfast (1000mg) and 1 pill with dinner (500mg) for 1 week, then 1000mg twice a day going forward   naproxen (Naprosyn) 500 mg tablet   No No   Sig: Take 1 tablet (500 mg total) by mouth 2 (two) times a day with meals for 5 days   topiramate (TOPAMAX) 50 MG tablet   Yes No   Sig:  "Take 50 mg by mouth 2 (two) times a day      Facility-Administered Medications: None       Portions of the record may have been created with voice recognition software. Occasional wrong word or \"sound a like\" substitutions may have occurred due to the inherent limitations of voice recognition software. Read the chart carefully and recognize, using context, where substitutions have occurred.    Electronically signed by:  MD Kanu Villafuerte MD  02/08/25 0653    "

## 2025-02-11 NOTE — PROGRESS NOTES
5/7/18 @ 1515:  Patient accepted at [address] [place] [address] by Dr Angela Dugan; will complete admission and 7 day paperwork and fax to [address] [place] [address]    Andrés Hua MS A 6 fr sheath was successfully inserted into the right radial artery. Sheath insertion not delayed.

## 2025-03-18 ENCOUNTER — APPOINTMENT (EMERGENCY)
Dept: RADIOLOGY | Facility: HOSPITAL | Age: 20
End: 2025-03-18
Payer: COMMERCIAL

## 2025-03-18 ENCOUNTER — HOSPITAL ENCOUNTER (EMERGENCY)
Facility: HOSPITAL | Age: 20
Discharge: HOME | End: 2025-03-18
Attending: EMERGENCY MEDICINE | Admitting: EMERGENCY MEDICINE
Payer: COMMERCIAL

## 2025-03-18 VITALS
WEIGHT: 315 LBS | DIASTOLIC BLOOD PRESSURE: 91 MMHG | TEMPERATURE: 97.3 F | RESPIRATION RATE: 18 BRPM | HEART RATE: 115 BPM | OXYGEN SATURATION: 97 % | BODY MASS INDEX: 55.81 KG/M2 | SYSTOLIC BLOOD PRESSURE: 133 MMHG | HEIGHT: 63 IN

## 2025-03-18 DIAGNOSIS — S83.004A PATELLAR DISLOCATION, RIGHT, INITIAL ENCOUNTER: Primary | ICD-10-CM

## 2025-03-18 PROCEDURE — 99283 EMERGENCY DEPT VISIT LOW MDM: CPT

## 2025-03-18 PROCEDURE — 73564 X-RAY EXAM KNEE 4 OR MORE: CPT

## 2025-03-18 PROCEDURE — 99284 EMERGENCY DEPT VISIT MOD MDM: CPT | Performed by: PHYSICIAN ASSISTANT

## 2025-03-18 NOTE — ED PROVIDER NOTES
"Time reflects when diagnosis was documented in both MDM as applicable and the Disposition within this note       Time User Action Codes Description Comment    3/18/2025 12:14 PM Amado Nelson Add [S83.004A] Patellar dislocation, right, initial encounter     3/18/2025 12:14 PM Leslie Amado Modify [S83.004A] Patellar dislocation, right, initial encounter by history           ED Disposition       ED Disposition   Discharge    Condition   Stable    Date/Time   Tue Mar 18, 2025 12:13 PM    Comment   Rik Marin discharge to home/self care.                   Assessment & Plan       Medical Decision Making  Differential diagnosis includes knee fracture, knee dislocation.    I ordered a right knee x-ray.  I independently interpreted as no acute osseous abnormality.  Patient ambulated without a limp.  An Ace wrap was placed to the right knee.  States he has a knee sleeve at home that he will use.  Advised to follow-up with [facility] orthopedic Dr. Juarez or associate.  History suggests patient is having recurrent patellar dislocation.  Return precautions reviewed.             Medications - No data to display    ED Risk Strat Scores              CRAFFT      Flowsheet Row Most Recent Value   CRACICI Initial Screen: During the past 12 months, did you:    1. Drink any alcohol (more than a few sips)?  No Filed at: 03/18/2025 1126   2. Smoke any marijuana or hashish No Filed at: 03/18/2025 1126   3. Use anything else to get high? (\"anything else\" includes illegal drugs, over the counter and prescription drugs, and things that you sniff or 'patel')? 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       Past Surgical History:   Procedure Laterality Date    INCISION AND DRAINAGE OF WOUND Left 5/13/2024    Procedure: INCISION AND DRAINAGE (I&D) GROIN;  Surgeon: Tim Ch MD;  Location: [facility];  Service: General    INCISION AND DRAINAGE OF WOUND Left 5/15/2024    Procedure: INCISION AND DRAINAGE (I&D) GROIN;  Surgeon: Tim Ch MD;  Location: [facility];  Service: General    MI I&D VULVA/PERINEAL ABSCESS N/A 5/11/2024    Procedure: INCISION AND DRAINAGE (I&D) PERINEAL ABSCESS;  Surgeon: Tim Ch MD;  Location: [facility];  Service: General    TONSILECTOMY AND ADNOIDECTOMY      2020      Family History   Problem Relation Age of Onset    Hyperlipidemia Mother     Diabetes type II Mother     Obesity Mother     Obesity Father     Drug abuse Father     Diabetes type II Maternal Aunt     Lung disease Maternal Aunt     Rheum arthritis Maternal Aunt     Fibromyalgia Maternal Aunt     Atrial fibrillation Maternal Grandmother     Hyperlipidemia Maternal Grandfather     Diabetes type II Maternal Grandfather     Stroke Maternal Grandfather     Heart disease Maternal Grandfather     Stroke Paternal Grandfather       Social History     Tobacco Use    Smoking status: Every Day     Types: Cigarettes     Passive exposure: Yes    Smokeless tobacco: Current   Vaping Use    Vaping status: Some Days    Substances: Nicotine, Flavoring   Substance Use Topics    Alcohol use: Yes     Alcohol/week: 2.0 standard drinks of alcohol     Types: 2 Shots of liquor per week     Comment: when available to him    Drug use: Yes     Types: Marijuana      E-Cigarette/Vaping    E-Cigarette Use Current Some Day User     Comments smoke cigg. when he has money       E-Cigarette/Vaping Substances    Nicotine Yes     THC No     CBD No     Flavoring Yes     Other No      Unknown No       I have reviewed and agree with the history as documented.     Patient is a 19-year-old white male who reports he was walking up steps 2 days ago when he felt his right kneecap dislocate.  States this has happened multiple times in the past.  Has been seen by orthopedist Dr. Juarez.  He states he was able to push the kneecap back in place.  There was no fall.        Review of Systems   Musculoskeletal:  Positive for arthralgias.   Neurological:  Negative for numbness.           Objective       ED Triage Vitals   Temperature Pulse Blood Pressure Respirations SpO2 Patient Position - Orthostatic VS   03/18/25 1127 03/18/25 1134 03/18/25 1134 03/18/25 1127 03/18/25 1134 --   (!) 97.3 °F (36.3 °C) (!) 109 (!) 176/105 18 95 %       Temp src Heart Rate Source BP Location FiO2 (%) Pain Score    -- 03/18/25 1225 -- -- 03/18/25 1127     Monitor   No Pain      Vitals      Date and Time Temp Pulse SpO2 Resp BP Pain Score FACES Pain Rating User   03/18/25 1225 -- 115 97 % 18 133/91 -- -- OE   03/18/25 1134 -- 109 95 % -- 176/105 -- -- MEDHAT   03/18/25 1127 97.3 °F (36.3 °C) -- -- 18 -- No Pain -- MED[facility]AT            Physical Exam  Vitals and nursing note reviewed.   Constitutional:       General: He is not in acute distress.     Appearance: Normal appearance. He is obese. He is not ill-appearing, toxic-appearing or diaphoretic.   Cardiovascular:      Rate and Rhythm: Normal rate and regular rhythm.      Pulses: Normal pulses.      Heart sounds: Normal heart sounds.   Pulmonary:      Effort: Pulmonary effort is normal.      Breath sounds: Normal breath sounds.   Musculoskeletal:         General: No swelling, tenderness, deformity or signs of injury.      Right lower leg: No edema.      Left lower leg: No edema.   Skin:     General: Skin is warm and dry.      Capillary Refill: Capillary refill takes less than 2 seconds.   Neurological:      General: No focal deficit present.      Mental Status: He is alert and oriented  to person, place, and time. Mental status is at baseline.         Results Reviewed       None            XR knee 4+ vw right injury   Final Interpretation by Pardeep Iglesias MD (03/18 1510)      No acute osseous abnormality.         Computerized Assisted Algorithm (CAA) may have been used to analyze all applicable images.         Workstation performed: [id number]             Procedures    ED Medication and Procedure Management   Prior to Admission Medications   Prescriptions Last Dose Informant Patient Reported? Taking?   Abilify Maintena 400 MG injection   Yes No   Sig: INJECT 2 ML INTRAMUSCUALRLY EVERY 4 WEEKS AS DIRECTED   Alcohol Swabs 70 % PADS   No No   Sig: May substitute brand based on insurance coverage. Check glucose TID.   Blood Glucose Monitoring Suppl (OneTouch Verio Reflect) w/Device KIT   No No   Sig: May substitute brand based on insurance coverage. Check glucose TID.   Blood Glucose Monitoring Suppl (OneTouch Verio) w/Device KIT   No No   Sig: Use 2 (two) times a day Ok to substitute with insurance covered brand   FLUoxetine (PROzac) 10 mg capsule   No No   Sig: Take 1 capsule (10 mg total) by mouth daily   Insulin Glargine Solostar (Lantus SoloStar) 100 UNIT/ML SOPN   No No   Sig: Inject 0.3 mL (30 Units total) under the skin daily at bedtime   Insulin Pen Needle (BD Pen Needle Shani 2nd Gen) 32G X 4 MM MISC   No No   Sig: For use with insulin pen. Pharmacy may dispense brand covered by insurance.   Lancets (onetouch ultrasoft) lancets   No No   Sig: Use as instructed   OneTouch Delica Lancets 33G MISC   No No   Sig: May substitute brand based on insurance coverage. Check glucose TID.   acetaminophen (TYLENOL) 500 mg tablet   No No   Sig: Take 2 tablets (1,000 mg total) by mouth every 8 (eight) hours   albuterol (2.5 mg/3 mL) 0.083 % nebulizer solution   No No   Sig: Take 3 mL (2.5 mg total) by nebulization every 6 (six) hours as needed for wheezing or shortness of breath   dicyclomine (BENTYL)  20 mg tablet   No No   Sig: Take 1 tablet (20 mg total) by mouth 2 (two) times a day   glucose blood (OneTouch Verio) test strip   No No   Sig: TEST BLOOD SUGAR TWICE A DAY   glucose blood (OneTouch Verio) test strip   No No   Sig: May substitute brand based on insurance coverage. Check glucose TID.   ibuprofen (MOTRIN) 800 mg tablet   No No   Sig: Take 1 tablet (800 mg total) by mouth every 8 (eight) hours as needed for mild pain   insulin aspart (NovoLOG FlexPen) 100 UNIT/ML injection pen   No No   Sig: Inject 9 Units under the skin 3 (three) times a day with meals   metFORMIN (GLUCOPHAGE-XR) 500 mg 24 hr tablet   No No   Sig: Take 2 tablets (1,000 mg total) by mouth 2 (two) times a day with meals Start with 1 pill 500mg with breakfast x 1 week, then 1 pill 500mg with breakfast and 1 pill with dinner for 1 week , then 2 pills with breakfast (1000mg) and 1 pill with dinner (500mg) for 1 week, then 1000mg twice a day going forward   naproxen (Naprosyn) 500 mg tablet   No No   Sig: Take 1 tablet (500 mg total) by mouth 2 (two) times a day with meals for 5 days   ondansetron (ZOFRAN-ODT) 4 mg disintegrating tablet   No No   Sig: Take 1 tablet (4 mg total) by mouth every 8 (eight) hours as needed for nausea or vomiting   topiramate (TOPAMAX) 50 MG tablet   Yes No   Sig: Take 50 mg by mouth 2 (two) times a day      Facility-Administered Medications: None     Discharge Medication List as of 3/18/2025 12:15 PM        CONTINUE these medications which have NOT CHANGED    Details   Abilify Maintena 400 MG injection INJECT 2 ML INTRAMUSCUALRLY EVERY 4 WEEKS AS DIRECTED, Historical Med      acetaminophen (TYLENOL) 500 mg tablet Take 2 tablets (1,000 mg total) by mouth every 8 (eight) hours, Starting Thu 2/6/2025, Normal      albuterol (2.5 mg/3 mL) 0.083 % nebulizer solution Take 3 mL (2.5 mg total) by nebulization every 6 (six) hours as needed for wheezing or shortness of breath, Starting Wed 5/8/2024, Normal      Alcohol  Swabs 70 % PADS May substitute brand based on insurance coverage. Check glucose TID., Normal      !! Blood Glucose Monitoring Suppl (OneTouch Verio Reflect) w/Device KIT May substitute brand based on insurance coverage. Check glucose TID., Normal      !! Blood Glucose Monitoring Suppl (OneTouch Verio) w/Device KIT Use 2 (two) times a day Ok to substitute with insurance covered brand, Starting Fri 10/27/2023, Normal      dicyclomine (BENTYL) 20 mg tablet Take 1 tablet (20 mg total) by mouth 2 (two) times a day, Starting Thu 2/6/2025, Normal      FLUoxetine (PROzac) 10 mg capsule Take 1 capsule (10 mg total) by mouth daily, Starting Wed 5/8/2024, Normal      !! glucose blood (OneTouch Verio) test strip TEST BLOOD SUGAR TWICE A DAY, Normal      !! glucose blood (OneTouch Verio) test strip May substitute brand based on insurance coverage. Check glucose TID., Normal      ibuprofen (MOTRIN) 800 mg tablet Take 1 tablet (800 mg total) by mouth every 8 (eight) hours as needed for mild pain, Starting Tue 8/20/2024, Normal      insulin aspart (NovoLOG FlexPen) 100 UNIT/ML injection pen Inject 9 Units under the skin 3 (three) times a day with meals, Starting Thu 11/7/2024, Normal      Insulin Glargine Solostar (Lantus SoloStar) 100 UNIT/ML SOPN Inject 0.3 mL (30 Units total) under the skin daily at bedtime, Starting Thu 11/7/2024, Normal      Insulin Pen Needle (BD Pen Needle Shani 2nd Gen) 32G X 4 MM MISC For use with insulin pen. Pharmacy may dispense brand covered by insurance., Normal      !! Lancets (onetouch ultrasoft) lancets Use as instructed, Normal      metFORMIN (GLUCOPHAGE-XR) 500 mg 24 hr tablet Take 2 tablets (1,000 mg total) by mouth 2 (two) times a day with meals Start with 1 pill 500mg with breakfast x 1 week, then 1 pill 500mg with breakfast and 1 pill with dinner for 1 week , then 2 pills with breakfast (1000mg) and 1 pill with dinner (500 mg) for 1 week, then 1000mg twice a day going forward, Starting Thu  11/7/2024, Normal      naproxen (Naprosyn) 500 mg tablet Take 1 tablet (500 mg total) by mouth 2 (two) times a day with meals for 5 days, Starting Mon 10/14/2024, Until Sat 10/19/2024, Normal      ondansetron (ZOFRAN-ODT) 4 mg disintegrating tablet Take 1 tablet (4 mg total) by mouth every 8 (eight) hours as needed for nausea or vomiting, Starting Thu 2/6/2025, Normal      !! OneTouch Delica Lancets 33G MISC May substitute brand based on insurance coverage. Check glucose TID., Normal      topiramate (TOPAMAX) 50 MG tablet Take 50 mg by mouth 2 (two) times a day, Starting Thu 3/10/2022, Historical Med       !! - Potential duplicate medications found. Please discuss with provider.        No discharge procedures on file.  ED SEPSIS DOCUMENTATION   Time reflects when diagnosis was documented in both MDM as applicable and the Disposition within this note       Time User Action Codes Description Comment    3/18/2025 12:14 PM Amado Nelson Add [S83.004A] Patellar dislocation, right, initial encounter     3/18/2025 12:14 PM Amdao Nelson Modify [S83.004A] Patellar dislocation, right, initial encounter by history                  Amado Nelson PA-C  03/18/25 6910

## 2025-03-18 NOTE — DISCHARGE INSTRUCTIONS
Cold packs 20 minutes every couple waking hours next 2 days    Tylenol or ibuprofen (with food) for pain     Follow up with [facility] ( Dr Kohler or associate). Call for appointment    Return to ED for increased pain, worsening symptoms

## 2025-04-06 ENCOUNTER — HOSPITAL ENCOUNTER (EMERGENCY)
Facility: HOSPITAL | Age: 20
Discharge: HOME | End: 2025-04-06
Attending: EMERGENCY MEDICINE
Payer: COMMERCIAL

## 2025-04-06 ENCOUNTER — HOSPITAL ENCOUNTER (EMERGENCY)
Facility: HOSPITAL | Age: 20
Discharge: HOME | End: 2025-04-08
Attending: EMERGENCY MEDICINE
Payer: COMMERCIAL

## 2025-04-06 VITALS
OXYGEN SATURATION: 94 % | DIASTOLIC BLOOD PRESSURE: 77 MMHG | HEIGHT: 63 IN | HEART RATE: 97 BPM | BODY MASS INDEX: 66.43 KG/M2 | TEMPERATURE: 96.7 F | RESPIRATION RATE: 20 BRPM | SYSTOLIC BLOOD PRESSURE: 141 MMHG

## 2025-04-06 DIAGNOSIS — R45.851 SUICIDAL IDEATIONS: ICD-10-CM

## 2025-04-06 DIAGNOSIS — E10.9 TYPE 1 DIABETES MELLITUS WITHOUT COMPLICATION (HCC): ICD-10-CM

## 2025-04-06 DIAGNOSIS — Z72.89 DELIBERATE SELF-CUTTING: ICD-10-CM

## 2025-04-06 DIAGNOSIS — Z00.8 ENCOUNTER FOR PSYCHOLOGICAL EVALUATION: Primary | ICD-10-CM

## 2025-04-06 DIAGNOSIS — R45.851 SUICIDAL IDEATION: Primary | ICD-10-CM

## 2025-04-06 LAB
ALBUMIN SERPL BCG-MCNC: 4.5 G/DL (ref 3.5–5)
ALP SERPL-CCNC: 106 U/L (ref 34–104)
ALT SERPL W P-5'-P-CCNC: 36 U/L (ref 7–52)
AMPHETAMINES SERPL QL SCN: NEGATIVE
ANION GAP SERPL CALCULATED.3IONS-SCNC: 17 MMOL/L (ref 4–13)
ANION GAP SERPL CALCULATED.3IONS-SCNC: 21 MMOL/L (ref 4–13)
AST SERPL W P-5'-P-CCNC: 34 U/L (ref 13–39)
B-OH-BUTYR SERPL-MCNC: 0.08 MMOL/L (ref 0.02–0.27)
BARBITURATES UR QL: NEGATIVE
BASE EX.OXY STD BLDV CALC-SCNC: 74.6 % (ref 60–80)
BASE EXCESS BLDV CALC-SCNC: 0.1 MMOL/L
BASOPHILS # BLD AUTO: 0.03 THOUSANDS/ÂΜL (ref 0–0.1)
BASOPHILS NFR BLD AUTO: 0 % (ref 0–1)
BENZODIAZ UR QL: NEGATIVE
BILIRUB SERPL-MCNC: 0.49 MG/DL (ref 0.2–1)
BUN SERPL-MCNC: 13 MG/DL (ref 5–25)
BUN SERPL-MCNC: 14 MG/DL (ref 5–25)
CALCIUM SERPL-MCNC: 8.8 MG/DL (ref 8.4–10.2)
CALCIUM SERPL-MCNC: 9.6 MG/DL (ref 8.4–10.2)
CHLORIDE SERPL-SCNC: 100 MMOL/L (ref 96–108)
CHLORIDE SERPL-SCNC: 97 MMOL/L (ref 96–108)
CO2 SERPL-SCNC: 13 MMOL/L (ref 21–32)
CO2 SERPL-SCNC: 15 MMOL/L (ref 21–32)
COCAINE UR QL: NEGATIVE
CREAT SERPL-MCNC: 0.58 MG/DL (ref 0.6–1.3)
CREAT SERPL-MCNC: 0.58 MG/DL (ref 0.6–1.3)
EOSINOPHIL # BLD AUTO: 0.16 THOUSAND/ÂΜL (ref 0–0.61)
EOSINOPHIL NFR BLD AUTO: 2 % (ref 0–6)
ERYTHROCYTE [DISTWIDTH] IN BLOOD BY AUTOMATED COUNT: 13.2 % (ref 11.6–15.1)
ETHANOL EXG-MCNC: 0 MG/DL
ETHANOL SERPL-MCNC: <10 MG/DL
FENTANYL UR QL SCN: NEGATIVE
GFR SERPL CREATININE-BSD FRML MDRD: 146 ML/MIN/1.73SQ M
GFR SERPL CREATININE-BSD FRML MDRD: 146 ML/MIN/1.73SQ M
GLUCOSE SERPL-MCNC: 165 MG/DL (ref 65–140)
GLUCOSE SERPL-MCNC: 196 MG/DL (ref 65–140)
HCO3 BLDV-SCNC: 24.6 MMOL/L (ref 24–30)
HCT VFR BLD AUTO: 43.8 % (ref 36.5–49.3)
HGB BLD-MCNC: 14.4 G/DL (ref 12–17)
HYDROCODONE UR QL SCN: NEGATIVE
IMM GRANULOCYTES # BLD AUTO: 0.03 THOUSAND/UL (ref 0–0.2)
IMM GRANULOCYTES NFR BLD AUTO: 0 % (ref 0–2)
LACTATE SERPL-SCNC: 0.8 MMOL/L (ref 0.5–2)
LYMPHOCYTES # BLD AUTO: 2.81 THOUSANDS/ÂΜL (ref 0.6–4.47)
LYMPHOCYTES NFR BLD AUTO: 33 % (ref 14–44)
MCH RBC QN AUTO: 28.3 PG (ref 26.8–34.3)
MCHC RBC AUTO-ENTMCNC: 32.9 G/DL (ref 31.4–37.4)
MCV RBC AUTO: 86 FL (ref 82–98)
METHADONE UR QL: NEGATIVE
MONOCYTES # BLD AUTO: 0.51 THOUSAND/ÂΜL (ref 0.17–1.22)
MONOCYTES NFR BLD AUTO: 6 % (ref 4–12)
NEUTROPHILS # BLD AUTO: 4.95 THOUSANDS/ÂΜL (ref 1.85–7.62)
NEUTS SEG NFR BLD AUTO: 59 % (ref 43–75)
NRBC BLD AUTO-RTO: 0 /100 WBCS
O2 CT BLDV-SCNC: 15.4 ML/DL
OPIATES UR QL SCN: NEGATIVE
OXYCODONE+OXYMORPHONE UR QL SCN: NEGATIVE
PCO2 BLDV: 39.8 MM HG (ref 42–50)
PCP UR QL: NEGATIVE
PH BLDV: 7.41 [PH] (ref 7.3–7.4)
PLATELET # BLD AUTO: 281 THOUSANDS/UL (ref 149–390)
PMV BLD AUTO: 10.6 FL (ref 8.9–12.7)
PO2 BLDV: 39.1 MM HG (ref 35–45)
POTASSIUM SERPL-SCNC: 4 MMOL/L (ref 3.5–5.3)
POTASSIUM SERPL-SCNC: 4.8 MMOL/L (ref 3.5–5.3)
PROT SERPL-MCNC: 8.8 G/DL (ref 6.4–8.4)
RBC # BLD AUTO: 5.09 MILLION/UL (ref 3.88–5.62)
SODIUM SERPL-SCNC: 131 MMOL/L (ref 135–147)
SODIUM SERPL-SCNC: 132 MMOL/L (ref 135–147)
THC UR QL: POSITIVE
WBC # BLD AUTO: 8.49 THOUSAND/UL (ref 4.31–10.16)

## 2025-04-06 PROCEDURE — 82077 ASSAY SPEC XCP UR&BREATH IA: CPT | Performed by: EMERGENCY MEDICINE

## 2025-04-06 PROCEDURE — 85025 COMPLETE CBC W/AUTO DIFF WBC: CPT | Performed by: EMERGENCY MEDICINE

## 2025-04-06 PROCEDURE — 80053 COMPREHEN METABOLIC PANEL: CPT | Performed by: EMERGENCY MEDICINE

## 2025-04-06 PROCEDURE — 82010 KETONE BODYS QUAN: CPT | Performed by: EMERGENCY MEDICINE

## 2025-04-06 PROCEDURE — 83605 ASSAY OF LACTIC ACID: CPT | Performed by: EMERGENCY MEDICINE

## 2025-04-06 PROCEDURE — 99285 EMERGENCY DEPT VISIT HI MDM: CPT | Performed by: EMERGENCY MEDICINE

## 2025-04-06 PROCEDURE — 36415 COLL VENOUS BLD VENIPUNCTURE: CPT | Performed by: EMERGENCY MEDICINE

## 2025-04-06 PROCEDURE — 80048 BASIC METABOLIC PNL TOTAL CA: CPT | Performed by: EMERGENCY MEDICINE

## 2025-04-06 PROCEDURE — 99285 EMERGENCY DEPT VISIT HI MDM: CPT

## 2025-04-06 PROCEDURE — 82075 ASSAY OF BREATH ETHANOL: CPT | Performed by: EMERGENCY MEDICINE

## 2025-04-06 PROCEDURE — 80307 DRUG TEST PRSMV CHEM ANLYZR: CPT | Performed by: EMERGENCY MEDICINE

## 2025-04-06 PROCEDURE — 82805 BLOOD GASES W/O2 SATURATION: CPT | Performed by: EMERGENCY MEDICINE

## 2025-04-06 PROCEDURE — 96360 HYDRATION IV INFUSION INIT: CPT

## 2025-04-06 RX ADMIN — SODIUM CHLORIDE 1000 ML: 0.9 INJECTION, SOLUTION INTRAVENOUS at 15:21

## 2025-04-06 NOTE — ED NOTES
This writer discussed the patients current presentation and recommended discharge plan with  They agree with the patient being discharged at this time with referrals and/or information about:  Patient did not come for Behavioral Health but in an attempt to get out of going to court tomorrow.    The patient was Instructed to attend his court hearing.  The patient was provided with referral information for:   NA          SAFETY PLAN  Warning Signs (thoughts, images, mood, behavior, situations) of a potential crisis:       Coping Skills (what can I do to take my mind off the problem, or to keep myself safe):       Outside Support (who can I reach out to for support and help):      National Suicide Prevention Hotline:  988      [facility] [phone] - Crisis   [facility] [phone] - LVF Crisis/Mobile Crisis   [phone] - SLPF Crisis   [facility]: [phone]  [facility]: [phone]   [facility] [phone] - Crisis   [facility] [phone] - Crisis     [facility] [phone] - Crisis   [facility] [phone] - Crisis   [phone] - Peer Support Talk Line (1-9pm daily)  [phone] - Teen Support Talk Line (1-9pm daily)  [phone] - [facility] [phone]- Crisis    [facility] [phone] - Crisis   [facility] [phone] - Crisis    [facility] [phone] - [facility] Crisis

## 2025-04-06 NOTE — Clinical Note
Rik Marin should be transferred out to behavioral [facility] and has been medically cleared.

## 2025-04-06 NOTE — ED PROVIDER NOTES
"Time reflects when diagnosis was documented in both MDM as applicable and the Disposition within this note       Time User Action Codes Description Comment    4/6/2025  2:21 PM Echo Snyder Add [Z00.8] Encounter for psychological evaluation     4/6/2025  2:21 PM Echo Snyder Add [R45.851] Suicidal ideations           ED Disposition       ED Disposition   Transfer to Behavioral Health Condition   --    Date/Time   Sun Apr 6, 2025  2:21 PM    Comment                  Assessment & Plan       Medical Decision Making  Pt is a 20yo M who presents with SI.     Will plan for medical clearance and crisis evaluation.  See ED course for results and details.    Awaiting crisis eval at time of sign out.         Amount and/or Complexity of Data Reviewed  Labs: ordered. Decision-making details documented in ED Course.    Risk  Decision regarding hospitalization.        ED Course as of 04/06/25 1655   Sun Apr 06, 2025   1420 Crisis aware.    1441 CBC and differential  Reviewed and without actionable derangement.    1458 GLUCOSE(!): 196  Minimally elevated.    1458 ANION GAP(!): 21  Will initiate fluids.    1458 Carbon Dioxide(!): 13  Will add on VBG and betahydroxy.    1459 ETHANOL: <10  Negative.    1524 Beta- Hydroxybutyrate: 0.08  WNL   1534 pH, Cristi(!): 7.409  No evidence of acidosis.    1549 LACTIC ACID: 0.8  WNL   1654 Carbon Dioxide(!): 15  Interval improvement   1654 ANION GAP(!): 17  Interval improvement.    1654 GLUCOSE(!): 165  Interval improvement.    1654 Pt is medically cleared.        Medications   sodium chloride 0.9 % bolus 1,000 mL (0 mL Intravenous Stopped 4/6/25 1621)       ED Risk Strat Scores              CRAFFT      Flowsheet Row Most Recent Value   CRAFFT Initial Screen: During the past 12 months, did you:    1. Drink any alcohol (more than a few sips)?  No Filed at: 04/06/2025 1419   2. Smoke any marijuana or hashish No Filed at: 04/06/2025 1419   3. Use anything else to get high? (\"anything " "else\" includes illegal drugs, over the counter and prescription drugs, and things that you sniff or 'patel')? No Filed at: 04/06/2025 5221                                          History of Present Illness       Chief Complaint   Patient presents with    Psychiatric Evaluation     Patient arrived w/ police. Patient called police because he has court tomorrow and states that if he were to get sentenced he was going to \"shot myself w/ a gun\". Patient reports SI w/ plan. Patient arrived w/ multiple bags and states home life has been \"rough\".        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       Past Surgical History:   Procedure Laterality Date    INCISION AND DRAINAGE OF WOUND Left 5/13/2024    Procedure: INCISION AND DRAINAGE (I&D) GROIN;  Surgeon: Tim Ch MD;  Location: [facility];  Service: General    INCISION AND DRAINAGE OF WOUND Left 5/15/2024    Procedure: INCISION AND DRAINAGE (I&D) GROIN;  Surgeon: Tim Ch MD;  Location: [facility];  Service: General    ID I&D VULVA/PERINEAL ABSCESS N/A 5/11/2024    Procedure: INCISION AND DRAINAGE (I&D) PERINEAL ABSCESS;  Surgeon: Tim Ch MD;  Location: [facility];  Service: General    TONSILECTOMY AND ADNOIDECTOMY      2020      Family History   Problem Relation Age of Onset    Hyperlipidemia Mother     Diabetes type II Mother     Obesity Mother     Obesity Father     Drug abuse Father     Diabetes type II Maternal Aunt     Lung disease Maternal Aunt     Rheum arthritis Maternal Aunt     Fibromyalgia Maternal Aunt     Atrial fibrillation Maternal Grandmother     Hyperlipidemia Maternal Grandfather     Diabetes type II Maternal Grandfather     Stroke Maternal Grandfather     Heart disease Maternal " "Grandfather     Stroke Paternal Grandfather       Social History     Tobacco Use    Smoking status: Every Day     Types: Cigarettes     Passive exposure: Yes    Smokeless tobacco: Current   Vaping Use    Vaping status: Some Days    Substances: Nicotine, Flavoring   Substance Use Topics    Alcohol use: Yes     Alcohol/week: 2.0 standard drinks of alcohol     Types: 2 Shots of liquor per week     Comment: when available to him    Drug use: Yes     Types: Marijuana      E-Cigarette/Vaping    E-Cigarette Use Current Some Day User     Comments smoke cigg. when he has money       E-Cigarette/Vaping Substances    Nicotine Yes     THC No     CBD No     Flavoring Yes     Other No     Unknown No       I have reviewed and agree with the history as documented.     Pt is a 18yo M who presents for suicidal ideation.  Patient reports he has been having a lot of stress at home as well as generally in his life.  He reports that he was recently arrested and has to present to court tomorrow.  Patient reports that he has been having a lot of suicidal ideation in regards to this.  Patient reports that if he were to get stents instead he would \"jump over the bench, grab a gun, and shoot himself\".  Patient reports that he does not have access to any guns at home.  Patient reports that he has not been on his medication in \"a while\" due to insurance issues.  Patient also was previously going to a day program but states that he \"got banned\".  Patient reports tobacco, alcohol, and marijuana use \"to cope with his stress\".          Objective       ED Triage Vitals [04/06/25 1422]   Temperature Pulse Blood Pressure Respirations SpO2 Patient Position - Orthostatic VS   (!) 96.7 °F (35.9 °C) 97 141/77 20 94 % Sitting      Temp Source Heart Rate Source BP Location FiO2 (%) Pain Score    Temporal Monitor Left arm -- No Pain      Vitals      Date and Time Temp Pulse SpO2 Resp BP Pain Score FACES Pain Rating User   04/06/25 1422 96.7 °F (35.9 °C) 97 " 94 % 20 141/77 No Pain --             Physical Exam  Vitals reviewed.   Constitutional:       General: He is not in acute distress.     Appearance: He is well-developed. He is obese. He is not toxic-appearing or diaphoretic.   HENT:      Head: Normocephalic and atraumatic.      Right Ear: External ear normal.      Left Ear: External ear normal.      Nose: Nose normal.      Mouth/Throat:      Pharynx: Oropharynx is clear.   Eyes:      Extraocular Movements: Extraocular movements intact.      Pupils: Pupils are equal, round, and reactive to light.   Cardiovascular:      Rate and Rhythm: Normal rate and regular rhythm.      Heart sounds: Normal heart sounds.   Pulmonary:      Effort: Pulmonary effort is normal. No respiratory distress.      Breath sounds: Normal breath sounds.   Abdominal:      General: Bowel sounds are normal. There is no distension.      Palpations: Abdomen is soft.      Tenderness: There is no abdominal tenderness.   Musculoskeletal:         General: Normal range of motion.      Cervical back: Normal range of motion and neck supple.   Skin:     General: Skin is warm and dry.      Capillary Refill: Capillary refill takes less than 2 seconds.      Coloration: Skin is not pale.   Neurological:      General: No focal deficit present.      Mental Status: He is alert and oriented to person, place, and time.   Psychiatric:         Speech: Speech normal.         Behavior: Behavior is cooperative.         Thought Content: Thought content includes suicidal ideation. Thought content includes suicidal plan.         Results Reviewed       Procedure Component Value Units Date/Time    Basic metabolic panel [[id number]]  (Abnormal) Collected: 04/06/25 1623    Lab Status: Final result Specimen: Blood from Arm, Right Updated: 04/06/25 8390     Sodium 132 mmol/L      Potassium 4.8 mmol/L      Chloride 100 mmol/L      CO2 15 mmol/L      ANION GAP 17 mmol/L      BUN 14 mg/dL      Creatinine 0.58 mg/dL      Glucose 165  mg/dL      Calcium 8.8 mg/dL      eGFR 146 ml/min/1.73sq m     Narrative:      National Kidney Disease Foundation guidelines for Chronic Kidney Disease (CKD):     Stage 1 with normal or high GFR (GFR > 90 mL/min/1.73 square meters)    Stage 2 Mild CKD (GFR = 60-89 mL/min/1.73 square meters)    Stage 3A Moderate CKD (GFR = 45-59 mL/min/1.73 square meters)    Stage 3B Moderate CKD (GFR = 30-44 mL/min/1.73 square meters)    Stage 4 Severe CKD (GFR = 15-29 mL/min/1.73 square meters)    Stage 5 End Stage CKD (GFR <15 mL/min/1.73 square meters)  Note: GFR calculation is accurate only with a steady state creatinine    Lactic acid, plasma (w/reflex if result > 2.0) [[id number]]  (Normal) Collected: 04/06/25 1527    Lab Status: Final result Specimen: Blood from Arm, Right Updated: 04/06/25 1549     LACTIC ACID 0.8 mmol/L     Narrative:      Result may be elevated if tourniquet was used during collection.    Blood gas, venous [[id number]]  (Abnormal) Collected: 04/06/25 1527    Lab Status: Final result Specimen: Blood from Arm, Right Updated: 04/06/25 1533     pH, Cristi 7.409     pCO2, Cristi 39.8 mm Hg      pO2, Cristi 39.1 mm Hg      HCO3, Cristi 24.6 mmol/L      Base Excess, Cristi 0.1 mmol/L      O2 Content, Cristi 15.4 ml/dL      O2 HGB, VENOUS 74.6 %     Beta Hydroxybutyrate [[id number]]  (Normal) Collected: 04/06/25 1434    Lab Status: Final result Specimen: Blood from Arm, Left Updated: 04/06/25 1523     Beta- Hydroxybutyrate 0.08 mmol/L     Ethanol [[id number]]  (Normal) Collected: 04/06/25 1434    Lab Status: Final result Specimen: Blood from Arm, Left Updated: 04/06/25 1454     Ethanol Lvl <10 mg/dL     Comprehensive metabolic panel [[id number]]  (Abnormal) Collected: 04/06/25 1434    Lab Status: Final result Specimen: Blood from Arm, Left Updated: 04/06/25 1454     Sodium 131 mmol/L      Potassium 4.0 mmol/L      Chloride 97 mmol/L      CO2 13 mmol/L      ANION GAP 21 mmol/L      BUN 13 mg/dL      Creatinine 0.58 mg/dL      Glucose 196  mg/dL      Calcium 9.6 mg/dL      AST 34 U/L      ALT 36 U/L      Alkaline Phosphatase 106 U/L      Total Protein 8.8 g/dL      Albumin 4.5 g/dL      Total Bilirubin 0.49 mg/dL      eGFR 146 ml/min/1.73sq m     Narrative:      National Kidney Disease Foundation guidelines for Chronic Kidney Disease (CKD):     Stage 1 with normal or high GFR (GFR > 90 mL/min/1.73 square meters)    Stage 2 Mild CKD (GFR = 60-89 mL/min/1.73 square meters)    Stage 3A Moderate CKD (GFR = 45-59 mL/min/1.73 square meters)    Stage 3B Moderate CKD (GFR = 30-44 mL/min/1.73 square meters)    Stage 4 Severe CKD (GFR = 15-29 mL/min/1.73 square meters)    Stage 5 End Stage CKD (GFR <15 mL/min/1.73 square meters)  Note: GFR calculation is accurate only with a steady state creatinine    CBC and differential [[id number]] Collected: 04/06/25 1434    Lab Status: Final result Specimen: Blood from Arm, Left Updated: 04/06/25 1438     WBC 8.49 Thousand/uL      RBC 5.09 Million/uL      Hemoglobin 14.4 g/dL      Hematocrit 43.8 %      MCV 86 fL      MCH 28.3 pg      MCHC 32.9 g/dL      RDW 13.2 %      MPV 10.6 fL      Platelets 281 Thousands/uL      nRBC 0 /100 WBCs      Segmented % 59 %      Immature Grans % 0 %      Lymphocytes % 33 %      Monocytes % 6 %      Eosinophils Relative 2 %      Basophils Relative 0 %      Absolute Neutrophils 4.95 Thousands/µL      Absolute Immature Grans 0.03 Thousand/uL      Absolute Lymphocytes 2.81 Thousands/µL      Absolute Monocytes 0.51 Thousand/µL      Eosinophils Absolute 0.16 Thousand/µL      Basophils Absolute 0.03 Thousands/µL     UA (URINE) with reflex to Scope [[id number]]     Lab Status: No result Specimen: Urine     Rapid drug screen, urine [[id number]]     Lab Status: No result Specimen: Urine             No orders to display       Procedures    ED Medication and Procedure Management   Prior to Admission Medications   Prescriptions Last Dose Informant Patient Reported? Taking?   Abilify Maintena 400 MG  injection   Yes No   Sig: INJECT 2 ML INTRAMUSCUALRLY EVERY 4 WEEKS AS DIRECTED   Alcohol Swabs 70 % PADS   No No   Sig: May substitute brand based on insurance coverage. Check glucose TID.   Blood Glucose Monitoring Suppl (OneTouch Verio Reflect) w/Device KIT   No No   Sig: May substitute brand based on insurance coverage. Check glucose TID.   Blood Glucose Monitoring Suppl (OneTouch Verio) w/Device KIT   No No   Sig: Use 2 (two) times a day Ok to substitute with insurance covered brand   FLUoxetine (PROzac) 10 mg capsule   No No   Sig: Take 1 capsule (10 mg total) by mouth daily   Insulin Glargine Solostar (Lantus SoloStar) 100 UNIT/ML SOPN   No No   Sig: Inject 0.3 mL (30 Units total) under the skin daily at bedtime   Insulin Pen Needle (BD Pen Needle Shani 2nd Gen) 32G X 4 MM MISC   No No   Sig: For use with insulin pen. Pharmacy may dispense brand covered by insurance.   Lancets (onetouch ultrasoft) lancets   No No   Sig: Use as instructed   OneTouch Delica Lancets 33G MISC   No No   Sig: May substitute brand based on insurance coverage. Check glucose TID.   acetaminophen (TYLENOL) 500 mg tablet   No No   Sig: Take 2 tablets (1,000 mg total) by mouth every 8 (eight) hours   albuterol (2.5 mg/3 mL) 0.083 % nebulizer solution   No No   Sig: Take 3 mL (2.5 mg total) by nebulization every 6 (six) hours as needed for wheezing or shortness of breath   dicyclomine (BENTYL) 20 mg tablet   No No   Sig: Take 1 tablet (20 mg total) by mouth 2 (two) times a day   glucose blood (OneTouch Verio) test strip   No No   Sig: TEST BLOOD SUGAR TWICE A DAY   glucose blood (OneTouch Verio) test strip   No No   Sig: May substitute brand based on insurance coverage. Check glucose TID.   ibuprofen (MOTRIN) 800 mg tablet   No No   Sig: Take 1 tablet (800 mg total) by mouth every 8 (eight) hours as needed for mild pain   insulin aspart (NovoLOG FlexPen) 100 UNIT/ML injection pen   No No   Sig: Inject 9 Units under the skin 3 (three) times  a day with meals   metFORMIN (GLUCOPHAGE-XR) 500 mg 24 hr tablet   No No   Sig: Take 2 tablets (1,000 mg total) by mouth 2 (two) times a day with meals Start with 1 pill 500mg with breakfast x 1 week, then 1 pill 500mg with breakfast and 1 pill with dinner for 1 week , then 2 pills with breakfast (1000mg) and 1 pill with dinner (500mg) for 1 week, then 1000mg twice a day going forward   naproxen (Naprosyn) 500 mg tablet   No No   Sig: Take 1 tablet (500 mg total) by mouth 2 (two) times a day with meals for 5 days   ondansetron (ZOFRAN-ODT) 4 mg disintegrating tablet   No No   Sig: Take 1 tablet (4 mg total) by mouth every 8 (eight) hours as needed for nausea or vomiting   topiramate (TOPAMAX) 50 MG tablet   Yes No   Sig: Take 50 mg by mouth 2 (two) times a day      Facility-Administered Medications: None     Patient's Medications   Discharge Prescriptions    No medications on file     No discharge procedures on file.  ED SEPSIS DOCUMENTATION   Time reflects when diagnosis was documented in both MDM as applicable and the Disposition within this note       Time User Action Codes Description Comment    4/6/2025  2:21 PM Echo Snyder [Z00.8] Encounter for psychological evaluation     4/6/2025  2:21 PM Echo Snyder [R45.851] Suicidal ideations                  Echo Snyder MD  04/06/25 4120

## 2025-04-06 NOTE — DISCHARGE INSTRUCTIONS
If you have any thoughts of hurting yourself or hurting anyone else, return to the emergency department.

## 2025-04-07 PROBLEM — F39 MOOD DISORDER (HCC): Status: ACTIVE | Noted: 2025-04-07

## 2025-04-07 LAB
ALBUMIN SERPL BCG-MCNC: 4.2 G/DL (ref 3.5–5)
ALP SERPL-CCNC: 104 U/L (ref 34–104)
ALT SERPL W P-5'-P-CCNC: 34 U/L (ref 7–52)
ANION GAP SERPL CALCULATED.3IONS-SCNC: 15 MMOL/L (ref 4–13)
AST SERPL W P-5'-P-CCNC: 35 U/L (ref 13–39)
BASOPHILS # BLD AUTO: 0.03 THOUSANDS/ÂΜL (ref 0–0.1)
BASOPHILS NFR BLD AUTO: 0 % (ref 0–1)
BILIRUB SERPL-MCNC: 0.41 MG/DL (ref 0.2–1)
BILIRUB UR QL STRIP: NEGATIVE
BUN SERPL-MCNC: 12 MG/DL (ref 5–25)
CALCIUM SERPL-MCNC: 9.1 MG/DL (ref 8.4–10.2)
CHLORIDE SERPL-SCNC: 98 MMOL/L (ref 96–108)
CLARITY UR: CLEAR
CO2 SERPL-SCNC: 18 MMOL/L (ref 21–32)
COLOR UR: YELLOW
CREAT SERPL-MCNC: 0.62 MG/DL (ref 0.6–1.3)
EOSINOPHIL # BLD AUTO: 0.15 THOUSAND/ÂΜL (ref 0–0.61)
EOSINOPHIL NFR BLD AUTO: 2 % (ref 0–6)
ERYTHROCYTE [DISTWIDTH] IN BLOOD BY AUTOMATED COUNT: 13.2 % (ref 11.6–15.1)
GFR SERPL CREATININE-BSD FRML MDRD: 142 ML/MIN/1.73SQ M
GLUCOSE SERPL-MCNC: 280 MG/DL (ref 65–140)
GLUCOSE UR STRIP-MCNC: ABNORMAL MG/DL
HCT VFR BLD AUTO: 38.8 % (ref 36.5–49.3)
HGB BLD-MCNC: 12.7 G/DL (ref 12–17)
HGB UR QL STRIP.AUTO: NEGATIVE
IMM GRANULOCYTES # BLD AUTO: 0.04 THOUSAND/UL (ref 0–0.2)
IMM GRANULOCYTES NFR BLD AUTO: 1 % (ref 0–2)
KETONES UR STRIP-MCNC: NEGATIVE MG/DL
LEUKOCYTE ESTERASE UR QL STRIP: NEGATIVE
LYMPHOCYTES # BLD AUTO: 2.31 THOUSANDS/ÂΜL (ref 0.6–4.47)
LYMPHOCYTES NFR BLD AUTO: 32 % (ref 14–44)
MCH RBC QN AUTO: 28.3 PG (ref 26.8–34.3)
MCHC RBC AUTO-ENTMCNC: 32.7 G/DL (ref 31.4–37.4)
MCV RBC AUTO: 86 FL (ref 82–98)
MONOCYTES # BLD AUTO: 0.43 THOUSAND/ÂΜL (ref 0.17–1.22)
MONOCYTES NFR BLD AUTO: 6 % (ref 4–12)
NEUTROPHILS # BLD AUTO: 4.36 THOUSANDS/ÂΜL (ref 1.85–7.62)
NEUTS SEG NFR BLD AUTO: 59 % (ref 43–75)
NITRITE UR QL STRIP: NEGATIVE
NRBC BLD AUTO-RTO: 0 /100 WBCS
PH UR STRIP.AUTO: 6 [PH]
PLATELET # BLD AUTO: 234 THOUSANDS/UL (ref 149–390)
PMV BLD AUTO: 10.9 FL (ref 8.9–12.7)
POTASSIUM SERPL-SCNC: 4 MMOL/L (ref 3.5–5.3)
PROT SERPL-MCNC: 7.9 G/DL (ref 6.4–8.4)
PROT UR STRIP-MCNC: NEGATIVE MG/DL
RBC # BLD AUTO: 4.49 MILLION/UL (ref 3.88–5.62)
SODIUM SERPL-SCNC: 131 MMOL/L (ref 135–147)
SP GR UR STRIP.AUTO: 1.02 (ref 1–1.03)
TSH SERPL DL<=0.05 MIU/L-ACNC: 2.73 UIU/ML (ref 0.45–4.5)
UROBILINOGEN UR QL STRIP.AUTO: 0.2 E.U./DL
WBC # BLD AUTO: 7.32 THOUSAND/UL (ref 4.31–10.16)

## 2025-04-07 PROCEDURE — 99245 OFF/OP CONSLTJ NEW/EST HI 55: CPT | Performed by: PSYCHIATRY & NEUROLOGY

## 2025-04-07 PROCEDURE — 80053 COMPREHEN METABOLIC PANEL: CPT | Performed by: INTERNAL MEDICINE

## 2025-04-07 PROCEDURE — 36415 COLL VENOUS BLD VENIPUNCTURE: CPT | Performed by: INTERNAL MEDICINE

## 2025-04-07 PROCEDURE — 81003 URINALYSIS AUTO W/O SCOPE: CPT | Performed by: INTERNAL MEDICINE

## 2025-04-07 PROCEDURE — 84443 ASSAY THYROID STIM HORMONE: CPT | Performed by: INTERNAL MEDICINE

## 2025-04-07 PROCEDURE — 85025 COMPLETE CBC W/AUTO DIFF WBC: CPT | Performed by: INTERNAL MEDICINE

## 2025-04-07 RX ADMIN — METFORMIN HYDROCHLORIDE 500 MG: 500 TABLET ORAL at 17:04

## 2025-04-07 NOTE — ED NOTES
[facility] followed up with [facility]. They advised patient is still under review.

## 2025-04-07 NOTE — ED NOTES
"Chief Complaint   Patient presents with    Psychiatric Evaluation     Patient arrives to the Ed with complaint of SI. Patient noted to have multiple superficial cuts to the Left arm. Apt states that they where cause by a kitchen knife. State plan to end life.      Met with the patient and later with bio mother on the phone to complete crisis intake and safety assessments.  Patient is on the autism spectrum.  As per chart patient was evaluated at the [facility] yesterday for suicidal ideation where he psychiatrically cleared by crisis worker and discharged with the plan to attend a court hearing today.  On assessment patient is wearing a T-shirt and scrub bottoms.  Eye glasses are missing the left arm.  Patient is unkempt with dandruff.  Patient is on the phone.  Patient proceeds to hang up the phone call and agrees to speak with this worker.  Fair eye contact. Patient is fidgety though calm and cooperative. Patient states that after he was discharged from the [facility] ER yesterday - that an argument ensued at home with his siblings/sisters over \"something stupid\".  Patient states that he grabbed a \"kitchen\" knife and cut \"arms\" with intent of to \"get attention, get out of the house, and to get help.\"  Patient does have superficial cuts on bilateral arms, no injury. Regarding current suicidal ideation. Patient pauses. States \"50-50.. depends on mood\".   Patient states that when he has \"bad thoughts\" that he then could attempt to harm himself again by \"cutting\".  Patient reports prior suicide attempts \"a lot of times\" and cites he attempted by \"choking\" himself with a blinds string.  Patient answers, \"I do not know\" regarding homicidal ideations.  Patient states that he sometimes hears \"whispers\", but not currently.  Patient reports that he occasionally \"sees shit\" at home.  Patient reports mood as \"sumit\".  Patient reports \"4/10\" depression.  Patient reports having \"insomnia\" and describes his sleep at " "\"uneasy\".  Patient states that he is supposed to be using a CPAP but is not because he lost the \"power source/cord\".  Patient states that the last time he smoked marijuana was 2 to 3 weeks ago. UDS positive for THC.  Report he \"sometimes\" drinks alcohol, BAT 0.  Patient reports stressors at home with her living arrangement, family conflict. Patient states that court was scheduled for \"8 or 9 a.m\" this morning in [place] for charges of \"theft\".  Patient's states he attended [employer] up to 12th grade but did not graduate.  Unemployed.  Denies he receives SSI. Patient states that he asked EMS to be brought to this campus due to dissatisfaction with the [facility].     Patient reports that he has not had his psychiatric/medical medications in a \"long time\".  As per chart patient with history of type 2 diabetes. Patient reports multiple prior IP BH to include [facility], [facility] and [facility].  Patient states that he is connected with [facility]  in [place], [place] for outpatient and he schedules by \"email\".  Patient unsure when last appointment was.  As per medical record, patient is currently enrolled in [facility] ([facility]) and had an appointments on 4/2 and 4/4/25.  Has upcoming appointments on 4/8, 4/9 and 4/11/25.     Patient's mother reports that the patient is \"spiraling\", has been off psychiatric medications for a \"long time\", and needs to be admitted for inpatient behavioral health for stabilization.  Patient has a history of noncompliance.  The patient was connected with [facility] but he \"maxed out\" due to his age.  Patient's mother would like assistance for locating community living placement for patient. Patient lives in [place], [place] with his mother and multiple siblings.  Patient has no access to firearms.     Patient is requesting voluntary IP BH treatment.  Collaborated with [facility].  Plan is for psychiatry consult for " further evaluation and treatment recommendation. Dr. Franco is aware and will evaluate.

## 2025-04-07 NOTE — ED NOTES
Dr. Franco recommends IP  tx on voluntary status.     [place]  [place]. Labs requested for medical clearance.  EDMD aware and will order.      CIS to bed search once labs result.

## 2025-04-07 NOTE — ED NOTES
"Patient presents to the ED calm and cooperative in street clothes via EMS from [place] for self inflicted cuts to b/l forearms. Patient claiming he cut his arms with a kitchen knife tonight on arms and left side of face for attention because \"[place] or no one was taking me seriously.\" Pt claims he has active thoughts of suicide with plan to go [address] with a scooter in the middle of traffic so \"it can be quick.\" Pt states he has not been taking any of his medications. 3 bandaids removed from L forearm, pictures uploaded to patient chart. Dried skin and light red marks noted to L side of the face. Pt alert and oriented x 4, lungs clear, cardiac regular, s1/s2 noted, pt obese (Pt kept in tshirt and changed into scrub pants due to body habitus). Genia, EDT sitting for 1:1 continuous observation.      Latha GEORGE RN  04/06/25 9104       Latha GEORGE RN  04/06/25 5632    "

## 2025-04-07 NOTE — ED CARE HANDOFF
Emergency Department Sign Out Note        Sign out and transfer of care from Dr. Rosenberg. See Separate Emergency Department note.     The patient, Rik Marin, was evaluated by the previous provider for SI    Workup Completed:  Labs Reviewed   RAPID DRUG SCREEN, URINE - Abnormal       Result Value Ref Range Status    Amph/Meth UR Negative  Negative Final    Barbiturate Ur Negative  Negative Final    Benzodiazepine Urine Negative  Negative Final    Cocaine Urine Negative  Negative Final    Methadone Urine Negative  Negative Final    Opiate Urine Negative  Negative Final    PCP Ur Negative  Negative Final    THC Urine Positive (*) Negative Final    Oxycodone Urine Negative  Negative Final    Fentanyl Urine Negative  Negative Final    HYDROCODONE URINE Negative  Negative Final    Narrative:     Presumptive report. If requested, specimen will be sent to reference lab for confirmation.  FOR MEDICAL PURPOSES ONLY.   IF CONFIRMATION NEEDED PLEASE CONTACT THE LAB WITHIN 5 DAYS.    Drug Screen Cutoff Levels:  AMPHETAMINE/METHAMPHETAMINES  1000 ng/mL  BARBITURATES     200 ng/mL  BENZODIAZEPINES     200 ng/mL  COCAINE      300 ng/mL  METHADONE      300 ng/mL  OPIATES      300 ng/mL  PHENCYCLIDINE     25 ng/mL  THC       50 ng/mL  OXYCODONE      100 ng/mL  FENTANYL      5 ng/mL  HYDROCODONE     300 ng/mL   POCT ALCOHOL BREATH TEST - Normal    EXTBreath Alcohol 0.00   Final         ED Course / Workup Pending (followup):  This is a 19 years old came in for having SI.  Patient went to [facility] SI and he was evaluated by crisis then patient was discharged.  Patient went home and he started to cut himself on his arms which was superficial cuts and came to the ER.  Patient stated that if he has gotten his gun to shoot himself.  Patient has scheduled the patient in front of the court today.  Patient currently taking no psych medication.  Physical exam shows no pertinent positive findings.  Patient evaluated by his psychiatrist   and he stated;    I saw the patient. He wants to go to psych and crisis will search for a bed in [place] therefore no 201 at this time. If he tries to leave please reconsult psychiatry.  Patient is on bed search for psych admission in [place].  Patient is diabetic but he does not take any medications at home.  Glucose level is 280.    Pt usually take metformin 850mg, and Insulin lantus 30U at bedtime and rapid insulin after meal depending upon his blood sugar.   Patient did not take his diabetic medication for some time.                                      Procedures  Medical Decision Making  Amount and/or Complexity of Data Reviewed  Labs: ordered.    Risk  Decision regarding hospitalization.            Disposition  Final diagnoses:   Suicidal ideation   Deliberate self-cutting     Time reflects when diagnosis was documented in both MDM as applicable and the Disposition within this note       Time User Action Codes Description Comment    4/6/2025 10:36 PM Lanny Rosenberg [R45.851] Suicidal ideation     4/6/2025 10:36 PM Lanny Rosenberg Add [Z72.89] Deliberate self-cutting           ED Disposition       ED Disposition   Transfer to Behavioral Health    Condition   --    Date/Time   Sun Apr 6, 2025 10:36 PM    Comment   Rik Marin should be transferred out to behavioral health and has been medically cleared.               Follow-up Information    None       Patient's Medications   Discharge Prescriptions    No medications on file     No discharge procedures on file.       ED Provider  Electronically Signed by     Aubree Rawls MD  04/07/25 0929

## 2025-04-07 NOTE — ED NOTES
"Labs resulted.  Medically cleared per EDMD.     [place] bed search initiated:    [facility] - per Lanny, Patient would need to be reviewed by team due having mason BEAR MA. Faxed referral.    [facility] - Per Admissions, \"I might have a bed - send it over\". Faxed referral.     [facility] - No answer at admissions [phone]. Multiple calls. Left a VM, requesting call back. Emailed referral to: [email or web address]     Under review: [facility], [facility] and [facility]. [facility] to follow up.   "

## 2025-04-07 NOTE — ED PROVIDER NOTES
Time reflects when diagnosis was documented in both MDM as applicable and the Disposition within this note       Time User Action Codes Description Comment    4/6/2025 10:36 PM Lanny Rosenberg Add [R45.851] Suicidal ideation     4/6/2025 10:36 PM Lanny Rosenberg Add [Z72.89] Deliberate self-cutting           ED Disposition       ED Disposition   Transfer to Behavioral Health Condition   --    Date/Time   Sun Apr 6, 2025 10:36 PM    Comment   Rik Marin should be transferred out to behavioral health and has been medically cleared.               Assessment & Plan       Medical Decision Making  19-year-old male presenting for suicidal ideations.  Patient noted to have superficial cuts to bilateral upper extremities.  He is up-to-date on tetanus.  None of the cuts require repair.  Signed out to Dr. Rawls pending crisis evaluation.    Problems Addressed:  Deliberate self-cutting: acute illness or injury  Suicidal ideation: acute illness or injury    Amount and/or Complexity of Data Reviewed  Labs: ordered.    Risk  Decision regarding hospitalization.        ED Course as of 04/07/25 0315   Sun Apr 06, 2025   2242 UTD on tetanus, last dose 2019.       Medications - No data to display    ED Risk Strat Scores                                                History of Present Illness       Chief Complaint   Patient presents with    Psychiatric Evaluation     Patient arrives to the Ed with complaint of SI. Patient noted to have multiple superficial cuts to the Left arm. Apt states that they where cause by a kitchen knife. State plan to end life.        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       Past Surgical History:   Procedure Laterality Date    INCISION AND  DRAINAGE OF WOUND Left 5/13/2024    Procedure: INCISION AND DRAINAGE (I&D) GROIN;  Surgeon: Tim Ch MD;  Location: [facility];  Service: General    INCISION AND DRAINAGE OF WOUND Left 5/15/2024    Procedure: INCISION AND DRAINAGE (I&D) GROIN;  Surgeon: Tim Ch MD;  Location: [facility];  Service: General    NV I&D VULVA/PERINEAL ABSCESS N/A 5/11/2024    Procedure: INCISION AND DRAINAGE (I&D) PERINEAL ABSCESS;  Surgeon: Tim Ch MD;  Location: [facility];  Service: General    TONSILECTOMY AND ADNOIDECTOMY      2020      Family History   Problem Relation Age of Onset    Hyperlipidemia Mother     Diabetes type II Mother     Obesity Mother     Obesity Father     Drug abuse Father     Diabetes type II Maternal Aunt     Lung disease Maternal Aunt     Rheum arthritis Maternal Aunt     Fibromyalgia Maternal Aunt     Atrial fibrillation Maternal Grandmother     Hyperlipidemia Maternal Grandfather     Diabetes type II Maternal Grandfather     Stroke Maternal Grandfather     Heart disease Maternal Grandfather     Stroke Paternal Grandfather       Social History     Tobacco Use    Smoking status: Some Days     Types: Cigarettes     Passive exposure: Yes    Smokeless tobacco: Current   Vaping Use    Vaping status: Some Days    Substances: Nicotine, THC, Flavoring   Substance Use Topics    Alcohol use: Yes     Alcohol/week: 2.0 standard drinks of alcohol     Types: 2 Shots of liquor per week     Comment: when available to him    Drug use: Yes     Types: Marijuana      E-Cigarette/Vaping    E-Cigarette Use Current Some Day User     Comments smoke cigg. when he has money       E-Cigarette/Vaping Substances    Nicotine Yes     THC Yes     CBD No     Flavoring Yes     Other No     Unknown No       I have reviewed and agree with the history as documented.     19-year-old male with history of diabetes presenting for psychiatric evaluation.  Patient endorses suicidal ideation that has been triggered by life  "stressors.  He states that he has a plan to jump into traffic on 22.  He was evaluated at [facility] and was discharged.  He subsequently went home and cut his bilateral upper extremities with a kitchen knife.  When asked why he did this, he states \"to get attention because nobody is taking me seriously.\"  He states that his family thinks that he would be better off dead so \"why not give them what they want.\"  He reports a suicide attempt a few months ago by cutting himself.        Review of Systems   Constitutional:  Negative for fever.   Respiratory:  Negative for shortness of breath.    Cardiovascular:  Negative for chest pain.   Gastrointestinal:  Negative for abdominal pain.   Genitourinary:  Negative for flank pain.   Musculoskeletal:  Negative for gait problem.   Skin:  Positive for wound. Negative for rash.   Neurological:  Negative for light-headedness.   Psychiatric/Behavioral:  Positive for suicidal ideas.    All other systems reviewed and are negative.          Objective       ED Triage Vitals [04/06/25 2227]   Temperature Pulse Blood Pressure Respirations SpO2 Patient Position - Orthostatic VS   98.1 °F (36.7 °C) 93 (!) 140/109 19 97 % Sitting      Temp Source Heart Rate Source BP Location FiO2 (%) Pain Score    Oral Monitor Left arm -- --      Vitals      Date and Time Temp Pulse SpO2 Resp BP Pain Score FACES Pain Rating User   04/06/25 2227 98.1 °F (36.7 °C) 93 97 % 19 140/109 -- -- MED[facility]AT            Physical Exam  Vitals reviewed.   Constitutional:       General: He is not in acute distress.     Appearance: Normal appearance. He is not ill-appearing.   HENT:      Head: Normocephalic and atraumatic.      Nose: Nose normal.      Mouth/Throat:      Mouth: Mucous membranes are moist.   Eyes:      Conjunctiva/sclera: Conjunctivae normal.   Cardiovascular:      Rate and Rhythm: Normal rate.   Pulmonary:      Effort: Pulmonary effort is normal.   Abdominal:      General: There is no distension. "   Musculoskeletal:         General: Normal range of motion.      Cervical back: Normal range of motion and neck supple.   Skin:     General: Skin is warm and dry.      Comments: Multiple superficial lacerations and scratches to bilateral upper extremities.   Neurological:      General: No focal deficit present.      Mental Status: He is alert and oriented to person, place, and time.   Psychiatric:         Attention and Perception: Attention normal.         Mood and Affect: Mood normal.         Speech: Speech normal.         Behavior: Behavior normal.         Thought Content: Thought content includes suicidal ideation. Thought content does not include homicidal ideation. Thought content includes suicidal plan.         Results Reviewed       Procedure Component Value Units Date/Time    Rapid drug screen, urine [[id number]]  (Abnormal) Collected: 04/06/25 2244    Lab Status: Final result Specimen: Urine, Clean Catch Updated: 04/06/25 2302     Amph/Meth UR Negative     Barbiturate Ur Negative     Benzodiazepine Urine Negative     Cocaine Urine Negative     Methadone Urine Negative     Opiate Urine Negative     PCP Ur Negative     THC Urine Positive     Oxycodone Urine Negative     Fentanyl Urine Negative     HYDROCODONE URINE Negative    Narrative:      Presumptive report. If requested, specimen will be sent to reference lab for confirmation.  FOR MEDICAL PURPOSES ONLY.   IF CONFIRMATION NEEDED PLEASE CONTACT THE LAB WITHIN 5 DAYS.    Drug Screen Cutoff Levels:  AMPHETAMINE/METHAMPHETAMINES  1000 ng/mL  BARBITURATES     200 ng/mL  BENZODIAZEPINES     200 ng/mL  COCAINE      300 ng/mL  METHADONE      300 ng/mL  OPIATES      300 ng/mL  PHENCYCLIDINE     25 ng/mL  THC       50 ng/mL  OXYCODONE      100 ng/mL  FENTANYL      5 ng/mL  HYDROCODONE     300 ng/mL    POCT alcohol breath test [[id number]]  (Normal) Resulted: 04/06/25 2239    Lab Status: Final result Updated: 04/06/25 2239     EXTBreath Alcohol 0.00            No  orders to display       Procedures    ED Medication and Procedure Management   Prior to Admission Medications   Prescriptions Last Dose Informant Patient Reported? Taking?   Abilify Maintena 400 MG injection   Yes No   Sig: INJECT 2 ML INTRAMUSCUALRLY EVERY 4 WEEKS AS DIRECTED   Alcohol Swabs 70 % PADS   No No   Sig: May substitute brand based on insurance coverage. Check glucose TID.   Blood Glucose Monitoring Suppl (OneTouch Verio Reflect) w/Device KIT   No No   Sig: May substitute brand based on insurance coverage. Check glucose TID.   Blood Glucose Monitoring Suppl (OneTouch Verio) w/Device KIT   No No   Sig: Use 2 (two) times a day Ok to substitute with insurance covered brand   FLUoxetine (PROzac) 10 mg capsule   No No   Sig: Take 1 capsule (10 mg total) by mouth daily   Insulin Glargine Solostar (Lantus SoloStar) 100 UNIT/ML SOPN   No No   Sig: Inject 0.3 mL (30 Units total) under the skin daily at bedtime   Insulin Pen Needle (BD Pen Needle Shani 2nd Gen) 32G X 4 MM MISC   No No   Sig: For use with insulin pen. Pharmacy may dispense brand covered by insurance.   Lancets (onetouch ultrasoft) lancets   No No   Sig: Use as instructed   OneTouch Delica Lancets 33G MISC   No No   Sig: May substitute brand based on insurance coverage. Check glucose TID.   acetaminophen (TYLENOL) 500 mg tablet   No No   Sig: Take 2 tablets (1,000 mg total) by mouth every 8 (eight) hours   albuterol (2.5 mg/3 mL) 0.083 % nebulizer solution   No No   Sig: Take 3 mL (2.5 mg total) by nebulization every 6 (six) hours as needed for wheezing or shortness of breath   dicyclomine (BENTYL) 20 mg tablet   No No   Sig: Take 1 tablet (20 mg total) by mouth 2 (two) times a day   glucose blood (OneTouch Verio) test strip   No No   Sig: TEST BLOOD SUGAR TWICE A DAY   glucose blood (OneTouch Verio) test strip   No No   Sig: May substitute brand based on insurance coverage. Check glucose TID.   ibuprofen (MOTRIN) 800 mg tablet   No No   Sig: Take 1  tablet (800 mg total) by mouth every 8 (eight) hours as needed for mild pain   insulin aspart (NovoLOG FlexPen) 100 UNIT/ML injection pen   No No   Sig: Inject 9 Units under the skin 3 (three) times a day with meals   metFORMIN (GLUCOPHAGE-XR) 500 mg 24 hr tablet   No No   Sig: Take 2 tablets (1,000 mg total) by mouth 2 (two) times a day with meals Start with 1 pill 500mg with breakfast x 1 week, then 1 pill 500mg with breakfast and 1 pill with dinner for 1 week , then 2 pills with breakfast (1000mg) and 1 pill with dinner (500mg) for 1 week, then 1000mg twice a day going forward   naproxen (Naprosyn) 500 mg tablet   No No   Sig: Take 1 tablet (500 mg total) by mouth 2 (two) times a day with meals for 5 days   ondansetron (ZOFRAN-ODT) 4 mg disintegrating tablet   No No   Sig: Take 1 tablet (4 mg total) by mouth every 8 (eight) hours as needed for nausea or vomiting   topiramate (TOPAMAX) 50 MG tablet   Yes No   Sig: Take 50 mg by mouth 2 (two) times a day      Facility-Administered Medications: None     Patient's Medications   Discharge Prescriptions    No medications on file     No discharge procedures on file.  ED SEPSIS DOCUMENTATION   Time reflects when diagnosis was documented in both MDM as applicable and the Disposition within this note       Time User Action Codes Description Comment    4/6/2025 10:36 PM Lanny Rosenberg [R45.851] Suicidal ideation     4/6/2025 10:36 PM Lanny Rosenberg [Z72.89] Deliberate self-cutting                  Lanny Rosenberg MD  04/07/25 0315

## 2025-04-07 NOTE — ED NOTES
[facility]: denied due to aggressive behavior and ASD diagnosis.     [facility]: per Lanny, still under review.    [facility]: per Marcos, still under review.

## 2025-04-07 NOTE — CONSULTS
"Consultation - Behavioral Health   Rik Marin 19 y.o. male MRN: [id number]  Unit/Bed#: SH 01 Encounter: [id number]              Assessment & Plan  Mood disorder (HCC)  Patient with history of depression who presented for suicidal ideation. Patient was seen yesterday at the [facility] emergency department for psychiatric evaluation and per documentation patient verbalized suicidal ideation with plan and verbalized having court the following day.  Patient was discharged and subsequently presented to the emergency department here at [place] for suicidal ideation and superficial cuts to bilateral upper extremities.   Psychiatric consultation is ordered for determination of disposition.  I saw patient previously on 5/6/2024 for psychiatric consultation.  Upon evaluation this morning when asked regarding the reason for patient's presentation he showed superficial cuts on the dorsal side of his forearms bilaterally.  He reports that he has been struggling with anger, depression, anxiety, agitation. Reports wishing he were dead.  Reports having suicidal thoughts at times when thinking of disparaging things about himself - reports most recently had suicidal thoughts last night. Denies current suicidal or homicidal ideation, plan, or intent.  When assessing for hallucinations patient's response was delayed and vague - reports seeing \"shadows\" and hearing a \"whisper\" that says disparaging things about himself.  Patient does not appear to be responding to internal stimuli.  He does not endorse current or previous episodes of carlos/hypomania. Reports that he is interested in inpatient psychiatric hospitalization and starting psychiatric medication.  States that he has been following outpatient with [facility] but states that he feels he needs more help.  States that he wants to restart medications to improve his condition.  He states that he spoke with his mother and that his court hearing has been rescheduled and " "states that he is not worried about it.  States that it was contributing to his stress.  When asked how the next time going to court would be different compared to this time, patient states that he would be taking \"medication\" and feels that therefore his mood would be better. Per crisis patient [place] therefore bed search would have be focused in [place]. Therefore will not have patient sign 201 at this time as such is related to [place] and not [place]. Discussed restarting Abilify and patient was amenable however states that he would be interested in other options once he is on the psychiatric unit.      Continue medical management  Continue observation  Plan is for voluntary inpatient psychiatric hospitalization  Patient does not meet involuntary inpatient psychiatric commitment criteria at this time  If patient wants to leave, please consult psychiatry  Will defer starting psychiatric medication to inpatient psychiatric unit at this time  Discussed with the ED physician  Discussed with the crisis worker  Please contact with questions and updates  For after hours and weekends please contact [facility] Psychiatry Consultation role         Risks, benefits and possible side effects of Medications:   Discussed restarting Abilify and patient was amenable however states that he would be interested in other options once he is on the psychiatric unit.        Chief Complaint: Showed superficial cuts on the dorsal side of forearms bilaterally      History of Present Illness   Inpatient consult to Psychiatry  Consult performed by: Mino Franco DO  Consult ordered by: Aubree Rawls MD        Physician Requesting Consult: Aubree Rawls MD  Reason for Consult / Principal Problem: Dx: 1.  Suicidal ideation    Rik Marin is a 19 y.o. male with past medical history of depression, type 2 diabetes, hypertriglyceridemia, sleep apnea who presented for suicidal ideation.  Patient was seen yesterday at the [facility] emergency department for psychiatric evaluation and per documentation patient verbalized suicidal ideation with plan and verbalized having court the following day.  Patient was discharged and subsequently presented to the emergency department here at [facility] for suicidal ideation and superficial cuts to bilateral upper extremities.   Psychiatric consultation is ordered for determination of disposition.  I saw patient previously on 5/6/2024 for psychiatric consultation.  Upon evaluation this morning when asked regarding the reason for patient's presentation he showed superficial cuts on the dorsal side of his forearms bilaterally.  Reports having had \"a rage, sadness, cutting\".  States that he had an argument with his family.  Reports for the past few weeks he has been struggling with feeling \"angry, mad, anxious, depressed\".  Reports he feels easily agitated and irritated.  Reports trouble sleeping and with appetite particularly when agitated.  Reports decreased energy. Reports he has been worrying.  When asked about auditory or visual hallucinations patient provided delayed and vague response stating that he sees \"shadows\" and hears a \"whisper\" that says disparaging things to him about himself and states that the voice is a mix of his internal voice and another voice.  Patient does not endorse current or previous episodes of carlos/hypomania.  Reports wishing he were dead.  Reports having suicidal thoughts at times when thinking of disparaging things about himself - reports most recently was last night. Denies current suicidal or homicidal ideation, plan, or intent.  He states that he spoke with his mother and states that his court hearing has been rescheduled and that he is not worried about it.  When asked about his presentation at [facility] he states that they \"did not take me seriously\".  States that he went home and later cut himself.  Reports that he is interested in inpatient psychiatric " hospitalization and starting psychiatric medication.  States that he has been following outpatient with [facility] but states that he feels he needs more help.  States that he wants to restart medications to improve his condition.  States that he has not taken his medications in 2 years (despite reporting having been admitted to a psychiatric hospital last year).  Reports that he used to receive an Abilify injection 400 mg and that this helped him with anger.  When asked why he stopped taking his medications he stated something about his appointments being canceled(?).  States that Abilify was more helpful than other medications like Prozac or Topamax.          Historical Information   Past Psychiatric History:   Past Psychiatric Hospitalizations: Reports 13 previous psychiatric hospitalizations including in July 2024  Currently in treatment with [facility]  Past Suicide attempts: Reports yes but that he cannot remember  Self-harm behavior: Previously reported history of cutting  Past Violent behavior: Previously reported history of aggression, had denied history of physical violence   Past Psychiatric medication trial: Abilify, Wellbutrin, BuSpar, methylphenidate, Topamax, guanfacine, Lamictal, fluoxetine        Substance Abuse History:  States that it has been a while since he last used alcohol.  Reports use cannabis most recently a few days ago      Family Psychiatric History:   Family History   Problem Relation Age of Onset    Hyperlipidemia Mother     Diabetes type II Mother     Obesity Mother     Obesity Father     Drug abuse Father     Diabetes type II Maternal Aunt     Lung disease Maternal Aunt     Rheum arthritis Maternal Aunt     Fibromyalgia Maternal Aunt     Atrial fibrillation Maternal Grandmother     Hyperlipidemia Maternal Grandfather     Diabetes type II Maternal Grandfather     Stroke Maternal Grandfather     Heart disease Maternal Grandfather     Stroke Paternal Grandfather   "        Social History  Education: 12th grade, did not finish  Marital history: Single per chart  Living arrangement, social support: Reports living with family        No Known Allergies  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      Past Surgical History:   Procedure Laterality Date    INCISION AND DRAINAGE OF WOUND Left 5/13/2024    Procedure: INCISION AND DRAINAGE (I&D) GROIN;  Surgeon: Tim Ch MD;  Location: [facility];  Service: General    INCISION AND DRAINAGE OF WOUND Left 5/15/2024    Procedure: INCISION AND DRAINAGE (I&D) GROIN;  Surgeon: Tim Ch MD;  Location: [facility];  Service: General    RI I&D VULVA/PERINEAL ABSCESS N/A 5/11/2024    Procedure: INCISION AND DRAINAGE (I&D) PERINEAL ABSCESS;  Surgeon: Tim Ch MD;  Location: [facility];  Service: General    TONSILECTOMY AND ADNOIDECTOMY      2020       Current medications:  No current facility-administered medications for this encounter.      Objective     Vital signs in last 24 hours:  Temp:  [96.7 °F (35.9 °C)-98.1 °F (36.7 °C)] 97.6 °F (36.4 °C)  HR:  [82-97] 82  BP: (138-141)/() 138/75  Resp:  [16-20] 16  SpO2:  [94 %-97 %] 96 %  O2 Device: None (Room air)      Mental Status Evaluation:  Appearance:  appears stated age and obese, left arm of glasses not present , casually dressed, multiple superficial cuts on dorsal forearms bilaterally   Behavior:  calm and cooperative   Speech:  normal rate and normal volume, delayed at times   Mood:  \"Depressed\"   Affect:  Constricted   Thought Process:  concrete, goal directed, and organized   Thought Content:  no delusions elicited   Perceptual Disturbances: Reports hearing whispering of disparaging things about himself, seeing " shadows.  Does not appear to be responding to internal stimuli   Risk Potential: Reports wishing he were dead.  Reports having suicidal thoughts at times when thinking of disparaging things about himself - reports most recently had suicidal thoughts last night. Denies current suicidal or homicidal ideation, plan, or intent.   Sensorium:  person, place, and situation   Memory:  recent and remote memory grossly intact   Cognition:  recent and remote memory grossly intact   Consciousness:  alert and awake    Attention: attention span appeared shorter than expected for age   Insight:  limited   Judgment: limited   Muscle Strength and Tone: Not assessed   Motor Activity: no abnormal movements     Lab Results:    Most Recent Labs:   Lab Results   Component Value Date    WBC 8.49 04/06/2025    RBC 5.09 04/06/2025    HGB 14.4 04/06/2025    HCT 43.8 04/06/2025     04/06/2025    RDW 13.2 04/06/2025    NEUTROABS 4.95 04/06/2025    SODIUM 132 (L) 04/06/2025    K 4.8 04/06/2025     04/06/2025    CO2 15 (L) 04/06/2025    BUN 14 04/06/2025    CREATININE 0.58 (L) 04/06/2025    GLUC 165 (H) 04/06/2025    GLUF 180 (H) 09/26/2023    CALCIUM 8.8 04/06/2025    AST 34 04/06/2025    ALT 36 04/06/2025    ALKPHOS 106 (H) 04/06/2025    TP 8.8 (H) 04/06/2025    ALB 4.5 04/06/2025    TBILI 0.49 04/06/2025    CHOLESTEROL 330 (H) 09/28/2023    HDL 21 (L) 09/28/2023    LDLCALC Comment 09/28/2023    NONHDLC 309 (H) 09/28/2023    [id number] 2.487 01/11/2025    HGBA1C 11.6 (H) 05/07/2024     05/07/2024     Labs in last 72 hours:   Recent Labs     04/06/25  1434 04/06/25  1623   WBC 8.49  --    RBC 5.09  --    HGB 14.4  --    HCT 43.8  --      --    RDW 13.2  --    NEUTROABS 4.95  --    SODIUM 131* 132*   K 4.0 4.8   CL 97 100   CO2 13* 15*   BUN 13 14   CREATININE 0.58* 0.58*   GLUC 196* 165*   CALCIUM 9.6 8.8   AST 34  --    ALT 36  --    ALKPHOS 106*  --    TP 8.8*  --    ALB 4.5  --    TBILI 0.49  --      Drug Screen:    Lab Results   Component Value Date    AMPMETHUR Negative 04/06/2025    BARBTUR Negative 04/06/2025    BDZUR Negative 04/06/2025    THCUR Positive (A) 04/06/2025    COCAINEUR Negative 04/06/2025    METHADONEUR Negative 04/06/2025    OPIATEUR Negative 04/06/2025    PCPUR Negative 04/06/2025     Medical alcohol level   Lab Results   Component Value Date    ETOH <10 04/06/2025     Ext Breath Alcohol   Lab Results   Component Value Date    BREATHALC 0.00 04/06/2025       Imaging Studies:     EKG/Pathology/Other Studies:   Lab Results   Component Value Date    VENTRATE 108 11/07/2024    ATRIALRATE 108 11/07/2024    PRINT 150 11/07/2024    QRSDINT 94 11/07/2024    QTINT 342 11/07/2024    QTCINT 458 11/07/2024    PAXIS 48 11/07/2024    QRSAXIS 69 11/07/2024    TWAVEAXIS 44 11/07/2024        Code Status: Prior  Advance Directive and Living Will:      Power of :    POLST:          Mino Franco DO  04/07/25      This note has been constructed using a voice recognition system.  There may be translation, syntax,  or grammatical errors. If you have any questions, please contact the dictating provider.

## 2025-04-07 NOTE — ASSESSMENT & PLAN NOTE
"Patient with history of depression who presented for suicidal ideation. Patient was seen yesterday at the [facility] emergency department for psychiatric evaluation and per documentation patient verbalized suicidal ideation with plan and verbalized having court the following day.  Patient was discharged and subsequently presented to the emergency department here at [facility] for suicidal ideation and superficial cuts to bilateral upper extremities.   Psychiatric consultation is ordered for determination of disposition.  I saw patient previously on 5/6/2024 for psychiatric consultation.  Upon evaluation this morning when asked regarding the reason for patient's presentation he showed superficial cuts on the dorsal side of his forearms bilaterally.  He reports that he has been struggling with anger, depression, anxiety, agitation. Reports wishing he were dead.  Reports having suicidal thoughts at times when thinking of disparaging things about himself - reports most recently had suicidal thoughts last night. Denies current suicidal or homicidal ideation, plan, or intent.  When assessing for hallucinations patient's response was delayed and vague - reports seeing \"shadows\" and hearing a \"whisper\" that says disparaging things about himself.  Patient does not appear to be responding to internal stimuli.  He does not endorse current or previous episodes of carlos/hypomania. Reports that he is interested in inpatient psychiatric hospitalization and starting psychiatric medication.  States that he has been following outpatient with [facility] but states that he feels he needs more help.  States that he wants to restart medications to improve his condition.  He states that he spoke with his mother and that his court hearing has been rescheduled and states that he is not worried about it.  States that it was contributing to his stress.  When asked how the next time going to court would be different compared to this time, " "patient states that he would be taking \"medication\" and feels that therefore his mood would be better. Per crisis patient has [place] med[place] therefore bed search would have be focused in [place]. Therefore will not have patient sign 201 at this time as such is related to [place] and not [place]. Discussed restarting Abilify and patient was amenable however states that he would be interested in other options once he is on the psychiatric unit.      Continue medical management  Continue observation  Plan is for voluntary inpatient psychiatric hospitalization  Patient does not meet involuntary inpatient psychiatric commitment criteria at this time  If patient wants to leave, please consult psychiatry  Will defer starting psychiatric medication to inpatient psychiatric unit at this time  Discussed with the ED physician  Discussed with the crisis worker  Please contact with questions and updates  For after hours and weekends please contact [facility] Psychiatry Consultation role   "

## 2025-04-07 NOTE — ED NOTES
Pt requested to speak to mother and friend. Staff provided pt with ED phone      Marie Ding  04/07/25 [id number]

## 2025-04-08 VITALS
SYSTOLIC BLOOD PRESSURE: 143 MMHG | TEMPERATURE: 98.1 F | DIASTOLIC BLOOD PRESSURE: 80 MMHG | RESPIRATION RATE: 17 BRPM | OXYGEN SATURATION: 97 % | HEART RATE: 82 BPM

## 2025-04-08 LAB — GLUCOSE SERPL-MCNC: 325 MG/DL (ref 65–140)

## 2025-04-08 PROCEDURE — 82948 REAGENT STRIP/BLOOD GLUCOSE: CPT

## 2025-04-08 PROCEDURE — 96372 THER/PROPH/DIAG INJ SC/IM: CPT

## 2025-04-08 PROCEDURE — 99245 OFF/OP CONSLTJ NEW/EST HI 55: CPT | Performed by: GENERAL PRACTICE

## 2025-04-08 RX ORDER — INSULIN LISPRO 100 [IU]/ML
1-5 INJECTION, SOLUTION INTRAVENOUS; SUBCUTANEOUS
Status: DISCONTINUED | OUTPATIENT
Start: 2025-04-09 | End: 2025-04-09 | Stop reason: HOSPADM

## 2025-04-08 RX ORDER — LORAZEPAM 1 MG/1
1 TABLET ORAL ONCE
Status: COMPLETED | OUTPATIENT
Start: 2025-04-08 | End: 2025-04-08

## 2025-04-08 RX ORDER — OLANZAPINE 5 MG/1
10 TABLET, ORALLY DISINTEGRATING ORAL ONCE
Status: COMPLETED | OUTPATIENT
Start: 2025-04-08 | End: 2025-04-08

## 2025-04-08 RX ORDER — INSULIN LISPRO 100 [IU]/ML
1-5 INJECTION, SOLUTION INTRAVENOUS; SUBCUTANEOUS
Status: DISCONTINUED | OUTPATIENT
Start: 2025-04-08 | End: 2025-04-09 | Stop reason: HOSPADM

## 2025-04-08 RX ORDER — INSULIN LISPRO 100 [IU]/ML
7 INJECTION, SOLUTION INTRAVENOUS; SUBCUTANEOUS
Status: DISCONTINUED | OUTPATIENT
Start: 2025-04-09 | End: 2025-04-09 | Stop reason: HOSPADM

## 2025-04-08 RX ORDER — INSULIN LISPRO 100 [IU]/ML
7 INJECTION, SOLUTION INTRAVENOUS; SUBCUTANEOUS
Status: DISCONTINUED | OUTPATIENT
Start: 2025-04-08 | End: 2025-04-09 | Stop reason: HOSPADM

## 2025-04-08 RX ORDER — INSULIN GLARGINE 100 [IU]/ML
30 INJECTION, SOLUTION SUBCUTANEOUS ONCE
Status: COMPLETED | OUTPATIENT
Start: 2025-04-08 | End: 2025-04-08

## 2025-04-08 RX ADMIN — INSULIN GLARGINE 30 UNITS: 100 INJECTION, SOLUTION SUBCUTANEOUS at 19:19

## 2025-04-08 RX ADMIN — LORAZEPAM 1 MG: 1 TABLET ORAL at 04:08

## 2025-04-08 RX ADMIN — OLANZAPINE 10 MG: 5 TABLET, ORALLY DISINTEGRATING ORAL at 04:08

## 2025-04-08 RX ADMIN — INSULIN LISPRO 7 UNITS: 100 INJECTION, SOLUTION INTRAVENOUS; SUBCUTANEOUS at 19:22

## 2025-04-08 RX ADMIN — INSULIN LISPRO 3 UNITS: 100 INJECTION, SOLUTION INTRAVENOUS; SUBCUTANEOUS at 19:21

## 2025-04-08 NOTE — ED NOTES
Pt pacing in room, becoming increasingly agitated, tapping head against wall, making fists, shaking stretcher, pretending to be punching walls. Pt laughing and having conversation with self. Asked what is wrong pt did not respond and kept pacing. RN notified.       Marlen Carter  04/08/25 [id number]

## 2025-04-08 NOTE — PROGRESS NOTES
Progress Note - Behavioral Health   Rik Marin 19 y.o. male MRN: [id number]  Unit/Bed#: SH 01 Encounter: [id number]          Assessment & Plan  Mood disorder (HCC)  Patient with history of depression who presented for suicidal ideation. Patient was seen prior to presentation at the [facility] emergency department for psychiatric evaluation and per documentation patient verbalized suicidal ideation with plan and verbalized having court the following day.  Patient was discharged and subsequently presented to the emergency department here at [facility] for suicidal ideation and superficial cuts to bilateral upper extremities.   Psychiatric consultation was ordered for determination of disposition. Upon evaluation on 4/7/25 patient showed superficial cuts on the dorsal side of his forearms bilaterally.  He reported that he has been struggling with anger, depression, anxiety, agitation. Reports wishing he were dead.  Reported having suicidal thoughts at times when thinking of disparaging things about himself - reports most recently had suicidal thoughts last night. Denied current suicidal or homicidal ideation, plan, or intent. Reported interest in inpatient psychiatric hospitalization and starting psychiatric medication to improve his condition. Per crisis patient has [place] therefore bed search would have be focused in [place]. Please see consultation note from 4/7/25 for more information.  Early this morning patient was noted to have become increasingly agitated, having conversation with self, was pacing.  This morning patient appears to be tired.  States that he was up at 4 AM feeling depressed, anxious, agitated. States that he was hearing voices and seeing things. States he felt that staff was staring at him which he stated made him feel worse. States that voices were telling him to run through the door. When asked if he was doing this to leave or hurt/kill himself patient states both. Reports  having suicidal thoughts of thinking of ways to try to harm/kill himself. Denies homicidal ideation. Reports continuing to want to be admitted to a psychiatric hospital. Denies wanting to start medication here, states he wants to start one when he arrives at the psychiatric unit.      Continue medical management  Continue observation  Plan is for voluntary inpatient psychiatric hospitalization  Bed search pending  Plan is not to pursue 201 at this time as patient is trying to be placed in [place] and not [place]  If patient wants to leave, please consult psychiatry  Will defer starting psychiatric medication to inpatient psychiatric unit at this time per patient's request  Discussed with the ED physician  Discussed with the crisis worker  Please contact with questions and updates  For after hours and weekends please contact [facility] Psychiatry Consultation role           Subjective:    I came to see the patient for continuity of care.  Patient reports that he was up at 4 o’clock in the morning. Reports that he had been feeling depressed, anxious, agitated. States that he was hearing voices and seeing things. States he felt that staff was staring at him which he stated made him feel worse. States that voices were telling him to run through the door. When asked if he was doing this to leave or hurt/kill himself patient states both. Reports having suicidal thoughts of thinking of ways to try to harm/kill himself. Denies homicidal ideation. Reports continuing to want to be admitted to a psychiatric hospital. Denies wanting to start medication here, states he wants to start one when he arrives at the psychiatric unit.      Mental Status Evaluation:  Appearance:  appears stated age, casually dressed, and obese   Behavior:  calm and cooperative   Speech:  soft and scant   Mood:  depressed   Affect:  Constricted   Thought Process:  organized   Thought Content:  no delusions elicited   Perceptual Disturbances: Reports at  4 am hearing voices telling him to run out to leave, harm/kill himself. Reports he was seeing things    Risk Potential: Reports having suicidal thoughts and trying to think of a plan.  Denies homicidal ideation   Sensorium:  person, place, and situation   Memory:  recent and remote memory grossly intact   Cognition:  recent and remote memory grossly intact   Consciousness:  awake and lethargic     Attention: attention span appeared shorter than expected for age   Insight:  limited   Judgment: limited   Muscle Strength and Tone: not assessed   Motor Activity: no abnormal movements         Medications:        Risks, benefits and possible side effects of Medications:   Patient is not interested in psychiatric medication at this time      Labs:    Most Recent Labs:   Lab Results   Component Value Date    WBC 7.32 04/07/2025    RBC 4.49 04/07/2025    HGB 12.7 04/07/2025    HCT 38.8 04/07/2025     04/07/2025    RDW 13.2 04/07/2025    NEUTROABS 4.36 04/07/2025    SODIUM 131 (L) 04/07/2025    K 4.0 04/07/2025    CL 98 04/07/2025    CO2 18 (L) 04/07/2025    BUN 12 04/07/2025    CREATININE 0.62 04/07/2025    GLUC 280 (H) 04/07/2025    GLUF 180 (H) 09/26/2023    CALCIUM 9.1 04/07/2025    AST 35 04/07/2025    ALT 34 04/07/2025    ALKPHOS 104 04/07/2025    TP 7.9 04/07/2025    ALB 4.2 04/07/2025    TBILI 0.41 04/07/2025    CHOLESTEROL 330 (H) 09/28/2023    HDL 21 (L) 09/28/2023    LDLCALC Comment 09/28/2023    NONHDLC 309 (H) 09/28/2023    [id number] 2.735 04/07/2025    HGBA1C 11.6 (H) 05/07/2024     05/07/2024     Labs in last 72 hours:   Recent Labs     04/07/25  1310   WBC 7.32   RBC 4.49   HGB 12.7   HCT 38.8      RDW 13.2   NEUTROABS 4.36   SODIUM 131*   K 4.0   CL 98   CO2 18*   BUN 12   CREATININE 0.62   GLUC 280*   CALCIUM 9.1   AST 35   ALT 34   ALKPHOS 104   TP 7.9   ALB 4.2   TBILI 0.41   [id number] 2.735     EKG   Lab Results   Component Value Date    VENTRATE 108 11/07/2024    ATRIALRATE 108  11/07/2024    PRINT 150 11/07/2024    QRSDINT 94 11/07/2024    QTINT 342 11/07/2024    QTCINT 458 11/07/2024    PAXIS 48 11/07/2024    QRSAXIS 69 11/07/2024    TWAVEAXIS 44 11/07/2024       Mino Franco DO  04/08/25      This note has been constructed using a voice recognition system.    There may be translation, syntax,  or grammatical errors. If you have any questions, please contact the dictating provider.

## 2025-04-08 NOTE — ED RE-EVALUATION NOTE
I received sign-out on the patient at change of shift.    1. Suicidal ideation    2. Deliberate self-cutting    3. Type 1 diabetes mellitus without complication (HCC)      ED Course as of 04/08/25 2255 Tue Apr 08, 2025   1703 Case signed out to me at change of shift pending psych placement for SI.  201 signed.     1732 Home insulin dosage confirmed with patient and home insulin ordered.     2011 Patient reportedly asked RN about being discharged. I reviewed psychiatry note form today which advised consulting psychiatry if patient wants to leave.  Repeat psych consult ordered.   2245 Case discussed with Dr. Johnston who advised patient is appropriate for discharge.  Patient currently denying SI/HI, and does not meet criteria for involuntary committment.  He already has outpatient mental health services established.         Disposition: Discharged       Mino Craft MD  04/08/25 [id number]

## 2025-04-08 NOTE — ED NOTES
[facility] called [facility] for review for [facility].  Renate advised that clinical could be faxed but they do not have beds at this time.      [facility] faxed clinical to [facility].

## 2025-04-08 NOTE — ED CARE HANDOFF
Emergency Department Sign Out Note        Sign out and transfer of care from Dr. Godfrey. See Separate Emergency Department note.     The patient, Rik Marin, was evaluated by the previous provider for psych eval.    Workup Completed:  Labs Reviewed   RAPID DRUG SCREEN, URINE - Abnormal       Result Value Ref Range Status    Amph/Meth UR Negative  Negative Final    Barbiturate Ur Negative  Negative Final    Benzodiazepine Urine Negative  Negative Final    Cocaine Urine Negative  Negative Final    Methadone Urine Negative  Negative Final    Opiate Urine Negative  Negative Final    PCP Ur Negative  Negative Final    THC Urine Positive (*) Negative Final    Oxycodone Urine Negative  Negative Final    Fentanyl Urine Negative  Negative Final    HYDROCODONE URINE Negative  Negative Final    Narrative:     Presumptive report. If requested, specimen will be sent to reference lab for confirmation.  FOR MEDICAL PURPOSES ONLY.   IF CONFIRMATION NEEDED PLEASE CONTACT THE LAB WITHIN 5 DAYS.    Drug Screen Cutoff Levels:  AMPHETAMINE/METHAMPHETAMINES  1000 ng/mL  BARBITURATES     200 ng/mL  BENZODIAZEPINES     200 ng/mL  COCAINE      300 ng/mL  METHADONE      300 ng/mL  OPIATES      300 ng/mL  PHENCYCLIDINE     25 ng/mL  THC       50 ng/mL  OXYCODONE      100 ng/mL  FENTANYL      5 ng/mL  HYDROCODONE     300 ng/mL   COMPREHENSIVE METABOLIC PANEL - Abnormal    Sodium 131 (*) 135 - 147 mmol/L Final    Potassium 4.0  3.5 - 5.3 mmol/L Final    Chloride 98  96 - 108 mmol/L Final    CO2 18 (*) 21 - 32 mmol/L Final    ANION GAP 15 (*) 4 - 13 mmol/L Final    BUN 12  5 - 25 mg/dL Final    Creatinine 0.62  0.60 - 1.30 mg/dL Final    Comment: Standardized to IDMS reference method    Glucose 280 (*) 65 - 140 mg/dL Final    Comment: If the patient is fasting, the ADA then defines impaired fasting glucose as > 100 mg/dL and diabetes as > or equal to 123 mg/dL.    Calcium 9.1  8.4 - 10.2 mg/dL Final    AST 35  13 - 39 U/L Final    ALT 34   7 - 52 U/L Final    Comment: Specimen collection should occur prior to Sulfasalazine administration due to the potential for falsely depressed results.     Alkaline Phosphatase 104  34 - 104 U/L Final    Total Protein 7.9  6.4 - 8.4 g/dL Final    Albumin 4.2  3.5 - 5.0 g/dL Final    Total Bilirubin 0.41  0.20 - 1.00 mg/dL Final    Comment: Use of this assay is not recommended for patients undergoing treatment with eltrombopag due to the potential for falsely elevated results.  N-acetyl-p-benzoquinone imine (metabolite of Acetaminophen) will generate erroneously low results in samples for patients that have taken an overdose of Acetaminophen.    eGFR 142  ml/min/1.73sq m Final    Narrative:     National Kidney Disease Foundation guidelines for Chronic Kidney Disease (CKD):     Stage 1 with normal or high GFR (GFR > 90 mL/min/1.73 square meters)    Stage 2 Mild CKD (GFR = 60-89 mL/min/1.73 square meters)    Stage 3A Moderate CKD (GFR = 45-59 mL/min/1.73 square meters)    Stage 3B Moderate CKD (GFR = 30-44 mL/min/1.73 square meters)    Stage 4 Severe CKD (GFR = 15-29 mL/min/1.73 square meters)    Stage 5 End Stage CKD (GFR <15 mL/min/1.73 square meters)  Note: GFR calculation is accurate only with a steady state creatinine   UA W REFLEX TO MICROSCOPIC WITH REFLEX TO CULTURE - Abnormal    Color, UA Yellow  Yellow Final    Clarity, UA Clear  Clear Final    Specific Gravity, UA 1.025  1.001 - 1.030 Final    pH, UA 6.0  5.0, 5.5, 6.0, 6.5, 7.0, 7.5, 8.0 Final    Leukocytes, UA Negative  Negative Final    Nitrite, UA Negative  Negative Final    Protein, UA Negative  Negative, Interference- unable to analyze mg/dl Final    Glucose, UA 3+ (*) Negative mg/dl Final    Ketones, UA Negative  Negative mg/dl Final    Urobilinogen, UA 0.2  0.2, 1.0 E.U./dl E.U./dl Final    Bilirubin, UA Negative  Negative Final    Occult Blood, UA Negative  Negative Final   TSH, 3RD GENERATION - Normal    TSH 3RD GENERATON 2.735  0.450 - 4.500  uIU/mL Final    Comment: Adult TSH (3rd generation) reference range follows the recommended guidelines of the American Thyroid Association, January, 2020.   POCT ALCOHOL BREATH TEST - Normal    EXTBreath Alcohol 0.00   Final   CBC AND DIFFERENTIAL    WBC 7.32  4.31 - 10.16 Thousand/uL Final    RBC 4.49  3.88 - 5.62 Million/uL Final    Hemoglobin 12.7  12.0 - 17.0 g/dL Final    Hematocrit 38.8  36.5 - 49.3 % Final    MCV 86  82 - 98 fL Final    MCH 28.3  26.8 - 34.3 pg Final    MCHC 32.7  31.4 - 37.4 g/dL Final    RDW 13.2  11.6 - 15.1 % Final    MPV 10.9  8.9 - 12.7 fL Final    Platelets 234  149 - 390 Thousands/uL Final    nRBC 0  /100 WBCs Final    Segmented % 59  43 - 75 % Final    Immature Grans % 1  0 - 2 % Final    Lymphocytes % 32  14 - 44 % Final    Monocytes % 6  4 - 12 % Final    Eosinophils Relative 2  0 - 6 % Final    Basophils Relative 0  0 - 1 % Final    Absolute Neutrophils 4.36  1.85 - 7.62 Thousands/µL Final    Absolute Immature Grans 0.04  0.00 - 0.20 Thousand/uL Final    Absolute Lymphocytes 2.31  0.60 - 4.47 Thousands/µL Final    Absolute Monocytes 0.43  0.17 - 1.22 Thousand/µL Final    Eosinophils Absolute 0.15  0.00 - 0.61 Thousand/µL Final    Basophils Absolute 0.03  0.00 - 0.10 Thousands/µL Final        ED Course / Workup Pending (followup):                  ED Course as of 04/08/25 0633   Sun Apr 06, 2025   2242 UTD on tetanus, last dose 2019.   Tue Apr 08, 2025   0415 Patient slightly agitated, punched the wall. Willing to take oral meds.     Procedures  Medical Decision Making  19-year-old male previously evaluated for psychiatric evaluation.  Pending placement.  Signed out to Dr. Rawls pending placement.    Problems Addressed:  Deliberate self-cutting: acute illness or injury  Suicidal ideation: acute illness or injury  Type 1 diabetes mellitus without complication (HCC): chronic illness or injury    Amount and/or Complexity of Data Reviewed  Labs: ordered.    Risk  Prescription drug  management.  Decision regarding hospitalization.            Disposition  Final diagnoses:   Suicidal ideation   Deliberate self-cutting   Type 1 diabetes mellitus without complication (HCC)     Time reflects when diagnosis was documented in both MDM as applicable and the Disposition within this note       Time User Action Codes Description Comment    4/6/2025 10:36 PM Lanny Rosenberg [R45.851] Suicidal ideation     4/6/2025 10:36 PM Lanny Rosenberg Add [Z72.89] Deliberate self-cutting     4/7/2025  3:44 PM Aubree Rawls Add [E10.9] Type 1 diabetes mellitus without complication (HCC)           ED Disposition       ED Disposition   Transfer to Behavioral Health Condition   --    Date/Time   Sun Apr 6, 2025 10:36 PM    Comment   Rik Marin should be transferred out to behavioral health and has been medically cleared.               MD Documentation      Flowsheet Row Most Recent Value   Sending MD Dr. Mino Franco DO (Psychiatrist)          Follow-up Information    None       Patient's Medications   Discharge Prescriptions    No medications on file     No discharge procedures on file.       ED Provider  Electronically Signed by     Lanny Rosenberg MD  04/08/25 0633

## 2025-04-08 NOTE — ED NOTES
Pt requesting a phone call. RN made pt aware of the expectations when using the phone. Pt aware and agreed.    1616- Pt yelling and cursing at mother on the phone. Pt asked to terminate the phone call with mom. Pt agreed and RN was given the phone back.     1630- Pt is in room watching tv- no complaints. 1:1 observation continued.      Heather Wong RN  04/08/25 9003

## 2025-04-08 NOTE — ED NOTES
[place] bed search:    [facility] - per Crisis/admissions, they can review; referral faxed    [facility]  - per admissions, they can review; referral faxed    [facility] with admissions ([phone]) to follow up on referral, requesting call back. Also sent an email to: [email or web address]    Previously denied:  [facility] to follow up.

## 2025-04-08 NOTE — ASSESSMENT & PLAN NOTE
Patient with history of depression who presented for suicidal ideation. Patient was seen prior to presentation at the [facility] emergency department for psychiatric evaluation and per documentation patient verbalized suicidal ideation with plan and verbalized having court the following day.  Patient was discharged and subsequently presented to the emergency department here at [place] for suicidal ideation and superficial cuts to bilateral upper extremities.   Psychiatric consultation was ordered for determination of disposition. Upon evaluation on 4/7/25 patient showed superficial cuts on the dorsal side of his forearms bilaterally.  He reported that he has been struggling with anger, depression, anxiety, agitation. Reports wishing he were dead.  Reported having suicidal thoughts at times when thinking of disparaging things about himself - reports most recently had suicidal thoughts last night. Denied current suicidal or homicidal ideation, plan, or intent. Reported interest in inpatient psychiatric hospitalization and starting psychiatric medication to improve his condition. Per crisis patient has [place] medicaid insurance therefore bed search would have be focused in [place]. Please see consultation note from 4/7/25 for more information.  Early this morning patient was noted to have become increasingly agitated, having conversation with self, was pacing.  This morning patient appears to be tired.  States that he was up at 4 AM feeling depressed, anxious, agitated. States that he was hearing voices and seeing things. States he felt that staff was staring at him which he stated made him feel worse. States that voices were telling him to run through the door. When asked if he was doing this to leave or hurt/kill himself patient states both. Reports having suicidal thoughts of thinking of ways to try to harm/kill himself. Denies homicidal ideation. Reports continuing to want to be admitted to a psychiatric hospital.  Denies wanting to start medication here, states he wants to start one when he arrives at the psychiatric unit.      Continue medical management  Continue observation  Plan is for voluntary inpatient psychiatric hospitalization  Bed search pending  Plan is not to pursue 201 at this time as patient is trying to be placed in [place] and not [place]  If patient wants to leave, please consult psychiatry  Will defer starting psychiatric medication to inpatient psychiatric unit at this time per patient's request  Discussed with the ED physician  Discussed with the crisis worker  Please contact with questions and updates  For after hours and weekends please contact [facility] Psychiatry Consultation role

## 2025-04-08 NOTE — ED CARE HANDOFF
Emergency Department Sign Out Note        Sign out and transfer of care from Dr. Rosenberg. See Separate Emergency Department note.     The patient, Rik Marin, was evaluated by the previous provider for SI    Workup Completed:  Labs Reviewed   RAPID DRUG SCREEN, URINE - Abnormal       Result Value Ref Range Status    Amph/Meth UR Negative  Negative Final    Barbiturate Ur Negative  Negative Final    Benzodiazepine Urine Negative  Negative Final    Cocaine Urine Negative  Negative Final    Methadone Urine Negative  Negative Final    Opiate Urine Negative  Negative Final    PCP Ur Negative  Negative Final    THC Urine Positive (*) Negative Final    Oxycodone Urine Negative  Negative Final    Fentanyl Urine Negative  Negative Final    HYDROCODONE URINE Negative  Negative Final    Narrative:     Presumptive report. If requested, specimen will be sent to reference lab for confirmation.  FOR MEDICAL PURPOSES ONLY.   IF CONFIRMATION NEEDED PLEASE CONTACT THE LAB WITHIN 5 DAYS.    Drug Screen Cutoff Levels:  AMPHETAMINE/METHAMPHETAMINES  1000 ng/mL  BARBITURATES     200 ng/mL  BENZODIAZEPINES     200 ng/mL  COCAINE      300 ng/mL  METHADONE      300 ng/mL  OPIATES      300 ng/mL  PHENCYCLIDINE     25 ng/mL  THC       50 ng/mL  OXYCODONE      100 ng/mL  FENTANYL      5 ng/mL  HYDROCODONE     300 ng/mL   COMPREHENSIVE METABOLIC PANEL - Abnormal    Sodium 131 (*) 135 - 147 mmol/L Final    Potassium 4.0  3.5 - 5.3 mmol/L Final    Chloride 98  96 - 108 mmol/L Final    CO2 18 (*) 21 - 32 mmol/L Final    ANION GAP 15 (*) 4 - 13 mmol/L Final    BUN 12  5 - 25 mg/dL Final    Creatinine 0.62  0.60 - 1.30 mg/dL Final    Comment: Standardized to IDMS reference method    Glucose 280 (*) 65 - 140 mg/dL Final    Comment: If the patient is fasting, the ADA then defines impaired fasting glucose as > 100 mg/dL and diabetes as > or equal to 123 mg/dL.    Calcium 9.1  8.4 - 10.2 mg/dL Final    AST 35  13 - 39 U/L Final    ALT 34  7 - 52 U/L  Final    Comment: Specimen collection should occur prior to Sulfasalazine administration due to the potential for falsely depressed results.     Alkaline Phosphatase 104  34 - 104 U/L Final    Total Protein 7.9  6.4 - 8.4 g/dL Final    Albumin 4.2  3.5 - 5.0 g/dL Final    Total Bilirubin 0.41  0.20 - 1.00 mg/dL Final    Comment: Use of this assay is not recommended for patients undergoing treatment with eltrombopag due to the potential for falsely elevated results.  N-acetyl-p-benzoquinone imine (metabolite of Acetaminophen) will generate erroneously low results in samples for patients that have taken an overdose of Acetaminophen.    eGFR 142  ml/min/1.73sq m Final    Narrative:     National Kidney Disease Foundation guidelines for Chronic Kidney Disease (CKD):     Stage 1 with normal or high GFR (GFR > 90 mL/min/1.73 square meters)    Stage 2 Mild CKD (GFR = 60-89 mL/min/1.73 square meters)    Stage 3A Moderate CKD (GFR = 45-59 mL/min/1.73 square meters)    Stage 3B Moderate CKD (GFR = 30-44 mL/min/1.73 square meters)    Stage 4 Severe CKD (GFR = 15-29 mL/min/1.73 square meters)    Stage 5 End Stage CKD (GFR <15 mL/min/1.73 square meters)  Note: GFR calculation is accurate only with a steady state creatinine   UA W REFLEX TO MICROSCOPIC WITH REFLEX TO CULTURE - Abnormal    Color, UA Yellow  Yellow Final    Clarity, UA Clear  Clear Final    Specific Gravity, UA 1.025  1.001 - 1.030 Final    pH, UA 6.0  5.0, 5.5, 6.0, 6.5, 7.0, 7.5, 8.0 Final    Leukocytes, UA Negative  Negative Final    Nitrite, UA Negative  Negative Final    Protein, UA Negative  Negative, Interference- unable to analyze mg/dl Final    Glucose, UA 3+ (*) Negative mg/dl Final    Ketones, UA Negative  Negative mg/dl Final    Urobilinogen, UA 0.2  0.2, 1.0 E.U./dl E.U./dl Final    Bilirubin, UA Negative  Negative Final    Occult Blood, UA Negative  Negative Final   TSH, 3RD GENERATION - Normal    TSH 3RD GENERATON 2.735  0.450 - 4.500 uIU/mL Final     Comment: Adult TSH (3rd generation) reference range follows the recommended guidelines of the American Thyroid Association, January, 2020.   POCT ALCOHOL BREATH TEST - Normal    EXTBreath Alcohol 0.00   Final   CBC AND DIFFERENTIAL    WBC 7.32  4.31 - 10.16 Thousand/uL Final    RBC 4.49  3.88 - 5.62 Million/uL Final    Hemoglobin 12.7  12.0 - 17.0 g/dL Final    Hematocrit 38.8  36.5 - 49.3 % Final    MCV 86  82 - 98 fL Final    MCH 28.3  26.8 - 34.3 pg Final    MCHC 32.7  31.4 - 37.4 g/dL Final    RDW 13.2  11.6 - 15.1 % Final    MPV 10.9  8.9 - 12.7 fL Final    Platelets 234  149 - 390 Thousands/uL Final    nRBC 0  /100 WBCs Final    Segmented % 59  43 - 75 % Final    Immature Grans % 1  0 - 2 % Final    Lymphocytes % 32  14 - 44 % Final    Monocytes % 6  4 - 12 % Final    Eosinophils Relative 2  0 - 6 % Final    Basophils Relative 0  0 - 1 % Final    Absolute Neutrophils 4.36  1.85 - 7.62 Thousands/µL Final    Absolute Immature Grans 0.04  0.00 - 0.20 Thousand/uL Final    Absolute Lymphocytes 2.31  0.60 - 4.47 Thousands/µL Final    Absolute Monocytes 0.43  0.17 - 1.22 Thousand/µL Final    Eosinophils Absolute 0.15  0.00 - 0.61 Thousand/µL Final    Basophils Absolute 0.03  0.00 - 0.10 Thousands/µL Final         ED Course / Workup Pending (followup):  This is a 27 years old came for having yesterday for psych evaluation.  Patient went to the door and was on campus before came to the [facility] on campus telling them that he has SI and patient was evaluated by crisis discharge at home.  Patient went home and he started to do cuts on his forearm.  And came to the ER stated that he wanted to commit suicide and if he has gone his gun shooting himself.  Patient evaluated by a psychiatrist will be admitted to a psych facility.  Patient also is diabetic and he is on metformin and insulin but he does not take his medication.  Patient is on bed search for psych facility in [place].   Patient is medically stable cleared for psych  admission.                                     Procedures  Medical Decision Making  Amount and/or Complexity of Data Reviewed  Labs: ordered.    Risk  Prescription drug management.  Decision regarding hospitalization.            Disposition  Final diagnoses:   Suicidal ideation   Deliberate self-cutting   Type 1 diabetes mellitus without complication (HCC)     Time reflects when diagnosis was documented in both MDM as applicable and the Disposition within this note       Time User Action Codes Description Comment    4/6/2025 10:36 PM Lanny Rosenberg Add [R45.851] Suicidal ideation     4/6/2025 10:36 PM Lanny Rosenberg Add [Z72.89] Deliberate self-cutting     4/7/2025  3:44 PM Aubree Rawls Add [E10.9] Type 1 diabetes mellitus without complication (HCC)           ED Disposition       ED Disposition   Transfer to Behavioral Health    Condition   --    Date/Time   Sun Apr 6, 2025 10:36 PM    Comment   Rik Marin should be transferred out to behavioral health and has been medically cleared.               MD Documentation      Flowsheet Row Most Recent Value   Sending MD Dr. Mino Franco DO (Psychiatrist)          Follow-up Information    None       Patient's Medications   Discharge Prescriptions    No medications on file     No discharge procedures on file.       ED Provider  Electronically Signed by     Aubree Rawls MD  04/08/25 0672

## 2025-04-08 NOTE — ED NOTES
Called [facility] who advised that patient has not yet been reviewed and it will be passed on to next shift as they have been very busy. Crisis to follow up tomorrow.

## 2025-04-09 NOTE — ED NOTES
This writer discussed the patients current presentation and recommended discharge plan with .  They agree with the patient being discharged at this time with referrals and/or information about OP Services.     The patient was Instructed to follow up with their PCP  The patient was provided with referral information for:   [place] resources    The patient declined to complete a safety plan however a blank plan was provided for future use.     Patient has decided to face his court hearing.    SAFETY PLAN  Warning Signs (thoughts, images, mood, behavior, situations) of a potential crisis:       Coping Skills (what can I do to take my mind off the problem, or to keep myself safe):       Outside Support (who can I reach out to for support and help):         National Suicide Prevention Hotline:  988      [facility] [phone] - Crisis   [facility] [phone] - LVF Crisis/Mobile Crisis   [phone] - S[facility] Crisis   [facility]: [phone]  [facility]: [phone]   [facility] [phone] - Crisis   [facility] [phone] - Crisis     [facility] [phone] - Crisis   [facility] [phone] - Crisis   [phone] - Peer Support Talk Line (1-9pm daily)  [phone] - Teen Support Talk Line (1-9pm daily)  [phone] - [facility] [phone]- Crisis    [facility] [phone] - Crisis   [facility] [phone] - Crisis    [facility]) [phone] - [facility] Crisis

## 2025-04-09 NOTE — ED NOTES
Patient wanted to leave.  Psych consult was ordered.  Psychiatrist gave approval for patient to leave.

## 2025-04-09 NOTE — CONSULTS
TeleConsultation - Behavioral Health   Name: Rik Marin 19 y.o. male I MRN: [id number]  Unit/Bed#: SH 01 I Date of Admission: 4/6/2025   Date of Service: 4/8/2025 I Hospital Day: 0  Inpatient consult to Psychiatry  Consult performed by: Elen Johnston MD  Consult ordered by: Mino Craft MD      Physician Requesting Consult: Mino Craft MD  Principal Problem:<principal problem not specified>  Reason for Consult:  Psych Evaluation     Assessment & Plan   Unspecified Mood Disorder   Intellectual Delay (Rule Out)     Patient initially presented with superficial self-harm cutting endorsing suicidal ideations.  Patient not currently presenting with any active suicidal ideations nor any other acute or imminent safety concerns warranting inpatient psychiatric mission and safe discharge home.       TREATMENT PLAN RECOMMENDATIONS:  Medications: none  PRN for sleep: none  PRN for agitation: none     Informed consent for the above medication has been obtained including discussion of the risks, benefits and alternatives: Yes    Disposition: The patient does not currently meet criteria for inpatient psychiatric hospitalization. They deny thoughts or plans for suicide and denies homicidal thoughts or intent. They are not unable to care for themselves due to a mental illness and/or acute psychosis. They are able to adequately participate in care planning with primary team. No criteria for an involuntary psychiatric commitment exists at this time.    Legal Status Recommendation:  Voluntary     Multiple Antipsychotic Review: N/A    Psychotherapy/Psychoeducation: N/A to this case.    Other/Medical Work Up and/or treatment modality recommendations: N/A to this case.    Patient Caregiver/Family Education: Provided education regarding relevant aspects of the treatment plan to identified patient support based on their needs and abilities in a manner that they could understand with the patient's express  consent.    Follow-up: Re-consult PRN    Report regarding the above Assessment and Treatment plan was provided to: Dr. Craft     History of Present Illness      Patient states that he had some cuts on his arm and that he did this out of frustration and that he was fighting with his mother and didn't want to go to court with her. Patient reports that he has established mental health services and that he is not feeling suicidal currently. Patient states that he is no longer interested in IP as he feels stable and that his mom wanted him inpatient more than anything. Patient denied any current SI/HI/AVH or other acute psychiatric complaints.       Psychiatric Review Of Systems:  sleep: no  appetite changes: no  weight changes: no  energy/anergy: no  interest/pleasure/anhedonia: no  somatic symptoms: no  anxiety/panic: no  carlos: no  guilty/hopeless: no  self injurious behavior/risky behavior: no    Historical Information   Past Psychiatric History:   Psychiatric Hospitalizations:   13 past inpatient psychiatric admissions  Outpatient Treatment History:   Yes   Suicide Attempts:   None  History of self-harm:   Yes, history of self-abusive behavior  Violence History:   no  Past Psychiatric medication trials: Yes     Substance Abuse History: Patient reports smoking weed, nicotine and cigarettes and also consumes alcohol 1x per week.       Family Psychiatric History:  Denied       Social History:  Education: high school diploma/GED  Learning Disabilities: special education  Marital history: single  Children: Denied   Living arrangement, social support: The patient lives in home with mother.  Occupational History: on permanent disability  Functioning Relationships: good support system.  Other Pertinent History: None    Traumatic History:   Abuse: None  Other Traumatic Events: none    Social History     Tobacco Use    Smoking status: Some Days     Types: Cigarettes     Passive exposure: Yes    Smokeless tobacco: Current    Vaping Use    Vaping status: Some Days    Substances: Nicotine, THC, Flavoring   Substance and Sexual Activity    Alcohol use: Yes     Alcohol/week: 2.0 standard drinks of alcohol     Types: 2 Shots of liquor per week     Comment: when available to him    Drug use: Yes     Types: Marijuana    Sexual activity: Never       Meds/Allergies   Current Facility-Administered Medications   Medication Dose Route Frequency Provider Last Rate    [START ON 4/9/2025] insulin lispro  1-5 Units Subcutaneous 0200 Mino Craft MD      insulin lispro  1-5 Units Subcutaneous TID AC Mino Craft MD      [START ON 4/9/2025] insulin lispro  7 Units Subcutaneous Daily With Breakfast Mino Craft MD      [START ON 4/9/2025] insulin lispro  7 Units Subcutaneous Daily With Lunch Mino Craft MD      insulin lispro  7 Units Subcutaneous Daily With Dinner Mino Craft MD        No Known Allergies    Objective :  Temp:  [98.1 °F (36.7 °C)] 98.1 °F (36.7 °C)  HR:  [82] 82  BP: (143)/(80) 143/80  Resp:  [17] 17  SpO2:  [97 %] 97 %  O2 Device: None (Room air)    Mental Status Evaluation:  Appearance:  age appropriate   Behavior:  normal   Speech:  normal pitch and normal volume   Mood:  normal   Affect:  normal   Language: naming objects   Thought Process:  normal   Associations intact associations   Thought Content:  normal   Perceptual Disturbances: None   Risk Potential: Suicidal Ideations none  Homicidal Ideations none  Potential for Aggression No   Sensorium:  person, place, and time/date   Cognition:  recent and remote memory grossly intact   Consciousness:  alert    Attention: attention span and concentration were age appropriate   Intellect: Below normal limits   Fund of Knowledge: awareness of current events: President    Insight:  limited   Judgment: limited   Muscle Strength:  Muscle Tone: normal  NFT  normal   Gait/Station: normal gait/station   Motor Activity: no abnormal movements     Lab Results:  "I have reviewed the following lab results:   No new results in last 24 hours.   Results from last 7 days   Lab Units 04/06/25  2244   BARBITURATE UR  Negative   BENZODIAZEPINE UR  Negative   THC UR  Positive*   COCAINE UR  Negative   METHADONE URINE  Negative   OPIATE UR  Negative   PCP UR  Negative     Lipid Profile:   Lab Results   Component Value Date    CHOLESTEROL 330 (H) 09/28/2023    HDL 21 (L) 09/28/2023    LDLCALC Comment 09/28/2023    NONHDLC 309 (H) 09/28/2023   Thyroid Studies:   Lab Results   Component Value Date    ESG1VRSBCXNO 2.735 04/07/2025     Ammonia: No results found for: \"AMMONIA\"  Drug Levels: No results found for: \"VALPROICTOT\", \"VALPROICACID\", \"LITHIUM\", \"CARBAMAZEPIN\", \"CLOZAPINE\", \"NCLOZIP\"    Imaging Results Review: No pertinent imaging studies reviewed.  Other Study Results Review: No additional pertinent studies reviewed.    Code Status: Prior  Advance Directive and Living Will:      Power of :    POLST:      Screenings:   1. Nutrition Assessment (completed by Staff):   Nutrition  Appetite: Fair  2. Pain Screening     3. Suicide Screening  ED Crisis Suicide Risk Assessment: Suicide Risk Assessment  Violence Risk to Self: Yes- Within the past 6 months  Description of self harming behaviors or thoughts:: Prior to arrvial, cut arms bilaterally with kitchen knife (no injury). Currently reports SI as \"50-50.. depends on mood\". Patient states that when he has \"bad thoughts\" that he then could attempt to harm himself again by \"cutting\"    C-SSRS Screening (Nursing Assessment - recent):    C-SSRS Screening (Nursing Assessment - since last contact):      Suicide Risk Assessment completed by the Consultant: Based on today's assessment, Rik presents the following risk of harm to self: low.    Administrative Statements   VIRTUAL CARE DOCUMENTATION:     1. This service was provided via Telemedicine using Sn[employer]     2. Parties in the room with patient during teleconsult Patient " only    3. Confidentiality My office door was closed     4. Participants No one else was in the room    5. Patient acknowledged consent and understanding of privacy and security of the  Telemedicine consult. I informed the patient that I have reviewed their record in Epic and presented the opportunity for them to ask any questions regarding the visit today.  The patient agreed to participate.    6. I have spent a total time of 30 minutes in caring for this patient on the day of the visit/encounter including Obtaining or reviewing history  , not including the time spent for establishing the audio/video connection.

## 2025-04-29 ENCOUNTER — HOSPITAL ENCOUNTER (EMERGENCY)
Facility: HOSPITAL | Age: 20
Discharge: HOME | End: 2025-04-29
Payer: COMMERCIAL

## 2025-04-29 VITALS
RESPIRATION RATE: 20 BRPM | TEMPERATURE: 98.8 F | SYSTOLIC BLOOD PRESSURE: 132 MMHG | HEART RATE: 96 BPM | OXYGEN SATURATION: 97 % | DIASTOLIC BLOOD PRESSURE: 79 MMHG

## 2025-04-29 DIAGNOSIS — L02.91 ABSCESS: Primary | ICD-10-CM

## 2025-04-29 PROCEDURE — 10061 I&D ABSCESS COMP/MULTIPLE: CPT | Performed by: PHYSICIAN ASSISTANT

## 2025-04-29 PROCEDURE — 87205 SMEAR GRAM STAIN: CPT | Performed by: PHYSICIAN ASSISTANT

## 2025-04-29 PROCEDURE — 99283 EMERGENCY DEPT VISIT LOW MDM: CPT

## 2025-04-29 PROCEDURE — 87070 CULTURE OTHR SPECIMN AEROBIC: CPT | Performed by: PHYSICIAN ASSISTANT

## 2025-04-29 PROCEDURE — 99284 EMERGENCY DEPT VISIT MOD MDM: CPT | Performed by: PHYSICIAN ASSISTANT

## 2025-04-29 RX ORDER — LIDOCAINE HYDROCHLORIDE AND EPINEPHRINE 10; 10 MG/ML; UG/ML
10 INJECTION, SOLUTION INFILTRATION; PERINEURAL ONCE
Status: COMPLETED | OUTPATIENT
Start: 2025-04-29 | End: 2025-04-29

## 2025-04-29 RX ORDER — CEPHALEXIN 500 MG/1
500 CAPSULE ORAL ONCE
Status: COMPLETED | OUTPATIENT
Start: 2025-04-29 | End: 2025-04-29

## 2025-04-29 RX ORDER — ACETAMINOPHEN 325 MG/1
975 TABLET ORAL ONCE
Status: COMPLETED | OUTPATIENT
Start: 2025-04-29 | End: 2025-04-29

## 2025-04-29 RX ORDER — CEPHALEXIN 500 MG/1
500 CAPSULE ORAL EVERY 6 HOURS SCHEDULED
Qty: 20 CAPSULE | Refills: 0 | Status: SHIPPED | OUTPATIENT
Start: 2025-04-29 | End: 2025-05-04

## 2025-04-29 RX ADMIN — LIDOCAINE HYDROCHLORIDE,EPINEPHRINE BITARTRATE 10 ML: 10; .01 INJECTION, SOLUTION INFILTRATION; PERINEURAL at 15:51

## 2025-04-29 RX ADMIN — ACETAMINOPHEN 975 MG: 325 TABLET, FILM COATED ORAL at 16:36

## 2025-04-29 RX ADMIN — CEPHALEXIN 500 MG: 500 CAPSULE ORAL at 16:14

## 2025-04-29 NOTE — ED PROVIDER NOTES
Time reflects when diagnosis was documented in both MDM as applicable and the Disposition within this note       Time User Action Codes Description Comment    4/29/2025  4:22 PM Amado Nelson Add [L02.91] Abscess           ED Disposition       ED Disposition   Discharge    Condition   Stable    Date/Time   Tue Apr 29, 2025  4:22 PM    Comment   Rik Coreasonnell discharge to home/self care.                   Assessment & Plan       Medical Decision Making  20-year-old white male presenting with lower abdominal abscess of 3-day duration.  Abscess was anesthetized with 1% lidocaine with epinephrine.  Abscess was prepped and draped in usual sterile fashion with chlorhexidine.  Incision and drainage with 11 blade scalpel with small amount of purulent return.  Wound culture was sent.  Keflex p.o. and prescription for same given.  Wound care instructions reviewed.  Advised follow-up with primary care provider or ED in 2 days for wound check/packing removal.  Return precautions were reviewed.    Amount and/or Complexity of Data Reviewed  Labs: ordered.    Risk  OTC drugs.  Prescription drug management.             Medications   lidocaine-epinephrine (XYLOCAINE/EPINEPHRINE) 1 %-1:100,000 injection 10 mL (10 mL Infiltration Given 4/29/25 1551)   cephalexin (KEFLEX) capsule 500 mg (500 mg Oral Given 4/29/25 1614)   acetaminophen (TYLENOL) tablet 975 mg (975 mg Oral Given 4/29/25 1636)       ED Risk Strat Scores                    No data recorded        SBIRT 20yo+      Flowsheet Row Most Recent Value   Initial Alcohol Screen: US AUDIT-C     1. How often do you have a drink containing alcohol? 0 Filed at: 04/29/2025 1527   2. How many drinks containing alcohol do you have on a typical day you are drinking?  0 Filed at: 04/29/2025 1527   3a. Male UNDER 65: How often do you have five or more drinks on one occasion? 0 Filed at: 04/29/2025 1527   3b. FEMALE Any Age, or MALE 65+: How often do you have 4 or more drinks on one  occassion? 0 Filed at: 04/29/2025 1527   Audit-C Score 0 Filed at: 04/29/2025 1527   ALISTAIR: How many times in the past year have you...    Used an illegal drug or used a prescription medication for non-medical reasons? Never Filed at: 04/29/2025 1527                            History of Present Illness       Chief Complaint   Patient presents with    Abscess     Patient started with an abscess on his right lower abdominal area about 3 days ago. No fever.        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       Past Surgical History:   Procedure Laterality Date    INCISION AND DRAINAGE OF WOUND Left 5/13/2024    Procedure: INCISION AND DRAINAGE (I&D) GROIN;  Surgeon: Tim Ch MD;  Location: [facility];  Service: General    INCISION AND DRAINAGE OF WOUND Left 5/15/2024    Procedure: INCISION AND DRAINAGE (I&D) GROIN;  Surgeon: Tim Ch MD;  Location: [facility];  Service: General    DC I&D VULVA/PERINEAL ABSCESS N/A 5/11/2024    Procedure: INCISION AND DRAINAGE (I&D) PERINEAL ABSCESS;  Surgeon: Tim Ch MD;  Location: [facility];  Service: General    TONSILECTOMY AND ADNOIDECTOMY      2020      Family History   Problem Relation Age of Onset    Hyperlipidemia Mother     Diabetes type II Mother     Obesity Mother     Obesity Father     Drug abuse Father     Diabetes type II Maternal Aunt     Lung disease Maternal Aunt     Rheum arthritis Maternal Aunt     Fibromyalgia Maternal Aunt     Atrial fibrillation Maternal Grandmother     Hyperlipidemia Maternal Grandfather     Diabetes type II Maternal Grandfather     Stroke Maternal Grandfather     Heart disease Maternal Grandfather     Stroke Paternal Grandfather       Social History     Tobacco Use    Smoking  status: Some Days     Types: Cigarettes     Passive exposure: Yes    Smokeless tobacco: Current   Vaping Use    Vaping status: Some Days    Substances: Nicotine, THC, Flavoring   Substance Use Topics    Alcohol use: Yes     Alcohol/week: 2.0 standard drinks of alcohol     Types: 2 Shots of liquor per week     Comment: when available to him    Drug use: Yes     Types: Marijuana      E-Cigarette/Vaping    E-Cigarette Use Current Some Day User     Comments smoke cigg. when he has money       E-Cigarette/Vaping Substances    Nicotine Yes     THC Yes     CBD No     Flavoring Yes     Other No     Unknown No       I have reviewed and agree with the history as documented.     Patient is a 21 yo wm with h/o ADHD, DM, fourniere's gangrene who reports 3 day history painful swelling to lower abdomen. No fever, shaking chills. No other complaints         Review of Systems   Constitutional:  Negative for chills and fever.   Respiratory:  Negative for shortness of breath.    Cardiovascular:  Negative for chest pain.           Objective       ED Triage Vitals [04/29/25 1525]   Temperature Pulse Blood Pressure Respirations SpO2 Patient Position - Orthostatic VS   98.8 °F (37.1 °C) 96 132/79 20 97 % Lying      Temp Source Heart Rate Source BP Location FiO2 (%) Pain Score    Tympanic Monitor Left arm -- 8      Vitals      Date and Time Temp Pulse SpO2 Resp BP Pain Score FACES Pain Rating User   04/29/25 1636 -- -- -- -- -- 9 -- MB   04/29/25 1525 98.8 °F (37.1 °C) 96 97 % 20 132/79 8 -- MB            Physical Exam  Vitals and nursing note reviewed.   Constitutional:       General: He is not in acute distress.     Appearance: He is obese. He is not ill-appearing, toxic-appearing or diaphoretic.   HENT:      Head: Normocephalic and atraumatic.   Cardiovascular:      Rate and Rhythm: Normal rate and regular rhythm.      Pulses: Normal pulses.      Heart sounds: Normal heart sounds.   Pulmonary:      Effort: Pulmonary effort is normal.     "  Breath sounds: Normal breath sounds.   Neurological:      Mental Status: He is alert.         Results Reviewed       Procedure Component Value Units Date/Time    Wound culture and Gram stain [[id number]] Collected: 04/29/25 1609    Lab Status: In process Specimen: Wound from Groin Updated: 04/29/25 1613            No orders to display       Incision and drain    Date/Time: 4/29/2025 4:41 PM    Performed by: Amado Nelson PA-C  Authorized by: Amado Nelson PA-C  Universal Protocol:  procedure performed by consultantConsent: Verbal consent obtained.  Risks and benefits: risks, benefits and alternatives were discussed  Consent given by: patient  Time out: Immediately prior to procedure a \"time out\" was called to verify the correct patient, procedure, equipment, support staff and site/side marked as required.  Timeout called at: 4/29/2025 4:12 PM.  Patient understanding: patient states understanding of the procedure being performed  Patient consent: the patient's understanding of the procedure matches consent given  Procedure consent: procedure consent matches procedure scheduled  Relevant documents: relevant documents present and verified  Test results: test results available and properly labeled  Site marked: the operative site was marked  Radiology Images displayed and confirmed. If images not available, report reviewed: imaging studies available  Patient identity confirmed: verbally with patient and arm band    Patient location:  ED  Location:     Type:  Abscess    Size:  3 cm    Location:  Trunk    Trunk location:  Abdomen  Pre-procedure details:     Skin preparation:  Chlorhexidine  Anesthesia (see MAR for exact dosages):     Anesthesia method:  Local infiltration    Local anesthetic:  Lidocaine 1% WITH epi  Procedure details:     Complexity:  Simple    Incision types:  Single straight    Scalpel blade:  11    Approach:  Open    Incision depth:  Subcutaneous    Wound management:  Probed and " "deloculated    Drainage:  Purulent    Wound treatment:  Packing placed    Packing materials:  1/4 in iodoform gauze    Amount 1/4\" iodoform:  4  Post-procedure details:     Patient tolerance of procedure:  Tolerated well, no immediate complications      ED Medication and Procedure Management   Prior to Admission Medications   Prescriptions Last Dose Informant Patient Reported? Taking?   Abilify Maintena 400 MG injection   Yes No   Sig: INJECT 2 ML INTRAMUSCUALRLY EVERY 4 WEEKS AS DIRECTED   Alcohol Swabs 70 % PADS   No No   Sig: May substitute brand based on insurance coverage. Check glucose TID.   Blood Glucose Monitoring Suppl (OneTouch Verio Reflect) w/Device KIT   No No   Sig: May substitute brand based on insurance coverage. Check glucose TID.   Blood Glucose Monitoring Suppl (OneTouch Verio) w/Device KIT   No No   Sig: Use 2 (two) times a day Ok to substitute with insurance covered brand   FLUoxetine (PROzac) 10 mg capsule   No No   Sig: Take 1 capsule (10 mg total) by mouth daily   Insulin Glargine Solostar (Lantus SoloStar) 100 UNIT/ML SOPN   No No   Sig: Inject 0.3 mL (30 Units total) under the skin daily at bedtime   Insulin Pen Needle (BD Pen Needle Shani 2nd Gen) 32G X 4 MM MISC   No No   Sig: For use with insulin pen. Pharmacy may dispense brand covered by insurance.   Lancets (onetouch ultrasoft) lancets   No No   Sig: Use as instructed   OneTouch Delica Lancets 33G MISC   No No   Sig: May substitute brand based on insurance coverage. Check glucose TID.   acetaminophen (TYLENOL) 500 mg tablet   No No   Sig: Take 2 tablets (1,000 mg total) by mouth every 8 (eight) hours   albuterol (2.5 mg/3 mL) 0.083 % nebulizer solution   No No   Sig: Take 3 mL (2.5 mg total) by nebulization every 6 (six) hours as needed for wheezing or shortness of breath   dicyclomine (BENTYL) 20 mg tablet   No No   Sig: Take 1 tablet (20 mg total) by mouth 2 (two) times a day   glucose blood (OneTouch Verio) test strip   No No "   Sig: TEST BLOOD SUGAR TWICE A DAY   glucose blood (OneTouch Verio) test strip   No No   Sig: May substitute brand based on insurance coverage. Check glucose TID.   ibuprofen (MOTRIN) 800 mg tablet   No No   Sig: Take 1 tablet (800 mg total) by mouth every 8 (eight) hours as needed for mild pain   insulin aspart (NovoLOG FlexPen) 100 UNIT/ML injection pen   No No   Sig: Inject 9 Units under the skin 3 (three) times a day with meals   metFORMIN (GLUCOPHAGE-XR) 500 mg 24 hr tablet   No No   Sig: Take 2 tablets (1,000 mg total) by mouth 2 (two) times a day with meals Start with 1 pill 500mg with breakfast x 1 week, then 1 pill 500mg with breakfast and 1 pill with dinner for 1 week , then 2 pills with breakfast (1000mg) and 1 pill with dinner (500mg) for 1 week, then 1000mg twice a day going forward   naproxen (Naprosyn) 500 mg tablet   No No   Sig: Take 1 tablet (500 mg total) by mouth 2 (two) times a day with meals for 5 days   ondansetron (ZOFRAN-ODT) 4 mg disintegrating tablet   No No   Sig: Take 1 tablet (4 mg total) by mouth every 8 (eight) hours as needed for nausea or vomiting   topiramate (TOPAMAX) 50 MG tablet   Yes No   Sig: Take 50 mg by mouth 2 (two) times a day      Facility-Administered Medications: None     Discharge Medication List as of 4/29/2025  4:26 PM        START taking these medications    Details   cephalexin (KEFLEX) 500 mg capsule Take 1 capsule (500 mg total) by mouth every 6 (six) hours for 5 days, Starting Tue 4/29/2025, Until Sun 5/4/2025, Normal           CONTINUE these medications which have NOT CHANGED    Details   Abilify Maintena 400 MG injection INJECT 2 ML INTRAMUSCUALRLY EVERY 4 WEEKS AS DIRECTED, Historical Med      acetaminophen (TYLENOL) 500 mg tablet Take 2 tablets (1,000 mg total) by mouth every 8 (eight) hours, Starting Thu 2/6/2025, Normal      albuterol (2.5 mg/3 mL) 0.083 % nebulizer solution Take 3 mL (2.5 mg total) by nebulization every 6 (six) hours as needed for  wheezing or shortness of breath, Starting Wed 5/8/2024, Normal      Alcohol Swabs 70 % PADS May substitute brand based on insurance coverage. Check glucose TID., Normal      !! Blood Glucose Monitoring Suppl (OneTouch Verio Reflect) w/Device KIT May substitute brand based on insurance coverage. Check glucose TID., Normal      !! Blood Glucose Monitoring Suppl (OneTouch Verio) w/Device KIT Use 2 (two) times a day Ok to substitute with insurance covered brand, Starting Fri 10/27/2023, Normal      dicyclomine (BENTYL) 20 mg tablet Take 1 tablet (20 mg total) by mouth 2 (two) times a day, Starting Thu 2/6/2025, Normal      FLUoxetine (PROzac) 10 mg capsule Take 1 capsule (10 mg total) by mouth daily, Starting Wed 5/8/2024, Normal      !! glucose blood (OneTouch Verio) test strip TEST BLOOD SUGAR TWICE A DAY, Normal      !! glucose blood (OneTouch Verio) test strip May substitute brand based on insurance coverage. Check glucose TID., Normal      ibuprofen (MOTRIN) 800 mg tablet Take 1 tablet (800 mg total) by mouth every 8 (eight) hours as needed for mild pain, Starting Tue 8/20/2024, Normal      insulin aspart (NovoLOG FlexPen) 100 UNIT/ML injection pen Inject 9 Units under the skin 3 (three) times a day with meals, Starting Thu 11/7/2024, Normal      Insulin Glargine Solostar (Lantus SoloStar) 100 UNIT/ML SOPN Inject 0.3 mL (30 Units total) under the skin daily at bedtime, Starting Thu 11/7/2024, Normal      Insulin Pen Needle (BD Pen Needle Shani 2nd Gen) 32G X 4 MM MISC For use with insulin pen. Pharmacy may dispense brand covered by insurance., Normal      !! Lancets (onetouch ultrasoft) lancets Use as instructed, Normal      metFORMIN (GLUCOPHAGE-XR) 500 mg 24 hr tablet Take 2 tablets (1,000 mg total) by mouth 2 (two) times a day with meals Start with 1 pill 500mg with breakfast x 1 week, then 1 pill 500mg with breakfast and 1 pill with dinner for 1 week , then 2 pills with breakfast (1000mg) and 1 pill with dinner  (500 mg) for 1 week, then 1000mg twice a day going forward, Starting Thu 11/7/2024, Normal      naproxen (Naprosyn) 500 mg tablet Take 1 tablet (500 mg total) by mouth 2 (two) times a day with meals for 5 days, Starting Mon 10/14/2024, Until Sat 10/19/2024, Normal      ondansetron (ZOFRAN-ODT) 4 mg disintegrating tablet Take 1 tablet (4 mg total) by mouth every 8 (eight) hours as needed for nausea or vomiting, Starting Thu 2/6/2025, Normal      !! OneTouch Delica Lancets 33G MISC May substitute brand based on insurance coverage. Check glucose TID., Normal      topiramate (TOPAMAX) 50 MG tablet Take 50 mg by mouth 2 (two) times a day, Starting Thu 3/10/2022, Historical Med       !! - Potential duplicate medications found. Please discuss with provider.        No discharge procedures on file.  ED SEPSIS DOCUMENTATION   Time reflects when diagnosis was documented in both MDM as applicable and the Disposition within this note       Time User Action Codes Description Comment    4/29/2025  4:22 PM Amado Nelson Add [L02.91] Abscess                  Amado Nelson PA-C  04/29/25 1643       Amado Nelson PA-C  04/29/25 1648

## 2025-04-29 NOTE — DISCHARGE INSTRUCTIONS
Follow up with primary care provider in 2 days for wound check/packing removal    Keflex x 5 days    Return to ED for fever, signs of wound infection.

## 2025-05-02 LAB
BACTERIA WND AEROBE CULT: ABNORMAL
GRAM STN SPEC: ABNORMAL

## 2025-05-18 ENCOUNTER — HOSPITAL ENCOUNTER (EMERGENCY)
Facility: HOSPITAL | Age: 20
Discharge: HOME | End: 2025-05-18
Attending: EMERGENCY MEDICINE

## 2025-05-18 ENCOUNTER — APPOINTMENT (EMERGENCY)
Dept: RADIOLOGY | Facility: HOSPITAL | Age: 20
End: 2025-05-18

## 2025-05-18 VITALS
HEART RATE: 109 BPM | TEMPERATURE: 98 F | DIASTOLIC BLOOD PRESSURE: 69 MMHG | RESPIRATION RATE: 19 BRPM | SYSTOLIC BLOOD PRESSURE: 143 MMHG | OXYGEN SATURATION: 99 %

## 2025-05-18 DIAGNOSIS — R05.9 COUGH: Primary | ICD-10-CM

## 2025-05-18 PROCEDURE — 93005 ELECTROCARDIOGRAM TRACING: CPT

## 2025-05-18 PROCEDURE — 71045 X-RAY EXAM CHEST 1 VIEW: CPT

## 2025-05-18 PROCEDURE — 99284 EMERGENCY DEPT VISIT MOD MDM: CPT | Performed by: EMERGENCY MEDICINE

## 2025-05-18 PROCEDURE — 99285 EMERGENCY DEPT VISIT HI MDM: CPT

## 2025-05-19 LAB
ATRIAL RATE: 108 BPM
P AXIS: 46 DEGREES
PR INTERVAL: 140 MS
QRS AXIS: 79 DEGREES
QRSD INTERVAL: 90 MS
QT INTERVAL: 324 MS
QTC INTERVAL: 434 MS
T WAVE AXIS: 58 DEGREES
VENTRICULAR RATE: 108 BPM

## 2025-05-19 PROCEDURE — 93010 ELECTROCARDIOGRAM REPORT: CPT | Performed by: INTERNAL MEDICINE

## 2025-05-19 NOTE — DISCHARGE INSTRUCTIONS
Please refrain from smoking for the next few days to allow your lungs to heal.  Please follow-up with your primary care doctor.    Return to ER for chest pain or worsening shortness of breath.

## 2025-05-19 NOTE — ED PROVIDER NOTES
Time reflects when diagnosis was documented in both MDM as applicable and the Disposition within this note       Time User Action Codes Description Comment    5/18/2025 10:43 PM Joe Palumbo Add [R05.9] Cough           ED Disposition       ED Disposition   Discharge    Condition   Stable    Date/Time   Sun May 18, 2025 10:43 PM    Comment   Rik Marin discharge to home/self care.                   Assessment & Plan       Medical Decision Making  20-year-old male presenting to the ED today with cough and shortness of breath.  Differential thalamic with pneumonia versus pneumothorax versus arrhythmia.  Will check an EKG and chest x-ray.    EKG and chest x-ray were otherwise remarkable.  Discussed with patient smoking cessation.  Encouraged outpatient follow-up and patient was discharged home.    Amount and/or Complexity of Data Reviewed  Radiology: ordered and independent interpretation performed.        ED Course as of 05/18/25 2253   Sun May 18, 2025   2237 Procedure Note: EKG  Date/Time: 05/18/25 10:37 PM   Interpreted by: Joe Palumbo   Indications / Diagnosis: SOB  ECG reviewed by me, the ED Provider: yes   The EKG demonstrates:  Rhythm: sinus tachycardia  Intervals: normal intervals  Axis: normal axis  QRS/Blocks: normal QRS  ST Changes: No acute ST Changes, no STD/WING.         Medications - No data to display    ED Risk Strat Scores                    No data recorded        SBIRT 20yo+      Flowsheet Row Most Recent Value   Initial Alcohol Screen: US AUDIT-C     1. How often do you have a drink containing alcohol? 0 Filed at: 05/18/2025 2224   2. How many drinks containing alcohol do you have on a typical day you are drinking?  0 Filed at: 05/18/2025 2224   3a. Male UNDER 65: How often do you have five or more drinks on one occasion? 0 Filed at: 05/18/2025 2224   3b. FEMALE Any Age, or MALE 65+: How often do you have 4 or more drinks on one occassion? 0 Filed at: 05/18/2025 2224   Audit-C Score 0 Filed  at: 05/18/2025 2224   ALISTAIR: How many times in the past year have you...    Used an illegal drug or used a prescription medication for non-medical reasons? Never Filed at: 05/18/2025 2224                            History of Present Illness       Chief Complaint   Patient presents with    Shortness of Breath     Pt. Reports smoking marijuana and cigarettes all day today, felt short of breath immediately after smoking at 9am. 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       Past Surgical History:   Procedure Laterality Date    INCISION AND DRAINAGE OF WOUND Left 5/13/2024    Procedure: INCISION AND DRAINAGE (I&D) GROIN;  Surgeon: Tim Ch MD;  Location: [facility];  Service: General    INCISION AND DRAINAGE OF WOUND Left 5/15/2024    Procedure: INCISION AND DRAINAGE (I&D) GROIN;  Surgeon: Tim Ch MD;  Location: [facility];  Service: General    NE I&D VULVA/PERINEAL ABSCESS N/A 5/11/2024    Procedure: INCISION AND DRAINAGE (I&D) PERINEAL ABSCESS;  Surgeon: Tim Ch MD;  Location: [facility];  Service: General    TONSILECTOMY AND ADNOIDECTOMY      2020      Family History   Problem Relation Age of Onset    Hyperlipidemia Mother     Diabetes type II Mother     Obesity Mother     Obesity Father     Drug abuse Father     Diabetes type II Maternal Aunt     Lung disease Maternal Aunt     Rheum arthritis Maternal Aunt     Fibromyalgia Maternal Aunt     Atrial fibrillation Maternal Grandmother     Hyperlipidemia Maternal Grandfather     Diabetes type II Maternal Grandfather     Stroke Maternal Grandfather     Heart disease Maternal Grandfather     Stroke Paternal Grandfather       Social  History[1]   E-Cigarette/Vaping    E-Cigarette Use Current Some Day User     Comments smoke cigg. when he has money       E-Cigarette/Vaping Substances    Nicotine Yes     THC Yes     CBD No     Flavoring Yes     Other No     Unknown No       I have reviewed and agree with the history as documented.     20-year-old male presenting to the ED today for reported shortness of breath.  He says that has been smoking all day and was worried his pneumonia came back.  Talking in full sentences without any difficulty.  No chest pain.  Asking for a turkey sandwich.        Review of Systems   Constitutional:  Negative for chills and fever.   HENT:  Negative for hearing loss.    Eyes:  Negative for visual disturbance.   Respiratory:  Positive for cough and shortness of breath.    Cardiovascular:  Negative for chest pain.   Gastrointestinal:  Negative for abdominal pain, constipation, diarrhea, nausea and vomiting.   Genitourinary:  Negative for difficulty urinating.   Musculoskeletal:  Negative for myalgias.   Skin:  Negative for rash.   Neurological:  Negative for dizziness.   Psychiatric/Behavioral:  Negative for agitation.    All other systems reviewed and are negative.          Objective       ED Triage Vitals [05/18/25 2224]   Temperature Pulse Blood Pressure Respirations SpO2 Patient Position - Orthostatic VS   98 °F (36.7 °C) (!) 109 143/69 19 99 % Lying      Temp Source Heart Rate Source BP Location FiO2 (%) Pain Score    Oral Monitor Right arm -- --      Vitals      Date and Time Temp Pulse SpO2 Resp BP Pain Score FACES Pain Rating User   05/18/25 2224 98 °F (36.7 °C) 109 99 % 19 143/69 -- -- DD            Physical Exam  Vitals and nursing note reviewed.   Constitutional:       General: He is not in acute distress.     Appearance: Normal appearance. He is not ill-appearing.   HENT:      Head: Normocephalic and atraumatic.      Right Ear: External ear normal.      Left Ear: External ear normal.      Nose: Nose normal. No  congestion.      Mouth/Throat:      Mouth: Mucous membranes are moist.      Pharynx: Oropharynx is clear. No oropharyngeal exudate.     Eyes:      General:         Right eye: No discharge.         Left eye: No discharge.      Extraocular Movements: Extraocular movements intact.      Conjunctiva/sclera: Conjunctivae normal.      Pupils: Pupils are equal, round, and reactive to light.       Cardiovascular:      Rate and Rhythm: Normal rate and regular rhythm.      Pulses: Normal pulses.      Heart sounds: Normal heart sounds. No murmur heard.  Pulmonary:      Effort: Pulmonary effort is normal. No tachypnea, accessory muscle usage or respiratory distress.      Breath sounds: Normal breath sounds. No stridor. No wheezing, rhonchi or rales.   Abdominal:      General: Abdomen is flat. Bowel sounds are normal. There is no distension.      Palpations: Abdomen is soft.      Tenderness: There is no abdominal tenderness.     Musculoskeletal:         General: No swelling or deformity. Normal range of motion.      Cervical back: Normal range of motion. No rigidity.     Skin:     General: Skin is warm and dry.      Capillary Refill: Capillary refill takes less than 2 seconds.     Neurological:      General: No focal deficit present.      Mental Status: He is alert and oriented to person, place, and time. Mental status is at baseline.      Cranial Nerves: No cranial nerve deficit.      Motor: No weakness.      Gait: Gait normal.     Psychiatric:         Mood and Affect: Mood normal.         Behavior: Behavior normal.         Results Reviewed       None            XR chest 1 view portable   ED Interpretation by Joe Palumbo MD (05/18 [id number])   I interpreted this XR as no acute cardiopulmonary process.          Procedures    ED Medication and Procedure Management   Prior to Admission Medications   Prescriptions Last Dose Informant Patient Reported? Taking?   Abilify Maintena 400 MG injection   Yes No   Sig: INJECT 2 ML  INTRAMUSCUALRLY EVERY 4 WEEKS AS DIRECTED   Alcohol Swabs 70 % PADS   No No   Sig: May substitute brand based on insurance coverage. Check glucose TID.   Blood Glucose Monitoring Suppl (OneTouch Verio Reflect) w/Device KIT   No No   Sig: May substitute brand based on insurance coverage. Check glucose TID.   Blood Glucose Monitoring Suppl (OneTouch Verio) w/Device KIT   No No   Sig: Use 2 (two) times a day Ok to substitute with insurance covered brand   FLUoxetine (PROzac) 10 mg capsule   No No   Sig: Take 1 capsule (10 mg total) by mouth daily   Insulin Glargine Solostar (Lantus SoloStar) 100 UNIT/ML SOPN   No No   Sig: Inject 0.3 mL (30 Units total) under the skin daily at bedtime   Insulin Pen Needle (BD Pen Needle Shani 2nd Gen) 32G X 4 MM MISC   No No   Sig: For use with insulin pen. Pharmacy may dispense brand covered by insurance.   Lancets (onetouch ultrasoft) lancets   No No   Sig: Use as instructed   OneTouch Delica Lancets 33G MISC   No No   Sig: May substitute brand based on insurance coverage. Check glucose TID.   acetaminophen (TYLENOL) 500 mg tablet   No No   Sig: Take 2 tablets (1,000 mg total) by mouth every 8 (eight) hours   albuterol (2.5 mg/3 mL) 0.083 % nebulizer solution   No No   Sig: Take 3 mL (2.5 mg total) by nebulization every 6 (six) hours as needed for wheezing or shortness of breath   dicyclomine (BENTYL) 20 mg tablet   No No   Sig: Take 1 tablet (20 mg total) by mouth 2 (two) times a day   glucose blood (OneTouch Verio) test strip   No No   Sig: TEST BLOOD SUGAR TWICE A DAY   glucose blood (OneTouch Verio) test strip   No No   Sig: May substitute brand based on insurance coverage. Check glucose TID.   ibuprofen (MOTRIN) 800 mg tablet   No No   Sig: Take 1 tablet (800 mg total) by mouth every 8 (eight) hours as needed for mild pain   insulin aspart (NovoLOG FlexPen) 100 UNIT/ML injection pen   No No   Sig: Inject 9 Units under the skin 3 (three) times a day with meals   metFORMIN  (GLUCOPHAGE-XR) 500 mg 24 hr tablet   No No   Sig: Take 2 tablets (1,000 mg total) by mouth 2 (two) times a day with meals Start with 1 pill 500mg with breakfast x 1 week, then 1 pill 500mg with breakfast and 1 pill with dinner for 1 week , then 2 pills with breakfast (1000mg) and 1 pill with dinner (500mg) for 1 week, then 1000mg twice a day going forward   naproxen (Naprosyn) 500 mg tablet   No No   Sig: Take 1 tablet (500 mg total) by mouth 2 (two) times a day with meals for 5 days   ondansetron (ZOFRAN-ODT) 4 mg disintegrating tablet   No No   Sig: Take 1 tablet (4 mg total) by mouth every 8 (eight) hours as needed for nausea or vomiting   topiramate (TOPAMAX) 50 MG tablet   Yes No   Sig: Take 50 mg by mouth 2 (two) times a day      Facility-Administered Medications: None     Patient's Medications   Discharge Prescriptions    No medications on file     No discharge procedures on file.  ED SEPSIS DOCUMENTATION   Time reflects when diagnosis was documented in both MDM as applicable and the Disposition within this note       Time User Action Codes Description Comment    5/18/2025 10:43 PM Joe Palumbo Add [R05.9] Cough                      [1]   Social History  Tobacco Use    Smoking status: Some Days     Types: Cigarettes     Passive exposure: Yes    Smokeless tobacco: Current   Vaping Use    Vaping status: Some Days    Substances: Nicotine, THC, Flavoring   Substance Use Topics    Alcohol use: Yes     Alcohol/week: 2.0 standard drinks of alcohol     Types: 2 Shots of liquor per week     Comment: when available to him    Drug use: Yes     Types: Marijuana        Joe Palumbo MD  05/18/25 0598

## 2025-05-20 ENCOUNTER — HOSPITAL ENCOUNTER (EMERGENCY)
Facility: HOSPITAL | Age: 20
Discharge: HOME | End: 2025-05-20
Attending: EMERGENCY MEDICINE
Payer: COMMERCIAL

## 2025-05-20 VITALS
SYSTOLIC BLOOD PRESSURE: 141 MMHG | RESPIRATION RATE: 20 BRPM | OXYGEN SATURATION: 96 % | TEMPERATURE: 97.7 F | HEART RATE: 102 BPM | DIASTOLIC BLOOD PRESSURE: 96 MMHG

## 2025-05-20 DIAGNOSIS — L02.91 ABSCESS: Primary | ICD-10-CM

## 2025-05-20 PROCEDURE — 10061 I&D ABSCESS COMP/MULTIPLE: CPT | Performed by: EMERGENCY MEDICINE

## 2025-05-20 PROCEDURE — 99284 EMERGENCY DEPT VISIT MOD MDM: CPT | Performed by: EMERGENCY MEDICINE

## 2025-05-20 PROCEDURE — 87205 SMEAR GRAM STAIN: CPT | Performed by: EMERGENCY MEDICINE

## 2025-05-20 PROCEDURE — 99283 EMERGENCY DEPT VISIT LOW MDM: CPT

## 2025-05-20 PROCEDURE — 87070 CULTURE OTHR SPECIMN AEROBIC: CPT | Performed by: EMERGENCY MEDICINE

## 2025-05-20 RX ORDER — CEPHALEXIN 500 MG/1
500 CAPSULE ORAL EVERY 6 HOURS SCHEDULED
Qty: 28 CAPSULE | Refills: 0 | Status: ON HOLD | OUTPATIENT
Start: 2025-05-20 | End: 2025-05-27

## 2025-05-20 RX ORDER — CEPHALEXIN 500 MG/1
500 CAPSULE ORAL ONCE
Status: COMPLETED | OUTPATIENT
Start: 2025-05-20 | End: 2025-05-20

## 2025-05-20 RX ORDER — LIDOCAINE HYDROCHLORIDE 10 MG/ML
5 INJECTION, SOLUTION EPIDURAL; INFILTRATION; INTRACAUDAL; PERINEURAL ONCE
Status: COMPLETED | OUTPATIENT
Start: 2025-05-20 | End: 2025-05-20

## 2025-05-20 RX ADMIN — LIDOCAINE HYDROCHLORIDE 5 ML: 10 INJECTION, SOLUTION EPIDURAL; INFILTRATION; INTRACAUDAL at 22:21

## 2025-05-20 RX ADMIN — CEPHALEXIN 500 MG: 500 CAPSULE ORAL at 22:21

## 2025-05-21 ENCOUNTER — RESULTS FOLLOW-UP (OUTPATIENT)
Dept: EMERGENCY DEPT | Facility: HOSPITAL | Age: 20
End: 2025-05-21

## 2025-05-21 NOTE — ED PROVIDER NOTES
Time reflects when diagnosis was documented in both MDM as applicable and the Disposition within this note       Time User Action Codes Description Comment    5/20/2025 10:33 PM Mg Terrell Add [L02.91] Abscess           ED Disposition       ED Disposition   Discharge    Condition   Stable    Date/Time   Tue May 20, 2025 10:33 PM    Comment   Rik Tomas discharge to home/self care.                   Assessment & Plan       Medical Decision Making  20-year-old male with abscess in right groin    Amount and/or Complexity of Data Reviewed  Labs: ordered.    Risk  Prescription drug management.             Medications   lidocaine (PF) (XYLOCAINE-MPF) 1 % injection 5 mL (5 mL Infiltration Given 5/20/25 2221)   cephalexin (KEFLEX) capsule 500 mg (500 mg Oral Given 5/20/25 2221)       ED Risk Strat Scores                    No data recorded        SBIRT 22yo+      Flowsheet Row Most Recent Value   Initial Alcohol Screen: US AUDIT-C     1. How often do you have a drink containing alcohol? 0 Filed at: 05/20/2025 2104   2. How many drinks containing alcohol do you have on a typical day you are drinking?  0 Filed at: 05/20/2025 2104   3a. Male UNDER 65: How often do you have five or more drinks on one occasion? 0 Filed at: 05/20/2025 2104   Audit-C Score 0 Filed at: 05/20/2025 2104   ALISTAIR: How many times in the past year have you...    Used an illegal drug or used a prescription medication for non-medical reasons? Never Filed at: 05/20/2025 2104                            History of Present Illness       Chief Complaint   Patient presents with    Abscess     Pt presents with right sided groin abscess. Noticed two days ago. Reports red and palpable through his pants.        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    Gram-negative bacteremia 05/13/2024    Lung collapse     Sepsis (HCC)  05/06/2024    Sleep apnea     Viral meningitis       Past Surgical History:   Procedure Laterality Date    INCISION AND DRAINAGE OF WOUND Left 5/13/2024    Procedure: INCISION AND DRAINAGE (I&D) GROIN;  Surgeon: Tim Ch MD;  Location: [facility];  Service: General    INCISION AND DRAINAGE OF WOUND Left 5/15/2024    Procedure: INCISION AND DRAINAGE (I&D) GROIN;  Surgeon: Tim Ch MD;  Location: [facility];  Service: General    [place] I&D VULVA/PERINEAL ABSCESS N/A 5/11/2024    Procedure: INCISION AND DRAINAGE (I&D) PERINEAL ABSCESS;  Surgeon: Tim Ch MD;  Location: [facility];  Service: General    TONSILECTOMY AND ADNOIDECTOMY      2020      Family History   Problem Relation Age of Onset    Hyperlipidemia Mother     Diabetes type II Mother     Obesity Mother     Obesity Father     Drug abuse Father     Diabetes type II Maternal Aunt     Lung disease Maternal Aunt     Rheum arthritis Maternal Aunt     Fibromyalgia Maternal Aunt     Atrial fibrillation Maternal Grandmother     Hyperlipidemia Maternal Grandfather     Diabetes type II Maternal Grandfather     Stroke Maternal Grandfather     Heart disease Maternal Grandfather     Stroke Paternal Grandfather       Social History[1]   E-Cigarette/Vaping    E-Cigarette Use Current Some Day User     Comments smoke cigg. when he has money       E-Cigarette/Vaping Substances    Nicotine Yes     THC Yes     CBD No     Flavoring Yes     Other No     Unknown No       I have reviewed and agree with the history as documented.     20-year-old with abscess right groin.  Patient states been there for few days getting swollen and red painful when it rubs against his other leg.  No systemic symptoms patient awake and alert        Review of Systems   Constitutional:  Negative for activity change, chills, diaphoresis and fever.   HENT:  Negative for congestion, ear pain, nosebleeds, sore throat, trouble swallowing and voice change.    Eyes:  Negative for pain, discharge  and redness.   Respiratory:  Negative for apnea, cough, choking, shortness of breath, wheezing and stridor.    Cardiovascular:  Negative for chest pain and palpitations.   Gastrointestinal:  Negative for abdominal distention, abdominal pain, constipation, diarrhea, nausea and vomiting.   Endocrine: Negative for polydipsia.   Genitourinary:  Negative for difficulty urinating, dysuria, flank pain, frequency, hematuria and urgency.   Musculoskeletal:  Negative for back pain, gait problem, joint swelling, myalgias, neck pain and neck stiffness.   Skin:  Positive for wound. Negative for pallor and rash.   Neurological:  Negative for dizziness, tremors, syncope, speech difficulty, weakness, numbness and headaches.   Hematological:  Negative for adenopathy.   Psychiatric/Behavioral:  Negative for confusion, hallucinations, self-injury and suicidal ideas. The patient is not nervous/anxious.            Objective       ED Triage Vitals [05/20/25 2108]   Temperature Pulse Blood Pressure Respirations SpO2 Patient Position - Orthostatic VS   97.7 °F (36.5 °C) 102 141/96 20 96 % Sitting      Temp Source Heart Rate Source BP Location FiO2 (%) Pain Score    Tympanic Monitor Left arm -- 3      Vitals      Date and Time Temp Pulse SpO2 Resp BP Pain Score FACES Pain Rating User   05/20/25 2108 97.7 °F (36.5 °C) 102 96 % 20 141/96 3 -- CM            Physical Exam  Vitals and nursing note reviewed.   Constitutional:       General: He is not in acute distress.     Appearance: He is well-developed. He is not diaphoretic.   HENT:      Head: Normocephalic and atraumatic.      Right Ear: External ear normal.      Left Ear: External ear normal.      Nose: Nose normal.     Eyes:      Conjunctiva/sclera: Conjunctivae normal.      Pupils: Pupils are equal, round, and reactive to light.       Cardiovascular:      Rate and Rhythm: Normal rate and regular rhythm.      Heart sounds: Normal heart sounds.   Pulmonary:      Effort: Pulmonary effort is  normal.      Breath sounds: Normal breath sounds.   Abdominal:      General: Bowel sounds are normal.      Palpations: Abdomen is soft.      Tenderness: There is no abdominal tenderness.     Musculoskeletal:         General: Normal range of motion.      Cervical back: Normal range of motion and neck supple.     Skin:     General: Skin is warm and dry.     Neurological:      Mental Status: He is alert and oriented to person, place, and time.      Deep Tendon Reflexes: Reflexes are normal and symmetric.     Psychiatric:         Behavior: Behavior is cooperative.         Results Reviewed       Procedure Component Value Units Date/Time    Wound culture and Gram stain [[id number]] Collected: 05/20/25 9580    Lab Status: No result Specimen: Wound from Groin             No orders to display       Procedures    ED Medication and Procedure Management   Prior to Admission Medications   Prescriptions Last Dose Informant Patient Reported? Taking?   Abilify Maintena 400 MG injection   Yes No   Sig: INJECT 2 ML INTRAMUSCUALRLY EVERY 4 WEEKS AS DIRECTED   Alcohol Swabs 70 % PADS   No No   Sig: May substitute brand based on insurance coverage. Check glucose TID.   Blood Glucose Monitoring Suppl (OneTouch Verio Reflect) w/Device KIT   No No   Sig: May substitute brand based on insurance coverage. Check glucose TID.   Blood Glucose Monitoring Suppl (OneTouch Verio) w/Device KIT   No No   Sig: Use 2 (two) times a day Ok to substitute with insurance covered brand   FLUoxetine (PROzac) 10 mg capsule   No No   Sig: Take 1 capsule (10 mg total) by mouth daily   Insulin Glargine Solostar (Lantus SoloStar) 100 UNIT/ML SOPN   No No   Sig: Inject 0.3 mL (30 Units total) under the skin daily at bedtime   Insulin Pen Needle (BD Pen Needle Shani 2nd Gen) 32G X 4 MM MISC   No No   Sig: For use with insulin pen. Pharmacy may dispense brand covered by insurance.   Lancets (onetouch ultrasoft) lancets   No No   Sig: Use as instructed   OneTouch Delica  Lancets 33G MISC   No No   Sig: May substitute brand based on insurance coverage. Check glucose TID.   acetaminophen (TYLENOL) 500 mg tablet   No No   Sig: Take 2 tablets (1,000 mg total) by mouth every 8 (eight) hours   albuterol (2.5 mg/3 mL) 0.083 % nebulizer solution   No No   Sig: Take 3 mL (2.5 mg total) by nebulization every 6 (six) hours as needed for wheezing or shortness of breath   dicyclomine (BENTYL) 20 mg tablet   No No   Sig: Take 1 tablet (20 mg total) by mouth 2 (two) times a day   glucose blood (OneTouch Verio) test strip   No No   Sig: TEST BLOOD SUGAR TWICE A DAY   glucose blood (OneTouch Verio) test strip   No No   Sig: May substitute brand based on insurance coverage. Check glucose TID.   ibuprofen (MOTRIN) 800 mg tablet   No No   Sig: Take 1 tablet (800 mg total) by mouth every 8 (eight) hours as needed for mild pain   insulin aspart (NovoLOG FlexPen) 100 UNIT/ML injection pen   No No   Sig: Inject 9 Units under the skin 3 (three) times a day with meals   metFORMIN (GLUCOPHAGE-XR) 500 mg 24 hr tablet   No No   Sig: Take 2 tablets (1,000 mg total) by mouth 2 (two) times a day with meals Start with 1 pill 500mg with breakfast x 1 week, then 1 pill 500mg with breakfast and 1 pill with dinner for 1 week , then 2 pills with breakfast (1000mg) and 1 pill with dinner (500mg) for 1 week, then 1000mg twice a day going forward   naproxen (Naprosyn) 500 mg tablet   No No   Sig: Take 1 tablet (500 mg total) by mouth 2 (two) times a day with meals for 5 days   ondansetron (ZOFRAN-ODT) 4 mg disintegrating tablet   No No   Sig: Take 1 tablet (4 mg total) by mouth every 8 (eight) hours as needed for nausea or vomiting   topiramate (TOPAMAX) 50 MG tablet   Yes No   Sig: Take 50 mg by mouth 2 (two) times a day      Facility-Administered Medications: None     Patient's Medications   Discharge Prescriptions    CEPHALEXIN (KEFLEX) 500 MG CAPSULE    Take 1 capsule (500 mg total) by mouth every 6 (six) hours for 7  days       Start Date: 5/20/2025 End Date: 5/27/2025       Order Dose: 500 mg       Quantity: 28 capsule    Refills: 0     No discharge procedures on file.  ED SEPSIS DOCUMENTATION   Time reflects when diagnosis was documented in both MDM as applicable and the Disposition within this note       Time User Action Codes Description Comment    5/20/2025 10:33 PM Mg Terrell Add [L02.91] Abscess                    [1]   Social History  Tobacco Use    Smoking status: Some Days     Types: Cigarettes     Passive exposure: Yes    Smokeless tobacco: Current   Vaping Use    Vaping status: Some Days    Substances: Nicotine, THC, Flavoring   Substance Use Topics    Alcohol use: Yes     Alcohol/week: 2.0 standard drinks of alcohol     Types: 2 Shots of liquor per week     Comment: when available to him    Drug use: Yes     Types: Marijuana        Mg Terrell DO  05/20/25 1524           Time User Action Codes Description Comment    5/20/2025 10:33 PM Mg Terrell Add [L02.91] Abscess                    Mg Terrell DO  05/20/25 2237         [1]   Social History  Tobacco Use    Smoking status: Some Days     Current packs/day: 0.50     Types: Cigarettes     Passive exposure: Yes    Smokeless tobacco: Current    Tobacco comments:     Not interested in quitting   Vaping Use    Vaping status: Some Days    Substances: Nicotine, THC, Flavoring   Substance Use Topics    Alcohol use: Yes     Alcohol/week: 2.0 standard drinks of alcohol     Types: 2 Shots of liquor per week     Comment: when available to him    Drug use: Yes     Types: Marijuana        Mg Terrell DO  06/03/25 4504

## 2025-05-21 NOTE — ED NOTES
Patient reports that he is not taking any of his medications right now, med rec reviewed and reports off all meds.      Noemy Mcelroy RN  05/20/25 5325

## 2025-05-22 ENCOUNTER — HOSPITAL ENCOUNTER (EMERGENCY)
Facility: HOSPITAL | Age: 20
End: 2025-05-22
Attending: STUDENT IN AN ORGANIZED HEALTH CARE EDUCATION/TRAINING PROGRAM
Payer: COMMERCIAL

## 2025-05-22 ENCOUNTER — HOSPITAL ENCOUNTER (INPATIENT)
Facility: HOSPITAL | Age: 20
LOS: 5 days | Discharge: HOME | DRG: 885 | End: 2025-05-27
Attending: STUDENT IN AN ORGANIZED HEALTH CARE EDUCATION/TRAINING PROGRAM | Admitting: PSYCHIATRY & NEUROLOGY

## 2025-05-22 VITALS
TEMPERATURE: 96 F | DIASTOLIC BLOOD PRESSURE: 94 MMHG | HEART RATE: 114 BPM | RESPIRATION RATE: 18 BRPM | SYSTOLIC BLOOD PRESSURE: 136 MMHG | OXYGEN SATURATION: 96 %

## 2025-05-22 DIAGNOSIS — E11.9 TYPE 2 DIABETES MELLITUS WITHOUT COMPLICATION, WITHOUT LONG-TERM CURRENT USE OF INSULIN (HCC): ICD-10-CM

## 2025-05-22 DIAGNOSIS — Z00.8 ENCOUNTER FOR PSYCHOLOGICAL EVALUATION: ICD-10-CM

## 2025-05-22 DIAGNOSIS — F32.9 MAJOR DEPRESSIVE DISORDER WITH CURRENT ACTIVE EPISODE, UNSPECIFIED DEPRESSION EPISODE SEVERITY, UNSPECIFIED WHETHER RECURRENT: ICD-10-CM

## 2025-05-22 DIAGNOSIS — Z00.8 MEDICAL CLEARANCE FOR PSYCHIATRIC ADMISSION: Primary | ICD-10-CM

## 2025-05-22 DIAGNOSIS — F32.9 MDD (MAJOR DEPRESSIVE DISORDER): ICD-10-CM

## 2025-05-22 DIAGNOSIS — Z00.8 ENCOUNTER FOR PSYCHOLOGICAL EVALUATION: Primary | ICD-10-CM

## 2025-05-22 DIAGNOSIS — E11.65 UNCONTROLLED TYPE 2 DIABETES MELLITUS WITH HYPERGLYCEMIA (HCC): ICD-10-CM

## 2025-05-22 DIAGNOSIS — F84.0 AUTISM SPECTRUM DISORDER: ICD-10-CM

## 2025-05-22 LAB
ALBUMIN SERPL BCG-MCNC: 4.3 G/DL (ref 3.5–5)
ALP SERPL-CCNC: 89 U/L (ref 34–104)
ALT SERPL W P-5'-P-CCNC: 33 U/L (ref 7–52)
AMPHETAMINES SERPL QL SCN: NEGATIVE
ANION GAP SERPL CALCULATED.3IONS-SCNC: 15 MMOL/L (ref 4–13)
AST SERPL W P-5'-P-CCNC: 34 U/L (ref 13–39)
BACTERIA UR QL AUTO: ABNORMAL /HPF
BARBITURATES UR QL: NEGATIVE
BASOPHILS # BLD AUTO: 0.04 THOUSANDS/ÂΜL (ref 0–0.1)
BASOPHILS NFR BLD AUTO: 0 % (ref 0–1)
BENZODIAZ UR QL: NEGATIVE
BILIRUB SERPL-MCNC: 0.47 MG/DL (ref 0.2–1)
BILIRUB UR QL STRIP: ABNORMAL
BUN SERPL-MCNC: 10 MG/DL (ref 5–25)
CALCIUM SERPL-MCNC: 9.5 MG/DL (ref 8.4–10.2)
CAOX CRY URNS QL MICRO: ABNORMAL /HPF
CHLORIDE SERPL-SCNC: 98 MMOL/L (ref 96–108)
CLARITY UR: CLEAR
CO2 SERPL-SCNC: 20 MMOL/L (ref 21–32)
COCAINE UR QL: NEGATIVE
COLOR UR: ABNORMAL
CREAT SERPL-MCNC: 0.63 MG/DL (ref 0.6–1.3)
EOSINOPHIL # BLD AUTO: 0.28 THOUSAND/ÂΜL (ref 0–0.61)
EOSINOPHIL NFR BLD AUTO: 3 % (ref 0–6)
ERYTHROCYTE [DISTWIDTH] IN BLOOD BY AUTOMATED COUNT: 12.9 % (ref 11.6–15.1)
ETHANOL SERPL-MCNC: <10 MG/DL
FENTANYL UR QL SCN: NEGATIVE
GFR SERPL CREATININE-BSD FRML MDRD: 140 ML/MIN/1.73SQ M
GLUCOSE SERPL-MCNC: 153 MG/DL (ref 65–140)
GLUCOSE UR STRIP-MCNC: ABNORMAL MG/DL
HCT VFR BLD AUTO: 44.3 % (ref 36.5–49.3)
HGB BLD-MCNC: 14.6 G/DL (ref 12–17)
HGB UR QL STRIP.AUTO: NEGATIVE
HYALINE CASTS #/AREA URNS LPF: ABNORMAL /LPF
HYDROCODONE UR QL SCN: NEGATIVE
IMM GRANULOCYTES # BLD AUTO: 0.03 THOUSAND/UL (ref 0–0.2)
IMM GRANULOCYTES NFR BLD AUTO: 0 % (ref 0–2)
KETONES UR STRIP-MCNC: ABNORMAL MG/DL
LEUKOCYTE ESTERASE UR QL STRIP: ABNORMAL
LYMPHOCYTES # BLD AUTO: 3.01 THOUSANDS/ÂΜL (ref 0.6–4.47)
LYMPHOCYTES NFR BLD AUTO: 27 % (ref 14–44)
MCH RBC QN AUTO: 28.3 PG (ref 26.8–34.3)
MCHC RBC AUTO-ENTMCNC: 33 G/DL (ref 31.4–37.4)
MCV RBC AUTO: 86 FL (ref 82–98)
METHADONE UR QL: NEGATIVE
MONOCYTES # BLD AUTO: 0.5 THOUSAND/ÂΜL (ref 0.17–1.22)
MONOCYTES NFR BLD AUTO: 5 % (ref 4–12)
MUCOUS THREADS UR QL AUTO: ABNORMAL
NEUTROPHILS # BLD AUTO: 7.36 THOUSANDS/ÂΜL (ref 1.85–7.62)
NEUTS SEG NFR BLD AUTO: 65 % (ref 43–75)
NITRITE UR QL STRIP: NEGATIVE
NON-SQ EPI CELLS URNS QL MICRO: ABNORMAL /HPF
NRBC BLD AUTO-RTO: 0 /100 WBCS
OPIATES UR QL SCN: NEGATIVE
OXYCODONE+OXYMORPHONE UR QL SCN: NEGATIVE
PCP UR QL: NEGATIVE
PH UR STRIP.AUTO: 7 [PH]
PLATELET # BLD AUTO: 290 THOUSANDS/UL (ref 149–390)
PMV BLD AUTO: 10.9 FL (ref 8.9–12.7)
POTASSIUM SERPL-SCNC: 3.9 MMOL/L (ref 3.5–5.3)
PROT SERPL-MCNC: 8.8 G/DL (ref 6.4–8.4)
PROT UR STRIP-MCNC: ABNORMAL MG/DL
RBC # BLD AUTO: 5.16 MILLION/UL (ref 3.88–5.62)
RBC #/AREA URNS AUTO: ABNORMAL /HPF
SODIUM SERPL-SCNC: 133 MMOL/L (ref 135–147)
SP GR UR STRIP.AUTO: 1.02 (ref 1–1.03)
THC UR QL: POSITIVE
TSH SERPL DL<=0.05 MIU/L-ACNC: 1.89 UIU/ML (ref 0.45–4.5)
UROBILINOGEN UR STRIP-ACNC: 2 MG/DL
WBC # BLD AUTO: 11.22 THOUSAND/UL (ref 4.31–10.16)
WBC #/AREA URNS AUTO: ABNORMAL /HPF

## 2025-05-22 PROCEDURE — 80307 DRUG TEST PRSMV CHEM ANLYZR: CPT | Performed by: STUDENT IN AN ORGANIZED HEALTH CARE EDUCATION/TRAINING PROGRAM

## 2025-05-22 PROCEDURE — 36415 COLL VENOUS BLD VENIPUNCTURE: CPT | Performed by: STUDENT IN AN ORGANIZED HEALTH CARE EDUCATION/TRAINING PROGRAM

## 2025-05-22 PROCEDURE — 81001 URINALYSIS AUTO W/SCOPE: CPT | Performed by: STUDENT IN AN ORGANIZED HEALTH CARE EDUCATION/TRAINING PROGRAM

## 2025-05-22 PROCEDURE — 82077 ASSAY SPEC XCP UR&BREATH IA: CPT | Performed by: STUDENT IN AN ORGANIZED HEALTH CARE EDUCATION/TRAINING PROGRAM

## 2025-05-22 PROCEDURE — 99285 EMERGENCY DEPT VISIT HI MDM: CPT

## 2025-05-22 PROCEDURE — 99284 EMERGENCY DEPT VISIT MOD MDM: CPT | Performed by: STUDENT IN AN ORGANIZED HEALTH CARE EDUCATION/TRAINING PROGRAM

## 2025-05-22 PROCEDURE — 85025 COMPLETE CBC W/AUTO DIFF WBC: CPT | Performed by: STUDENT IN AN ORGANIZED HEALTH CARE EDUCATION/TRAINING PROGRAM

## 2025-05-22 PROCEDURE — 84443 ASSAY THYROID STIM HORMONE: CPT | Performed by: STUDENT IN AN ORGANIZED HEALTH CARE EDUCATION/TRAINING PROGRAM

## 2025-05-22 PROCEDURE — 80053 COMPREHEN METABOLIC PANEL: CPT | Performed by: STUDENT IN AN ORGANIZED HEALTH CARE EDUCATION/TRAINING PROGRAM

## 2025-05-22 RX ORDER — BENZTROPINE MESYLATE 0.5 MG/1
1 TABLET ORAL
Status: CANCELLED | OUTPATIENT
Start: 2025-05-22

## 2025-05-22 RX ORDER — AMOXICILLIN 250 MG
1 CAPSULE ORAL DAILY PRN
Status: CANCELLED | OUTPATIENT
Start: 2025-05-22

## 2025-05-22 RX ORDER — BENZTROPINE MESYLATE 1 MG/ML
1 INJECTION, SOLUTION INTRAMUSCULAR; INTRAVENOUS
Status: CANCELLED | OUTPATIENT
Start: 2025-05-22

## 2025-05-22 RX ORDER — MAGNESIUM HYDROXIDE/ALUMINUM HYDROXICE/SIMETHICONE 120; 1200; 1200 MG/30ML; MG/30ML; MG/30ML
30 SUSPENSION ORAL EVERY 4 HOURS PRN
Status: CANCELLED | OUTPATIENT
Start: 2025-05-22

## 2025-05-22 RX ORDER — IBUPROFEN 600 MG/1
600 TABLET, FILM COATED ORAL EVERY 6 HOURS PRN
Status: DISCONTINUED | OUTPATIENT
Start: 2025-05-22 | End: 2025-05-27 | Stop reason: HOSPADM

## 2025-05-22 RX ORDER — BENZTROPINE MESYLATE 1 MG/ML
0.5 INJECTION, SOLUTION INTRAMUSCULAR; INTRAVENOUS
Status: CANCELLED | OUTPATIENT
Start: 2025-05-22

## 2025-05-22 RX ORDER — BISACODYL 10 MG
10 SUPPOSITORY, RECTAL RECTAL DAILY PRN
Status: CANCELLED | OUTPATIENT
Start: 2025-05-22

## 2025-05-22 RX ORDER — HALOPERIDOL 5 MG/1
5 TABLET ORAL
Status: CANCELLED | OUTPATIENT
Start: 2025-05-22

## 2025-05-22 RX ORDER — GINSENG 100 MG
2 CAPSULE ORAL ONCE
Status: COMPLETED | OUTPATIENT
Start: 2025-05-22 | End: 2025-05-22

## 2025-05-22 RX ORDER — POLYETHYLENE GLYCOL 3350 17 G/17G
17 POWDER, FOR SOLUTION ORAL DAILY PRN
Status: DISCONTINUED | OUTPATIENT
Start: 2025-05-22 | End: 2025-05-27 | Stop reason: HOSPADM

## 2025-05-22 RX ORDER — HALOPERIDOL 1 MG/1
1 TABLET ORAL EVERY 6 HOURS PRN
Status: DISCONTINUED | OUTPATIENT
Start: 2025-05-22 | End: 2025-05-27 | Stop reason: HOSPADM

## 2025-05-22 RX ORDER — BENZTROPINE MESYLATE 1 MG/ML
1 INJECTION, SOLUTION INTRAMUSCULAR; INTRAVENOUS
Status: DISCONTINUED | OUTPATIENT
Start: 2025-05-22 | End: 2025-05-27 | Stop reason: HOSPADM

## 2025-05-22 RX ORDER — HYDROXYZINE HYDROCHLORIDE 25 MG/1
50 TABLET, FILM COATED ORAL
Status: CANCELLED | OUTPATIENT
Start: 2025-05-22

## 2025-05-22 RX ORDER — HYDROXYZINE HYDROCHLORIDE 25 MG/1
100 TABLET, FILM COATED ORAL
Status: CANCELLED | OUTPATIENT
Start: 2025-05-22

## 2025-05-22 RX ORDER — LORAZEPAM 2 MG/ML
1 INJECTION INTRAMUSCULAR
Status: CANCELLED | OUTPATIENT
Start: 2025-05-22

## 2025-05-22 RX ORDER — BISACODYL 10 MG
10 SUPPOSITORY, RECTAL RECTAL DAILY PRN
Status: DISCONTINUED | OUTPATIENT
Start: 2025-05-22 | End: 2025-05-27 | Stop reason: HOSPADM

## 2025-05-22 RX ORDER — IBUPROFEN 400 MG/1
400 TABLET, FILM COATED ORAL EVERY 4 HOURS PRN
Status: DISCONTINUED | OUTPATIENT
Start: 2025-05-22 | End: 2025-05-27 | Stop reason: HOSPADM

## 2025-05-22 RX ORDER — IBUPROFEN 400 MG/1
800 TABLET, FILM COATED ORAL EVERY 8 HOURS PRN
Status: CANCELLED | OUTPATIENT
Start: 2025-05-22

## 2025-05-22 RX ORDER — HALOPERIDOL 5 MG/1
5 TABLET ORAL
Status: DISCONTINUED | OUTPATIENT
Start: 2025-05-22 | End: 2025-05-27 | Stop reason: HOSPADM

## 2025-05-22 RX ORDER — HALOPERIDOL 5 MG/ML
2.5 INJECTION INTRAMUSCULAR
Status: DISCONTINUED | OUTPATIENT
Start: 2025-05-22 | End: 2025-05-27 | Stop reason: HOSPADM

## 2025-05-22 RX ORDER — PROPRANOLOL HCL 20 MG
10 TABLET ORAL EVERY 8 HOURS PRN
Status: CANCELLED | OUTPATIENT
Start: 2025-05-22

## 2025-05-22 RX ORDER — HALOPERIDOL 5 MG/ML
5 INJECTION INTRAMUSCULAR
Status: CANCELLED | OUTPATIENT
Start: 2025-05-22

## 2025-05-22 RX ORDER — POLYETHYLENE GLYCOL 3350 17 G/17G
17 POWDER, FOR SOLUTION ORAL DAILY PRN
Status: CANCELLED | OUTPATIENT
Start: 2025-05-22

## 2025-05-22 RX ORDER — HALOPERIDOL 5 MG/1
2.5 TABLET ORAL
Status: DISCONTINUED | OUTPATIENT
Start: 2025-05-22 | End: 2025-05-27 | Stop reason: HOSPADM

## 2025-05-22 RX ORDER — HALOPERIDOL 5 MG/ML
5 INJECTION INTRAMUSCULAR
Status: DISCONTINUED | OUTPATIENT
Start: 2025-05-22 | End: 2025-05-27 | Stop reason: HOSPADM

## 2025-05-22 RX ORDER — IBUPROFEN 600 MG/1
600 TABLET, FILM COATED ORAL EVERY 6 HOURS PRN
Status: CANCELLED | OUTPATIENT
Start: 2025-05-22

## 2025-05-22 RX ORDER — HALOPERIDOL 5 MG/ML
2.5 INJECTION INTRAMUSCULAR
Status: CANCELLED | OUTPATIENT
Start: 2025-05-22

## 2025-05-22 RX ORDER — BENZTROPINE MESYLATE 1 MG/1
1 TABLET ORAL
Status: DISCONTINUED | OUTPATIENT
Start: 2025-05-22 | End: 2025-05-27 | Stop reason: HOSPADM

## 2025-05-22 RX ORDER — PROPRANOLOL HYDROCHLORIDE 10 MG/1
10 TABLET ORAL EVERY 8 HOURS PRN
Status: DISCONTINUED | OUTPATIENT
Start: 2025-05-22 | End: 2025-05-27 | Stop reason: HOSPADM

## 2025-05-22 RX ORDER — LORAZEPAM 2 MG/ML
2 INJECTION INTRAMUSCULAR EVERY 6 HOURS PRN
Status: DISCONTINUED | OUTPATIENT
Start: 2025-05-22 | End: 2025-05-27 | Stop reason: HOSPADM

## 2025-05-22 RX ORDER — HYDROXYZINE HYDROCHLORIDE 50 MG/1
100 TABLET, FILM COATED ORAL
Status: DISCONTINUED | OUTPATIENT
Start: 2025-05-22 | End: 2025-05-27 | Stop reason: HOSPADM

## 2025-05-22 RX ORDER — HYDROXYZINE HYDROCHLORIDE 25 MG/1
25 TABLET, FILM COATED ORAL
Status: DISCONTINUED | OUTPATIENT
Start: 2025-05-22 | End: 2025-05-27 | Stop reason: HOSPADM

## 2025-05-22 RX ORDER — HYDROXYZINE HYDROCHLORIDE 25 MG/1
25 TABLET, FILM COATED ORAL
Status: CANCELLED | OUTPATIENT
Start: 2025-05-22

## 2025-05-22 RX ORDER — LORAZEPAM 2 MG/ML
1 INJECTION INTRAMUSCULAR
Status: DISCONTINUED | OUTPATIENT
Start: 2025-05-22 | End: 2025-05-27 | Stop reason: HOSPADM

## 2025-05-22 RX ORDER — DIPHENHYDRAMINE HYDROCHLORIDE 50 MG/ML
50 INJECTION, SOLUTION INTRAMUSCULAR; INTRAVENOUS EVERY 6 HOURS PRN
Status: CANCELLED | OUTPATIENT
Start: 2025-05-22

## 2025-05-22 RX ORDER — MAGNESIUM HYDROXIDE/ALUMINUM HYDROXICE/SIMETHICONE 120; 1200; 1200 MG/30ML; MG/30ML; MG/30ML
30 SUSPENSION ORAL EVERY 4 HOURS PRN
Status: DISCONTINUED | OUTPATIENT
Start: 2025-05-22 | End: 2025-05-27 | Stop reason: HOSPADM

## 2025-05-22 RX ORDER — LORAZEPAM 2 MG/ML
2 INJECTION INTRAMUSCULAR EVERY 6 HOURS PRN
Status: CANCELLED | OUTPATIENT
Start: 2025-05-22

## 2025-05-22 RX ORDER — IBUPROFEN 400 MG/1
400 TABLET, FILM COATED ORAL EVERY 4 HOURS PRN
Status: CANCELLED | OUTPATIENT
Start: 2025-05-22

## 2025-05-22 RX ORDER — TRAZODONE HYDROCHLORIDE 50 MG/1
50 TABLET ORAL
Status: CANCELLED | OUTPATIENT
Start: 2025-05-22

## 2025-05-22 RX ORDER — BENZTROPINE MESYLATE 1 MG/ML
0.5 INJECTION, SOLUTION INTRAMUSCULAR; INTRAVENOUS
Status: DISCONTINUED | OUTPATIENT
Start: 2025-05-22 | End: 2025-05-27 | Stop reason: HOSPADM

## 2025-05-22 RX ORDER — HYDROXYZINE HYDROCHLORIDE 50 MG/1
50 TABLET, FILM COATED ORAL
Status: DISCONTINUED | OUTPATIENT
Start: 2025-05-22 | End: 2025-05-27 | Stop reason: HOSPADM

## 2025-05-22 RX ORDER — LORAZEPAM 2 MG/ML
2 INJECTION INTRAMUSCULAR
Status: DISCONTINUED | OUTPATIENT
Start: 2025-05-22 | End: 2025-05-27 | Stop reason: HOSPADM

## 2025-05-22 RX ORDER — AMOXICILLIN 250 MG
1 CAPSULE ORAL DAILY PRN
Status: DISCONTINUED | OUTPATIENT
Start: 2025-05-22 | End: 2025-05-27 | Stop reason: HOSPADM

## 2025-05-22 RX ORDER — TRAZODONE HYDROCHLORIDE 50 MG/1
50 TABLET ORAL
Status: DISCONTINUED | OUTPATIENT
Start: 2025-05-22 | End: 2025-05-27 | Stop reason: HOSPADM

## 2025-05-22 RX ORDER — HALOPERIDOL 1 MG/1
1 TABLET ORAL EVERY 6 HOURS PRN
Status: CANCELLED | OUTPATIENT
Start: 2025-05-22

## 2025-05-22 RX ORDER — DIPHENHYDRAMINE HYDROCHLORIDE 50 MG/ML
50 INJECTION, SOLUTION INTRAMUSCULAR; INTRAVENOUS EVERY 6 HOURS PRN
Status: DISCONTINUED | OUTPATIENT
Start: 2025-05-22 | End: 2025-05-27 | Stop reason: HOSPADM

## 2025-05-22 RX ORDER — LORAZEPAM 2 MG/ML
2 INJECTION INTRAMUSCULAR
Status: CANCELLED | OUTPATIENT
Start: 2025-05-22

## 2025-05-22 RX ORDER — HALOPERIDOL 5 MG/1
2.5 TABLET ORAL
Status: CANCELLED | OUTPATIENT
Start: 2025-05-22

## 2025-05-22 RX ORDER — IBUPROFEN 800 MG/1
800 TABLET, FILM COATED ORAL EVERY 8 HOURS PRN
Status: DISCONTINUED | OUTPATIENT
Start: 2025-05-22 | End: 2025-05-27 | Stop reason: HOSPADM

## 2025-05-22 RX ADMIN — Medication 3 MG: at 22:42

## 2025-05-22 RX ADMIN — BACITRACIN ZINC 2 SMALL APPLICATION: 500 OINTMENT TOPICAL at 17:09

## 2025-05-22 NOTE — ED PROCEDURE NOTE
"PROCEDURE  Incision and drain    Date/Time: 5/22/2025 4:52 PM    Performed by: Mg Terrell DO  Authorized by: Mg Terrell DO    Universal Protocol:  procedure performed by consultantConsent: The procedure was performed in an emergent situation. Verbal consent obtained. Written consent not obtained  Risks and benefits: risks, benefits and alternatives were discussed  Consent given by: patient  Time out: Immediately prior to procedure a \"time out\" was called to verify the correct patient, procedure, equipment, support staff and site/side marked as required.  Patient understanding: patient states understanding of the procedure being performed  Patient consent: the patient's understanding of the procedure matches consent given  Procedure consent: procedure consent matches procedure scheduled  Relevant documents: relevant documents present and verified  Test results: test results available and properly labeled  Site marked: the operative site was marked  Radiology Images displayed and confirmed. If images not available, report reviewed: imaging studies available  Patient identity confirmed: arm band    Patient location:  ED  Location:     Type:  Abscess    Location:  Anogenital    Anogenital location:  Perineum  Anesthesia (see MAR for exact dosages):     Anesthesia method:  Local infiltration    Local anesthetic:  Lidocaine 1% w/o epi  Procedure details:     Complexity:  Simple    Needle aspiration: no      Incision types:  Single straight    Scalpel blade:  11    Approach:  Open    Incision depth:  Subcutaneous    Wound management:  Probed and deloculated    Drainage:  Purulent    Drainage amount:  Copious    Wound treatment:  Packing placed    Packing materials:  1/4 in iodoform gauze  Post-procedure details:     Patient tolerance of procedure:  Tolerated well, no immediate complications       Mg Terrell DO  05/22/25 1653    "

## 2025-05-22 NOTE — ED NOTES
Patient is accepted at [facility]  Patient is accepted by Dr. Hill per [facility] @ Intake     Transportation is arranged with [facility]     Transportation is scheduled for 9pm by [facility]   Patient may go to the floor at anytime          Nurse report is to be called to [phone] prior to patient transfer.           Deana TAMEZ

## 2025-05-22 NOTE — EMTALA/ACUTE CARE TRANSFER
Critical access hospital EMERGENCY DEPARTMENT  [address] [facility] [address]  Dept: [phone]      EMTALA TRANSFER CONSENT    NAME Rik Marin                                         2005                              MRN [id number]    I have been informed of my rights regarding examination, treatment, and transfer   by Dr. Kee Mendez DO    Benefits: Specialized equipment and/or services available at the receiving facility (Include comment)________________________ ()    Risks: Potential for delay in receiving treatment, Potential deterioration of medical condition, Increased discomfort during transfer, Possible worsening of condition or death during transfer      Consent for Transfer:  I acknowledge that my medical condition has been evaluated and explained to me by the emergency department physician or other qualified medical person and/or my attending physician, who has recommended that I be transferred to the service of  Accepting Physician: Dr Hill at Accepting Facility Name, City & State : [facility]. The above potential benefits of such transfer, the potential risks associated with such transfer, and the probable risks of not being transferred have been explained to me, and I fully understand them.  The doctor has explained that, in my case, the benefits of transfer outweigh the risks.  I agree to be transferred.    I authorize the performance of emergency medical procedures and treatments upon me in both transit and upon arrival at the receiving facility.  Additionally, I authorize the release of any and all medical records to the receiving facility and request they be transported with me, if possible.  I understand that the safest mode of transportation during a medical emergency is an ambulance and that the Hospital advocates the use of this mode of transport. Risks of traveling to the receiving facility by car, including absence of medical control, life  sustaining equipment, such as oxygen, and medical personnel has been explained to me and I fully understand them.    (FRANSICO CORRECT BOX BELOW)  [  ]  I consent to the stated transfer and to be transported by ambulance/helicopter.  [  ]  I consent to the stated transfer, but refuse transportation by ambulance and accept full responsibility for my transportation by car.  I understand the risks of non-ambulance transfers and I exonerate the Hospital and its staff from any deterioration in my condition that results from this refusal.    X___________________________________________    DATE  25  TIME________  Signature of patient or legally responsible individual signing on patient behalf           RELATIONSHIP TO PATIENT_________________________          Provider Certification    NAME Rik Marin                                         2005                              MRN [id number]    A medical screening exam was performed on the above named patient.  Based on the examination:    Condition Necessitating Transfer The primary encounter diagnosis was Encounter for psychological evaluation. Diagnoses of Major depressive disorder with current active episode, unspecified depression episode severity, unspecified whether recurrent and MDD (major depressive disorder) were also pertinent to this visit.    Patient Condition: The patient has been stabilized such that within reasonable medical probability, no material deterioration of the patient condition or the condition of the unborn child(keren) is likely to result from the transfer    Reason for Transfer: Level of Care needed not available at this facility ()    Transfer Requirements: Facility [facility]   Space available and qualified personnel available for treatment as acknowledged by    Agreed to accept transfer and to provide appropriate medical treatment as acknowledged by       Dr Hill  Appropriate medical records of the examination and  treatment of the patient are provided at the time of transfer   STAFF INITIAL WHEN COMPLETED _______  Transfer will be performed by qualified personnel from    and appropriate transfer equipment as required, including the use of necessary and appropriate life support measures.    Provider Certification: I have examined the patient and explained the following risks and benefits of being transferred/refusing transfer to the patient/family:  General risk, such as traffic hazards, adverse weather conditions, rough terrain or turbulence, possible failure of equipment (including vehicle or aircraft), or consequences of actions of persons outside the control of the transport personnel      Based on these reasonable risks and benefits to the patient and/or the unborn child(keren), and based upon the information available at the time of the patient’s examination, I certify that the medical benefits reasonably to be expected from the provision of appropriate medical treatments at another medical facility outweigh the increasing risks, if any, to the individual’s medical condition, and in the case of labor to the unborn child, from effecting the transfer.    X____________________________________________ DATE 05/22/25        TIME_______      ORIGINAL - SEND TO MEDICAL RECORDS   COPY - SEND WITH PATIENT DURING TRANSFER

## 2025-05-22 NOTE — ED NOTES
Pt changed out of clothing and into  scrubs. Belongings bagged and charted and placed in [address]. Pt escorted to . Pt cooperative throughout. Security in attendance.     Shanda Jaramillo RN  05/22/25 8738

## 2025-05-22 NOTE — ED PROVIDER NOTES
Time reflects when diagnosis was documented in both MDM as applicable and the Disposition within this note       Time User Action Codes Description Comment    5/22/2025  5:04 PM Kee Mendez Add [Z00.8] Encounter for psychological evaluation     5/22/2025  6:55 PM Yoav Manjarrez Add [F32.9] Major depressive disorder with current active episode, unspecified depression episode severity, unspecified whether recurrent     5/22/2025  7:31 PM Kee Mendez Add [F32.9] MDD (major depressive disorder)           ED Disposition       ED Disposition   Transfer to Behavioral Health    [facility]   --    Date/Time   Thu May 22, 2025  5:04 PM    Comment   Rik Marin should be transferred out to  and has been medically cleared.               Assessment & Plan       Medical Decision Making  Patient is a 20 y.o. male who presents to the ED for behavioral health evaluation.  Patient is nontoxic, well-appearing.     Differential includes but is not limited to: MDD, anxiety.  As far as patient's lacerations ago, none require sutures.  Tetanus already up-to-date.    Plan: Crisis labs, crisis consult, bacitracin for wounds, reassess                   Amount and/or Complexity of Data Reviewed  Labs: ordered.    Risk  OTC drugs.  Decision regarding hospitalization.        ED Course as of 05/22/25 2157   Thu May 22, 2025   1820 Patient medically cleared for behavioral health evaluation   2115 Pt accepted. Leaving now       Medications   bacitracin topical ointment 2 small application (2 small application Topical Given 5/22/25 1709)       ED Risk Strat Scores                    No data recorded        SBIRT 20yo+      Flowsheet Row Most Recent Value   Initial Alcohol Screen: US AUDIT-C     1. How often do you have a drink containing alcohol? 0 Filed at: 05/22/2025 1651   2. How many drinks containing alcohol do you have on a typical day you are drinking?  0 Filed at: 05/22/2025 1651   Audit-C Score 0 Filed at: 05/22/2025 1651  "                           History of Present Illness       Chief Complaint   Patient presents with    Psychiatric Evaluation     Patient had an argument with mother. At some point patient grabbed kitchen knife and was threatening to hurt himself. Mom called 911. Patient presents with superficial cuts to arms.       Past Medical History[1]   Past Surgical History[2]   Family History[3]   Social History[4]   E-Cigarette/Vaping    E-Cigarette Use Current Some Day User     Comments smoke cigg. when he has money       E-Cigarette/Vaping Substances    Nicotine Yes     THC Yes     CBD No     Flavoring Yes     Other No     Unknown No       I have reviewed and agree with the history as documented.     20-year-old male who presents to the emergency room for behavioral health evaluation.  Patient not speaking to me or answering questions to majority of history obtained by EMS, police.  Apparently patient had an argument with his mother earlier today.  Grabbed a knife and cut the top of both of his arms multiple times.  Superficial.  Now presents for further evaluation.  When asked if patient feels like he needs help states \"he always needs help \"but we do not provide it for him.  States we will forget about him when he leaves.     Further history and physical limited secondary to patient cooperation      Psychiatric Evaluation      Review of Systems   Skin:  Positive for wound.   Psychiatric/Behavioral:  Positive for behavioral problems.    All other systems reviewed and are negative.          Objective       ED Triage Vitals [05/22/25 1715]   Temperature Pulse Blood Pressure Respirations SpO2 Patient Position - Orthostatic VS   (!) 96 °F (35.6 °C) (!) 114 136/94 18 96 % --      Temp Source Heart Rate Source BP Location FiO2 (%) Pain Score    Tympanic Monitor -- -- --      Vitals      Date and Time Temp Pulse SpO2 Resp BP Pain Score FACES Pain Rating User   05/22/25 1715 96 °F (35.6 °C) 114 96 % 18 136/94 -- -- OE      "       Physical Exam  Vitals and nursing note reviewed.   Constitutional:       General: He is not in acute distress.     Appearance: He is well-developed. He is not ill-appearing, toxic-appearing or diaphoretic.   HENT:      Head: Normocephalic and atraumatic.      Right Ear: External ear normal.      Left Ear: External ear normal.      Nose: Nose normal.     Eyes:      General: Lids are normal. No scleral icterus.      Cardiovascular:      Rate and Rhythm: Normal rate and regular rhythm.   Pulmonary:      Effort: Pulmonary effort is normal. No respiratory distress.   Abdominal:      Tenderness: There is no guarding.     Musculoskeletal:         General: No deformity. Normal range of motion.      Cervical back: Normal range of motion and neck supple.     Skin:     General: Skin is warm and dry.      Comments: Multiple superficial linear lacerations to the dorsal aspects of both forearms, none of which require repair.     Neurological:      General: No focal deficit present.      Mental Status: He is alert.     Psychiatric:         Mood and Affect: Affect is angry.         Speech: He is noncommunicative.         Results Reviewed       Procedure Component Value Units Date/Time    Rapid drug screen, urine [[id number]]  (Abnormal) Collected: 05/22/25 1714    Lab Status: Final result Specimen: Urine, Clean Catch Updated: 05/22/25 1752     Amph/Meth UR Negative     Barbiturate Ur Negative     Benzodiazepine Urine Negative     Cocaine Urine Negative     Methadone Urine Negative     Opiate Urine Negative     PCP Ur Negative     THC Urine Positive     Oxycodone Urine Negative     Fentanyl Urine Negative     HYDROCODONE URINE Negative    Narrative:      Presumptive report. If requested, specimen will be sent to reference lab for confirmation.  FOR MEDICAL PURPOSES ONLY.   IF CONFIRMATION NEEDED PLEASE CONTACT THE LAB WITHIN 5 DAYS.    Drug Screen Cutoff Levels:  AMPHETAMINE/METHAMPHETAMINES  1000 ng/mL  BARBITURATES     200  ng/mL  BENZODIAZEPINES     200 ng/mL  COCAINE      300 ng/mL  METHADONE      300 ng/mL  OPIATES      300 ng/mL  PHENCYCLIDINE     25 ng/mL  THC       50 ng/mL  OXYCODONE      100 ng/mL  FENTANYL      5 ng/mL  HYDROCODONE     300 ng/mL    Urine Microscopic [[id number]]  (Abnormal) Collected: 05/22/25 1714    Lab Status: Final result Specimen: Urine, Clean Catch Updated: 05/22/25 1751     RBC, UA 0-1 /hpf      WBC, UA 2-4 /hpf      Epithelial Cells Occasional /hpf      Bacteria, UA Moderate /hpf      MUCUS THREADS Moderate     Hyaline Casts, UA 4-10 /lpf      Ca Oxalate Guerda, UA Occasional /hpf     TSH [[id number]]  (Normal) Collected: 05/22/25 1707    Lab Status: Final result Specimen: Blood from Arm, Right Updated: 05/22/25 1746     TSH 3RD GENERATON 1.894 uIU/mL     UA w Reflex to Microscopic w Reflex to Culture [[id number]]  (Abnormal) Collected: 05/22/25 1714    Lab Status: Final result Specimen: Urine, Clean Catch Updated: 05/22/25 1731     Color, UA Dark Yellow     Clarity, UA Clear     Specific Gravity, UA 1.025     pH, UA 7.0     Leukocytes, UA Trace     Nitrite, UA Negative     Protein,  (2+) mg/dl      Glucose, UA 30 (3/100%) mg/dl      Ketones, UA Trace mg/dl      Urobilinogen, UA 2.0 mg/dl      Bilirubin, UA Small     Occult Blood, UA Negative    Comprehensive metabolic panel [[id number]]  (Abnormal) Collected: 05/22/25 1707    Lab Status: Final result Specimen: Blood from Arm, Right Updated: 05/22/25 1729     Sodium 133 mmol/L      Potassium 3.9 mmol/L      Chloride 98 mmol/L      CO2 20 mmol/L      ANION GAP 15 mmol/L      BUN 10 mg/dL      Creatinine 0.63 mg/dL      Glucose 153 mg/dL      Calcium 9.5 mg/dL      AST 34 U/L      ALT 33 U/L      Alkaline Phosphatase 89 U/L      Total Protein 8.8 g/dL      Albumin 4.3 g/dL      Total Bilirubin 0.47 mg/dL      eGFR 140 ml/min/1.73sq m     Narrative:      National Kidney Disease Foundation guidelines for Chronic Kidney Disease (CKD):     Stage 1 with  normal or high GFR (GFR > 90 mL/min/1.73 square meters)    Stage 2 Mild CKD (GFR = 60-89 mL/min/1.73 square meters)    Stage 3A Moderate CKD (GFR = 45-59 mL/min/1.73 square meters)    Stage 3B Moderate CKD (GFR = 30-44 mL/min/1.73 square meters)    Stage 4 Severe CKD (GFR = 15-29 mL/min/1.73 square meters)    Stage 5 End Stage CKD (GFR <15 mL/min/1.73 square meters)  Note: GFR calculation is accurate only with a steady state creatinine    Ethanol [[id number]]  (Normal) Collected: 05/22/25 1707    Lab Status: Final result Specimen: Blood from Arm, Right Updated: 05/22/25 1728     Ethanol Lvl <10 mg/dL     CBC and differential [[id number]]  (Abnormal) Collected: 05/22/25 1707    Lab Status: Final result Specimen: Blood from Arm, Right Updated: 05/22/25 1713     WBC 11.22 Thousand/uL      RBC 5.16 Million/uL      Hemoglobin 14.6 g/dL      Hematocrit 44.3 %      MCV 86 fL      MCH 28.3 pg      MCHC 33.0 g/dL      RDW 12.9 %      MPV 10.9 fL      Platelets 290 Thousands/uL      nRBC 0 /100 WBCs      Segmented % 65 %      Immature Grans % 0 %      Lymphocytes % 27 %      Monocytes % 5 %      Eosinophils Relative 3 %      Basophils Relative 0 %      Absolute Neutrophils 7.36 Thousands/µL      Absolute Immature Grans 0.03 Thousand/uL      Absolute Lymphocytes 3.01 Thousands/µL      Absolute Monocytes 0.50 Thousand/µL      Eosinophils Absolute 0.28 Thousand/µL      Basophils Absolute 0.04 Thousands/µL             No orders to display       Procedures    ED Medication and Procedure Management   Prior to Admission Medications   Prescriptions Last Dose Informant Patient Reported? Taking?   Abilify Maintena 400 MG injection Not Taking  Yes No   Sig: INJECT 2 ML INTRAMUSCUALRLY EVERY 4 WEEKS AS DIRECTED   Patient not taking: Reported on 5/22/2025   Alcohol Swabs 70 % PADS   No No   Sig: May substitute brand based on insurance coverage. Check glucose TID.   Blood Glucose Monitoring Suppl (OneTouch Verio Reflect) w/Device KIT   No No    Sig: May substitute brand based on insurance coverage. Check glucose TID.   Blood Glucose Monitoring Suppl (OneTouch Verio) w/Device KIT   No No   Sig: Use 2 (two) times a day Ok to substitute with insurance covered brand   FLUoxetine (PROzac) 10 mg capsule Not Taking  No No   Sig: Take 1 capsule (10 mg total) by mouth daily   Patient not taking: Reported on 5/22/2025   Insulin Glargine Solostar (Lantus SoloStar) 100 UNIT/ML SOPN Not Taking  No No   Sig: Inject 0.3 mL (30 Units total) under the skin daily at bedtime   Patient not taking: Reported on 5/22/2025   Insulin Pen Needle (BD Pen Needle Shani 2nd Gen) 32G X 4 MM MISC   No No   Sig: For use with insulin pen. Pharmacy may dispense brand covered by insurance.   Lancets (onetouch ultrasoft) lancets   No No   Sig: Use as instructed   OneTouch Delica Lancets 33G MISC   No No   Sig: May substitute brand based on insurance coverage. Check glucose TID.   acetaminophen (TYLENOL) 500 mg tablet   No No   Sig: Take 2 tablets (1,000 mg total) by mouth every 8 (eight) hours   albuterol (2.5 mg/3 mL) 0.083 % nebulizer solution   No No   Sig: Take 3 mL (2.5 mg total) by nebulization every 6 (six) hours as needed for wheezing or shortness of breath   cephalexin (KEFLEX) 500 mg capsule Not Taking  No No   Sig: Take 1 capsule (500 mg total) by mouth every 6 (six) hours for 7 days   Patient not taking: Reported on 5/22/2025   dicyclomine (BENTYL) 20 mg tablet Not Taking  No No   Sig: Take 1 tablet (20 mg total) by mouth 2 (two) times a day   Patient not taking: Reported on 5/22/2025   glucose blood (OneTouch Verio) test strip   No No   Sig: TEST BLOOD SUGAR TWICE A DAY   glucose blood (OneTouch Verio) test strip   No No   Sig: May substitute brand based on insurance coverage. Check glucose TID.   ibuprofen (MOTRIN) 800 mg tablet   No No   Sig: Take 1 tablet (800 mg total) by mouth every 8 (eight) hours as needed for mild pain   insulin aspart (NovoLOG FlexPen) 100 UNIT/ML  injection pen Not Taking  No No   Sig: Inject 9 Units under the skin 3 (three) times a day with meals   Patient not taking: Reported on 5/22/2025   metFORMIN (GLUCOPHAGE-XR) 500 mg 24 hr tablet Not Taking  No No   Sig: Take 2 tablets (1,000 mg total) by mouth 2 (two) times a day with meals Start with 1 pill 500mg with breakfast x 1 week, then 1 pill 500mg with breakfast and 1 pill with dinner for 1 week , then 2 pills with breakfast (1000mg) and 1 pill with dinner (500mg) for 1 week, then 1000mg twice a day going forward   Patient not taking: Reported on 5/22/2025   naproxen (Naprosyn) 500 mg tablet   No No   Sig: Take 1 tablet (500 mg total) by mouth 2 (two) times a day with meals for 5 days   ondansetron (ZOFRAN-ODT) 4 mg disintegrating tablet Not Taking  No No   Sig: Take 1 tablet (4 mg total) by mouth every 8 (eight) hours as needed for nausea or vomiting   Patient not taking: Reported on 5/22/2025   topiramate (TOPAMAX) 50 MG tablet Not Taking  Yes No   Sig: Take 50 mg by mouth 2 (two) times a day   Patient not taking: Reported on 5/22/2025      Facility-Administered Medications: None     Discharge Medication List as of 5/22/2025  9:47 PM        CONTINUE these medications which have NOT CHANGED    Details   Abilify Maintena 400 MG injection INJECT 2 ML INTRAMUSCUALRLY EVERY 4 WEEKS AS DIRECTED, Historical Med      acetaminophen (TYLENOL) 500 mg tablet Take 2 tablets (1,000 mg total) by mouth every 8 (eight) hours, Starting Thu 2/6/2025, Normal      albuterol (2.5 mg/3 mL) 0.083 % nebulizer solution Take 3 mL (2.5 mg total) by nebulization every 6 (six) hours as needed for wheezing or shortness of breath, Starting Wed 5/8/2024, Normal      Alcohol Swabs 70 % PADS May substitute brand based on insurance coverage. Check glucose TID., Normal      !! Blood Glucose Monitoring Suppl (OneTouch Verio Reflect) w/Device KIT May substitute brand based on insurance coverage. Check glucose TID., Normal      !! Blood Glucose  Monitoring Suppl (OneTouch Verio) w/Device KIT Use 2 (two) times a day Ok to substitute with insurance covered brand, Starting Fri 10/27/2023, Normal      cephalexin (KEFLEX) 500 mg capsule Take 1 capsule (500 mg total) by mouth every 6 (six) hours for 7 days, Starting Tue 5/20/2025, Until Tue 5/27/2025, Normal      dicyclomine (BENTYL) 20 mg tablet Take 1 tablet (20 mg total) by mouth 2 (two) times a day, Starting Thu 2/6/2025, Normal      FLUoxetine (PROzac) 10 mg capsule Take 1 capsule (10 mg total) by mouth daily, Starting Wed 5/8/2024, Normal      !! glucose blood (OneTouch Verio) test strip TEST BLOOD SUGAR TWICE A DAY, Normal      !! glucose blood (OneTouch Verio) test strip May substitute brand based on insurance coverage. Check glucose TID., Normal      ibuprofen (MOTRIN) 800 mg tablet Take 1 tablet (800 mg total) by mouth every 8 (eight) hours as needed for mild pain, Starting Tue 8/20/2024, Normal      insulin aspart (NovoLOG FlexPen) 100 UNIT/ML injection pen Inject 9 Units under the skin 3 (three) times a day with meals, Starting Thu 11/7/2024, Normal      Insulin Glargine Solostar (Lantus SoloStar) 100 UNIT/ML SOPN Inject 0.3 mL (30 Units total) under the skin daily at bedtime, Starting Thu 11/7/2024, Normal      Insulin Pen Needle (BD Pen Needle Shani 2nd Gen) 32G X 4 MM MISC For use with insulin pen. Pharmacy may dispense brand covered by insurance., Normal      !! Lancets (onetouch ultrasoft) lancets Use as instructed, Normal      metFORMIN (GLUCOPHAGE-XR) 500 mg 24 hr tablet Take 2 tablets (1,000 mg total) by mouth 2 (two) times a day with meals Start with 1 pill 500mg with breakfast x 1 week, then 1 pill 500mg with breakfast and 1 pill with dinner for 1 week , then 2 pills with breakfast (1000mg) and 1 pill with dinner (500 mg) for 1 week, then 1000mg twice a day going forward, Starting Thu 11/7/2024, Normal      naproxen (Naprosyn) 500 mg tablet Take 1 tablet (500 mg total) by mouth 2 (two) times  a day with meals for 5 days, Starting Mon 10/14/2024, Until Sat 10/19/2024, Normal      ondansetron (ZOFRAN-ODT) 4 mg disintegrating tablet Take 1 tablet (4 mg total) by mouth every 8 (eight) hours as needed for nausea or vomiting, Starting Thu 2/6/2025, Normal      !! OneTouch Delica Lancets 33G MISC May substitute brand based on insurance coverage. Check glucose TID., Normal      topiramate (TOPAMAX) 50 MG tablet Take 50 mg by mouth 2 (two) times a day, Starting Thu 3/10/2022, Historical Med       !! - Potential duplicate medications found. Please discuss with provider.        No discharge procedures on file.  ED SEPSIS DOCUMENTATION   Time reflects when diagnosis was documented in both MDM as applicable and the Disposition within this note       Time User Action Codes Description Comment    5/22/2025  5:04 PM Kee Mendez [Z00.8] Encounter for psychological evaluation     5/22/2025  6:55 PM Yoav Manjarrez [F32.9] Major depressive disorder with current active episode, unspecified depression episode severity, unspecified whether recurrent     5/22/2025  7:31 PM Kee Mendez [F32.9] MDD (major depressive disorder)                    [1]   Past Medical History:  Diagnosis Date    ADHD (attention deficit hyperactivity disorder) 05/11/2024    Diabetes mellitus (HCC)     5/2023    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    [2]   Past Surgical History:  Procedure Laterality Date    INCISION AND DRAINAGE OF WOUND Left 5/13/2024    Procedure: INCISION AND DRAINAGE (I&D) GROIN;  Surgeon: Tim Ch MD;  Location: [facility];  Service: General    INCISION AND DRAINAGE OF WOUND Left 5/15/2024    Procedure: INCISION AND DRAINAGE (I&D) GROIN;  Surgeon: Tim Ch MD;  Location: [facility];  Service: General    MN I&D VULVA/PERINEAL ABSCESS N/A 5/11/2024    Procedure: INCISION AND  DRAINAGE (I&D) PERINEAL ABSCESS;  Surgeon: Tim Ch MD;  Location: [facility];  Service: General    TONSILECTOMY AND ADNOIDECTOMY      2020   [3]   Family History  Problem Relation Name Age of Onset    Hyperlipidemia Mother      Diabetes type II Mother      Obesity Mother      Obesity Father      Drug abuse Father      Diabetes type II Maternal Aunt      Lung disease Maternal Aunt      Rheum arthritis Maternal Aunt      Fibromyalgia Maternal Aunt      Atrial fibrillation Maternal Grandmother      Hyperlipidemia Maternal Grandfather      Diabetes type II Maternal Grandfather      Stroke Maternal Grandfather      Heart disease Maternal Grandfather      Stroke Paternal Grandfather     [4]   Social History  Tobacco Use    Smoking status: Some Days     Types: Cigarettes     Passive exposure: Yes    Smokeless tobacco: Current   Vaping Use    Vaping status: Some Days    Substances: Nicotine, THC, Flavoring   Substance Use Topics    Alcohol use: Yes     Alcohol/week: 2.0 standard drinks of alcohol     Types: 2 Shots of liquor per week     Comment: when available to him    Drug use: Yes     Types: Marijuana        Kee Mendez, DO  05/22/25 2474

## 2025-05-22 NOTE — ED NOTES
21 y/o male presented to the ED by Tamra LOGAN. Patient had an argument with mother. At some point patient grabbed kitchen knife and was threatening to hurt himself. Mom called 911. Patient presents with superficial cuts to arms. Patient is well known to ED. PES went into speak with the patient to complete the crisis and safety assessment. The patient stated he snapped today. Patient said he lost control after he had a fight with his mom and sister. The patient took a kitchen knife and cut both his arms. PES asked the patient what was his intent when cutting himself the patient responded with he was not sure. The patient stated he was really upset and seeing red. The patient stated he has been a having SI for a bit now. The patient reports having HI ( target is his sister) no plan. The patient denies AVH, Paranoia. The patient reports having poor sleep and a poor appetite. The patient has hx of inpatient treatment. The patient stated he hasn't been taking his medications due to not having any insurance. The patient wants to stay voluntarily for treatment. Patient does not have any health insurance 201 signed and sent to intake for review       Deana TAMEZ

## 2025-05-23 PROBLEM — F12.10 MILD TETRAHYDROCANNABINOL (THC) ABUSE: Status: ACTIVE | Noted: 2025-05-23

## 2025-05-23 PROBLEM — F84.0 AUTISM SPECTRUM DISORDER: Status: ACTIVE | Noted: 2025-05-23

## 2025-05-23 PROBLEM — Z00.8 MEDICAL CLEARANCE FOR PSYCHIATRIC ADMISSION: Status: ACTIVE | Noted: 2025-05-23

## 2025-05-23 LAB
GLUCOSE SERPL-MCNC: 204 MG/DL (ref 65–140)
GLUCOSE SERPL-MCNC: 245 MG/DL (ref 65–140)
GLUCOSE SERPL-MCNC: 293 MG/DL (ref 65–140)

## 2025-05-23 PROCEDURE — 99253 IP/OBS CNSLTJ NEW/EST LOW 45: CPT | Performed by: PHYSICIAN ASSISTANT

## 2025-05-23 PROCEDURE — 99223 1ST HOSP IP/OBS HIGH 75: CPT | Performed by: PSYCHIATRY & NEUROLOGY

## 2025-05-23 PROCEDURE — 82948 REAGENT STRIP/BLOOD GLUCOSE: CPT

## 2025-05-23 PROCEDURE — 86780 TREPONEMA PALLIDUM: CPT | Performed by: PSYCHIATRY & NEUROLOGY

## 2025-05-23 RX ORDER — SULFAMETHOXAZOLE AND TRIMETHOPRIM 800; 160 MG/1; MG/1
1 TABLET ORAL EVERY 12 HOURS SCHEDULED
Status: DISCONTINUED | OUTPATIENT
Start: 2025-05-23 | End: 2025-05-27 | Stop reason: HOSPADM

## 2025-05-23 RX ORDER — INSULIN GLARGINE 100 [IU]/ML
15 INJECTION, SOLUTION SUBCUTANEOUS
Status: DISCONTINUED | OUTPATIENT
Start: 2025-05-23 | End: 2025-05-25

## 2025-05-23 RX ORDER — INSULIN LISPRO 100 [IU]/ML
5 INJECTION, SOLUTION INTRAVENOUS; SUBCUTANEOUS
Status: DISCONTINUED | OUTPATIENT
Start: 2025-05-23 | End: 2025-05-25

## 2025-05-23 RX ORDER — ARIPIPRAZOLE 5 MG/1
5 TABLET ORAL DAILY
Status: DISCONTINUED | OUTPATIENT
Start: 2025-05-23 | End: 2025-05-27 | Stop reason: HOSPADM

## 2025-05-23 RX ORDER — INSULIN LISPRO 100 [IU]/ML
1-6 INJECTION, SOLUTION INTRAVENOUS; SUBCUTANEOUS
Status: DISCONTINUED | OUTPATIENT
Start: 2025-05-23 | End: 2025-05-27 | Stop reason: HOSPADM

## 2025-05-23 RX ORDER — TOPIRAMATE 25 MG/1
25 TABLET, FILM COATED ORAL 2 TIMES DAILY
Status: DISCONTINUED | OUTPATIENT
Start: 2025-05-23 | End: 2025-05-27 | Stop reason: HOSPADM

## 2025-05-23 RX ADMIN — INSULIN LISPRO 2 UNITS: 100 INJECTION, SOLUTION INTRAVENOUS; SUBCUTANEOUS at 17:08

## 2025-05-23 RX ADMIN — HYDROXYZINE HYDROCHLORIDE 100 MG: 50 TABLET ORAL at 15:45

## 2025-05-23 RX ADMIN — ARIPIPRAZOLE 5 MG: 5 TABLET ORAL at 08:34

## 2025-05-23 RX ADMIN — TOPIRAMATE 25 MG: 25 TABLET, FILM COATED ORAL at 08:34

## 2025-05-23 RX ADMIN — NICOTINE POLACRILEX 4 MG: 4 GUM, CHEWING BUCCAL at 09:27

## 2025-05-23 RX ADMIN — INSULIN LISPRO 4 UNITS: 100 INJECTION, SOLUTION INTRAVENOUS; SUBCUTANEOUS at 11:45

## 2025-05-23 RX ADMIN — TOPIRAMATE 25 MG: 25 TABLET, FILM COATED ORAL at 17:11

## 2025-05-23 RX ADMIN — INSULIN LISPRO 5 UNITS: 100 INJECTION, SOLUTION INTRAVENOUS; SUBCUTANEOUS at 11:45

## 2025-05-23 RX ADMIN — HALOPERIDOL 1 MG: 1 TABLET ORAL at 15:14

## 2025-05-23 RX ADMIN — SULFAMETHOXAZOLE AND TRIMETHOPRIM 1 TABLET: 800; 160 TABLET ORAL at 21:40

## 2025-05-23 RX ADMIN — INSULIN LISPRO 5 UNITS: 100 INJECTION, SOLUTION INTRAVENOUS; SUBCUTANEOUS at 17:11

## 2025-05-23 RX ADMIN — INSULIN GLARGINE 15 UNITS: 100 INJECTION, SOLUTION SUBCUTANEOUS at 21:44

## 2025-05-23 RX ADMIN — SULFAMETHOXAZOLE AND TRIMETHOPRIM 1 TABLET: 800; 160 TABLET ORAL at 08:34

## 2025-05-23 RX ADMIN — FLUOXETINE HYDROCHLORIDE 20 MG: 20 CAPSULE ORAL at 08:34

## 2025-05-23 RX ADMIN — INSULIN LISPRO 5 UNITS: 100 INJECTION, SOLUTION INTRAVENOUS; SUBCUTANEOUS at 08:33

## 2025-05-23 RX ADMIN — TRAZODONE HYDROCHLORIDE 50 MG: 50 TABLET ORAL at 21:40

## 2025-05-23 RX ADMIN — INSULIN LISPRO 3 UNITS: 100 INJECTION, SOLUTION INTRAVENOUS; SUBCUTANEOUS at 08:33

## 2025-05-23 NOTE — PLAN OF CARE
Problem: Ineffective Coping  Goal: Cooperates with admission process  Description: Interventions:   - Complete admission process  Outcome: Completed  Goal: Identifies ineffective coping skills  Outcome: Progressing  Goal: Identifies healthy coping skills  Outcome: Progressing  Goal: Demonstrates healthy coping skills  Outcome: Progressing  Goal: Participates in unit activities  Description: Interventions:  - Provide therapeutic environment   - Provide required programming   - Redirect inappropriate behaviors   Outcome: Progressing  Goal: Patient/Family participate in treatment and DC plans  Description: Interventions:  - Provide therapeutic environment  Outcome: Progressing  Goal: Patient/Family verbalizes awareness of resources  Outcome: Progressing  Goal: Understands least restrictive measures  Description: Interventions:  - Utilize least restrictive behavior  Outcome: Progressing  Goal: Free from restraint events  Description: - Utilize least restrictive measures   - Provide behavioral interventions   - Redirect inappropriate behaviors   Outcome: Progressing     Problem: Depression  Goal: Treatment Goal: Demonstrate behavioral control of depressive symptoms, verbalize feelings of improved mood/affect, and adopt new coping skills prior to discharge  Outcome: Progressing     Problem: Anxiety  Goal: Anxiety is at manageable level  Description: Interventions:  - Assess and monitor patient's anxiety level.   - Monitor for signs and symptoms (heart palpitations, chest pain, shortness of breath, headaches, nausea, feeling jumpy, restlessness, irritable, apprehensive).   - Collaborate with interdisciplinary team and initiate plan and interventions as ordered.  - [place] patient to unit/surroundings  - Explain treatment plan  - Encourage participation in care  - Encourage verbalization of concerns/fears  - Identify coping mechanisms  - Assist in developing anxiety-reducing skills  - Administer/offer alternative  therapies  - Limit or eliminate stimulants  Outcome: Progressing     Problem: Risk for Violence/Aggression Toward Others  Goal: Treatment Goal: Refrain from acts of violence/aggression during length of stay, and demonstrate improved impulse control at the time of discharge  Outcome: Progressing

## 2025-05-23 NOTE — TREATMENT PLAN
TREATMENT PLAN REVIEW - Behavioral Health Rik Marin 20 y.o. 2005 male MRN: [id number]    [facility] Room / Bed: [facility] 212/[facility] 212-02 Encounter: [id number]          Admit Date/Time:  5/22/2025  9:47 PM    Treatment Team:   MD Radha Hodgson, RN  Albina Clark, ALICE Graves, ALICE Paredes, ALICE Russo, JEN Mendoza, ALICE Preston, ALICE Small RN    Diagnosis: Principal Problem:    Mood disorder (HCC)  Active Problems:    Morbid obesity (HCC)    Uncontrolled type 2 diabetes mellitus with hyperglycemia (HCC)    Conduct disorder    ADHD (attention deficit hyperactivity disorder)    Medical clearance for psychiatric admission    Autism spectrum disorder    Mild tetrahydrocannabinol (THC) abuse      Patient Strengths/Assets: good past treatment response    Patient Barriers/Limitations: family conflict    Short Term Goals: decrease in suicidal thoughts    Long Term Goals: free of suicidal thoughts    Progress Towards Goals: starting psychiatric medications as prescribed, improving gradually    Recommended Treatment: medication management, patient medication education, group therapy, milieu therapy, continued Behavioral Health psychiatric evaluation/assessment process    Treatment Frequency: daily medication monitoring, group and milieu therapy daily, monitoring through interdisciplinary rounds, monitoring through weekly patient care conferences    Expected Discharge Date:  5-10 days    Discharge Plan: discharge to home    Treatment Plan Created/Updated By: Cristi Quiles DO

## 2025-05-23 NOTE — ASSESSMENT & PLAN NOTE
Vital signs stable at time of assessment  Per patient labs were attempted but unsuccessful this AM  EKG sinus tachycardia normal Qtc   Patient appears medically stable at this time for inpatient psychiatric treatment

## 2025-05-23 NOTE — PLAN OF CARE
Problem: PAIN - ADULT  Goal: Verbalizes/displays adequate comfort level or baseline comfort level  Description: Interventions:  - Encourage patient to monitor pain and request assistance  - Assess pain using appropriate pain scale  - Administer analgesics as ordered based on type and severity of pain and evaluate response  - Implement non-pharmacological measures as appropriate and evaluate response  - Consider cultural and social influences on pain and pain management  - Notify physician/advanced practitioner if interventions unsuccessful or patient reports new pain  - Educate patient/family on pain management process including their role and importance of  reporting pain   - Provide non-pharmacologic/complimentary pain relief interventions  Outcome: Progressing     Problem: INFECTION - ADULT  Goal: Absence or prevention of progression during hospitalization  Description: INTERVENTIONS:  - Assess and monitor for signs and symptoms of infection  - Monitor lab/diagnostic results  - Monitor all insertion sites, i.e. indwelling lines, tubes, and drains  - Monitor endotracheal if appropriate and nasal secretions for changes in amount and color  - Magna appropriate cooling/warming therapies per order  - Administer medications as ordered  - Instruct and encourage patient and family to use good hand hygiene technique  - Identify and instruct in appropriate isolation precautions for identified infection/condition  Outcome: Progressing  Goal: Absence of fever/infection during neutropenic period  Description: INTERVENTIONS:  - Monitor WBC  - Perform strict hand hygiene  - Limit to healthy visitors only  - No plants, dried, fresh or silk flowers with baum in patient room  Outcome: Progressing     Problem: SAFETY ADULT  Goal: Maintain or return to baseline ADL function  Description: INTERVENTIONS:  -  Assess patient's ability to carry out ADLs; assess patient's baseline for ADL function and identify physical deficits  which impact ability to perform ADLs (bathing, care of mouth/teeth, toileting, grooming, dressing, etc.)  - Assess/evaluate cause of self-care deficits   - Assess range of motion  - Assess patient's mobility; develop plan if impaired  - Assess patient's need for assistive devices and provide as appropriate  - Encourage maximum independence but intervene and supervise when necessary  - Involve family in performance of ADLs  - Assess for home care needs following discharge   - Consider OT consult to assist with ADL evaluation and planning for discharge  - Provide patient education as appropriate  - Monitor functional capacity and physical performance, use of AM PAC & JH-HLM   - Monitor gait, balance and fatigue with ambulation    Outcome: Progressing     Problem: DISCHARGE PLANNING  Goal: Discharge to home or other facility with appropriate resources  Description: INTERVENTIONS:  - Identify barriers to discharge w/patient and caregiver  - Arrange for needed discharge resources and transportation as appropriate  - Identify discharge learning needs (meds, wound care, etc.)  - Arrange for interpretive services to assist at discharge as needed  - Refer to Case Management Department for coordinating discharge planning if the patient needs post-hospital services based on physician/advanced practitioner order or complex needs related to functional status, cognitive ability, or social support system  Outcome: Progressing     Problem: Knowledge Deficit  Goal: Patient/family/caregiver demonstrates understanding of disease process, treatment plan, medications, and discharge instructions  Description: Complete learning assessment and assess knowledge base.  Interventions:  - Provide teaching at level of understanding  - Provide teaching via preferred learning methods  Outcome: Progressing     Problem: Ineffective Coping  Goal: Identifies ineffective coping skills  Outcome: Progressing  Goal: Identifies healthy coping  skills  Outcome: Progressing  Goal: Demonstrates healthy coping skills  Outcome: Progressing  Goal: Participates in unit activities  Description: Interventions:  - Provide therapeutic environment   - Provide required programming   - Redirect inappropriate behaviors   Outcome: Progressing  Goal: Patient/Family participate in treatment and DC plans  Description: Interventions:  - Provide therapeutic environment  Outcome: Progressing  Goal: Patient/Family verbalizes awareness of resources  Outcome: Progressing  Goal: Understands least restrictive measures  Description: Interventions:  - Utilize least restrictive behavior  Outcome: Progressing  Goal: Free from restraint events  Description: - Utilize least restrictive measures   - Provide behavioral interventions   - Redirect inappropriate behaviors   Outcome: Progressing     Problem: Depression  Goal: Treatment Goal: Demonstrate behavioral control of depressive symptoms, verbalize feelings of improved mood/affect, and adopt new coping skills prior to discharge  Outcome: Progressing     Problem: Anxiety  Goal: Anxiety is at manageable level  Description: Interventions:  - Assess and monitor patient's anxiety level.   - Monitor for signs and symptoms (heart palpitations, chest pain, shortness of breath, headaches, nausea, feeling jumpy, restlessness, irritable, apprehensive).   - Collaborate with interdisciplinary team and initiate plan and interventions as ordered.  - Central patient to unit/surroundings  - Explain treatment plan  - Encourage participation in care  - Encourage verbalization of concerns/fears  - Identify coping mechanisms  - Assist in developing anxiety-reducing skills  - Administer/offer alternative therapies  - Limit or eliminate stimulants  Outcome: Progressing     Problem: Risk for Violence/Aggression Toward Others  Goal: Treatment Goal: Refrain from acts of violence/aggression during length of stay, and demonstrate improved impulse control at the  time of discharge  Outcome: Progressing

## 2025-05-23 NOTE — NURSING NOTE
Gave patient PRN Atarax 100 mg PO for high anxiety and a Forde of 31. Patient stated PRN Haldol didn't work and was feeling angry and highly anxious. Continued care and safety rounds in progress.  Patient stated the PRN Atarax helped him feel better. Patient visible in milieu with peers watching tv and then asked to take shower.

## 2025-05-23 NOTE — PROGRESS NOTES
05/23/25 1010   Team Meeting   Meeting Type Tx Team Meeting   Initial Conference Date 05/23/25   Next Conference Date 06/21/25   Team Members Present   Team Members Present Physician;Nurse;   Physician Team Member Dr. Suh   [facility] Georgina   Patient/Family Present   Patient Present No  (Pt declined to meet with treatment team and will review treatment plan with CM during intake.)   Patient's Family Present No     Reviewed diagnosis of mood disorder.   Discussed short term goals of decrease in suicidal thoughts.  Tentative discharge for 5/30/2025.  All parties are in agreement and treatment plan was signed.
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
## 2025-05-23 NOTE — CONSULTS
"Consultation - Hospitalist   Name: Rik Marin 20 y.o. male I MRN: [id number]  Unit/Bed#: -02 I Date of Admission: 5/22/2025   Date of Service: 5/23/2025 I Hospital Day: 1   Inpatient consult for Medical Clearance for  patient  Consult performed by: Ana Russo PA-C  Consult ordered by: Whit Hill MD        Physician Requesting Evaluation: Td Bui*   Reason for Evaluation / Principal Problem: Medical clearance    Assessment & Plan  Medical clearance for psychiatric admission  Vital signs stable at time of assessment  Per patient labs were attempted but unsuccessful this AM  EKG sinus tachycardia normal Qtc   Patient appears medically stable at this time for inpatient psychiatric treatment  Uncontrolled type 2 diabetes mellitus with hyperglycemia (HCC)  Lab Results   Component Value Date    HGBA1C 11.6 (H) 05/07/2024       No results for input(s): \"POCGLU\" in the last 72 hours.    Blood Sugar Average: Last 72 hrs:  Repeat hgba1c  Appears that patient has been noncompliant with prior regimen, has not had follow up with endocrinology:  Insulin glargine 30 units qhs  Novolog 9 units TID with meals  Metformin 1000 mg BID  For now will start lantus 15 units qhs, humalog 5 units TID with meals and uptitrate as needed  SSI + accuchek  Would recommend outpt follow up with endocrinology    Morbid obesity (HCC)  BMI 46.87  Lifestyle modifications  Please contact the SecureChat role for the Hospitalist service with any questions/concerns.    Recommendations for Discharge:  Follow-up with endocrinology after discharge    Collaboration of Care: Were Recommendations Directly Discussed with Primary Treatment Team? Yes    History of Present Illness   Chief Complaint: Suicidal ideation    Rik Marin is a 20 y.o. male with a PMH of morbid obesity, 2 diabetes who presents with suicidal ideation.    Patient presented to the emergency department due to suicidal ideation, " self-harm.  Currently denies chest pain/palpitations, shortness of breath, nausea/vomiting, abdominal pain, fever/chills.    Review of Systems   Constitutional:  Negative for chills, fatigue, fever and unexpected weight change.   HENT:  Negative for congestion, sore throat and trouble swallowing.    Eyes:  Negative for photophobia, pain and visual disturbance.   Respiratory:  Negative for cough, shortness of breath and wheezing.    Cardiovascular:  Negative for chest pain, palpitations and leg swelling.   Gastrointestinal:  Negative for abdominal pain, constipation, diarrhea, nausea and vomiting.   Endocrine: Negative for polyuria.   Genitourinary:  Negative for difficulty urinating, dysuria, flank pain, hematuria and urgency.   Musculoskeletal:  Negative for back pain, myalgias, neck pain and neck stiffness.   Skin:  Negative for pallor and rash.   Neurological:  Negative for dizziness, tremors, syncope, speech difficulty, weakness, light-headedness and headaches.   Hematological:  Does not bruise/bleed easily.   Psychiatric/Behavioral:  Positive for dysphoric mood and suicidal ideas. Negative for agitation and confusion.        Historical Information   Past Medical History[1]  Past Surgical History[2]  Social History[3]  E-Cigarette/Vaping    E-Cigarette Use Current Some Day User     Comments smoke cigg. when he has money      E-Cigarette/Vaping Substances    Nicotine Yes     THC Yes     CBD No     Flavoring Yes     Other No     Unknown No      Family History[4]  Social History:  Marital Status: Single   Occupation:   Patient Pre-hospital Living Situation: Home  Patient Pre-hospital Level of Mobility: walks  Patient Pre-hospital Diet Restrictions:     Meds/Allergies   I have reviewed home medications using recent Epic encounter.  Prior to Admission medications    Medication Sig Start Date End Date Taking? Authorizing Provider   Abilify Maintena 400 MG injection INJECT 2 ML INTRAMUSCUALRLY EVERY 4 WEEKS AS  DIRECTED  Patient not taking: Reported on 5/22/2025 9/23/23   Historical Provider, MD   acetaminophen (TYLENOL) 500 mg tablet Take 2 tablets (1,000 mg total) by mouth every 8 (eight) hours  Patient not taking: Reported on 5/22/2025 2/6/25   Kanu Carter MD   albuterol (2.5 mg/3 mL) 0.083 % nebulizer solution Take 3 mL (2.5 mg total) by nebulization every 6 (six) hours as needed for wheezing or shortness of breath  Patient not taking: Reported on 5/22/2025 5/8/24   Melissa Murillo PA-C   Alcohol Swabs 70 % PADS May substitute brand based on insurance coverage. Check glucose TID.  Patient not taking: Reported on 5/22/2025 5/7/24   Betzaida Hernandez MD   Blood Glucose Monitoring Suppl (OneTouch Verio Reflect) w/Device KIT May substitute brand based on insurance coverage. Check glucose TID.  Patient not taking: Reported on 5/22/2025 5/7/24   Betzaida Hernandez MD   Blood Glucose Monitoring Suppl (OneTouch Verio) w/Device KIT Use 2 (two) times a day Ok to substitute with insurance covered brand  Patient not taking: Reported on 5/22/2025 10/27/23   Kailyn Velázquez MD   cephalexin (KEFLEX) 500 mg capsule Take 1 capsule (500 mg total) by mouth every 6 (six) hours for 7 days  Patient not taking: Reported on 5/22/2025 5/20/25 5/27/25  Mg Terrell DO   dicyclomine (BENTYL) 20 mg tablet Take 1 tablet (20 mg total) by mouth 2 (two) times a day  Patient not taking: Reported on 5/22/2025 2/6/25   Kanu Carter MD   FLUoxetine (PROzac) 10 mg capsule Take 1 capsule (10 mg total) by mouth daily  Patient not taking: Reported on 5/22/2025 5/8/24   Betzaida Hernandez MD   glucose blood (OneTouch Verio) test strip TEST BLOOD SUGAR TWICE A DAY  Patient not taking: Reported on 5/22/2025 10/30/23   Kailyn Velázquez MD   glucose blood (OneTouch Verio) test strip May substitute brand based on insurance coverage. Check glucose TID.  Patient not taking: Reported on 5/22/2025 11/7/24   Dylan Peres MD   ibuprofen (MOTRIN) 800 mg  tablet Take 1 tablet (800 mg total) by mouth every 8 (eight) hours as needed for mild pain  Patient not taking: Reported on 5/22/2025 8/20/24   Jesus Alberto Juarez MD   insulin aspart (NovoLOG FlexPen) 100 UNIT/ML injection pen Inject 9 Units under the skin 3 (three) times a day with meals  Patient not taking: Reported on 5/22/2025 11/7/24   Dylan Peres MD   Insulin Glargine Solostar (Lantus SoloStar) 100 UNIT/ML SOPN Inject 0.3 mL (30 Units total) under the skin daily at bedtime  Patient not taking: Reported on 5/22/2025 11/7/24   Dylan Peres MD   Insulin Pen Needle (BD Pen Needle Shani 2nd Gen) 32G X 4 MM MISC For use with insulin pen. Pharmacy may dispense brand covered by insurance.  Patient not taking: Reported on 5/22/2025 11/7/24   Dylan Peres MD   Lancets (onetouch ultrasoft) lancets Use as instructed  Patient not taking: Reported on 5/22/2025 11/7/24   Dylan Peres MD   metFORMIN (GLUCOPHAGE-XR) 500 mg 24 hr tablet Take 2 tablets (1,000 mg total) by mouth 2 (two) times a day with meals Start with 1 pill 500mg with breakfast x 1 week, then 1 pill 500mg with breakfast and 1 pill with dinner for 1 week , then 2 pills with breakfast (1000mg) and 1 pill with dinner (500mg) for 1 week, then 1000mg twice a day going forward  Patient not taking: Reported on 5/22/2025 11/7/24   Dylan Peres MD   naproxen (Naprosyn) 500 mg tablet Take 1 tablet (500 mg total) by mouth 2 (two) times a day with meals for 5 days 10/14/24 10/19/24  Alexia August DO   ondansetron (ZOFRAN-ODT) 4 mg disintegrating tablet Take 1 tablet (4 mg total) by mouth every 8 (eight) hours as needed for nausea or vomiting  Patient not taking: Reported on 5/22/2025 2/6/25   Kanu A Carter, MD   OneTouch Delica Lancets 33G MISC May substitute brand based on insurance coverage. Check glucose TID.  Patient not taking: Reported on 5/22/2025 5/7/24   Betzaida Hernandez MD   topiramate (TOPAMAX) 50 MG tablet Take 50  mg by mouth 2 (two) times a day  Patient not taking: Reported on 5/22/2025 3/10/22   Historical Provider, MD     No Known Allergies    Objective :  Temp:  [96 °F (35.6 °C)-98 °F (36.7 °C)] 98 °F (36.7 °C)  HR:  [104-114] 104  BP: (136-161)/(93-94) 161/93  Resp:  [18] 18  SpO2:  [96 %-98 %] 98 %  O2 Device: None (Room air)    Physical Exam  Vitals and nursing note reviewed.   Constitutional:       Appearance: Normal appearance.      Comments: No acute distress   HENT:      Head: Normocephalic.     Eyes:      General: No scleral icterus.     Extraocular Movements: Extraocular movements intact.      Conjunctiva/sclera: Conjunctivae normal.       Cardiovascular:      Rate and Rhythm: Normal rate and regular rhythm.      Heart sounds: Normal heart sounds. No murmur heard.  Pulmonary:      Effort: Pulmonary effort is normal. No respiratory distress.      Breath sounds: No wheezing, rhonchi or rales.   Abdominal:      General: Bowel sounds are normal.      Palpations: Abdomen is soft.      Tenderness: There is no abdominal tenderness. There is no guarding or rebound.     Musculoskeletal:      Cervical back: Normal range of motion.      Comments: Ambulating unit without difficulty, no edema     Skin:     General: Skin is warm and dry.     Neurological:      Mental Status: He is alert and oriented to person, place, and time.     Psychiatric:         Mood and Affect: Mood normal.         Speech: Speech normal.         Behavior: Behavior normal.            Lab Results: I have reviewed the following results:   Results from last 7 days   Lab Units 05/22/25  1707   WBC Thousand/uL 11.22*   HEMOGLOBIN g/dL 14.6   HEMATOCRIT % 44.3   PLATELETS Thousands/uL 290   SEGS PCT % 65   LYMPHO PCT % 27   MONO PCT % 5   EOS PCT % 3     Results from last 7 days   Lab Units 05/22/25  1707   SODIUM mmol/L 133*   POTASSIUM mmol/L 3.9   CHLORIDE mmol/L 98   CO2 mmol/L 20*   BUN mg/dL 10   CREATININE mg/dL 0.63   ANION GAP mmol/L 15*   CALCIUM  mg/dL 9.5   ALBUMIN g/dL 4.3   TOTAL BILIRUBIN mg/dL 0.47   ALK PHOS U/L 89   ALT U/L 33   AST U/L 34   GLUCOSE RANDOM mg/dL 153*             Lab Results   Component Value Date    HGBA1C 11.6 (H) 05/07/2024    HGBA1C 10.9 (H) 09/26/2023    HGBA1C 8.8 (H) 06/16/2023               Imaging Results Review: No pertinent imaging studies reviewed.  Other Study Results Review: EKG was reviewed.     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    [2]   Past Surgical History:  Procedure Laterality Date    INCISION AND DRAINAGE OF WOUND Left 5/13/2024    Procedure: INCISION AND DRAINAGE (I&D) GROIN;  Surgeon: Tim Ch MD;  Location: [facility];  Service: General    INCISION AND DRAINAGE OF WOUND Left 5/15/2024    Procedure: INCISION AND DRAINAGE (I&D) GROIN;  Surgeon: Tim Ch MD;  Location: [facility];  Service: General    NH I&D VULVA/PERINEAL ABSCESS N/A 5/11/2024    Procedure: INCISION AND DRAINAGE (I&D) PERINEAL ABSCESS;  Surgeon: Tim Ch MD;  Location: [facility] OR;  Service: General    TONSILECTOMY AND ADNOIDECTOMY      2020   [3]   Social History  Tobacco Use    Smoking status: Some Days     Current packs/day: 0.50     Types: Cigarettes     Passive exposure: Yes    Smokeless tobacco: Current    Tobacco comments:     Not interested in quitting   Vaping Use    Vaping status: Some Days    Substances: Nicotine, THC, Flavoring   Substance and Sexual Activity    Alcohol use: Yes     Alcohol/week: 2.0 standard drinks of alcohol     Types: 2 Shots of liquor per week     Comment: when  available to him    Drug use: Yes     Types: Marijuana    Sexual activity: Never   [4]   Family History  Problem Relation Name Age of Onset    Hyperlipidemia Mother      Diabetes type II Mother      Obesity Mother      Obesity Father      Drug abuse Father      Diabetes type II Maternal Aunt      Lung disease Maternal Aunt      Rheum arthritis Maternal Aunt      Fibromyalgia Maternal Aunt      Atrial fibrillation Maternal Grandmother      Hyperlipidemia Maternal Grandfather      Diabetes type II Maternal Grandfather      Stroke Maternal Grandfather      Heart disease Maternal Grandfather      Stroke Paternal Grandfather

## 2025-05-23 NOTE — H&P
"Psychiatric Evaluation - Behavioral Health   Name: Rik Marin 20 y.o. male I MRN: [id number]  Unit/Bed#: U 212-02 I Date of Admission: 5/22/2025   Date of Service: 5/23/2025 I Hospital Day: 1    Assessment & Plan  Mood disorder (HCC)  Restart Prozac 20mg po QD  Restart Topamax 25mg po BID  Restart Abilify 5mg po QD   Plan to transition to Abilify Maintenna AMADO prior to discharge  Morbid obesity (HCC)    Uncontrolled type 2 diabetes mellitus with hyperglycemia (HCC)  Lab Results   Component Value Date    HGBA1C 11.6 (H) 05/07/2024       No results for input(s): \"POCGLU\" in the last 72 hours.    Blood Sugar Average: Last 72 hrs:      Conduct disorder    ADHD (attention deficit hyperactivity disorder)    Medical clearance for psychiatric admission    Autism spectrum disorder    Mild tetrahydrocannabinol (THC) abuse          Current Facility-Administered Medications:     aluminum-magnesium hydroxide-simethicone (MAALOX) oral suspension 30 mL, Q4H PRN    ARIPiprazole (ABILIFY) tablet 5 mg, Daily    haloperidol lactate (HALDOL) injection 2.5 mg, Q4H PRN Max 4/day **AND** LORazepam (ATIVAN) injection 1 mg, Q4H PRN Max 4/day **AND** benztropine (COGENTIN) injection 0.5 mg, Q4H PRN Max 4/day    haloperidol lactate (HALDOL) injection 5 mg, Q4H PRN Max 4/day **AND** LORazepam (ATIVAN) injection 2 mg, Q4H PRN Max 4/day **AND** benztropine (COGENTIN) injection 1 mg, Q4H PRN Max 4/day    benztropine (COGENTIN) injection 1 mg, Q4H PRN Max 6/day    benztropine (COGENTIN) tablet 1 mg, Q4H PRN Max 6/day    bisacodyl (DULCOLAX) rectal suppository 10 mg, Daily PRN    hydrOXYzine HCL (ATARAX) tablet 50 mg, Q6H PRN Max 4/day **OR** diphenhydrAMINE (BENADRYL) injection 50 mg, Q6H PRN    FLUoxetine (PROzac) capsule 20 mg, Daily    haloperidol (HALDOL) tablet 1 mg, Q6H PRN    haloperidol (HALDOL) tablet 2.5 mg, Q4H PRN Max 4/day    haloperidol (HALDOL) tablet 5 mg, Q4H PRN Max 4/day    hydrOXYzine HCL (ATARAX) tablet 100 mg, Q6H " PRN Max 4/day **OR** LORazepam (ATIVAN) injection 2 mg, Q6H PRN    hydrOXYzine HCL (ATARAX) tablet 25 mg, Q6H PRN Max 4/day    ibuprofen (MOTRIN) tablet 400 mg, Q4H PRN    ibuprofen (MOTRIN) tablet 600 mg, Q6H PRN    ibuprofen (MOTRIN) tablet 800 mg, Q8H PRN    insulin glargine (LANTUS) subcutaneous injection 15 Units 0.15 mL, HS    insulin lispro (HumALOG/ADMELOG) 100 units/mL subcutaneous injection 1-6 Units, TID AC **AND** Fingerstick Glucose (POCT), TID AC    insulin lispro (HumALOG/ADMELOG) 100 units/mL subcutaneous injection 1-6 Units, HS    insulin lispro (HumALOG/ADMELOG) 100 units/mL subcutaneous injection 5 Units, TID With Meals    nicotine polacrilex (NICORETTE) gum 4 mg, Q2H PRN    polyethylene glycol (MIRALAX) packet 17 g, Daily PRN    propranolol (INDERAL) tablet 10 mg, Q8H PRN    senna-docusate sodium (SENOKOT S) 8.6-50 mg per tablet 1 tablet, Daily PRN    sulfamethoxazole-trimethoprim (BACTRIM DS) 800-160 mg per tablet 1 tablet, Q12H TANESHA    topiramate (TOPAMAX) tablet 25 mg, BID    traZODone (DESYREL) tablet 50 mg, HS PRN    Risks/Benefits of Treatment:     Risks, benefits, and possible side effects of medications explained to patient and patient verbalizes understanding and agreement for treatment.    Treatment Planning:     All current active medications have been reviewed.  Continue to monitor response to treatment and assess for potential side effects of medications.  Encourage group therapy, milieu therapy and occupational therapy  Collaboration with medical service for medical comorbidities as indicated.  Behavioral Health checks for safety monitoring.  Estimated Discharge Day:   Legal Status: 201    Psychiatric Evaluation    Chief Complaint: SI    History of Present Illness     Per ED provider note:  20-year-old male who presents to the emergency room for behavioral health evaluation.  Patient not speaking to me or answering questions to majority of history obtained by EMS, police.  Apparently  "patient had an argument with his mother earlier today.  Grabbed a knife and cut the top of both of his arms multiple times.  Superficial.  Now presents for further evaluation.  When asked if patient feels like he needs help states \"he always needs help \"but we do not provide it for him.  States we will forget about him when he leaves.     Per Crisis worker note:  21 y/o male presented to the ED by Tamra LOGAN. Patient had an argument with mother. At some point patient grabbed kitchen knife and was threatening to hurt himself. Mom called 911. Patient presents with superficial cuts to arms. Patient is well known to ED. PES went into speak with the patient to complete the crisis and safety assessment. The patient stated he snapped today. Patient said he lost control after he had a fight with his mom and sister. The patient took a kitchen knife and cut both his arms. PES asked the patient what was his intent when cutting himself the patient responded with he was not sure. The patient stated he was really upset and seeing red. The patient stated he has been a having SI for a bit now. The patient reports having HI ( target is his sister) no plan. The patient denies AVH, Paranoia. The patient reports having poor sleep and a poor appetite. The patient has hx of inpatient treatment. The patient stated he hasn't been taking his medications due to not having any insurance. The patient wants to stay voluntarily for treatment. Patient does not have any health insurance 201 signed and sent to intake for review     On admission to Inpatient Psychiatric Unit:  Patient is a 20-year-old white male with a past psychiatric history of depression, autism spectrum disorder, conduct disorder, and ADHD, with an extensive history of psychiatric utilization, who presents voluntarily to inpatient BHU after grabbing a kitchen knife and superficially cutting himself.    Symptoms prior to hospitalization include: Getting an argument with his " "mom and sister, proceeding to grab a kitchen knife and applying superficial cuts to his bilateral forearms, agitation, irritability, inability to control impulses, ADHD symptoms, and generalized anxiety.  Symptom severity is rated as moderate.  Timeline is acute on chronic.  Mitigating factors are family support.  Exacerbating stressors are inability to obtain psychotropic medications due to being uninsured.    On initial psychiatric evaluation today, the patient clarifies that he was not trying to kill himself and that he was just trying to \"feel better.\"  He states that he has \"a few\" suicide attempts in the past and has previous episodes of agitation and violence because he is unable to control his impulses and control his agitation.  The patient states that he was previously on Prozac, Abilify Maintena long-acting injectable, and Topamax, and this combination helped best control his mood and his anger.  Unfortunately, the patient was unable to stay on these medications or go to any psychiatric facility because he did not have insurance.  He is requesting to restart them.  He states that he thinks he has bipolar because \"1 minute I feel happy in the next I feel upset.\"  He otherwise provides no symptomatology or natural history that is consistent with bipolar spectrum disease.  He denies hallucinations.  He denies current suicidal or homicidal ideation.    Psychiatric Review Of Systems:  Medication side effects: none  Sleep: no change  Appetite: no change  Hygiene: able to tend to instrumental and basic ADLs  Anxiety Symptoms: +  Psychotic Symptoms: denies  Depression Symptoms: denies  Manic Symptoms: denies  PTSD Symptoms: denies  Suicidal Thoughts: denies  Homicidal Thoughts: denies    Historical Information     Past Psychiatric History:   Inpatient Treatment: 13 [facility] stays  Outpatient Treatment: Previously saw an OP psychiatrist at [facility]  Past Suicide Attempts: Pertinent hx documented as per " HPI  Past Violent Behavior: Pertinent hx documented as per HPI  Past Psychiatric Medication Trials: Topamax, Abilify, Prozac    Substance Abuse History:  Social History     Substance and Sexual Activity   Alcohol Use Yes    Alcohol/week: 2.0 standard drinks of alcohol    Types: 2 Shots of liquor per week    Comment: when available to him     Social History     Substance and Sexual Activity   Drug Use Yes    Types: Marijuana     THC: 3-4x/day, $300 worth per day, 7x/wk    I have assessed this patient for substance use within the past 12 months.    Family Psychiatric History:  Family History[1]    Social History:  Highest education: HS  Currently living: With mom and 4 siblings  Relationships: single  Children: none  Occupation: none  -Legal hx  - hx    Rest of social history as per below:    Social History     Socioeconomic History    Marital status: Single     Spouse name: Not on file    Number of children: 0    Years of education: Not on file    Highest education level: 12th grade   Occupational History    Not on file   Tobacco Use    Smoking status: Some Days     Current packs/day: 0.50     Types: Cigarettes     Passive exposure: Yes    Smokeless tobacco: Current    Tobacco comments:     Not interested in quitting   Vaping Use    Vaping status: Some Days    Substances: Nicotine, THC, Flavoring   Substance and Sexual Activity    Alcohol use: Yes     Alcohol/week: 2.0 standard drinks of alcohol     Types: 2 Shots of liquor per week     Comment: when available to him    Drug use: Yes     Types: Marijuana    Sexual activity: Never   Other Topics Concern    Not on file   Social History Narrative    Not on file     Social Drivers of Health     Financial Resource Strain: Low Risk  (7/26/2024)    Received from [facility]    Overall Financial Resource Strain (CARDIA)     Difficulty of Paying Living Expenses: Not hard at all   Food Insecurity: No Food Insecurity (5/22/2025)    Nursing - Inadequate Food Risk  Classification     Worried About Running Out of Food in the Last Year: Not on file     Ran Out of Food in the Last Year: Not on file     Ran Out of Food in the Last Year: Never true   Transportation Needs: No Transportation Needs (5/22/2025)    Nursing - Transportation Risk Classification     Lack of Transportation: Not on file     Lack of Transportation: No   Physical Activity: Insufficiently Active (7/26/2024)    Received from [facility]    Exercise Vital Sign     On average, how many days per week do you engage in moderate to strenuous exercise (like a brisk walk)?: 1 day     On average, how many minutes do you engage in exercise at this level?: 10 min   Stress: No Stress Concern Present (7/26/2024)    Received from [facility]    [employer] [place] [employer] - Occupational Stress Questionnaire     Feeling of Stress : Only a little   Social Connections: Socially Isolated (7/26/2024)    Received from [facility]    Social Connection and Isolation Panel     In a typical week, how many times do you talk on the phone with family, friends, or neighbors?: More than three times a week     How often do you get together with friends or relatives?: Once a week     How often do you attend Tenriism or Nondenominational services?: Never     Do you belong to any clubs or organizations such as Tenriism groups, unions, fraternal or athletic groups, or school groups?: No     How often do you attend meetings of the clubs or organizations you belong to?: Never     Are you , , , , never , or living with a partner?: Never    Intimate Partner Violence: Unknown (5/22/2025)    Nursing IPS     Feels Physically and Emotionally Safe: Not on file     Physically Hurt by Someone: Not on file     Humiliated or Emotionally Abused by Someone: Not on file     Physically Hurt by Someone: No     Hurt or Threatened by Someone: No   Housing Stability: Unknown (5/22/2025)    Nursing:  "Inadequate Housing Risk Classification     Has Housing: Not on file     Worried About Losing Housing: Not on file     Unable to Get Utilities: Not on file     Unable to Pay for Housing in the Last Year: No     Has Housin       Traumatic History:  Pertinent hx documented as per HPI    Past Medical History:  Past Medical History[2]     Vital signs in last 24 hours:    Temp:  [96 °F (35.6 °C)-98 °F (36.7 °C)] 98 °F (36.7 °C)  HR:  [104-114] 104  BP: (136-161)/(93-94) 161/93  Resp:  [18] 18  SpO2:  [96 %-98 %] 98 %  O2 Device: None (Room air)    Mental Status Evaluation:    Appearance: casually dressed, appears consistent with stated age  Motor: no psychomotor disturbances, no gait abnormalities  Behavior: cooperative, interacts with this writer appropriately  Speech: normal rate, rhythm, and volume  Mood: \"good\"  Affect: euthymic, normal range and intensity  Thought Process: organized, linear, and goal-oriented  Thought Content: denies auditory hallucinations, denies visual hallucinations, denies delusions  Risk Potential: denies suicidal ideation, plan, or intent. Denies homicidal ideation  Sensorium: Oriented to person, place, time, and situation  Cognition: cognitive ability appears intact but was not quantitatively tested  Consciousness: alert and awake  Attention: able to focus without difficulty  Insight: fair  Judgement: poor      Patient Strengths/Assets: negotiates basic needs    Patient Barriers/Limitations: biopsychosocial stressors contributing to psychiatric decompensation    Lab Results: I have reviewed the following results:  Recent Results (from the past 48 hours)   CBC and differential    Collection Time: 25  5:07 PM   Result Value Ref Range    WBC 11.22 (H) 4.31 - 10.16 Thousand/uL    RBC 5.16 3.88 - 5.62 Million/uL    Hemoglobin 14.6 12.0 - 17.0 g/dL    Hematocrit 44.3 36.5 - 49.3 %    MCV 86 82 - 98 fL    MCH 28.3 26.8 - 34.3 pg    MCHC 33.0 31.4 - 37.4 g/dL    RDW 12.9 11.6 - 15.1 %    " MPV 10.9 8.9 - 12.7 fL    Platelets 290 149 - 390 Thousands/uL    nRBC 0 /100 WBCs    Segmented % 65 43 - 75 %    Immature Grans % 0 0 - 2 %    Lymphocytes % 27 14 - 44 %    Monocytes % 5 4 - 12 %    Eosinophils Relative 3 0 - 6 %    Basophils Relative 0 0 - 1 %    Absolute Neutrophils 7.36 1.85 - 7.62 Thousands/µL    Absolute Immature Grans 0.03 0.00 - 0.20 Thousand/uL    Absolute Lymphocytes 3.01 0.60 - 4.47 Thousands/µL    Absolute Monocytes 0.50 0.17 - 1.22 Thousand/µL    Eosinophils Absolute 0.28 0.00 - 0.61 Thousand/µL    Basophils Absolute 0.04 0.00 - 0.10 Thousands/µL   Comprehensive metabolic panel    Collection Time: 05/22/25  5:07 PM   Result Value Ref Range    Sodium 133 (L) 135 - 147 mmol/L    Potassium 3.9 3.5 - 5.3 mmol/L    Chloride 98 96 - 108 mmol/L    CO2 20 (L) 21 - 32 mmol/L    ANION GAP 15 (H) 4 - 13 mmol/L    BUN 10 5 - 25 mg/dL    Creatinine 0.63 0.60 - 1.30 mg/dL    Glucose 153 (H) 65 - 140 mg/dL    Calcium 9.5 8.4 - 10.2 mg/dL    AST 34 13 - 39 U/L    ALT 33 7 - 52 U/L    Alkaline Phosphatase 89 34 - 104 U/L    Total Protein 8.8 (H) 6.4 - 8.4 g/dL    Albumin 4.3 3.5 - 5.0 g/dL    Total Bilirubin 0.47 0.20 - 1.00 mg/dL    eGFR 140 ml/min/1.73sq m   TSH    Collection Time: 05/22/25  5:07 PM   Result Value Ref Range    TSH 3RD GENERATON 1.894 0.450 - 4.500 uIU/mL   Ethanol    Collection Time: 05/22/25  5:07 PM   Result Value Ref Range    Ethanol Lvl <10 <10 mg/dL   Rapid drug screen, urine    Collection Time: 05/22/25  5:14 PM   Result Value Ref Range    Amph/Meth UR Negative Negative    Barbiturate Ur Negative Negative    Benzodiazepine Urine Negative Negative    Cocaine Urine Negative Negative    Methadone Urine Negative Negative    Opiate Urine Negative Negative    PCP Ur Negative Negative    THC Urine Positive (A) Negative    Oxycodone Urine Negative Negative    Fentanyl Urine Negative Negative    HYDROCODONE URINE Negative Negative   UA w Reflex to Microscopic w Reflex to Culture     Collection Time: 05/22/25  5:14 PM    Specimen: Urine, Clean Catch   Result Value Ref Range    Color, UA Dark Yellow     Clarity, UA Clear     Specific Gravity, UA 1.025 1.005 - 1.030    pH, UA 7.0 4.5, 5.0, 5.5, 6.0, 6.5, 7.0, 7.5, 8.0    Leukocytes, UA Trace (A) Negative    Nitrite, UA Negative Negative    Protein,  (2+) (A) Negative mg/dl    Glucose, UA 30 (3/100%) (A) Negative mg/dl    Ketones, UA Trace (A) Negative mg/dl    Urobilinogen, UA 2.0 (A) <2.0 mg/dl mg/dl    Bilirubin, UA Small (A) Negative    Occult Blood, UA Negative Negative   Urine Microscopic    Collection Time: 05/22/25  5:14 PM   Result Value Ref Range    RBC, UA 0-1 None Seen, 0-1, 1-2, 2-4, 0-5 /hpf    WBC, UA 2-4 None Seen, 0-1, 1-2, 0-5, 2-4 /hpf    Epithelial Cells Occasional None Seen, Occasional /hpf    Bacteria, UA Moderate (A) None Seen, Occasional /hpf    MUCUS THREADS Moderate (A) None Seen    Hyaline Casts, UA 4-10 (A) (none) /lpf    Ca Oxalate Guerda, UA Occasional (A) None Seen /hpf        Code Status: Level 1 - Full Code  Advance Directive and Living Will: <no information>  Next of Kin: Extended Emergency Contact Information  Primary Emergency Contact: Lissa Lang  Address: [address] [place] [id number] [facility]  Home Phone: [phone]  Mobile Phone: [phone]  Relation: Mother  Secondary Emergency Contact: Nikhil Velazquez  Address: [address] [place] [id number] [facility]  Home Phone: [phone]  Mobile Phone: [phone]  Relation: Step Parent    Counseling / Coordination of Care:   Patient's progress discussed with staff in treatment team meeting.  Medication changes reviewed with staff in treatment team meeting..    Inpatient Psychiatric Certification:  Based upon physical, mental, and social evaluations, I certify that inpatient psychiatric services are medically necessary for this patient for a duration of 5-7 midnights (exact  duration TBD pending patient's response to psychiatric treatment) for the diagnosis listed above.  Available alternative community resources do not meet the patient's mental health care needs.  I further attest that an established written individualized plan of care has been implemented and is outlined in the patient's medical records.    This note has been constructed using a voice recognition system. There may be translation, syntax, or grammatical errors. If you have any questions, please contact the dictating provider.         [1]   Family History  Problem Relation Name Age of Onset    Hyperlipidemia Mother      Diabetes type II Mother      Obesity Mother      Obesity Father      Drug abuse Father      Diabetes type II Maternal Aunt      Lung disease Maternal Aunt      Rheum arthritis Maternal Aunt      Fibromyalgia Maternal Aunt      Atrial fibrillation Maternal Grandmother      Hyperlipidemia Maternal Grandfather      Diabetes type II Maternal Grandfather      Stroke Maternal Grandfather      Heart disease Maternal Grandfather      Stroke Paternal Grandfather     [2]   Past Medical History:  Diagnosis Date    ADHD (attention deficit hyperactivity disorder) 05/11/2024    Diabetes mellitus (HCC)     5/2023    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

## 2025-05-23 NOTE — ASSESSMENT & PLAN NOTE
"Lab Results   Component Value Date    HGBA1C 11.6 (H) 05/07/2024       No results for input(s): \"POCGLU\" in the last 72 hours.    Blood Sugar Average: Last 72 hrs:      "

## 2025-05-23 NOTE — NURSING NOTE
"Pt approached nurse's station requesting PRN medication for agitation. \"I am getting angry. I don't know why, my brain is just randomly starting to get angry.\" Pt encouraged to use coping mechanisms. Took laps in hallway. Pt continues with agitation. Haldol 1 mg given PO per PRN orders for mild agitation.   "

## 2025-05-23 NOTE — NURSING NOTE
Cooperative upon approach. Compliant with breakfast and medications and returned to room for a nap. Ate lunch and is currently watching television with peers. Denies SI/HI and hallucinations. Low depression and anxiety. Multiple healing superficial lacerations to bilateral forearms. Open to air, no signs of infection. UA obtained. No questions or concerns.

## 2025-05-23 NOTE — ASSESSMENT & PLAN NOTE
"Lab Results   Component Value Date    HGBA1C 11.6 (H) 05/07/2024       No results for input(s): \"POCGLU\" in the last 72 hours.    Blood Sugar Average: Last 72 hrs:  Repeat hgba1c  Appears that patient has been noncompliant with prior regimen, has not had follow up with endocrinology:  Insulin glargine 30 units qhs  Novolog 9 units TID with meals  Metformin 1000 mg BID  For now will start lantus 15 units qhs, humalog 5 units TID with meals and uptitrate as needed  SSI + accuchek  Would recommend outpt follow up with endocrinology    "

## 2025-05-23 NOTE — NURSING NOTE
Pt belongings      4 bracelets  1 sliver necklace  Pat of sneakers  Red sneakers  2 pair of boxers  5 pair of socks (2 mixed matched)  3 T shirts (blue,gray, black all graphic)  1- blue champion hoodie  2 -pair of basketball shorts  2- black pants  1 pair of selin pajama pants        At Bedside    2 pair of socks  3 t-shirts  2 - boxers  1-pair of black pants  1-pair of pajama pants

## 2025-05-23 NOTE — NURSING NOTE
Nursing will meet with patient at least twice per day to assess for any concerns, teach about prescribed medications and diagnosis. Patient will be taught and encouraged to utilize healthy coping skills.

## 2025-05-23 NOTE — NURSING NOTE
20 yr old male adm to [facility] C/O fleeting SI denies voices at this time stated this is his 15 th hospital admission, has superficial cuts to both arms pleasant and cooperative , wearing black face mask  able to tolerate  removal, pt is very obese , 375 lbs. Advised to monitor diet, will come to staff if feeling unsafe, appears to be on autism spectrum

## 2025-05-24 LAB
BACTERIA WND AEROBE CULT: ABNORMAL
GLUCOSE SERPL-MCNC: 223 MG/DL (ref 65–140)
GLUCOSE SERPL-MCNC: 268 MG/DL (ref 65–140)
GLUCOSE SERPL-MCNC: 281 MG/DL (ref 65–140)
GLUCOSE SERPL-MCNC: 320 MG/DL (ref 65–140)
GRAM STN SPEC: ABNORMAL
TREPONEMA PALLIDUM IGG+IGM AB [PRESENCE] IN SERUM OR PLASMA BY IMMUNOASSAY: NORMAL

## 2025-05-24 PROCEDURE — 82948 REAGENT STRIP/BLOOD GLUCOSE: CPT

## 2025-05-24 PROCEDURE — 99232 SBSQ HOSP IP/OBS MODERATE 35: CPT

## 2025-05-24 RX ADMIN — INSULIN LISPRO 5 UNITS: 100 INJECTION, SOLUTION INTRAVENOUS; SUBCUTANEOUS at 11:14

## 2025-05-24 RX ADMIN — INSULIN LISPRO 4 UNITS: 100 INJECTION, SOLUTION INTRAVENOUS; SUBCUTANEOUS at 08:14

## 2025-05-24 RX ADMIN — TOPIRAMATE 25 MG: 25 TABLET, FILM COATED ORAL at 17:37

## 2025-05-24 RX ADMIN — TOPIRAMATE 25 MG: 25 TABLET, FILM COATED ORAL at 08:10

## 2025-05-24 RX ADMIN — TRAZODONE HYDROCHLORIDE 50 MG: 50 TABLET ORAL at 21:22

## 2025-05-24 RX ADMIN — SULFAMETHOXAZOLE AND TRIMETHOPRIM 1 TABLET: 800; 160 TABLET ORAL at 21:19

## 2025-05-24 RX ADMIN — INSULIN LISPRO 5 UNITS: 100 INJECTION, SOLUTION INTRAVENOUS; SUBCUTANEOUS at 08:14

## 2025-05-24 RX ADMIN — INSULIN LISPRO 3 UNITS: 100 INJECTION, SOLUTION INTRAVENOUS; SUBCUTANEOUS at 15:51

## 2025-05-24 RX ADMIN — FLUOXETINE HYDROCHLORIDE 20 MG: 20 CAPSULE ORAL at 08:11

## 2025-05-24 RX ADMIN — INSULIN GLARGINE 15 UNITS: 100 INJECTION, SOLUTION SUBCUTANEOUS at 21:18

## 2025-05-24 RX ADMIN — SULFAMETHOXAZOLE AND TRIMETHOPRIM 1 TABLET: 800; 160 TABLET ORAL at 08:10

## 2025-05-24 RX ADMIN — INSULIN LISPRO 5 UNITS: 100 INJECTION, SOLUTION INTRAVENOUS; SUBCUTANEOUS at 15:55

## 2025-05-24 RX ADMIN — ARIPIPRAZOLE 5 MG: 5 TABLET ORAL at 08:11

## 2025-05-24 RX ADMIN — INSULIN LISPRO 2 UNITS: 100 INJECTION, SOLUTION INTRAVENOUS; SUBCUTANEOUS at 21:17

## 2025-05-24 NOTE — NURSING NOTE
Patient signed 72 hr notice this AM at 0757, patient denies SI HI AVH at present. Patient reports that his mother took his cell phone and would not give it to him unless he cut his arms and wrote a note. Encouraged patient to make healthy choices

## 2025-05-24 NOTE — NURSING NOTE
"Attempted several times to speak with patient to obtain nursing assessment. Patient disinterested, but eventually approached nurse. Shook his head \"no\" when asked about SI/HI/AVH. Shook his head \"yes\" when asked if the medication he took earlier was effective. Patient ate his HS snack and did not want his blood sugar checked, but accepted the 15 units of Lantus. Showered and laundered clothing this evening.   "

## 2025-05-24 NOTE — PLAN OF CARE
Problem: PAIN - ADULT  Goal: Verbalizes/displays adequate comfort level or baseline comfort level  Description: Interventions:  - Encourage patient to monitor pain and request assistance  - Assess pain using appropriate pain scale  - Administer analgesics as ordered based on type and severity of pain and evaluate response  - Implement non-pharmacological measures as appropriate and evaluate response  - Consider cultural and social influences on pain and pain management  - Notify physician/advanced practitioner if interventions unsuccessful or patient reports new pain  - Educate patient/family on pain management process including their role and importance of  reporting pain   - Provide non-pharmacologic/complimentary pain relief interventions  Outcome: Progressing     Problem: INFECTION - ADULT  Goal: Absence or prevention of progression during hospitalization  Description: INTERVENTIONS:  - Assess and monitor for signs and symptoms of infection  - Monitor lab/diagnostic results  - Monitor all insertion sites, i.e. indwelling lines, tubes, and drains  - Monitor endotracheal if appropriate and nasal secretions for changes in amount and color  - [place] appropriate cooling/warming therapies per order  - Administer medications as ordered  - Instruct and encourage patient and family to use good hand hygiene technique  - Identify and instruct in appropriate isolation precautions for identified infection/condition  Outcome: Progressing  Goal: Absence of fever/infection during neutropenic period  Description: INTERVENTIONS:  - Monitor WBC  - Perform strict hand hygiene  - Limit to healthy visitors only  - No plants, dried, fresh or silk flowers with baum in patient room  Outcome: Progressing     Problem: SAFETY ADULT  Goal: Patient will remain free of falls  Description: INTERVENTIONS:  - Educate patient/family on patient safety including physical limitations  - Instruct patient to call for assistance with activity   -  Consider consulting OT/PT to assist with strengthening/mobility based on AM PAC & JH-HLM score  - Consult OT/PT to assist with strengthening/mobility   - Keep Call bell within reach  - Keep bed low and locked with side rails adjusted as appropriate  - Keep care items and personal belongings within reach  - Initiate and maintain comfort rounds  - Make Fall Risk Sign visible to staff  - Apply yellow socks and bracelet for high fall risk patients  - Consider moving patient to room near nurses station  Outcome: Progressing  Goal: Maintain or return to baseline ADL function  Description: INTERVENTIONS:  -  Assess patient's ability to carry out ADLs; assess patient's baseline for ADL function and identify physical deficits which impact ability to perform ADLs (bathing, care of mouth/teeth, toileting, grooming, dressing, etc.)  - Assess/evaluate cause of self-care deficits   - Assess range of motion  - Assess patient's mobility; develop plan if impaired  - Assess patient's need for assistive devices and provide as appropriate  - Encourage maximum independence but intervene and supervise when necessary  - Involve family in performance of ADLs  - Assess for home care needs following discharge   - Consider OT consult to assist with ADL evaluation and planning for discharge  - Provide patient education as appropriate  - Monitor functional capacity and physical performance, use of AM PAC & JH-HLM   - Monitor gait, balance and fatigue with ambulation    Outcome: Progressing  Goal: Maintains/Returns to pre admission functional level  Description: INTERVENTIONS:  - Perform AM-PAC 6 Click Basic Mobility/ Daily Activity assessment daily.  - Set and communicate daily mobility goal to care team and patient/family/caregiver.   - Collaborate with rehabilitation services on mobility goals if consulted  - Out of bed for toileting  - Record patient progress and toleration of activity level   Outcome: Progressing     Problem: DISCHARGE  PLANNING  Goal: Discharge to home or other facility with appropriate resources  Description: INTERVENTIONS:  - Identify barriers to discharge w/patient and caregiver  - Arrange for needed discharge resources and transportation as appropriate  - Identify discharge learning needs (meds, wound care, etc.)  - Arrange for interpretive services to assist at discharge as needed  - Refer to Case Management Department for coordinating discharge planning if the patient needs post-hospital services based on physician/advanced practitioner order or complex needs related to functional status, cognitive ability, or social support system  Outcome: Progressing     Problem: Knowledge Deficit  Goal: Patient/family/caregiver demonstrates understanding of disease process, treatment plan, medications, and discharge instructions  Description: Complete learning assessment and assess knowledge base.  Interventions:  - Provide teaching at level of understanding  - Provide teaching via preferred learning methods  Outcome: Progressing     Problem: Ineffective Coping  Goal: Identifies ineffective coping skills  Outcome: Progressing  Goal: Identifies healthy coping skills  Outcome: Progressing  Goal: Demonstrates healthy coping skills  Outcome: Progressing  Goal: Participates in unit activities  Description: Interventions:  - Provide therapeutic environment   - Provide required programming   - Redirect inappropriate behaviors   Outcome: Progressing  Goal: Patient/Family participate in treatment and DC plans  Description: Interventions:  - Provide therapeutic environment  Outcome: Progressing  Goal: Patient/Family verbalizes awareness of resources  Outcome: Progressing  Goal: Understands least restrictive measures  Description: Interventions:  - Utilize least restrictive behavior  Outcome: Progressing  Goal: Free from restraint events  Description: - Utilize least restrictive measures   - Provide behavioral interventions   - Redirect  inappropriate behaviors   Outcome: Progressing     Problem: Depression  Goal: Treatment Goal: Demonstrate behavioral control of depressive symptoms, verbalize feelings of improved mood/affect, and adopt new coping skills prior to discharge  Outcome: Progressing     Problem: Anxiety  Goal: Anxiety is at manageable level  Description: Interventions:  - Assess and monitor patient's anxiety level.   - Monitor for signs and symptoms (heart palpitations, chest pain, shortness of breath, headaches, nausea, feeling jumpy, restlessness, irritable, apprehensive).   - Collaborate with interdisciplinary team and initiate plan and interventions as ordered.  - [place] patient to unit/surroundings  - Explain treatment plan  - Encourage participation in care  - Encourage verbalization of concerns/fears  - Identify coping mechanisms  - Assist in developing anxiety-reducing skills  - Administer/offer alternative therapies  - Limit or eliminate stimulants  Outcome: Progressing     Problem: Risk for Violence/Aggression Toward Others  Goal: Treatment Goal: Refrain from acts of violence/aggression during length of stay, and demonstrate improved impulse control at the time of discharge  Outcome: Progressing

## 2025-05-24 NOTE — PROGRESS NOTES
Progress Note - Behavioral Health   Name: Rik Marin 20 y.o. male I MRN: [id number]  Unit/Bed#: -02 I Date of Admission: 5/22/2025   Date of Service: 5/24/2025 I Hospital Day: 2    Assessment & Plan  Mood disorder (HCC)  Restart Prozac 20mg po QD  Restart Topamax 25mg po BID  Restart Abilify 5mg po QD   Plan to transition to Abilify Maintenna AMADO prior to discharge  Morbid obesity (HCC)    Uncontrolled type 2 diabetes mellitus with hyperglycemia (HCC)  Lab Results   Component Value Date    HGBA1C 11.6 (H) 05/07/2024       Recent Labs     05/23/25  1118 05/23/25  1608 05/24/25  0809 05/24/25  1109   POCGLU 293* 204* 281* 320*       Blood Sugar Average: Last 72 hrs:  (P) 268.6    Conduct disorder    ADHD (attention deficit hyperactivity disorder)    Medical clearance for psychiatric admission    Autism spectrum disorder    Mild tetrahydrocannabinol (THC) abuse      Current Medications:    Current Facility-Administered Medications:     ARIPiprazole (ABILIFY) tablet 5 mg, Oral, Daily    FLUoxetine (PROzac) capsule 20 mg, Oral, Daily    insulin glargine (LANTUS) subcutaneous injection 15 Units 0.15 mL, Subcutaneous, HS    insulin lispro (HumALOG/ADMELOG) 100 units/mL subcutaneous injection 1-6 Units, Subcutaneous, TID AC **AND** Fingerstick Glucose (POCT), , TID AC    insulin lispro (HumALOG/ADMELOG) 100 units/mL subcutaneous injection 1-6 Units, Subcutaneous, HS    insulin lispro (HumALOG/ADMELOG) 100 units/mL subcutaneous injection 5 Units, Subcutaneous, TID With Meals    sulfamethoxazole-trimethoprim (BACTRIM DS) 800-160 mg per tablet 1 tablet, Oral, Q12H TANESHA    topiramate (TOPAMAX) tablet 25 mg, Oral, BID      Risks/Benefits of Treatment:     Risks, benefits, and possible side effects of medications explained to patient and patient verbalizes understanding and agreement for treatment.    Progress Toward Goals: progressing    Treatment Planning:     All current active medications have been  reviewed.  Continue to monitor response to treatment and assess for potential side effects of medications.  Encourage group therapy, milieu therapy and occupational therapy.  Collaboration with medical service for medical comorbidities as indicated.  Behavioral Health checks for safety monitoring.  Estimated Discharge Day:          Subjective     Behavior over the last 24 hours: unchanged    Per nursing report, Rik has been visible and social.  He refused his bedtime Accu-Chek.  Signed a 72-hour this a.m. on 5/24/2025 at 0757.  Staff has been unsuccessful in collecting blood for labs.  Trazodone 50 mg as needed administered last evening for sleep.  Continues to have uncontrolled blood sugars.    On assessment, Rik is observed lying in his bed.  He denies anxiety or depression.  Denies SI, HI, AH, or VH.  Sleep and appetite have been adequate.  He denies any side effects to the medications.  72 hours signed on 5/24/2025 at 0757.  Offers no other concerns at this time.    Sleep: normal  Appetite: normal  Medication side effects: No  ROS: review of systems as noted above in HPI/Subjective report, all other systems are negative    Objective :  Temp:  [97.4 °F (36.3 °C)-97.7 °F (36.5 °C)] 97.7 °F (36.5 °C)  HR:  [84-95] 95  BP: (135-140)/(93-95) 140/95  Resp:  [18-20] 20  SpO2:  [96 %-97 %] 97 %  O2 Device: None (Room air)    Mental Status Evaluation:    Appearance:  age appropriate, adequate grooming   Behavior:  pleasant, cooperative, calm   Speech:  normal rate and volume   Mood:  euthymic   Affect:  normal range and intensity   Thought Process:  organized, logical, coherent   Thought Content:  no overt delusions   Perceptual Disturbances: denies auditory or visual hallucinations when asked   Risk Potential: Suicidal Ideation -  None  Homicidal Ideation -  None  Potential for Aggression - Not at present   Sensorium:  oriented to person, place, and time/date   Memory:  recent and remote memory grossly intact  "  Consciousness:  drowsy   Attention/Concentration: attention span and concentration appear shorter than expected for age   Insight:  limited   Judgment: limited   Gait/Station: Lying in bed   Motor Activity: no abnormal movements       Lab Results: I have reviewed the following results:  Results from the past 24 hours:   Recent Results (from the past 24 hours)   Fingerstick Glucose (POCT)    Collection Time: 05/23/25  4:08 PM   Result Value Ref Range    POC Glucose 204 (H) 65 - 140 mg/dl   Fingerstick Glucose (POCT)    Collection Time: 05/24/25  8:09 AM   Result Value Ref Range    POC Glucose 281 (H) 65 - 140 mg/dl   Fingerstick Glucose (POCT)    Collection Time: 05/24/25 11:09 AM   Result Value Ref Range    POC Glucose 320 (H) 65 - 140 mg/dl       Administrative Statements     Counseling / Coordination of Care:   Patient's progress discussed with staff in treatment team meeting.  Medication changes reviewed with staff in treatment team meeting.    Portions of the record may have been created with voice recognition software. Occasional wrong word or \"sound a like\" substitutions may have occurred due to the inherent limitations of voice recognition software. Read the chart carefully and recognize, using context, where substitutions have occurred.    ANDRES Busch 05/24/25  "

## 2025-05-24 NOTE — ASSESSMENT & PLAN NOTE
Restart Prozac 20mg po QD  Restart Topamax 25mg po BID  Restart Abilify 5mg po QD   Plan to transition to Abilify Maintenna AMADO prior to discharge

## 2025-05-24 NOTE — NURSING NOTE
Patient withdrawn to room this evening, stating he was tired. Patient denies anxiety and depression. Denies SI/HI/AVH. Focused on discharge. Denies any unmet needs at this time.

## 2025-05-24 NOTE — ASSESSMENT & PLAN NOTE
Lab Results   Component Value Date    HGBA1C 11.6 (H) 05/07/2024       Recent Labs     05/23/25  1118 05/23/25  1608 05/24/25  0809 05/24/25  1109   POCGLU 293* 204* 281* 320*       Blood Sugar Average: Last 72 hrs:  (P) 268.6

## 2025-05-25 LAB
25(OH)D3 SERPL-MCNC: 9.1 NG/ML (ref 30–100)
ALBUMIN SERPL BCG-MCNC: 4 G/DL (ref 3.5–5)
ALP SERPL-CCNC: 89 U/L (ref 34–104)
ALT SERPL W P-5'-P-CCNC: 30 U/L (ref 7–52)
ANION GAP SERPL CALCULATED.3IONS-SCNC: 14 MMOL/L (ref 4–13)
AST SERPL W P-5'-P-CCNC: 32 U/L (ref 13–39)
BACTERIA UR QL AUTO: ABNORMAL /HPF
BASOPHILS # BLD AUTO: 0.04 THOUSANDS/ÂΜL (ref 0–0.1)
BASOPHILS NFR BLD AUTO: 0 % (ref 0–1)
BILIRUB SERPL-MCNC: 0.43 MG/DL (ref 0.2–1)
BILIRUB UR QL STRIP: NEGATIVE
BUN SERPL-MCNC: 14 MG/DL (ref 5–25)
CALCIUM SERPL-MCNC: 9.3 MG/DL (ref 8.4–10.2)
CHLORIDE SERPL-SCNC: 99 MMOL/L (ref 96–108)
CHOLEST SERPL-MCNC: 291 MG/DL (ref ?–200)
CLARITY UR: CLEAR
CO2 SERPL-SCNC: 18 MMOL/L (ref 21–32)
COLOR UR: YELLOW
CREAT SERPL-MCNC: 0.61 MG/DL (ref 0.6–1.3)
EOSINOPHIL # BLD AUTO: 0.22 THOUSAND/ÂΜL (ref 0–0.61)
EOSINOPHIL NFR BLD AUTO: 2 % (ref 0–6)
ERYTHROCYTE [DISTWIDTH] IN BLOOD BY AUTOMATED COUNT: 12.8 % (ref 11.6–15.1)
EST. AVERAGE GLUCOSE BLD GHB EST-MCNC: 252 MG/DL
FOLATE SERPL-MCNC: 16.3 NG/ML
GFR SERPL CREATININE-BSD FRML MDRD: 142 ML/MIN/1.73SQ M
GLUCOSE P FAST SERPL-MCNC: 226 MG/DL (ref 65–99)
GLUCOSE SERPL-MCNC: 226 MG/DL (ref 65–140)
GLUCOSE SERPL-MCNC: 232 MG/DL (ref 65–140)
GLUCOSE SERPL-MCNC: 250 MG/DL (ref 65–140)
GLUCOSE SERPL-MCNC: 253 MG/DL (ref 65–140)
GLUCOSE SERPL-MCNC: 355 MG/DL (ref 65–140)
GLUCOSE UR STRIP-MCNC: ABNORMAL MG/DL
HBA1C MFR BLD: 10.4 %
HCT VFR BLD AUTO: 43.1 % (ref 36.5–49.3)
HDLC SERPL-MCNC: 18 MG/DL
HGB BLD-MCNC: 13.9 G/DL (ref 12–17)
HGB UR QL STRIP.AUTO: NEGATIVE
IMM GRANULOCYTES # BLD AUTO: 0.07 THOUSAND/UL (ref 0–0.2)
IMM GRANULOCYTES NFR BLD AUTO: 1 % (ref 0–2)
KETONES UR STRIP-MCNC: NEGATIVE MG/DL
LEUKOCYTE ESTERASE UR QL STRIP: NEGATIVE
LYMPHOCYTES # BLD AUTO: 3.03 THOUSANDS/ÂΜL (ref 0.6–4.47)
LYMPHOCYTES NFR BLD AUTO: 33 % (ref 14–44)
MCH RBC QN AUTO: 28.1 PG (ref 26.8–34.3)
MCHC RBC AUTO-ENTMCNC: 32.3 G/DL (ref 31.4–37.4)
MCV RBC AUTO: 87 FL (ref 82–98)
MONOCYTES # BLD AUTO: 0.53 THOUSAND/ÂΜL (ref 0.17–1.22)
MONOCYTES NFR BLD AUTO: 6 % (ref 4–12)
MUCOUS THREADS UR QL AUTO: ABNORMAL
NEUTROPHILS # BLD AUTO: 5.42 THOUSANDS/ÂΜL (ref 1.85–7.62)
NEUTS SEG NFR BLD AUTO: 58 % (ref 43–75)
NITRITE UR QL STRIP: NEGATIVE
NON-SQ EPI CELLS URNS QL MICRO: ABNORMAL /HPF
NONHDLC SERPL-MCNC: 273 MG/DL
NRBC BLD AUTO-RTO: 0 /100 WBCS
PH UR STRIP.AUTO: 5.5 [PH]
PLATELET # BLD AUTO: 315 THOUSANDS/UL (ref 149–390)
PMV BLD AUTO: 11 FL (ref 8.9–12.7)
POTASSIUM SERPL-SCNC: 4.2 MMOL/L (ref 3.5–5.3)
PROT SERPL-MCNC: 8 G/DL (ref 6.4–8.4)
PROT UR STRIP-MCNC: ABNORMAL MG/DL
RBC # BLD AUTO: 4.95 MILLION/UL (ref 3.88–5.62)
RBC #/AREA URNS AUTO: ABNORMAL /HPF
SODIUM SERPL-SCNC: 131 MMOL/L (ref 135–147)
SP GR UR STRIP.AUTO: >=1.03 (ref 1–1.03)
TRIGL SERPL-MCNC: 1162 MG/DL (ref ?–150)
TSH SERPL DL<=0.05 MIU/L-ACNC: 2.81 UIU/ML (ref 0.45–4.5)
UROBILINOGEN UR STRIP-ACNC: <2 MG/DL
VIT B12 SERPL-MCNC: 214 PG/ML (ref 180–914)
WBC # BLD AUTO: 9.31 THOUSAND/UL (ref 4.31–10.16)
WBC #/AREA URNS AUTO: ABNORMAL /HPF

## 2025-05-25 PROCEDURE — 84443 ASSAY THYROID STIM HORMONE: CPT | Performed by: PHYSICIAN ASSISTANT

## 2025-05-25 PROCEDURE — 82607 VITAMIN B-12: CPT | Performed by: PHYSICIAN ASSISTANT

## 2025-05-25 PROCEDURE — 82948 REAGENT STRIP/BLOOD GLUCOSE: CPT

## 2025-05-25 PROCEDURE — 81001 URINALYSIS AUTO W/SCOPE: CPT | Performed by: PSYCHIATRY & NEUROLOGY

## 2025-05-25 PROCEDURE — 99232 SBSQ HOSP IP/OBS MODERATE 35: CPT | Performed by: PSYCHIATRY & NEUROLOGY

## 2025-05-25 PROCEDURE — 80061 LIPID PANEL: CPT | Performed by: PHYSICIAN ASSISTANT

## 2025-05-25 PROCEDURE — 82746 ASSAY OF FOLIC ACID SERUM: CPT | Performed by: PHYSICIAN ASSISTANT

## 2025-05-25 PROCEDURE — 85025 COMPLETE CBC W/AUTO DIFF WBC: CPT | Performed by: PHYSICIAN ASSISTANT

## 2025-05-25 PROCEDURE — 82306 VITAMIN D 25 HYDROXY: CPT | Performed by: PHYSICIAN ASSISTANT

## 2025-05-25 PROCEDURE — 83036 HEMOGLOBIN GLYCOSYLATED A1C: CPT | Performed by: STUDENT IN AN ORGANIZED HEALTH CARE EDUCATION/TRAINING PROGRAM

## 2025-05-25 PROCEDURE — 80053 COMPREHEN METABOLIC PANEL: CPT | Performed by: PHYSICIAN ASSISTANT

## 2025-05-25 RX ORDER — INSULIN GLARGINE 100 [IU]/ML
20 INJECTION, SOLUTION SUBCUTANEOUS
Status: DISCONTINUED | OUTPATIENT
Start: 2025-05-25 | End: 2025-05-27 | Stop reason: HOSPADM

## 2025-05-25 RX ORDER — INSULIN LISPRO 100 [IU]/ML
7 INJECTION, SOLUTION INTRAVENOUS; SUBCUTANEOUS
Status: DISCONTINUED | OUTPATIENT
Start: 2025-05-25 | End: 2025-05-27 | Stop reason: HOSPADM

## 2025-05-25 RX ORDER — FENOFIBRATE 48 MG/1
48 TABLET, FILM COATED ORAL DAILY
Status: DISCONTINUED | OUTPATIENT
Start: 2025-05-25 | End: 2025-05-27 | Stop reason: HOSPADM

## 2025-05-25 RX ORDER — SODIUM BICARBONATE 650 MG/1
650 TABLET ORAL
Status: DISCONTINUED | OUTPATIENT
Start: 2025-05-25 | End: 2025-05-27 | Stop reason: HOSPADM

## 2025-05-25 RX ADMIN — SULFAMETHOXAZOLE AND TRIMETHOPRIM 1 TABLET: 800; 160 TABLET ORAL at 21:47

## 2025-05-25 RX ADMIN — FENOFIBRATE 48 MG: 48 TABLET ORAL at 12:11

## 2025-05-25 RX ADMIN — SODIUM BICARBONATE 650 MG TABLET 650 MG: at 16:45

## 2025-05-25 RX ADMIN — SULFAMETHOXAZOLE AND TRIMETHOPRIM 1 TABLET: 800; 160 TABLET ORAL at 09:01

## 2025-05-25 RX ADMIN — INSULIN LISPRO 5 UNITS: 100 INJECTION, SOLUTION INTRAVENOUS; SUBCUTANEOUS at 12:12

## 2025-05-25 RX ADMIN — TOPIRAMATE 25 MG: 25 TABLET, FILM COATED ORAL at 17:41

## 2025-05-25 RX ADMIN — TRAZODONE HYDROCHLORIDE 50 MG: 50 TABLET ORAL at 21:55

## 2025-05-25 RX ADMIN — INSULIN LISPRO 7 UNITS: 100 INJECTION, SOLUTION INTRAVENOUS; SUBCUTANEOUS at 16:45

## 2025-05-25 RX ADMIN — ARIPIPRAZOLE 5 MG: 5 TABLET ORAL at 09:01

## 2025-05-25 RX ADMIN — INSULIN LISPRO 3 UNITS: 100 INJECTION, SOLUTION INTRAVENOUS; SUBCUTANEOUS at 09:02

## 2025-05-25 RX ADMIN — INSULIN LISPRO 3 UNITS: 100 INJECTION, SOLUTION INTRAVENOUS; SUBCUTANEOUS at 16:45

## 2025-05-25 RX ADMIN — INSULIN LISPRO 6 UNITS: 100 INJECTION, SOLUTION INTRAVENOUS; SUBCUTANEOUS at 12:11

## 2025-05-25 RX ADMIN — TOPIRAMATE 25 MG: 25 TABLET, FILM COATED ORAL at 09:01

## 2025-05-25 RX ADMIN — INSULIN GLARGINE 20 UNITS: 100 INJECTION, SOLUTION SUBCUTANEOUS at 21:50

## 2025-05-25 RX ADMIN — INSULIN LISPRO 3 UNITS: 100 INJECTION, SOLUTION INTRAVENOUS; SUBCUTANEOUS at 21:47

## 2025-05-25 RX ADMIN — INSULIN LISPRO 5 UNITS: 100 INJECTION, SOLUTION INTRAVENOUS; SUBCUTANEOUS at 09:03

## 2025-05-25 RX ADMIN — FLUOXETINE HYDROCHLORIDE 20 MG: 20 CAPSULE ORAL at 09:01

## 2025-05-25 NOTE — NURSING NOTE
Pleasant and cooperative denies any issues will come to staff if feeling unsafe, requested a list of his medications , list given as requested

## 2025-05-25 NOTE — NURSING NOTE
Pt received PRN trazodone 50 mg at 2122 for complaints of insomnia. On follow up, pt appeared to be sleeping. PRN effective.

## 2025-05-25 NOTE — NURSING NOTE
Patient denies SI HI AVH  at present, patient denies anxiety and depression, bright when approached, patient physical assessments Wnl , except lung sounds, sounds of a pleural rub posterior on left, Slim notified, no resp distress, patient does have cough. Ju[place]oim said OK to monitor, Discussed with patient lung disease and encouraged patient to quit smoking

## 2025-05-25 NOTE — PROGRESS NOTES
Progress Note - Behavioral Health   Name: Rik Marin 20 y.o. male I MRN: [id number]  Unit/Bed#: -02 I Date of Admission: 5/22/2025   Date of Service: 5/25/2025 I Hospital Day: 3    Assessment & Plan  Mood disorder (HCC)  Restart Prozac 20mg po QD  Restart Topamax 25mg po BID  Restart Abilify 5mg po QD   Plan to transition to Abilify Maintenna AMADO prior to discharge  Morbid obesity (HCC)    Uncontrolled type 2 diabetes mellitus with hyperglycemia (HCC)  Lab Results   Component Value Date    HGBA1C 11.6 (H) 05/07/2024       Recent Labs     05/24/25  1109 05/24/25  1547 05/24/25  2051 05/25/25  0812   POCGLU 320* 268* 223* 253*       Blood Sugar Average: Last 72 hrs:  (P) 260.875    Conduct disorder    ADHD (attention deficit hyperactivity disorder)    Medical clearance for psychiatric admission    Autism spectrum disorder    Mild tetrahydrocannabinol (THC) abuse      Current Medications:    Current Facility-Administered Medications:     ARIPiprazole (ABILIFY) tablet 5 mg, Oral, Daily    FLUoxetine (PROzac) capsule 20 mg, Oral, Daily    insulin glargine (LANTUS) subcutaneous injection 15 Units 0.15 mL, Subcutaneous, HS    insulin lispro (HumALOG/ADMELOG) 100 units/mL subcutaneous injection 1-6 Units, Subcutaneous, TID AC **AND** Fingerstick Glucose (POCT), , TID AC    insulin lispro (HumALOG/ADMELOG) 100 units/mL subcutaneous injection 1-6 Units, Subcutaneous, HS    insulin lispro (HumALOG/ADMELOG) 100 units/mL subcutaneous injection 5 Units, Subcutaneous, TID With Meals    sulfamethoxazole-trimethoprim (BACTRIM DS) 800-160 mg per tablet 1 tablet, Oral, Q12H TANESHA    topiramate (TOPAMAX) tablet 25 mg, Oral, BID      Risks/Benefits of Treatment:     Risks, benefits, and possible side effects of medications explained to patient and patient verbalizes understanding and agreement for treatment.    Progress Toward Goals: progressing    Treatment Planning:     All current active medications have been  "reviewed.  Continue to monitor response to treatment and assess for potential side effects of medications.  Encourage group therapy, milieu therapy and occupational therapy.  Collaboration with medical service for medical comorbidities as indicated.  Behavioral Health checks for safety monitoring.  Estimated Discharge Day:          Subjective     Behavior over the last 24 hours: unchanged    Per nursing report, Black signed a 72-hour on 5/24/2025 at 0757.  He received as needed trazodone 1 5/24/2025 at 2122 for insomnia which was effective.  Continues to blame him for being admitted to the unit.  No behaviors reported.  They were able to obtain his lab work this morning.    On assessment, Rik presents in the TV room with peers watching TV.  Reports his mood is \"good\".  Denies anxiety or depression.  Denies SI/HI.  Denies any hallucinations.  Appetite and sleep remain adequate.  Denies any side effects to any medications.  Offers no other concerns at this time.  Triglycerides elevated and nursing to contact SLIM.     Sleep: normal  Appetite: normal  Medication side effects: No  ROS: review of systems as noted above in HPI/Subjective report, all other systems are negative    Objective :  Temp:  [96.7 °F (35.9 °C)-97.3 °F (36.3 °C)] 96.7 °F (35.9 °C)  HR:  [102-118] 102  BP: (138-161)/() 138/84  Resp:  [19-21] 19  SpO2:  [96 %-98 %] 98 %  O2 Device: None (Room air)    Mental Status Evaluation:    Appearance:  age appropriate, casually dressed, adequate grooming, dressed appropriately   Behavior:  cooperative, calm   Speech:  normal rate and volume   Mood:  euthymic   Affect:  normal range and intensity   Thought Process:  logical, linear   Thought Content:  no overt delusions   Perceptual Disturbances: denies auditory or visual hallucinations when asked   Risk Potential: Suicidal Ideation -  None at present  Homicidal Ideation -  None  Potential for Aggression - Not at present   Sensorium:  oriented to " "person, place, and time/date   Memory:  recent and remote memory grossly intact   Consciousness:  alert and awake   Attention/Concentration: attention span and concentration appear shorter than expected for age   Insight:  limited   Judgment: limited   Gait/Station: normal gait/station   Motor Activity: no abnormal movements       Lab Results: I have reviewed the following results:  Most Recent Labs:   Lab Results   Component Value Date    WBC 9.31 05/25/2025    RBC 4.95 05/25/2025    HGB 13.9 05/25/2025    HCT 43.1 05/25/2025     05/25/2025    RDW 12.8 05/25/2025    NEUTROABS 5.42 05/25/2025    TOTANEUTABS 9.15 (H) 05/12/2024    SODIUM 133 (L) 05/22/2025    K 3.9 05/22/2025    CL 98 05/22/2025    CO2 20 (L) 05/22/2025    BUN 10 05/22/2025    CREATININE 0.63 05/22/2025    GLUC 153 (H) 05/22/2025    CALCIUM 9.5 05/22/2025    AST 34 05/22/2025    ALT 33 05/22/2025    ALKPHOS 89 05/22/2025    TP 8.8 (H) 05/22/2025    ALB 4.3 05/22/2025    TBILI 0.47 05/22/2025    CHOLESTEROL 330 (H) 09/28/2023    HDL 21 (L) 09/28/2023    LDLCALC Comment 09/28/2023    NONHDLC 309 (H) 09/28/2023    HGF9TVTWJYZX 1.894 05/22/2025    TREPONEMAPA Non-reactive 05/23/2025    HGBA1C 11.6 (H) 05/07/2024     05/07/2024       Administrative Statements     Counseling / Coordination of Care:   Patient's progress discussed with staff in treatment team meeting.  Medication changes reviewed with staff in treatment team meeting.    Portions of the record may have been created with voice recognition software. Occasional wrong word or \"sound a like\" substitutions may have occurred due to the inherent limitations of voice recognition software. Read the chart carefully and recognize, using context, where substitutions have occurred.    ANDRES Busch 05/25/25  "

## 2025-05-25 NOTE — PLAN OF CARE
Problem: PAIN - ADULT  Goal: Verbalizes/displays adequate comfort level or baseline comfort level  Description: Interventions:  - Encourage patient to monitor pain and request assistance  - Assess pain using appropriate pain scale  - Administer analgesics as ordered based on type and severity of pain and evaluate response  - Implement non-pharmacological measures as appropriate and evaluate response  - Consider cultural and social influences on pain and pain management  - Notify physician/advanced practitioner if interventions unsuccessful or patient reports new pain  - Educate patient/family on pain management process including their role and importance of  reporting pain   - Provide non-pharmacologic/complimentary pain relief interventions  Outcome: Progressing     Problem: INFECTION - ADULT  Goal: Absence or prevention of progression during hospitalization  Description: INTERVENTIONS:  - Assess and monitor for signs and symptoms of infection  - Monitor lab/diagnostic results  - Monitor all insertion sites, i.e. indwelling lines, tubes, and drains  - Monitor endotracheal if appropriate and nasal secretions for changes in amount and color  - [place] appropriate cooling/warming therapies per order  - Administer medications as ordered  - Instruct and encourage patient and family to use good hand hygiene technique  - Identify and instruct in appropriate isolation precautions for identified infection/condition  Outcome: Progressing  Goal: Absence of fever/infection during neutropenic period  Description: INTERVENTIONS:  - Monitor WBC  - Perform strict hand hygiene  - Limit to healthy visitors only  - No plants, dried, fresh or silk flowers with baum in patient room  Outcome: Progressing     Problem: SAFETY ADULT  Goal: Patient will remain free of falls  Description: INTERVENTIONS:  - Educate patient/family on patient safety including physical limitations  - Instruct patient to call for assistance with activity   -  Consider consulting OT/PT to assist with strengthening/mobility based on AM PAC & JH-HLM score  - Consult OT/PT to assist with strengthening/mobility   - Keep Call bell within reach  - Keep bed low and locked with side rails adjusted as appropriate  - Keep care items and personal belongings within reach  - Initiate and maintain comfort rounds  - Make Fall Risk Sign visible to staff  - O- Apply yellow socks and bracelet for high fall risk patients  - Consider moving patient to room near nurses station  Outcome: Progressing  Goal: Maintain or return to baseline ADL function  Description: INTERVENTIONS:  -  Assess patient's ability to carry out ADLs; assess patient's baseline for ADL function and identify physical deficits which impact ability to perform ADLs (bathing, care of mouth/teeth, toileting, grooming, dressing, etc.)  - Assess/evaluate cause of self-care deficits   - Assess range of motion  - Assess patient's mobility; develop plan if impaired  - Assess patient's need for assistive devices and provide as appropriate  - Encourage maximum independence but intervene and supervise when necessary  - Involve family in performance of ADLs  - Assess for home care needs following discharge   - Consider OT consult to assist with ADL evaluation and planning for discharge  - Provide patient education as appropriate  - Monitor functional capacity and physical performance, use of AM PAC & JH-HLM   - Monitor gait, balance and fatigue with ambulation    Outcome: Progressing  Goal: Maintains/Returns to pre admission functional level  Description: INTERVENTIONS:  - Perform AM-PAC 6 Click Basic Mobility/ Daily Activity assessment daily.  - Set and communicate daily mobility goal to care team and patient/family/caregiver.   - Collaborate with rehabilitation services on mobility goals if consulted  - - Out of bed for toileting  - Record patient progress and toleration of activity level   Outcome: Progressing     Problem: Knowledge  Deficit  Goal: Patient/family/caregiver demonstrates understanding of disease process, treatment plan, medications, and discharge instructions  Description: Complete learning assessment and assess knowledge base.  Interventions:  - Provide teaching at level of understanding  - Provide teaching via preferred learning methods  Outcome: Progressing     Problem: Ineffective Coping  Goal: Identifies ineffective coping skills  Outcome: Progressing  Goal: Identifies healthy coping skills  Outcome: Progressing  Goal: Demonstrates healthy coping skills  Outcome: Progressing  Goal: Participates in unit activities  Description: Interventions:  - Provide therapeutic environment   - Provide required programming   - Redirect inappropriate behaviors   Outcome: Progressing  Goal: Patient/Family participate in treatment and DC plans  Description: Interventions:  - Provide therapeutic environment  Outcome: Progressing  Goal: Patient/Family verbalizes awareness of resources  Outcome: Progressing     Problem: Anxiety  Goal: Anxiety is at manageable level  Description: Interventions:  - Assess and monitor patient's anxiety level.   - Monitor for signs and symptoms (heart palpitations, chest pain, shortness of breath, headaches, nausea, feeling jumpy, restlessness, irritable, apprehensive).   - Collaborate with interdisciplinary team and initiate plan and interventions as ordered.  - [place] patient to unit/surroundings  - Explain treatment plan  - Encourage participation in care  - Encourage verbalization of concerns/fears  - Identify coping mechanisms  - Assist in developing anxiety-reducing skills  - Administer/offer alternative therapies  - Limit or eliminate stimulants  Outcome: Progressing     Problem: Risk for Violence/Aggression Toward Others  Goal: Treatment Goal: Refrain from acts of violence/aggression during length of stay, and demonstrate improved impulse control at the time of discharge  Outcome: Progressing     Problem:  Risk for Violence/Aggression Toward Others  Goal: Treatment Goal: Refrain from acts of violence/aggression during length of stay, and demonstrate improved impulse control at the time of discharge  Outcome: Progressing   2

## 2025-05-25 NOTE — ASSESSMENT & PLAN NOTE
Lab Results   Component Value Date    HGBA1C 11.6 (H) 05/07/2024       Recent Labs     05/24/25  1109 05/24/25  1547 05/24/25 2051 05/25/25  0812   POCGLU 320* 268* 223* 253*       Blood Sugar Average: Last 72 hrs:  (P) 260.875

## 2025-05-26 LAB
GLUCOSE SERPL-MCNC: 176 MG/DL (ref 65–140)
GLUCOSE SERPL-MCNC: 213 MG/DL (ref 65–140)
GLUCOSE SERPL-MCNC: 242 MG/DL (ref 65–140)
GLUCOSE SERPL-MCNC: 302 MG/DL (ref 65–140)

## 2025-05-26 PROCEDURE — 99232 SBSQ HOSP IP/OBS MODERATE 35: CPT | Performed by: PSYCHIATRY & NEUROLOGY

## 2025-05-26 PROCEDURE — 82948 REAGENT STRIP/BLOOD GLUCOSE: CPT

## 2025-05-26 RX ADMIN — SODIUM BICARBONATE 650 MG TABLET 650 MG: at 17:17

## 2025-05-26 RX ADMIN — TOPIRAMATE 25 MG: 25 TABLET, FILM COATED ORAL at 08:17

## 2025-05-26 RX ADMIN — FLUOXETINE HYDROCHLORIDE 20 MG: 20 CAPSULE ORAL at 08:17

## 2025-05-26 RX ADMIN — INSULIN LISPRO 7 UNITS: 100 INJECTION, SOLUTION INTRAVENOUS; SUBCUTANEOUS at 16:19

## 2025-05-26 RX ADMIN — INSULIN LISPRO 3 UNITS: 100 INJECTION, SOLUTION INTRAVENOUS; SUBCUTANEOUS at 08:19

## 2025-05-26 RX ADMIN — ARIPIPRAZOLE 5 MG: 5 TABLET ORAL at 08:17

## 2025-05-26 RX ADMIN — SULFAMETHOXAZOLE AND TRIMETHOPRIM 1 TABLET: 800; 160 TABLET ORAL at 21:42

## 2025-05-26 RX ADMIN — NICOTINE POLACRILEX 4 MG: 4 GUM, CHEWING BUCCAL at 12:22

## 2025-05-26 RX ADMIN — INSULIN LISPRO 1 UNITS: 100 INJECTION, SOLUTION INTRAVENOUS; SUBCUTANEOUS at 16:16

## 2025-05-26 RX ADMIN — SODIUM BICARBONATE 650 MG TABLET 650 MG: at 09:04

## 2025-05-26 RX ADMIN — TRAZODONE HYDROCHLORIDE 50 MG: 50 TABLET ORAL at 21:48

## 2025-05-26 RX ADMIN — INSULIN LISPRO 4 UNITS: 100 INJECTION, SOLUTION INTRAVENOUS; SUBCUTANEOUS at 11:15

## 2025-05-26 RX ADMIN — INSULIN LISPRO 2 UNITS: 100 INJECTION, SOLUTION INTRAVENOUS; SUBCUTANEOUS at 21:44

## 2025-05-26 RX ADMIN — INSULIN LISPRO 7 UNITS: 100 INJECTION, SOLUTION INTRAVENOUS; SUBCUTANEOUS at 11:15

## 2025-05-26 RX ADMIN — INSULIN GLARGINE 20 UNITS: 100 INJECTION, SOLUTION SUBCUTANEOUS at 21:47

## 2025-05-26 RX ADMIN — SULFAMETHOXAZOLE AND TRIMETHOPRIM 1 TABLET: 800; 160 TABLET ORAL at 08:17

## 2025-05-26 RX ADMIN — TOPIRAMATE 25 MG: 25 TABLET, FILM COATED ORAL at 17:17

## 2025-05-26 RX ADMIN — INSULIN LISPRO 7 UNITS: 100 INJECTION, SOLUTION INTRAVENOUS; SUBCUTANEOUS at 08:19

## 2025-05-26 RX ADMIN — FENOFIBRATE 48 MG: 48 TABLET ORAL at 08:17

## 2025-05-26 NOTE — DISCHARGE INSTR - APPOINTMENTS
You have confirmed and will be discharged to address [address] [place] [id number].  You have confirmed and will be contacted at phone number [phone].  Your  while you were at [facility] was Gabo ANNE who can be reached at phone number [phone] and fax number [phone].    Behavioral Health Nurse Navigator, Ana Maria or Elizabeth will be calling you after your discharge, on the phone number that you provided.  They will be available as an additional support, if needed.   If you wish to speak with Ana Maria, you may contact her at [phone].

## 2025-05-26 NOTE — ASSESSMENT & PLAN NOTE
Lab Results   Component Value Date    HGBA1C 10.4 (H) 05/25/2025       Recent Labs     05/25/25  1122 05/25/25  1559 05/25/25  2101 05/26/25  0811   POCGLU 355* 250* 232* 242*       Blood Sugar Average: Last 72 hrs:  (P) [id number]

## 2025-05-26 NOTE — ASSESSMENT & PLAN NOTE
Restarted Prozac 20mg po QD  Restarted Topamax 25mg po BID  Restarted Abilify 5mg po QD   Plan to transition to Abilify Maintenna AMADO prior to discharge  72 hr notice placed 5/24 rescinded on 5/26. Hoping still for D/C on 5/27. Signed JENNIFER and will allow team to help set up appropriate DC planning. No 302 behaviors on unit documented but will await collateral from CM to help assist with planning

## 2025-05-26 NOTE — PLAN OF CARE
Problem: PAIN - ADULT  Goal: Verbalizes/displays adequate comfort level or baseline comfort level  Description: Interventions:  - Encourage patient to monitor pain and request assistance  - Assess pain using appropriate pain scale  - Administer analgesics as ordered based on type and severity of pain and evaluate response  - Implement non-pharmacological measures as appropriate and evaluate response  - Consider cultural and social influences on pain and pain management  - Notify physician/advanced practitioner if interventions unsuccessful or patient reports new pain  - Educate patient/family on pain management process including their role and importance of  reporting pain   - Provide non-pharmacologic/complimentary pain relief interventions  Outcome: Progressing     Problem: INFECTION - ADULT  Goal: Absence or prevention of progression during hospitalization  Description: INTERVENTIONS:  - Assess and monitor for signs and symptoms of infection  - Monitor lab/diagnostic results  - Monitor all insertion sites, i.e. indwelling lines, tubes, and drains  - Monitor endotracheal if appropriate and nasal secretions for changes in amount and color  - [place] appropriate cooling/warming therapies per order  - Administer medications as ordered  - Instruct and encourage patient and family to use good hand hygiene technique  - Identify and instruct in appropriate isolation precautions for identified infection/condition  Outcome: Progressing  Goal: Absence of fever/infection during neutropenic period  Description: INTERVENTIONS:  - Monitor WBC  - Perform strict hand hygiene  - Limit to healthy visitors only  - No plants, dried, fresh or silk flowers with baum in patient room  Outcome: Progressing    Goal: Maintain or return to baseline ADL function  Description: INTERVENTIONS:  -  Assess patient's ability to carry out ADLs; assess patient's baseline for ADL function and identify physical deficits which impact ability to  perform ADLs (bathing, care of mouth/teeth, toileting, grooming, dressing, etc.)  - Assess/evaluate cause of self-care deficits   - Assess range of motion  - Assess patient's mobility; develop plan if impaired  - Assess patient's need for assistive devices and provide as appropriate  - Encourage maximum independence but intervene and supervise when necessary  - Involve family in performance of ADLs  - Assess for home care needs following discharge   - Consider OT consult to assist with ADL evaluation and planning for discharge  - Provide patient education as appropriate  - Monitor functional capacity and physical performance, use of AM PAC & JH-HLM   - Monitor gait, balance and fatigue with ambulation    Outcome: Progressing     Problem: DISCHARGE PLANNING  Goal: Discharge to home or other facility with appropriate resources  Description: INTERVENTIONS:  - Identify barriers to discharge w/patient and caregiver  - Arrange for needed discharge resources and transportation as appropriate  - Identify discharge learning needs (meds, wound care, etc.)  - Arrange for interpretive services to assist at discharge as needed  - Refer to Case Management Department for coordinating discharge planning if the patient needs post-hospital services based on physician/advanced practitioner order or complex needs related to functional status, cognitive ability, or social support system  Outcome: Progressing     Problem: Knowledge Deficit  Goal: Patient/family/caregiver demonstrates understanding of disease process, treatment plan, medications, and discharge instructions  Description: Complete learning assessment and assess knowledge base.  Interventions:  - Provide teaching at level of understanding  - Provide teaching via preferred learning methods  Outcome: Progressing     Problem: Ineffective Coping  Goal: Identifies ineffective coping skills  Outcome: Progressing  Goal: Identifies healthy coping skills  Outcome: Progressing  Goal:  Demonstrates healthy coping skills  Outcome: Progressing  Goal: Participates in unit activities  Description: Interventions:  - Provide therapeutic environment   - Provide required programming   - Redirect inappropriate behaviors   Outcome: Progressing  Goal: Patient/Family participate in treatment and DC plans  Description: Interventions:  - Provide therapeutic environment  Outcome: Progressing  Goal: Patient/Family verbalizes awareness of resources  Outcome: Progressing     Problem: Depression  Goal: Treatment Goal: Demonstrate behavioral control of depressive symptoms, verbalize feelings of improved mood/affect, and adopt new coping skills prior to discharge  Outcome: Progressing     Problem: Anxiety  Goal: Anxiety is at manageable level  Description: Interventions:  - Assess and monitor patient's anxiety level.   - Monitor for signs and symptoms (heart palpitations, chest pain, shortness of breath, headaches, nausea, feeling jumpy, restlessness, irritable, apprehensive).   - Collaborate with interdisciplinary team and initiate plan and interventions as ordered.  - [place] patient to unit/surroundings  - Explain treatment plan  - Encourage participation in care  - Encourage verbalization of concerns/fears  - Identify coping mechanisms  - Assist in developing anxiety-reducing skills  - Administer/offer alternative therapies  - Limit or eliminate stimulants  Outcome: Progressing     Problem: Risk for Violence/Aggression Toward Others  Goal: Treatment Goal: Refrain from acts of violence/aggression during length of stay, and demonstrate improved impulse control at the time of discharge  Outcome: Progressing

## 2025-05-26 NOTE — TREATMENT TEAM
05/26/25 0700   Team Meeting   Meeting Type Daily Rounds   Team Members Present   Team Members Present Physician;Nurse   Physician Team Member Anna / Sincere / Susie (CRNP)   Nursing Team Member Clauser   Patient/Family Present   Patient Present No   Patient's Family Present No     AM rounds -  201, brought in by police after grabbing a kitchen knife and threatening to hurt himself after an argument with his mother. Signed 72 hour notice 05/24 @0757. Denies SI/HI or hallucination. Triglycerides elevated.

## 2025-05-26 NOTE — NURSING NOTE
"Patient approached nurses station this afternoon requesting to speak with writer. Patient requesting his hoodie from storage as a comfort object. Writer went over the unit policy regarding hoods. Patient reports feeling upset due to a peer \"blowing up on him.\" Active listening provided. Patient declined PRN medication for anxiety at this time. Patient later observed socializing with a different peers, reports feeling less anxious at this time. Denies SI/HI/AVH. Staff availability reinforced.   "

## 2025-05-26 NOTE — DISCHARGE INSTR - OTHER ORDERS
[facility] Crisis Information   [facility]  Community Carelinks Program  [phone]  [facility]  Outpatient mental health, sexual abuse treatment, and suicide prevention treatment  [phone]  [place] [facility]  Family Crisis Intervention Unit  Crisis Hotline [phone]    [facility]  [address] [place] [address]  HOTLINE: [phone]    Human Services - Agency Programs  [facility]*  Career Exploration &  Job Placement for Adults with Disabilities  [phone]    [employer]*  Social Service Center  [phone]    [facility] ([facility])*  Crisis Line  [phone]    [facility]*  Rapid Rehousing Assistance  [phone]    [facility]*  Legal Services for Low Income  [phone]    [facility]*  Services for Emergency Basic Needs,  Child and Family Resource Services &  Food Bank  [phone] (main number)    [facility]*  Emergency Sheltering in [place], [place]  [phone]  [facility] Self-Sufficiency of [facility]3]*  Journey: [employer] on the Move in [place]  [phone]

## 2025-05-26 NOTE — NURSING NOTE
Pt approached in hallway this morning; Calm, cooperative and pleasant during interaction. Pt reports sleeping well overnight and denies SI/HI or hallucinations at this time. Pt expressed desire to speak with CM regarding discharge planning; Pt seeking confirmation for medications; Referred to CM. Pt denies any other unmet needs or concerns at this time; Plan of care ongoing.

## 2025-05-26 NOTE — PROGRESS NOTES
Progress Note - Behavioral Health   Name: Rik Marin 20 y.o. male I MRN: [id number]  Unit/Bed#: -02 I Date of Admission: 5/22/2025   Date of Service: 5/26/2025 I Hospital Day: 4     Assessment & Plan  Mood disorder (HCC)  Restarted Prozac 20mg po QD  Restarted Topamax 25mg po BID  Restarted Abilify 5mg po QD   Plan to transition to Abilify Maintenna AMADO prior to discharge  72 hr notice placed 5/24 rescinded on 5/26. Hoping still for D/C on 5/27. Signed JENNIFER and will allow team to help set up appropriate DC planning. No 302 behaviors on unit documented but will await collateral from CM to help assist with planning  Morbid obesity (HCC)    Uncontrolled type 2 diabetes mellitus with hyperglycemia (HCC)  Lab Results   Component Value Date    HGBA1C 10.4 (H) 05/25/2025       Recent Labs     05/25/25  1122 05/25/25  1559 05/25/25  2101 05/26/25  0811   POCGLU 355* 250* 232* 242*       Blood Sugar Average: Last 72 hrs:  (P) [id number]    Conduct disorder    ADHD (attention deficit hyperactivity disorder)    Medical clearance for psychiatric admission    Autism spectrum disorder    Mild tetrahydrocannabinol (THC) abuse      Current Medications:    Current Facility-Administered Medications:     ARIPiprazole (ABILIFY) tablet 5 mg, Oral, Daily    fenofibrate (TRICOR) tablet 48 mg, Oral, Daily    FLUoxetine (PROzac) capsule 20 mg, Oral, Daily    insulin glargine (LANTUS) subcutaneous injection 20 Units 0.2 mL, Subcutaneous, HS    insulin lispro (HumALOG/ADMELOG) 100 units/mL subcutaneous injection 1-6 Units, Subcutaneous, TID AC **AND** Fingerstick Glucose (POCT), , TID AC    insulin lispro (HumALOG/ADMELOG) 100 units/mL subcutaneous injection 1-6 Units, Subcutaneous, HS    insulin lispro (HumALOG/ADMELOG) 100 units/mL subcutaneous injection 7 Units, Subcutaneous, TID With Meals    sodium bicarbonate tablet 650 mg, Oral, BID after meals    sulfamethoxazole-trimethoprim (BACTRIM DS) 800-160 mg per tablet 1  tablet, Oral, Q12H TANESHA    topiramate (TOPAMAX) tablet 25 mg, Oral, BID    Current Facility-Administered Medications:     aluminum-magnesium hydroxide-simethicone (MAALOX) oral suspension 30 mL, Oral, Q4H PRN    haloperidol lactate (HALDOL) injection 2.5 mg, Intramuscular, Q4H PRN Max 4/day **AND** LORazepam (ATIVAN) injection 1 mg, Intramuscular, Q4H PRN Max 4/day **AND** benztropine (COGENTIN) injection 0.5 mg, Intramuscular, Q4H PRN Max 4/day    haloperidol lactate (HALDOL) injection 5 mg, Intramuscular, Q4H PRN Max 4/day **AND** LORazepam (ATIVAN) injection 2 mg, Intramuscular, Q4H PRN Max 4/day **AND** benztropine (COGENTIN) injection 1 mg, Intramuscular, Q4H PRN Max 4/day    benztropine (COGENTIN) injection 1 mg, Intramuscular, Q4H PRN Max 6/day    benztropine (COGENTIN) tablet 1 mg, Oral, Q4H PRN Max 6/day    bisacodyl (DULCOLAX) rectal suppository 10 mg, Rectal, Daily PRN    hydrOXYzine HCL (ATARAX) tablet 50 mg, Oral, Q6H PRN Max 4/day **OR** diphenhydrAMINE (BENADRYL) injection 50 mg, Intramuscular, Q6H PRN    haloperidol (HALDOL) tablet 1 mg, Oral, Q6H PRN    haloperidol (HALDOL) tablet 2.5 mg, Oral, Q4H PRN Max 4/day    haloperidol (HALDOL) tablet 5 mg, Oral, Q4H PRN Max 4/day    hydrOXYzine HCL (ATARAX) tablet 100 mg, Oral, Q6H PRN Max 4/day **OR** LORazepam (ATIVAN) injection 2 mg, Intramuscular, Q6H PRN    hydrOXYzine HCL (ATARAX) tablet 25 mg, Oral, Q6H PRN Max 4/day    ibuprofen (MOTRIN) tablet 400 mg, Oral, Q4H PRN    ibuprofen (MOTRIN) tablet 600 mg, Oral, Q6H PRN    ibuprofen (MOTRIN) tablet 800 mg, Oral, Q8H PRN    nicotine polacrilex (NICORETTE) gum 4 mg, Oral, Q2H PRN    polyethylene glycol (MIRALAX) packet 17 g, Oral, Daily PRN    propranolol (INDERAL) tablet 10 mg, Oral, Q8H PRN    senna-docusate sodium (SENOKOT S) 8.6-50 mg per tablet 1 tablet, Oral, Daily PRN    traZODone (DESYREL) tablet 50 mg, Oral, HS PRN    Risks/Benefits of Treatment:     Risks, benefits, and possible side effects of  "medications explained to patient. Patient has limited understanding of risks and benefits of treatment at this time, but agrees to take medications as prescribed.    Progress Toward Goals: improving    Treatment Planning:     All current active medications have been reviewed.  Continue to monitor response to treatment and assess for potential side effects of medications.  Encourage group therapy, milieu therapy and occupational therapy.  Collaboration with medical service for medical comorbidities as indicated.  Behavioral Health checks for safety monitoring.  Estimated Discharge Day: 2025     Subjective     Behavior over the last 24 hours: slowly improving    Per RN report he signed a 72 which would have  early in the AM on , patient did rescind so that appropriate DC planning can take place. Reports his mother told him to cut self to be able to get into the hospital so that he can get back on his medications, feels like \"my mind is clearer\" now that prozac, abilify and topamax were restarted. He denies SI or HI when asked directly. On the unit he required PRN of atarax and haldol since admission.    Sleep: required trazodone last night  Appetite: normal  Medication side effects: No  ROS: review of systems as noted above in HPI/Subjective report, all other systems are negative    Objective :  Temp:  [97.5 °F (36.4 °C)-97.8 °F (36.6 °C)] 97.5 °F (36.4 °C)  HR:  [100-107] 107  BP: (113-138)/(85-96) 138/96  Resp:  [18] 18  SpO2:  [95 %-97 %] 97 %  O2 Device: None (Room air)    Mental Status Evaluation:    Appearance:  adequate grooming, overweight   Behavior:  cooperative, calm   Speech:  normal rate, normal volume   Mood:  euthymic   Affect:  normal range and intensity   Thought Process:  goal directed   Thought Content:  no overt delusions   Perceptual Disturbances: denies auditory or visual hallucinations when asked   Risk Potential: Suicidal Ideation -  None at present  Homicidal Ideation -  None " "at present  Potential for Aggression - Not at present   Sensorium:  oriented to person, place, and situation   Memory:  recent and remote memory grossly intact   Consciousness:  alert and awake   Attention/Concentration: attention span and concentration appear shorter than expected for age   Insight:  fair   Judgment: limited   Gait/Station: normal gait/station   Motor Activity: no abnormal movements       Lab Results: I have reviewed the following results:  Results from the past 24 hours:   Recent Results (from the past 24 hours)   Fingerstick Glucose (POCT)    Collection Time: 05/25/25 11:22 AM   Result Value Ref Range    POC Glucose 355 (H) 65 - 140 mg/dl   Fingerstick Glucose (POCT)    Collection Time: 05/25/25  3:59 PM   Result Value Ref Range    POC Glucose 250 (H) 65 - 140 mg/dl   Fingerstick Glucose (POCT)    Collection Time: 05/25/25  9:01 PM   Result Value Ref Range    POC Glucose 232 (H) 65 - 140 mg/dl   Fingerstick Glucose (POCT)    Collection Time: 05/26/25  8:11 AM   Result Value Ref Range    POC Glucose 242 (H) 65 - 140 mg/dl       Administrative Statements     Counseling / Coordination of Care:   I have spent a total time of 30 minutes in caring for this patient on the day of the visit/encounter including:  Patient's progress discussed with staff in treatment team meeting.  Medication changes reviewed with staff in treatment team meeting.    Portions of the record may have been created with voice recognition software. Occasional wrong word or \"sound a like\" substitutions may have occurred due to the inherent limitations of voice recognition software. Read the chart carefully and recognize, using context, where substitutions have occurred.    Lilly Castillo MD 05/26/25  "

## 2025-05-26 NOTE — CASE MANAGEMENT
CM met with the Pt to discuss discharge planning. CM stated that they Pt signed a 72 hour notice on 5/26/2025 @0757 and is asking to discharge today. CM asked if the Pt has follow up scheduled. Pt stated that when he gets home, he can call [facility] and see if he is still active or not. Pt stated that he had a therapist named Jeremy that he was seeing twice a week and was also going to groups. CM asked if he had spoke to his mom about returning. Pt stated that he did and she is ok with it. CM stated that they would like for the Pt to sign an JENNIFER for his mom so that they can speak to her as well. CM suggested against discharging today due to the Pt not being able to call [facility] today due to the holiday. CM stated that if the Pt discharges tomorrow, they can call in the morning and also speak to his mom about discharge. He verbalized understanding and rescinded his 72 hour notice and signed an JENNIFER for his mom.     CM called the Pt's mom Lissa Lang @[phone] to discuss admission and discharge planning. CM left a voicemail.    Consent: The risks of atrophy were reviewed with the patient. Total Volume (Ccs): 0.3 Kenalog Preparation: Kenalog with 1% lidocaine without epinephrine How Many Mls Were Removed From The 40 Mg/Ml (5ml) Vial When Preparing The Injectable Solution?: 0 Administered By (Optional): Amber Davenport, MARY, ARNP Require Ndc Code?: No Detail Level: Zone Lot # For Kenalog (Optional): [id number] Kenalog Type Of Vial: Multiple Dose Expiration Date For Kenalog (Optional): 12/2025 Ndc# For Kenalog Only: 9368-3980-16 Validate Note Data When Using Inventory: Yes Concentration Of Kenalog Solution Injected (Mg/Ml): 2.0 Show Inventory Tab: Hide Medical Necessity Clause: This procedure was medically necessary because the lesions that were treated were:

## 2025-05-27 VITALS
SYSTOLIC BLOOD PRESSURE: 148 MMHG | WEIGHT: 315 LBS | DIASTOLIC BLOOD PRESSURE: 96 MMHG | BODY MASS INDEX: 39.17 KG/M2 | RESPIRATION RATE: 18 BRPM | TEMPERATURE: 98.2 F | OXYGEN SATURATION: 100 % | HEIGHT: 75 IN | HEART RATE: 99 BPM

## 2025-05-27 LAB
GLUCOSE SERPL-MCNC: 239 MG/DL (ref 65–140)
GLUCOSE SERPL-MCNC: 289 MG/DL (ref 65–140)

## 2025-05-27 PROCEDURE — 99239 HOSP IP/OBS DSCHRG MGMT >30: CPT | Performed by: PSYCHIATRY & NEUROLOGY

## 2025-05-27 PROCEDURE — 82948 REAGENT STRIP/BLOOD GLUCOSE: CPT

## 2025-05-27 RX ORDER — BLOOD SUGAR DIAGNOSTIC
STRIP MISCELLANEOUS
Qty: 100 EACH | Refills: 0 | Status: SHIPPED | OUTPATIENT
Start: 2025-05-27

## 2025-05-27 RX ORDER — BLOOD-GLUCOSE METER
KIT MISCELLANEOUS
Qty: 1 KIT | Refills: 0 | Status: SHIPPED | OUTPATIENT
Start: 2025-05-27

## 2025-05-27 RX ORDER — LANCETS 33 GAUGE
EACH MISCELLANEOUS
Qty: 100 EACH | Refills: 0 | Status: SHIPPED | OUTPATIENT
Start: 2025-05-27

## 2025-05-27 RX ORDER — SULFAMETHOXAZOLE AND TRIMETHOPRIM 800; 160 MG/1; MG/1
1 TABLET ORAL EVERY 12 HOURS SCHEDULED
Qty: 5 TABLET | Refills: 0 | Status: SHIPPED | OUTPATIENT
Start: 2025-05-27 | End: 2025-05-30

## 2025-05-27 RX ORDER — ARIPIPRAZOLE 5 MG/1
5 TABLET ORAL DAILY
Qty: 30 TABLET | Refills: 0 | Status: SHIPPED | OUTPATIENT
Start: 2025-05-28 | End: 2025-06-27

## 2025-05-27 RX ORDER — INSULIN GLARGINE 100 [IU]/ML
20 INJECTION, SOLUTION SUBCUTANEOUS
Qty: 6 ML | Refills: 0 | Status: CANCELLED | OUTPATIENT
Start: 2025-05-27

## 2025-05-27 RX ORDER — SODIUM BICARBONATE 650 MG/1
650 TABLET ORAL
Qty: 60 TABLET | Refills: 0 | Status: SHIPPED | OUTPATIENT
Start: 2025-05-27

## 2025-05-27 RX ORDER — INSULIN ASPART 100 [IU]/ML
7 INJECTION, SOLUTION INTRAVENOUS; SUBCUTANEOUS
Qty: 15 ML | Refills: 0 | Status: SHIPPED | OUTPATIENT
Start: 2025-05-27

## 2025-05-27 RX ORDER — GLUCOSAMINE HCL/CHONDROITIN SU 500-400 MG
CAPSULE ORAL
Qty: 100 EACH | Refills: 0 | Status: SHIPPED | OUTPATIENT
Start: 2025-05-27

## 2025-05-27 RX ORDER — FENOFIBRATE 48 MG/1
48 TABLET, FILM COATED ORAL DAILY
Qty: 30 TABLET | Refills: 0 | Status: SHIPPED | OUTPATIENT
Start: 2025-05-28 | End: 2025-06-27

## 2025-05-27 RX ORDER — TOPIRAMATE 25 MG/1
25 TABLET, FILM COATED ORAL 2 TIMES DAILY
Qty: 60 TABLET | Refills: 0 | Status: SHIPPED | OUTPATIENT
Start: 2025-05-27

## 2025-05-27 RX ORDER — INSULIN GLARGINE 100 [IU]/ML
20 INJECTION, SOLUTION SUBCUTANEOUS
Qty: 10 ML | Refills: 0 | Status: SHIPPED | OUTPATIENT
Start: 2025-05-27

## 2025-05-27 RX ORDER — INSULIN LISPRO 100 [IU]/ML
7 INJECTION, SOLUTION INTRAVENOUS; SUBCUTANEOUS
Qty: 6.3 ML | Refills: 0 | Status: CANCELLED | OUTPATIENT
Start: 2025-05-27

## 2025-05-27 RX ADMIN — INSULIN LISPRO 7 UNITS: 100 INJECTION, SOLUTION INTRAVENOUS; SUBCUTANEOUS at 12:12

## 2025-05-27 RX ADMIN — ARIPIPRAZOLE 5 MG: 5 TABLET ORAL at 08:09

## 2025-05-27 RX ADMIN — INSULIN LISPRO 3 UNITS: 100 INJECTION, SOLUTION INTRAVENOUS; SUBCUTANEOUS at 12:12

## 2025-05-27 RX ADMIN — SODIUM BICARBONATE 650 MG TABLET 650 MG: at 08:09

## 2025-05-27 RX ADMIN — FLUOXETINE HYDROCHLORIDE 20 MG: 20 CAPSULE ORAL at 08:09

## 2025-05-27 RX ADMIN — INSULIN LISPRO 4 UNITS: 100 INJECTION, SOLUTION INTRAVENOUS; SUBCUTANEOUS at 08:11

## 2025-05-27 RX ADMIN — SULFAMETHOXAZOLE AND TRIMETHOPRIM 1 TABLET: 800; 160 TABLET ORAL at 08:09

## 2025-05-27 RX ADMIN — FENOFIBRATE 48 MG: 48 TABLET ORAL at 08:09

## 2025-05-27 RX ADMIN — TOPIRAMATE 25 MG: 25 TABLET, FILM COATED ORAL at 08:09

## 2025-05-27 RX ADMIN — INSULIN LISPRO 7 UNITS: 100 INJECTION, SOLUTION INTRAVENOUS; SUBCUTANEOUS at 08:11

## 2025-05-27 NOTE — NURSING NOTE
Awake, walking the halls. Reports restful sleep. Hopeful for discharge home today. Expressed readiness. Denies SI/HI and hallucinations. Superficial cuts to bilateral forearms healing and scabbed, no signs of infection. Mood improved from admission. Encouraged compliance with medications and OP appointments upon discharge. No questions or concerns.

## 2025-05-27 NOTE — BH TRANSITION RECORD
Contact Information: If you have any questions, concerns, pended studies, tests and/or procedures, or emergencies regarding your inpatient behavioral health visit. Please contact [place] [facility] [phone] and ask to speak to a , nurse or physician. A contact is available 24 hours/ 7 days a week at this number.     Summary of Procedures Performed During your Stay:  Below is a list of major procedures performed during your hospital stay and a summary of results:  - No major procedures performed.    Pending Studies (From admission, onward)      None          Please follow up on the above pending studies with your PCP and/or referring provider.

## 2025-05-27 NOTE — PLAN OF CARE
Problem: DISCHARGE PLANNING  Goal: Discharge to home or other facility with appropriate resources  Description: INTERVENTIONS:  - Identify barriers to discharge w/patient and caregiver  - Arrange for needed discharge resources and transportation as appropriate  - Identify discharge learning needs (meds, wound care, etc.)  - Arrange for interpretive services to assist at discharge as needed  - Refer to Case Management Department for coordinating discharge planning if the patient needs post-hospital services based on physician/advanced practitioner order or complex needs related to functional status, cognitive ability, or social support system  Outcome: Progressing     Problem: Knowledge Deficit  Goal: Patient/family/caregiver demonstrates understanding of disease process, treatment plan, medications, and discharge instructions  Description: Complete learning assessment and assess knowledge base.  Interventions:  - Provide teaching at level of understanding  - Provide teaching via preferred learning methods  Outcome: Progressing     Problem: Ineffective Coping  Goal: Identifies ineffective coping skills  Outcome: Progressing  Goal: Identifies healthy coping skills  Outcome: Progressing  Goal: Demonstrates healthy coping skills  Outcome: Progressing  Goal: Participates in unit activities  Description: Interventions:  - Provide therapeutic environment   - Provide required programming   - Redirect inappropriate behaviors   Outcome: Progressing  Goal: Patient/Family participate in treatment and DC plans  Description: Interventions:  - Provide therapeutic environment  Outcome: Progressing  Goal: Patient/Family verbalizes awareness of resources  Outcome: Progressing     Problem: Depression  Goal: Treatment Goal: Demonstrate behavioral control of depressive symptoms, verbalize feelings of improved mood/affect, and adopt new coping skills prior to discharge  Outcome: Progressing     Problem: Anxiety  Goal: Anxiety is at  manageable level  Description: Interventions:  - Assess and monitor patient's anxiety level.   - Monitor for signs and symptoms (heart palpitations, chest pain, shortness of breath, headaches, nausea, feeling jumpy, restlessness, irritable, apprehensive).   - Collaborate with interdisciplinary team and initiate plan and interventions as ordered.  - [place] patient to unit/surroundings  - Explain treatment plan  - Encourage participation in care  - Encourage verbalization of concerns/fears  - Identify coping mechanisms  - Assist in developing anxiety-reducing skills  - Administer/offer alternative therapies  - Limit or eliminate stimulants  Outcome: Progressing     Problem: Risk for Violence/Aggression Toward Others  Goal: Treatment Goal: Refrain from acts of violence/aggression during length of stay, and demonstrate improved impulse control at the time of discharge  Outcome: Progressing     Problem: PAIN - ADULT  Goal: Verbalizes/displays adequate comfort level or baseline comfort level  Description: Interventions:  - Encourage patient to monitor pain and request assistance  - Assess pain using appropriate pain scale  - Administer analgesics as ordered based on type and severity of pain and evaluate response  - Implement non-pharmacological measures as appropriate and evaluate response  - Consider cultural and social influences on pain and pain management  - Notify physician/advanced practitioner if interventions unsuccessful or patient reports new pain  - Educate patient/family on pain management process including their role and importance of  reporting pain   - Provide non-pharmacologic/complimentary pain relief interventions  Outcome: Completed     Problem: INFECTION - ADULT  Goal: Absence or prevention of progression during hospitalization  Description: INTERVENTIONS:  - Assess and monitor for signs and symptoms of infection  - Monitor lab/diagnostic results  - Monitor all insertion sites, i.e. indwelling lines,  tubes, and drains  - Monitor endotracheal if appropriate and nasal secretions for changes in amount and color  - [place] appropriate cooling/warming therapies per order  - Administer medications as ordered  - Instruct and encourage patient and family to use good hand hygiene technique  - Identify and instruct in appropriate isolation precautions for identified infection/condition  Outcome: Completed  Goal: Absence of fever/infection during neutropenic period  Description: INTERVENTIONS:  - Monitor WBC  - Perform strict hand hygiene  - Limit to healthy visitors only  - No plants, dried, fresh or silk flowers with baum in patient room  Outcome: Completed     Problem: SAFETY ADULT  Goal: Patient will remain free of falls  Description: INTERVENTIONS:  - Educate patient/family on patient safety including physical limitations  - Instruct patient to call for assistance with activity   - Consider consulting OT/PT to assist with strengthening/mobility based on AM PAC & -HLM score  - Consult OT/PT to assist with strengthening/mobility   - Keep Call bell within reach  - Keep bed low and locked with side rails adjusted as appropriate  - Keep care items and personal belongings within reach  - Initiate and maintain comfort rounds  - Make Fall Risk Sign visible to staff  - Apply yellow socks and bracelet for high fall risk patients  - Consider moving patient to room near nurses station  Outcome: Completed  Goal: Maintain or return to baseline ADL function  Description: INTERVENTIONS:  -  Assess patient's ability to carry out ADLs; assess patient's baseline for ADL function and identify physical deficits which impact ability to perform ADLs (bathing, care of mouth/teeth, toileting, grooming, dressing, etc.)  - Assess/evaluate cause of self-care deficits   - Assess range of motion  - Assess patient's mobility; develop plan if impaired  - Assess patient's need for assistive devices and provide as appropriate  - Encourage maximum  independence but intervene and supervise when necessary  - Involve family in performance of ADLs  - Assess for home care needs following discharge   - Consider OT consult to assist with ADL evaluation and planning for discharge  - Provide patient education as appropriate  - Monitor functional capacity and physical performance, use of AM PAC & JH-HLM   - Monitor gait, balance and fatigue with ambulation    Outcome: Completed  Goal: Maintains/Returns to pre admission functional level  Description: INTERVENTIONS:  - Perform AM-PAC 6 Click Basic Mobility/ Daily Activity assessment daily.  - Set and communicate daily mobility goal to care team and patient/family/caregiver.   - Collaborate with rehabilitation services on mobility goals if consulted  - Out of bed for toileting  - Record patient progress and toleration of activity level   Outcome: Completed

## 2025-05-27 NOTE — DISCHARGE INSTR - AVS FIRST PAGE
You were restarted on home medications for Diabetes including insulin pens. Please be sure to follow up with your primary care physician and an Endocrinologist following discharge for ongoing management.

## 2025-05-27 NOTE — DISCHARGE SUMMARY
Discharge Summary -  Behavioral Health   Name: Rik Marin 20 y.o. male I MRN: [id number]  Unit/Bed#: -02 I Date of Admission: 5/22/2025   Date of Service: 5/27/2025 I Hospital Day: 5      Assessment & Plan  Mood disorder (HCC)  Restarted Prozac 20mg po QD  Restarted Topamax 25mg po BID  Restarted Abilify 5mg po QD   72 hr notice placed 5/24 rescinded on 5/26. Hoping still for D/C on 5/27. Signed JENNIFER and will allow team to help set up appropriate DC planning. No 302 behaviors on unit documented but will await collateral from CM to help assist with planning  Morbid obesity (HCC)    Uncontrolled type 2 diabetes mellitus with hyperglycemia (HCC)  Lab Results   Component Value Date    HGBA1C 10.4 (H) 05/25/2025       Recent Labs     05/26/25  1106 05/26/25  1612 05/26/25  2040 05/27/25  0804   POCGLU 302* 176* 213* 289*       Blood Sugar Average: Last 72 hrs:  (P) [id number]    Conduct disorder    ADHD (attention deficit hyperactivity disorder)    Medical clearance for psychiatric admission    Autism spectrum disorder    Mild tetrahydrocannabinol (THC) abuse         Admission Date: 5/22/2025       Discharge Date: 05/27/25  Attending Psychiatrist: Cristi Quiles DO    Admission Diagnosis:    Principal Problem:    Mood disorder (HCC)  Active Problems:    Morbid obesity (HCC)    Uncontrolled type 2 diabetes mellitus with hyperglycemia (HCC)    Conduct disorder    ADHD (attention deficit hyperactivity disorder)    Medical clearance for psychiatric admission    Autism spectrum disorder    Mild tetrahydrocannabinol (THC) abuse      Discharge Diagnosis:     Principal Problem:    Mood disorder (HCC)  Active Problems:    Morbid obesity (HCC)    Uncontrolled type 2 diabetes mellitus with hyperglycemia (HCC)    Conduct disorder    ADHD (attention deficit hyperactivity disorder)    Medical clearance for psychiatric admission    Autism spectrum disorder    Mild tetrahydrocannabinol (THC) abuse  Resolved  "Problems:    * No resolved hospital problems. *      Reason for Admission/HPI (as per admission documentation):     Per ED provider note:  20-year-old male who presents to the emergency room for behavioral health evaluation.  Patient not speaking to me or answering questions to majority of history obtained by EMS, police.  Apparently patient had an argument with his mother earlier today.  Grabbed a knife and cut the top of both of his arms multiple times.  Superficial.  Now presents for further evaluation.  When asked if patient feels like he needs help states \"he always needs help \"but we do not provide it for him.  States we will forget about him when he leaves.      Per Crisis worker note:  21 y/o male presented to the ED by Tamra LOGAN. Patient had an argument with mother. At some point patient grabbed kitchen knife and was threatening to hurt himself. Mom called 911. Patient presents with superficial cuts to arms. Patient is well known to ED. PES went into speak with the patient to complete the crisis and safety assessment. The patient stated he snapped today. Patient said he lost control after he had a fight with his mom and sister. The patient took a kitchen knife and cut both his arms. PES asked the patient what was his intent when cutting himself the patient responded with he was not sure. The patient stated he was really upset and seeing red. The patient stated he has been a having SI for a bit now. The patient reports having HI ( target is his sister) no plan. The patient denies AVH, Paranoia. The patient reports having poor sleep and a poor appetite. The patient has hx of inpatient treatment. The patient stated he hasn't been taking his medications due to not having any insurance. The patient wants to stay voluntarily for treatment. Patient does not have any health insurance 201 signed and sent to intake for review      On admission to Inpatient Psychiatric Unit:  Patient is a 20-year-old white male " "with a past psychiatric history of depression, autism spectrum disorder, conduct disorder, and ADHD, with an extensive history of psychiatric utilization, who presents voluntarily to inpatient U after grabbing a kitchen knife and superficially cutting himself.     Symptoms prior to hospitalization include: Getting an argument with his mom and sister, proceeding to grab a kitchen knife and applying superficial cuts to his bilateral forearms, agitation, irritability, inability to control impulses, ADHD symptoms, and generalized anxiety.  Symptom severity is rated as moderate.  Timeline is acute on chronic.  Mitigating factors are family support.  Exacerbating stressors are inability to obtain psychotropic medications due to being uninsured.     On initial psychiatric evaluation today, the patient clarifies that he was not trying to kill himself and that he was just trying to \"feel better.\"  He states that he has \"a few\" suicide attempts in the past and has previous episodes of agitation and violence because he is unable to control his impulses and control his agitation.  The patient states that he was previously on Prozac, Abilify Maintena long-acting injectable, and Topamax, and this combination helped best control his mood and his anger.  Unfortunately, the patient was unable to stay on these medications or go to any psychiatric facility because he did not have insurance.  He is requesting to restart them.  He states that he thinks he has bipolar because \"1 minute I feel happy in the next I feel upset.\"  He otherwise provides no symptomatology or natural history that is consistent with bipolar spectrum disease.  He denies hallucinations.  He denies current suicidal or homicidal ideation.       Past Medical History[1]  Past Surgical History[2]    Medications:    Current Facility-Administered Medications   Medication Dose Route Frequency Provider Last Rate    aluminum-magnesium hydroxide-simethicone  30 mL Oral Q4H " PRN Whit Hill MD      ARIPiprazole  5 mg Oral Daily [facility], DO      haloperidol lactate  2.5 mg Intramuscular Q4H PRN Max 4/day Whit Hill MD      And    LORazepam  1 mg Intramuscular Q4H PRN Max 4/day Whit Hill MD      And    benztropine  0.5 mg Intramuscular Q4H PRN Max 4/day Whit Hill MD      haloperidol lactate  5 mg Intramuscular Q4H PRN Max 4/day Whit Hill MD      And    LORazepam  2 mg Intramuscular Q4H PRN Max 4/day [facility] MD      And    benztropine  1 mg Intramuscular Q4H PRN Max 4/day Whit Hill MD      benztropine  1 mg Intramuscular Q4H PRN Max 6/day Whit Hill MD      benztropine  1 mg Oral Q4H PRN Max 6/day Whit Hill MD      bisacodyl  10 mg Rectal Daily PRN Whit Hill MD      hydrOXYzine HCL  50 mg Oral Q6H PRN Max 4/day Whit Hill MD      Or    diphenhydrAMINE  50 mg Intramuscular Q6H PRN Whit Hill MD      fenofibrate  48 mg Oral Daily Ana Russo, PA-C      FLUoxetine  20 mg Oral Daily [facility], DO      haloperidol  1 mg Oral Q6H PRN Whit Hill MD      haloperidol  2.5 mg Oral Q4H PRN Max 4/day Whit Hill MD      haloperidol  5 mg Oral Q4H PRN Max 4/day Whit Hill MD      hydrOXYzine HCL  100 mg Oral Q6H PRN Max 4/day Whit Hill MD      Or    LORazepam  2 mg Intramuscular Q6H PRN Whit Hill MD      hydrOXYzine HCL  25 mg Oral Q6H PRN Max 4/day Whit Hill MD      ibuprofen  400 mg Oral Q4H PRN Whit Hill MD      ibuprofen  600 mg Oral Q6H PRN Whit Hill MD      ibuprofen  800 mg Oral Q8H PRN Whit Hill MD      insulin glargine  20 Units Subcutaneous HS Ana Russo, PA-C      insulin lispro  1-6 Units Subcutaneous TID AC Ana Russo PA-C      insulin lispro  1-6 Units Subcutaneous HS Ana Russo PA-C      insulin lispro  7 Units Subcutaneous TID With Meals Ana Russo PA-C      nicotine  polacrilex  4 mg Oral Q2H PRN Whit Hill MD      polyethylene glycol  17 g Oral Daily PRN Whit Hill MD      propranolol  10 mg Oral Q8H PRN Whit Hill MD      senna-docusate sodium  1 tablet Oral Daily PRBERTHA Hill MD      sodium bicarbonate  650 mg Oral BID after meals Ana Russo PA-C      sulfamethoxazole-trimethoprim  1 tablet Oral Q12H [facility] Ana Russo PA-C      topiramate  25 mg Oral BID Cristi Quiles DO      traZODone  50 mg Oral HS PRN Whit Hill MD         Allergies:     Allergies[3]    Hospital Course:  Rik was admitted to the inpatient psychiatric unit on 5/22/2025. During their hospitalization, Rik was encouraged to attend groups and interact appropriately and positively with others in the milieu.     Rik was started on the following psychiatric medications: Abilify 5mg po QD, Prozac 20mg po QD, and Topamax 25mg po BID.  Additionally, patient was restarted on non-psychiatric medications including: Fenofibrate 48mg po QD, Insulin (Lantus) 20units HS, Insulin (Lispro) 7 units TID with meals, Sodium Bicarbonate 650mg po BID after meals, Bactrim 800-160mg PO Q12 hours for an abscess.     These medications were titrated up to a therapeutic range as evidenced by a reduction in Rik's reported psychiatric symptomatology. There were no side effects noted or reported throughout Rik's psychiatric hospitalization.     At the time of discharge, there were no criteria present through which Rik could be kept involuntarily for further psychiatric hospitalization. Patient was able to articulate a safety plan upon discharge home, including going to any ED if their symptoms return or worsen, or calling 911. We discussed mitigating factors against suicidal behavior, and the patient was able to articulate these mitigating factors which outweighed any potential exacerbating stressors. Patient explicitly denied suicidal ideation, plan, or intent. They  "explicitly stated they felt safe to return to the community.     Patient was prescribed a 30 day supply of all medications. We coordinated with [facility] to prescribe pen version of his insulin regimen since the patient reported not having any at home.    An outpatient discharge/follow up plan was discussed and coordinated between Rik, this writer, and case management team.    Specific discharge disposition: Home. Patient to f/u with [facility].    Vital signs in last 24 hours:    Temp:  [98.2 °F (36.8 °C)-99.8 °F (37.7 °C)] 98.2 °F (36.8 °C)  HR:  [] 99  BP: (135-148)/(96-99) 148/96  Resp:  [18] 18  SpO2:  [100 %] 100 %  O2 Device: None (Room air)    Mental Status Exam on day of discharge:  Appearance: moderately kempt  Motor: no psychomotor disturbances  Behavior: superficially cooperative  Speech: mostly normal with brief periods of increased volume  Mood: \"good\"  Affect: constricted  Thought Process: concrete but answers questions in a mostly linear fashion  Thought Content: denies auditory hallucinations, denies visual hallucinations, denies delusions  Risk Potential: denies suicidal ideation, plan, or intent. Denies homicidal ideation  Sensorium: Oriented to person, place, time, and situation  Cognition: cognitive ability mildly impaired but was not quantitatively tested  Consciousness: alert and awake  Attention: currently intact  Insight: limited  Judgement: limited      Suicide/Homicide Risk Assessment:    Risk of Harm to Self:   Patient denied suicidal thoughts, intent, plan, or acts of furtherance at the time of discharge.    Risk of Harm to Others:  Patient denied homicidal thoughts or intent at the time of discharge    Laboratory Results: I have personally reviewed the following pertinent lab results.    Recent Results (from the past 48 hours)   Fingerstick Glucose (POCT)    Collection Time: 05/25/25 11:22 AM   Result Value Ref Range    POC Glucose 355 (H) 65 - 140 mg/dl   Fingerstick Glucose (POCT) "    Collection Time: 05/25/25  3:59 PM   Result Value Ref Range    POC Glucose 250 (H) 65 - 140 mg/dl   Fingerstick Glucose (POCT)    Collection Time: 05/25/25  9:01 PM   Result Value Ref Range    POC Glucose 232 (H) 65 - 140 mg/dl   Fingerstick Glucose (POCT)    Collection Time: 05/26/25  8:11 AM   Result Value Ref Range    POC Glucose 242 (H) 65 - 140 mg/dl   Fingerstick Glucose (POCT)    Collection Time: 05/26/25 11:06 AM   Result Value Ref Range    POC Glucose 302 (H) 65 - 140 mg/dl   Fingerstick Glucose (POCT)    Collection Time: 05/26/25  4:12 PM   Result Value Ref Range    POC Glucose 176 (H) 65 - 140 mg/dl   Fingerstick Glucose (POCT)    Collection Time: 05/26/25  8:40 PM   Result Value Ref Range    POC Glucose 213 (H) 65 - 140 mg/dl   Fingerstick Glucose (POCT)    Collection Time: 05/27/25  8:04 AM   Result Value Ref Range    POC Glucose 289 (H) 65 - 140 mg/dl        Discharge Medications:    Current Discharge Medication List        START taking these medications    Details   !! Alcohol Swabs 70 % PADS May substitute brand based on insurance coverage. Check glucose TID.  Qty: 100 each, Refills: 0    Associated Diagnoses: Type 2 diabetes mellitus without complication, without long-term current use of insulin (HCC)      ARIPiprazole (ABILIFY) 5 mg tablet Take 1 tablet (5 mg total) by mouth daily  Qty: 30 tablet, Refills: 0    Associated Diagnoses: Autism spectrum disorder      !! Blood Glucose Monitoring Suppl (OneTouch Verio Reflect) w/Device KIT May substitute brand based on insurance coverage. Check glucose TID.  Qty: 1 kit, Refills: 0    Associated Diagnoses: Type 2 diabetes mellitus without complication, without long-term current use of insulin (HCC)      fenofibrate (TRICOR) 48 mg tablet Take 1 tablet (48 mg total) by mouth daily  Qty: 30 tablet, Refills: 0    Associated Diagnoses: Medical clearance for psychiatric admission      !! OneTouch Delica Lancets 33G MISC May substitute brand based on insurance  coverage. Check glucose TID.  Qty: 100 each, Refills: 0    Associated Diagnoses: Type 2 diabetes mellitus without complication, without long-term current use of insulin (HCC)      sodium bicarbonate 650 mg tablet Take 1 tablet (650 mg total) by mouth 2 (two) times daily after meals  Qty: 60 tablet, Refills: 0    Associated Diagnoses: Medical clearance for psychiatric admission      sulfamethoxazole-trimethoprim (BACTRIM DS) 800-160 mg per tablet Take 1 tablet by mouth every 12 (twelve) hours for 5 doses  Qty: 5 tablet, Refills: 0    Associated Diagnoses: Medical clearance for psychiatric admission       !! - Potential duplicate medications found. Please discuss with provider.           Current Discharge Medication List        STOP taking these medications       Abilify Maintena 400 MG injection Comments:   Reason for Stopping:         acetaminophen (TYLENOL) 500 mg tablet Comments:   Reason for Stopping:         albuterol (2.5 mg/3 mL) 0.083 % nebulizer solution Comments:   Reason for Stopping:         cephalexin (KEFLEX) 500 mg capsule Comments:   Reason for Stopping:         dicyclomine (BENTYL) 20 mg tablet Comments:   Reason for Stopping:         ibuprofen (MOTRIN) 800 mg tablet Comments:   Reason for Stopping:         metFORMIN (GLUCOPHAGE-XR) 500 mg 24 hr tablet Comments:   Reason for Stopping:         naproxen (Naprosyn) 500 mg tablet Comments:   Reason for Stopping:         ondansetron (ZOFRAN-ODT) 4 mg disintegrating tablet Comments:   Reason for Stopping:                Current Discharge Medication List        CONTINUE these medications which have CHANGED    Details   FLUoxetine (PROzac) 20 mg capsule Take 1 capsule (20 mg total) by mouth daily  Qty: 30 capsule, Refills: 0    Associated Diagnoses: Autism spectrum disorder      glucose blood (OneTouch Verio) test strip May substitute brand based on insurance coverage. Check glucose TID.  Qty: 100 each, Refills: 0    Associated Diagnoses: Type 2 diabetes  mellitus without complication, without long-term current use of insulin (HCC)      insulin aspart (NovoLOG FlexPen) 100 UNIT/ML injection pen Inject 7 Units under the skin 3 (three) times a day with meals  Qty: 15 mL, Refills: 0    Associated Diagnoses: Type 2 diabetes mellitus without complication, without long-term current use of insulin (HCC)      Insulin Glargine Solostar (Lantus SoloStar) 100 UNIT/ML SOPN Inject 0.2 mL (20 Units total) under the skin daily at bedtime  Qty: 10 mL, Refills: 0    Associated Diagnoses: Uncontrolled type 2 diabetes mellitus with hyperglycemia (HCC)      topiramate (TOPAMAX) 25 mg tablet Take 1 tablet (25 mg total) by mouth 2 (two) times a day  Qty: 60 tablet, Refills: 0    Associated Diagnoses: Autism spectrum disorder              Current Discharge Medication List        CONTINUE these medications which have NOT CHANGED    Details   !! Alcohol Swabs 70 % PADS May substitute brand based on insurance coverage. Check glucose TID.  Qty: 100 each, Refills: 0    Associated Diagnoses: Uncontrolled type 2 diabetes mellitus with hyperglycemia (HCC)      !! Blood Glucose Monitoring Suppl (OneTouch Verio Reflect) w/Device KIT May substitute brand based on insurance coverage. Check glucose TID.  Qty: 1 kit, Refills: 1    Associated Diagnoses: Uncontrolled type 2 diabetes mellitus with hyperglycemia (HCC)      !! Blood Glucose Monitoring Suppl (OneTouch Verio) w/Device KIT Use 2 (two) times a day Ok to substitute with insurance covered brand  Qty: 1 kit, Refills: 0    Associated Diagnoses: Uncontrolled type 2 diabetes mellitus with hyperglycemia (HCC)      Insulin Pen Needle (BD Pen Needle Shani 2nd Gen) 32G X 4 MM MISC For use with insulin pen. Pharmacy may dispense brand covered by insurance.  Qty: 100 each, Refills: 0    Associated Diagnoses: Uncontrolled type 2 diabetes mellitus with hyperglycemia (HCC)      !! Lancets (onetouch ultrasoft) lancets Use as instructed  Qty: 100 each, Refills: 0     Comments: Ok to substitute with insurance covered brand  Associated Diagnoses: Uncontrolled type 2 diabetes mellitus with hyperglycemia (HCC)      !! OneTouch Delica Lancets 33G MISC May substitute brand based on insurance coverage. Check glucose TID.  Qty: 100 each, Refills: 0    Associated Diagnoses: Uncontrolled type 2 diabetes mellitus with hyperglycemia (HCC)       !! - Potential duplicate medications found. Please discuss with provider.           Discharge instructions/Information to patient and family:   See After Visit Summary for information provided to patient and family.      Provisions for Follow-Up Care:  See after visit summary for information related to follow-up care and any pertinent home health orders.      Discharge Statement:    I spent 50 minutes discharging the patient. This time was spent on the day of discharge. I had direct contact with the patient on the day of discharge.     Additional documentation is required if more than 30 minutes were spent on discharge:    I had face-to-face contact with the patient and discussed discharge treatment plan  I reviewed the importance of compliance with medications and outpatient treatment after discharge with the patient.  I discussed discharge and treatment plan with the patient, nursing, and case management/social work.  I discussed the medication regimen and possible side effects of the medications with the patient prior to discharge. At the time of discharge they were tolerating psychiatric medications.  I discussed outpatient follow up with the patient as per treatment plan / aftercare summary.  I reviewed elements of the aftercare plan with the patient. I discussed that if symptoms worsen or reoccur, that the patient can return to the emergency room or dial 911 in an emergency.  I reviewed pertinent mitigating vs exacerbating stressors, as well as other risk factors, as they relate to potential suicidal behavior.  I reviewed the patient's  hospital course and current psychiatric disease status. As of today, they are now at goal. They are psychiatrically stable for discharge home. The combination of active psychopharmacological medication management, interdisciplinary coordination with case management, and adjunctive milieu and group therapy to augment psychopharmacological efficacy appears to have been successful. The patient's risk of morbidity, and progression or decompensation of psychiatric disease, is lower today as compared to the day of admission.    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    [2]   Past Surgical History:  Procedure Laterality Date    INCISION AND DRAINAGE OF WOUND Left 5/13/2024    Procedure: INCISION AND DRAINAGE (I&D) GROIN;  Surgeon: Tim Ch MD;  Location: [facility];  Service: General    INCISION AND DRAINAGE OF WOUND Left 5/15/2024    Procedure: INCISION AND DRAINAGE (I&D) GROIN;  Surgeon: Tim Ch MD;  Location: [facility];  Service: General    [facility] I&D VULVA/PERINEAL ABSCESS N/A 5/11/2024    Procedure: INCISION AND DRAINAGE (I&D) PERINEAL ABSCESS;  Surgeon: Tim Ch MD;  Location: [facility];  Service: General    TONSILECTOMY AND ADNOIDECTOMY      2020   [3] No Known Allergies

## 2025-05-27 NOTE — ASSESSMENT & PLAN NOTE
Restarted Prozac 20mg po QD  Restarted Topamax 25mg po BID  Restarted Abilify 5mg po QD   72 hr notice placed 5/24 rescinded on 5/26. Hoping still for D/C on 5/27. Signed JENNIFER and will allow team to help set up appropriate DC planning. No 302 behaviors on unit documented but will await collateral from CM to help assist with planning

## 2025-05-27 NOTE — PROGRESS NOTES
05/27/25 9130   Team Meeting   Meeting Type Daily Rounds   Team Members Present   Team Members Present Physician;Nurse;;Other (Discipline and Name)   Physician Team Member Dr. Post / JEN Davila / Dr. Quiles   Nursing Team Member Staci / Alex   Care Management Team Member Georgina / Ade / Andreina / Monico   Other (Discipline and Name) Mirza (Group Facilitator)   Patient/Family Present   Patient Present No   Patient's Family Present No     Treatment Team Rounds Completed  Medical and Psychiatric Review Completed  D/C: Pt is scheduled to discharge today to return to his mom's residence and to follow up with [facility] or [facility] for MHOP.

## 2025-05-27 NOTE — PLAN OF CARE
Problem: DISCHARGE PLANNING  Goal: Discharge to home or other facility with appropriate resources  Description: INTERVENTIONS:  - Identify barriers to discharge w/patient and caregiver  - Arrange for needed discharge resources and transportation as appropriate  - Identify discharge learning needs (meds, wound care, etc.)  - Arrange for interpretive services to assist at discharge as needed  - Refer to Case Management Department for coordinating discharge planning if the patient needs post-hospital services based on physician/advanced practitioner order or complex needs related to functional status, cognitive ability, or social support system  Outcome: Adequate for Discharge  Note: Pt is scheduled to discharge today to return to his mom's residence. Pt is connected with [facility] for [facility]OP and was referred for [facility] for PCP.

## 2025-05-27 NOTE — CASE MANAGEMENT
CM called [facility] @[phone]  to schedule a follow up appointment. They stated that they cannot scheduled him due to him needing a higher level of care coming from the hospital.     CM called the Pt's mom Lissa Lang @[phone] to discuss admission and discharge planning. CM introduced self and discussed role. CM asked if the Pt has MHOP. She stated that the Pt gets kicked out of places he goes to due to bringing marijuana with him or a knife. She stated that they have only been to [facility] for crisis not MHOP. She stated that he is noncompliant with his psych medication and diabetes medication. She stated that he feels good when he gets home and stops the meds. She stated that he decompensates and then threatens to kill himself. She stated that he should go to a group home cause he does well in settings like that. She stated that he is welcome home but they are getting evicted. She stated that the Pt doesn't have any insulin or the supplies at home. She stated that he has no income. CM stated that with the Pt not having insurance, it is hard to get him set up with services like group home and MHOP. CM stated that they are referring him to [facility] to get [facility] funding set up and they will make sure the Pt has his medication in hand. DIONICIO stated that [facility] is who they work with and they contact the Pt either while inpatient or when discharged to get his insurance active. She verbalized understanding and stated that he is getting too much to handle for her.     CM met with the Pt to sign JENNIFER for [facility] and [facility]. Pt agreeable and signed ROIs.     CM called [facility] @[phone] to discuss [facility] funding. They stated that the CM can email clinicals to them at [email or web address] and they will contact the Pt to scheduled. CM emailed facesheet, psych eval, and discharge summary to [facility].     CM called to  confirm they received the clinicals. They confirmed that they received them.     CM called [facility] @[phone] to see the Pt is still active with them. She stated that the Pt didn't actually come in to see them. She stated that they can schedule him as a new patient Monday 6/2/2025 @1:40pm.     CM called the Pt's mom Lissa Lang @[phone] to discuss discharge planning. CM reviewed follow up treatment and that they will provide the Pt with his medication and blood sugar equipment in hand for discharge. CM suggested that the Pt follow up with getting his insurance reactive. She verbalized frustration with the hospital not being able to get the Pt emergency insurance. CM attempted to review process with [facility] but she kept interrupting CM. She stated that the Pt is basically being discharged with nothing. CM reviewed follow up for MHOP and a new patient appointment for PCP so that he can manage his diabetes. She remains focused on insurance and how a [facility] was able to get her set up while she was admitted. CM reviewed their process and stated that they are located in [place]. She stated [facility] doesn't help him either and just sends him home. She stated that none of the hospitals want to help. CM attempted to review discharge plan and suggestions and that they will schedule the Pt a ride. She hung up on the CM.     CM met with the Pt to discuss discharge planning. CM reviewed follow up appointments and conversation with his mom. He stated that he will follow up and be compliant with medication and treatment. Pt stated that he knows how to and friend who can help him get his insurance set up. CM stated that the Pt will have his medication when he discharges. Pt verbalized understanding and readiness for discharge. Pt signed free local transportation waiver.    CM booked [facility] via [employer] for 12:45pm.

## 2025-05-27 NOTE — NURSING NOTE
Pt given PRN trazodone 50 mg to assist with insomnia. On follow up, pt is currently up walking halls. PRN not effective.

## 2025-05-27 NOTE — PLAN OF CARE
Problem: DISCHARGE PLANNING  Goal: Discharge to home or other facility with appropriate resources  Description: INTERVENTIONS:  - Identify barriers to discharge w/patient and caregiver  - Arrange for needed discharge resources and transportation as appropriate  - Identify discharge learning needs (meds, wound care, etc.)  - Arrange for interpretive services to assist at discharge as needed  - Refer to Case Management Department for coordinating discharge planning if the patient needs post-hospital services based on physician/advanced practitioner order or complex needs related to functional status, cognitive ability, or social support system  Outcome: Adequate for Discharge     Problem: Knowledge Deficit  Goal: Patient/family/caregiver demonstrates understanding of disease process, treatment plan, medications, and discharge instructions  Description: Complete learning assessment and assess knowledge base.  Interventions:  - Provide teaching at level of understanding  - Provide teaching via preferred learning methods  Outcome: Adequate for Discharge     Problem: Ineffective Coping  Goal: Identifies ineffective coping skills  Outcome: Adequate for Discharge  Goal: Identifies healthy coping skills  Outcome: Adequate for Discharge  Goal: Demonstrates healthy coping skills  Outcome: Adequate for Discharge  Goal: Participates in unit activities  Description: Interventions:  - Provide therapeutic environment   - Provide required programming   - Redirect inappropriate behaviors   Outcome: Adequate for Discharge  Goal: Patient/Family participate in treatment and DC plans  Description: Interventions:  - Provide therapeutic environment  Outcome: Adequate for Discharge  Goal: Patient/Family verbalizes awareness of resources  Outcome: Adequate for Discharge     Problem: Depression  Goal: Treatment Goal: Demonstrate behavioral control of depressive symptoms, verbalize feelings of improved mood/affect, and adopt new coping  skills prior to discharge  Outcome: Adequate for Discharge     Problem: Anxiety  Goal: Anxiety is at manageable level  Description: Interventions:  - Assess and monitor patient's anxiety level.   - Monitor for signs and symptoms (heart palpitations, chest pain, shortness of breath, headaches, nausea, feeling jumpy, restlessness, irritable, apprehensive).   - Collaborate with interdisciplinary team and initiate plan and interventions as ordered.  - [place] patient to unit/surroundings  - Explain treatment plan  - Encourage participation in care  - Encourage verbalization of concerns/fears  - Identify coping mechanisms  - Assist in developing anxiety-reducing skills  - Administer/offer alternative therapies  - Limit or eliminate stimulants  Outcome: Adequate for Discharge     Problem: Risk for Violence/Aggression Toward Others  Goal: Treatment Goal: Refrain from acts of violence/aggression during length of stay, and demonstrate improved impulse control at the time of discharge  Outcome: Adequate for Discharge     Expressed readiness for discharge.

## 2025-06-03 PROBLEM — T74.32XA PROBLEM WITH CHILD BEING BULLIED: Status: ACTIVE | Noted: 2019-03-07

## 2025-06-03 PROBLEM — H50.00 ESOTROPIA: Status: ACTIVE | Noted: 2019-03-07

## 2025-06-03 PROBLEM — F32.A DEPRESSIVE DISORDER: Status: ACTIVE | Noted: 2019-03-07

## 2025-06-03 PROBLEM — F60.3 BORDERLINE PERSONALITY DISORDER (HCC): Status: ACTIVE | Noted: 2025-04-21

## 2025-06-03 PROBLEM — R46.89 AGGRESSION: Status: ACTIVE | Noted: 2021-10-29

## 2025-06-03 PROBLEM — J35.1 ENLARGED TONSILS: Status: ACTIVE | Noted: 2019-03-07

## 2025-06-03 PROBLEM — F39 MOOD DISORDER (HCC): Status: ACTIVE | Noted: 2021-11-03

## 2025-06-03 PROBLEM — R45.89 SYMPTOMS OF DEPRESSION: Status: ACTIVE | Noted: 2020-12-26

## 2025-06-03 PROBLEM — E78.5 HYPERLIPIDEMIA: Status: ACTIVE | Noted: 2021-01-28

## 2025-06-03 PROBLEM — N49.3 FOURNIER'S GANGRENE: Status: RESOLVED | Noted: 2024-05-11 | Resolved: 2025-06-03

## 2025-07-09 NOTE — ED NOTES
"Met with patient to complete the crisis intake assessment and safety risk assessment.  Patient reported that he wanted to get back on his medication that he hasn't been on \"for years.\"  [facility] asked patient if he had court tomorrow and he confirmed.  PES asked patient if he was asking for [facility] because he wanted to get out of going to court tomorrow.  Patient said yes.  PES advised that he would not get out of court, he would need to go tomorrow or later.  PES asked if he wanted to kill himself.  Patient denied.  Patient reported that he doesn't have a ride to court.  PES told him to take an Uber and he said he doesn't have money.  Patient was advised that if he doesn't appear at his hearing, a bench warrant would most likely be issued.    Patient agreed to discharge.  " Detail Level: Detailed Quality 226: Preventive Care And Screening: Tobacco Use: Screening And Cessation Intervention: Patient screened for tobacco use and is an ex/non-smoker

## (undated) DEVICE — IODOFORM PACKING STRIP: Brand: CURITY

## (undated) DEVICE — NEPTUNE E-SEP SMOKE EVACUATION PENCIL, COATED, 70MM BLADE, PUSH BUTTON SWITCH: Brand: NEPTUNE E-SEP

## (undated) DEVICE — PADDING CAST 3IN COTTON STRL

## (undated) DEVICE — SPONGE LAP 18 X 18 IN STRL RFD

## (undated) DEVICE — SPONGE GAUZE 4 X 9

## (undated) DEVICE — STRL PENROSE DRAIN 18" X 1/2": Brand: CARDINAL HEALTH

## (undated) DEVICE — 34" SINGLE PATIENT USE HOVERMATT: Brand: SINGLE PATIENT USE HOVERMATT

## (undated) DEVICE — PERI/GYN PACK: Brand: CONVERTORS

## (undated) DEVICE — GLOVE INDICATOR PI UNDERGLOVE SZ 7.5 BLUE

## (undated) DEVICE — TIBURON TRANSVERSE LAPAROTOMY SHEET: Brand: CONVERTORS

## (undated) DEVICE — GLOVE SRG BIOGEL 8

## (undated) DEVICE — POV-IOD SOLUTION 4OZ BT

## (undated) DEVICE — PACK GENERAL LF

## (undated) DEVICE — UNDER BUTTOCKS DRAPE: Brand: CONVERTORS

## (undated) DEVICE — STERILE DOUBLE BASIN SET PACK: Brand: CARDINAL HEALTH

## (undated) DEVICE — HANDPIECE SET WITH HIGH FLOW TIP AND SUCTION TUBE: Brand: INTERPULSE

## (undated) DEVICE — DRAPE UTILITY

## (undated) DEVICE — PREMIUM DRY TRAY LF: Brand: MEDLINE INDUSTRIES, INC.

## (undated) DEVICE — INTENDED FOR TISSUE SEPARATION, AND OTHER PROCEDURES THAT REQUIRE A SHARP SURGICAL BLADE TO PUNCTURE OR CUT.: Brand: BARD-PARKER SAFETY BLADES SIZE 15, STERILE

## (undated) DEVICE — ASTOUND STANDARD SURGICAL GOWN, XL: Brand: CONVERTORS

## (undated) DEVICE — DISPOSABLE BRIEF/UNDERWEAR

## (undated) DEVICE — LEGGINGS: Brand: CONVERTORS

## (undated) DEVICE — ABDOMINAL PAD: Brand: DERMACEA

## (undated) DEVICE — BULB SYRINGE,IRRIGATION WITH PROTECTIVE CAP: Brand: DOVER

## (undated) DEVICE — ASTOUND IMPERVIOUS SURGICAL GOWN: Brand: CONVERTORS

## (undated) DEVICE — GAUZE SPONGES,16 PLY: Brand: CURITY

## (undated) DEVICE — LAVH/LAPAROSCOPY DRAPE: Brand: CONVERTORS